# Patient Record
Sex: MALE | Race: WHITE | Employment: FULL TIME | URBAN - METROPOLITAN AREA
[De-identification: names, ages, dates, MRNs, and addresses within clinical notes are randomized per-mention and may not be internally consistent; named-entity substitution may affect disease eponyms.]

---

## 2018-01-01 ENCOUNTER — APPOINTMENT (OUTPATIENT)
Dept: GENERAL RADIOLOGY | Age: 63
DRG: 870 | End: 2018-01-01
Attending: INTERNAL MEDICINE
Payer: COMMERCIAL

## 2018-01-01 ENCOUNTER — APPOINTMENT (OUTPATIENT)
Dept: MRI IMAGING | Age: 63
DRG: 870 | End: 2018-01-01
Attending: INTERNAL MEDICINE
Payer: COMMERCIAL

## 2018-01-01 ENCOUNTER — APPOINTMENT (OUTPATIENT)
Dept: CT IMAGING | Age: 63
DRG: 870 | End: 2018-01-01
Attending: INTERNAL MEDICINE
Payer: COMMERCIAL

## 2018-01-01 ENCOUNTER — HOSPITAL ENCOUNTER (INPATIENT)
Age: 63
LOS: 16 days | DRG: 870 | End: 2018-02-28
Attending: EMERGENCY MEDICINE | Admitting: INTERNAL MEDICINE
Payer: COMMERCIAL

## 2018-01-01 ENCOUNTER — APPOINTMENT (OUTPATIENT)
Dept: INTERVENTIONAL RADIOLOGY/VASCULAR | Age: 63
DRG: 870 | End: 2018-01-01
Attending: INTERNAL MEDICINE
Payer: COMMERCIAL

## 2018-01-01 ENCOUNTER — APPOINTMENT (OUTPATIENT)
Dept: NUCLEAR MEDICINE | Age: 63
DRG: 870 | End: 2018-01-01
Attending: INTERNAL MEDICINE
Payer: COMMERCIAL

## 2018-01-01 ENCOUNTER — APPOINTMENT (OUTPATIENT)
Dept: ULTRASOUND IMAGING | Age: 63
DRG: 870 | End: 2018-01-01
Attending: INTERNAL MEDICINE
Payer: COMMERCIAL

## 2018-01-01 ENCOUNTER — APPOINTMENT (OUTPATIENT)
Dept: CT IMAGING | Age: 63
DRG: 870 | End: 2018-01-01
Attending: EMERGENCY MEDICINE
Payer: COMMERCIAL

## 2018-01-01 ENCOUNTER — APPOINTMENT (OUTPATIENT)
Dept: GENERAL RADIOLOGY | Age: 63
DRG: 870 | End: 2018-01-01
Attending: EMERGENCY MEDICINE
Payer: COMMERCIAL

## 2018-01-01 VITALS
SYSTOLIC BLOOD PRESSURE: 79 MMHG | DIASTOLIC BLOOD PRESSURE: 35 MMHG | OXYGEN SATURATION: 100 % | RESPIRATION RATE: 39 BRPM | HEIGHT: 72 IN | BODY MASS INDEX: 42.66 KG/M2 | WEIGHT: 315 LBS | TEMPERATURE: 99 F

## 2018-01-01 DIAGNOSIS — R50.9 FEVER IN ADULT: ICD-10-CM
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
## 2018-01-01 DIAGNOSIS — E87.6 HYPOKALEMIA: ICD-10-CM

## 2018-01-01 DIAGNOSIS — N17.9 AKI (ACUTE KIDNEY INJURY) (HCC): ICD-10-CM

## 2018-01-01 DIAGNOSIS — A41.9 SEPSIS, DUE TO UNSPECIFIED ORGANISM: Primary | ICD-10-CM

## 2018-01-01 DIAGNOSIS — B95.62 MRSA BACTEREMIA: ICD-10-CM

## 2018-01-01 DIAGNOSIS — J96.00 ACUTE RESPIRATORY FAILURE, UNSPECIFIED WHETHER WITH HYPOXIA OR HYPERCAPNIA (HCC): ICD-10-CM

## 2018-01-01 DIAGNOSIS — R78.81 MRSA BACTEREMIA: ICD-10-CM

## 2018-01-01 DIAGNOSIS — E86.0 DEHYDRATION, MODERATE: ICD-10-CM

## 2018-01-01 LAB
1,25(OH)2D3 SERPL-MCNC: 19.1 PG/ML (ref 19.9–79.3)
ABO + RH BLD: NORMAL
ABO + RH BLD: NORMAL
ALBUMIN SERPL-MCNC: 1.5 G/DL (ref 3.4–5)
ALBUMIN SERPL-MCNC: 1.6 G/DL (ref 3.4–5)
ALBUMIN SERPL-MCNC: 1.7 G/DL (ref 3.4–5)
ALBUMIN SERPL-MCNC: 1.7 G/DL (ref 3.4–5)
ALBUMIN SERPL-MCNC: 1.8 G/DL (ref 3.4–5)
ALBUMIN SERPL-MCNC: 1.8 G/DL (ref 3.4–5)
ALBUMIN SERPL-MCNC: 1.9 G/DL (ref 3.4–5)
ALBUMIN SERPL-MCNC: 2 G/DL (ref 3.4–5)
ALBUMIN SERPL-MCNC: 2 G/DL (ref 3.4–5)
ALBUMIN SERPL-MCNC: 2.7 G/DL (ref 3.4–5)
ALBUMIN SERPL-MCNC: 3 G/DL (ref 3.4–5)
ALBUMIN/GLOB SERPL: 0.4 {RATIO} (ref 0.8–1.7)
ALBUMIN/GLOB SERPL: 0.5 {RATIO} (ref 0.8–1.7)
ALBUMIN/GLOB SERPL: 0.7 {RATIO} (ref 0.8–1.7)
ALBUMIN/GLOB SERPL: 0.7 {RATIO} (ref 0.8–1.7)
ALP SERPL-CCNC: 113 U/L (ref 45–117)
ALP SERPL-CCNC: 122 U/L (ref 45–117)
ALP SERPL-CCNC: 181 U/L (ref 45–117)
ALP SERPL-CCNC: 215 U/L (ref 45–117)
ALP SERPL-CCNC: 224 U/L (ref 45–117)
ALP SERPL-CCNC: 226 U/L (ref 45–117)
ALP SERPL-CCNC: 262 U/L (ref 45–117)
ALP SERPL-CCNC: 317 U/L (ref 45–117)
ALP SERPL-CCNC: 394 U/L (ref 45–117)
ALP SERPL-CCNC: 414 U/L (ref 45–117)
ALP SERPL-CCNC: 46 U/L (ref 45–117)
ALP SERPL-CCNC: 54 U/L (ref 45–117)
ALP SERPL-CCNC: 57 U/L (ref 45–117)
ALP SERPL-CCNC: 62 U/L (ref 45–117)
ALP SERPL-CCNC: 68 U/L (ref 45–117)
ALP SERPL-CCNC: 72 U/L (ref 45–117)
ALP SERPL-CCNC: 97 U/L (ref 45–117)
ALT SERPL-CCNC: 20 U/L (ref 16–61)
ALT SERPL-CCNC: 21 U/L (ref 16–61)
ALT SERPL-CCNC: 21 U/L (ref 16–61)
ALT SERPL-CCNC: 22 U/L (ref 16–61)
ALT SERPL-CCNC: 23 U/L (ref 16–61)
ALT SERPL-CCNC: 25 U/L (ref 16–61)
ALT SERPL-CCNC: 26 U/L (ref 16–61)
ALT SERPL-CCNC: 27 U/L (ref 16–61)
ALT SERPL-CCNC: 28 U/L (ref 16–61)
ALT SERPL-CCNC: 29 U/L (ref 16–61)
ALT SERPL-CCNC: 33 U/L (ref 16–61)
ALT SERPL-CCNC: 34 U/L (ref 16–61)
ALT SERPL-CCNC: 35 U/L (ref 16–61)
ALT SERPL-CCNC: 36 U/L (ref 16–61)
ALT SERPL-CCNC: 37 U/L (ref 16–61)
ALT SERPL-CCNC: 40 U/L (ref 16–61)
ALT SERPL-CCNC: 45 U/L (ref 16–61)
AMORPH CRY URNS QL MICRO: ABNORMAL
AMPHET UR QL SCN: NEGATIVE
ANION GAP SERPL CALC-SCNC: 10 MMOL/L (ref 3–18)
ANION GAP SERPL CALC-SCNC: 11 MMOL/L (ref 3–18)
ANION GAP SERPL CALC-SCNC: 12 MMOL/L (ref 3–18)
ANION GAP SERPL CALC-SCNC: 15 MMOL/L (ref 3–18)
ANION GAP SERPL CALC-SCNC: 16 MMOL/L (ref 3–18)
ANION GAP SERPL CALC-SCNC: 18 MMOL/L (ref 3–18)
ANION GAP SERPL CALC-SCNC: 7 MMOL/L (ref 3–18)
ANION GAP SERPL CALC-SCNC: 8 MMOL/L (ref 3–18)
ANION GAP SERPL CALC-SCNC: 8 MMOL/L (ref 3–18)
ANION GAP SERPL CALC-SCNC: 9 MMOL/L (ref 3–18)
APPEARANCE UR: ABNORMAL
APTT PPP: 116.4 SEC (ref 23–36.4)
APTT PPP: 127.9 SEC (ref 23–36.4)
APTT PPP: 47 SEC (ref 23–36.4)
APTT PPP: 50.8 SEC (ref 23–36.4)
APTT PPP: 52.4 SEC (ref 23–36.4)
APTT PPP: 53.6 SEC (ref 23–36.4)
APTT PPP: 53.9 SEC (ref 23–36.4)
APTT PPP: 55.4 SEC (ref 23–36.4)
APTT PPP: 56.2 SEC (ref 23–36.4)
APTT PPP: 57.5 SEC (ref 23–36.4)
APTT PPP: 58.3 SEC (ref 23–36.4)
APTT PPP: 61.4 SEC (ref 23–36.4)
APTT PPP: 66.2 SEC (ref 23–36.4)
APTT PPP: 71.2 SEC (ref 23–36.4)
APTT PPP: 75.6 SEC (ref 23–36.4)
APTT PPP: 82 SEC (ref 23–36.4)
APTT PPP: 82.3 SEC (ref 23–36.4)
APTT PPP: 83.8 SEC (ref 23–36.4)
APTT PPP: 94.9 SEC (ref 23–36.4)
ARTERIAL PATENCY WRIST A: ABNORMAL
ARTERIAL PATENCY WRIST A: YES
AST SERPL-CCNC: 107 U/L (ref 15–37)
AST SERPL-CCNC: 133 U/L (ref 15–37)
AST SERPL-CCNC: 20 U/L (ref 15–37)
AST SERPL-CCNC: 23 U/L (ref 15–37)
AST SERPL-CCNC: 44 U/L (ref 15–37)
AST SERPL-CCNC: 45 U/L (ref 15–37)
AST SERPL-CCNC: 46 U/L (ref 15–37)
AST SERPL-CCNC: 47 U/L (ref 15–37)
AST SERPL-CCNC: 48 U/L (ref 15–37)
AST SERPL-CCNC: 48 U/L (ref 15–37)
AST SERPL-CCNC: 54 U/L (ref 15–37)
AST SERPL-CCNC: 55 U/L (ref 15–37)
AST SERPL-CCNC: 57 U/L (ref 15–37)
AST SERPL-CCNC: 58 U/L (ref 15–37)
AST SERPL-CCNC: 59 U/L (ref 15–37)
AST SERPL-CCNC: 73 U/L (ref 15–37)
AST SERPL-CCNC: 80 U/L (ref 15–37)
ATRIAL RATE: 112 BPM
ATRIAL RATE: 115 BPM
ATRIAL RATE: 120 BPM
ATRIAL RATE: 272 BPM
BACTERIA SPEC CULT: ABNORMAL
BACTERIA SPEC CULT: NORMAL
BACTERIA URNS QL MICRO: ABNORMAL /HPF
BARBITURATES UR QL SCN: POSITIVE
BASE DEFICIT BLD-SCNC: 1 MMOL/L
BASE DEFICIT BLD-SCNC: 2 MMOL/L
BASE DEFICIT BLD-SCNC: 3 MMOL/L
BASE DEFICIT BLD-SCNC: 4 MMOL/L
BASE DEFICIT BLD-SCNC: 4 MMOL/L
BASE DEFICIT BLD-SCNC: 5 MMOL/L
BASE DEFICIT BLD-SCNC: 6 MMOL/L
BASE DEFICIT BLD-SCNC: 7 MMOL/L
BASE DEFICIT BLD-SCNC: 7 MMOL/L
BASE EXCESS BLD CALC-SCNC: 0 MMOL/L
BASE EXCESS BLD CALC-SCNC: 1 MMOL/L
BASE EXCESS BLD CALC-SCNC: 1 MMOL/L
BASE EXCESS BLD CALC-SCNC: 2 MMOL/L
BASE EXCESS BLD CALC-SCNC: 5 MMOL/L
BASE EXCESS BLD CALC-SCNC: 6 MMOL/L
BASE EXCESS BLD CALC-SCNC: 6 MMOL/L
BASE EXCESS BLD CALC-SCNC: 8 MMOL/L
BASE EXCESS BLD CALC-SCNC: 9 MMOL/L
BASOPHILS # BLD: 0 K/UL (ref 0–0.06)
BASOPHILS # BLD: 0 K/UL (ref 0–0.1)
BASOPHILS # BLD: 0.1 K/UL (ref 0–0.06)
BASOPHILS NFR BLD: 0 % (ref 0–2)
BASOPHILS NFR BLD: 0 % (ref 0–3)
BASOPHILS NFR BLD: 1 % (ref 0–2)
BDY SITE: ABNORMAL
BENZODIAZ UR QL: POSITIVE
BILIRUB SERPL-MCNC: 0.7 MG/DL (ref 0.2–1)
BILIRUB SERPL-MCNC: 0.7 MG/DL (ref 0.2–1)
BILIRUB SERPL-MCNC: 0.8 MG/DL (ref 0.2–1)
BILIRUB SERPL-MCNC: 1 MG/DL (ref 0.2–1)
BILIRUB SERPL-MCNC: 1.2 MG/DL (ref 0.2–1)
BILIRUB SERPL-MCNC: 1.3 MG/DL (ref 0.2–1)
BILIRUB SERPL-MCNC: 1.4 MG/DL (ref 0.2–1)
BILIRUB SERPL-MCNC: 1.4 MG/DL (ref 0.2–1)
BILIRUB SERPL-MCNC: 1.5 MG/DL (ref 0.2–1)
BILIRUB SERPL-MCNC: 1.5 MG/DL (ref 0.2–1)
BILIRUB SERPL-MCNC: 1.6 MG/DL (ref 0.2–1)
BILIRUB SERPL-MCNC: 1.9 MG/DL (ref 0.2–1)
BILIRUB SERPL-MCNC: 2 MG/DL (ref 0.2–1)
BILIRUB SERPL-MCNC: 2.1 MG/DL (ref 0.2–1)
BILIRUB SERPL-MCNC: 2.2 MG/DL (ref 0.2–1)
BILIRUB SERPL-MCNC: 2.4 MG/DL (ref 0.2–1)
BILIRUB SERPL-MCNC: 2.8 MG/DL (ref 0.2–1)
BILIRUB UR QL: NEGATIVE
BLD PROD TYP BPU: NORMAL
BLD PROD TYP BPU: NORMAL
BLOOD GROUP ANTIBODIES SERPL: NORMAL
BLOOD GROUP ANTIBODIES SERPL: NORMAL
BODY TEMPERATURE: 100
BODY TEMPERATURE: 100.7
BODY TEMPERATURE: 100.8
BODY TEMPERATURE: 101.2
BODY TEMPERATURE: 102.5
BODY TEMPERATURE: 103
BODY TEMPERATURE: 103
BODY TEMPERATURE: 97.8
BODY TEMPERATURE: 98.2
BODY TEMPERATURE: 98.4
BODY TEMPERATURE: 98.5
BODY TEMPERATURE: 98.6
BODY TEMPERATURE: 98.8
BODY TEMPERATURE: 98.8
BODY TEMPERATURE: 99
BODY TEMPERATURE: 99
BODY TEMPERATURE: 99.2
BODY TEMPERATURE: 99.3
BODY TEMPERATURE: 99.5
BODY TEMPERATURE: 99.6
BODY TEMPERATURE: 99.7
BODY TEMPERATURE: 99.9
BPU ID: NORMAL
BPU ID: NORMAL
BUN SERPL-MCNC: 27 MG/DL (ref 7–18)
BUN SERPL-MCNC: 28 MG/DL (ref 7–18)
BUN SERPL-MCNC: 29 MG/DL (ref 7–18)
BUN SERPL-MCNC: 29 MG/DL (ref 7–18)
BUN SERPL-MCNC: 30 MG/DL (ref 7–18)
BUN SERPL-MCNC: 30 MG/DL (ref 7–18)
BUN SERPL-MCNC: 31 MG/DL (ref 7–18)
BUN SERPL-MCNC: 32 MG/DL (ref 7–18)
BUN SERPL-MCNC: 33 MG/DL (ref 7–18)
BUN SERPL-MCNC: 34 MG/DL (ref 7–18)
BUN SERPL-MCNC: 37 MG/DL (ref 7–18)
BUN SERPL-MCNC: 43 MG/DL (ref 7–18)
BUN SERPL-MCNC: 44 MG/DL (ref 7–18)
BUN SERPL-MCNC: 45 MG/DL (ref 7–18)
BUN SERPL-MCNC: 57 MG/DL (ref 7–18)
BUN/CREAT SERPL: 12 (ref 12–20)
BUN/CREAT SERPL: 12 (ref 12–20)
BUN/CREAT SERPL: 14 (ref 12–20)
BUN/CREAT SERPL: 14 (ref 12–20)
BUN/CREAT SERPL: 15 (ref 12–20)
BUN/CREAT SERPL: 16 (ref 12–20)
BUN/CREAT SERPL: 19 (ref 12–20)
BUN/CREAT SERPL: 19 (ref 12–20)
BUN/CREAT SERPL: 20 (ref 12–20)
BUN/CREAT SERPL: 21 (ref 12–20)
BUN/CREAT SERPL: 21 (ref 12–20)
BUN/CREAT SERPL: 22 (ref 12–20)
BUN/CREAT SERPL: 23 (ref 12–20)
BUN/CREAT SERPL: 24 (ref 12–20)
CA-I SERPL-SCNC: 1.13 MMOL/L (ref 1.12–1.32)
CA-I SERPL-SCNC: 1.16 MMOL/L (ref 1.12–1.32)
CA-I SERPL-SCNC: 1.17 MMOL/L (ref 1.12–1.32)
CA-I SERPL-SCNC: 1.19 MMOL/L (ref 1.12–1.32)
CA-I SERPL-SCNC: 1.2 MMOL/L (ref 1.12–1.32)
CA-I SERPL-SCNC: 1.2 MMOL/L (ref 1.12–1.32)
CA-I SERPL-SCNC: 1.22 MMOL/L (ref 1.12–1.32)
CA-I SERPL-SCNC: 1.22 MMOL/L (ref 1.12–1.32)
CA-I SERPL-SCNC: 1.26 MMOL/L (ref 1.12–1.32)
CA-I SERPL-SCNC: 1.27 MMOL/L (ref 1.12–1.32)
CA-I SERPL-SCNC: 1.28 MMOL/L (ref 1.12–1.32)
CA-I SERPL-SCNC: 1.31 MMOL/L (ref 1.12–1.32)
CALCIUM SERPL-MCNC: 7.4 MG/DL (ref 8.5–10.1)
CALCIUM SERPL-MCNC: 8 MG/DL (ref 8.5–10.1)
CALCIUM SERPL-MCNC: 8.2 MG/DL (ref 8.5–10.1)
CALCIUM SERPL-MCNC: 8.2 MG/DL (ref 8.5–10.1)
CALCIUM SERPL-MCNC: 8.3 MG/DL (ref 8.5–10.1)
CALCIUM SERPL-MCNC: 8.5 MG/DL (ref 8.5–10.1)
CALCIUM SERPL-MCNC: 8.5 MG/DL (ref 8.5–10.1)
CALCIUM SERPL-MCNC: 8.6 MG/DL (ref 8.5–10.1)
CALCIUM SERPL-MCNC: 8.7 MG/DL (ref 8.5–10.1)
CALCIUM SERPL-MCNC: 8.8 MG/DL (ref 8.5–10.1)
CALCIUM SERPL-MCNC: 8.9 MG/DL (ref 8.5–10.1)
CALCIUM SERPL-MCNC: 9 MG/DL (ref 8.5–10.1)
CALCIUM SERPL-MCNC: 9.2 MG/DL (ref 8.5–10.1)
CALCIUM SERPL-MCNC: 9.4 MG/DL (ref 8.5–10.1)
CALCIUM SERPL-MCNC: 9.5 MG/DL (ref 8.5–10.1)
CALCULATED P AXIS, ECG09: 31 DEGREES
CALCULATED R AXIS, ECG10: -21 DEGREES
CALCULATED R AXIS, ECG10: -34 DEGREES
CALCULATED R AXIS, ECG10: -36 DEGREES
CALCULATED R AXIS, ECG10: -39 DEGREES
CALCULATED T AXIS, ECG11: -136 DEGREES
CALCULATED T AXIS, ECG11: -168 DEGREES
CALCULATED T AXIS, ECG11: -89 DEGREES
CALCULATED T AXIS, ECG11: 54 DEGREES
CALLED TO:,BCALL1: NORMAL
CALLED TO:,BCALL1: NORMAL
CANNABINOIDS UR QL SCN: NEGATIVE
CHLORIDE SERPL-SCNC: 102 MMOL/L (ref 100–108)
CHLORIDE SERPL-SCNC: 103 MMOL/L (ref 100–108)
CHLORIDE SERPL-SCNC: 105 MMOL/L (ref 100–108)
CHLORIDE SERPL-SCNC: 106 MMOL/L (ref 100–108)
CHLORIDE SERPL-SCNC: 107 MMOL/L (ref 100–108)
CHLORIDE SERPL-SCNC: 108 MMOL/L (ref 100–108)
CHLORIDE SERPL-SCNC: 109 MMOL/L (ref 100–108)
CHLORIDE SERPL-SCNC: 110 MMOL/L (ref 100–108)
CHLORIDE SERPL-SCNC: 112 MMOL/L (ref 100–108)
CHLORIDE SERPL-SCNC: 115 MMOL/L (ref 100–108)
CHLORIDE SERPL-SCNC: 97 MMOL/L (ref 100–108)
CHLORIDE SERPL-SCNC: 99 MMOL/L (ref 100–108)
CHOLEST SERPL-MCNC: 116 MG/DL
CK SERPL-CCNC: 1354 U/L (ref 39–308)
CK SERPL-CCNC: 217 U/L (ref 39–308)
CK SERPL-CCNC: 260 U/L (ref 39–308)
CK SERPL-CCNC: 2677 U/L (ref 39–308)
CK SERPL-CCNC: 3318 U/L (ref 39–308)
CK SERPL-CCNC: 647 U/L (ref 39–308)
CO2 SERPL-SCNC: 19 MMOL/L (ref 21–32)
CO2 SERPL-SCNC: 20 MMOL/L (ref 21–32)
CO2 SERPL-SCNC: 21 MMOL/L (ref 21–32)
CO2 SERPL-SCNC: 22 MMOL/L (ref 21–32)
CO2 SERPL-SCNC: 23 MMOL/L (ref 21–32)
CO2 SERPL-SCNC: 23 MMOL/L (ref 21–32)
CO2 SERPL-SCNC: 25 MMOL/L (ref 21–32)
CO2 SERPL-SCNC: 25 MMOL/L (ref 21–32)
CO2 SERPL-SCNC: 26 MMOL/L (ref 21–32)
CO2 SERPL-SCNC: 27 MMOL/L (ref 21–32)
CO2 SERPL-SCNC: 27 MMOL/L (ref 21–32)
CO2 SERPL-SCNC: 28 MMOL/L (ref 21–32)
CO2 SERPL-SCNC: 30 MMOL/L (ref 21–32)
CO2 SERPL-SCNC: 31 MMOL/L (ref 21–32)
CO2 SERPL-SCNC: 31 MMOL/L (ref 21–32)
COCAINE UR QL SCN: NEGATIVE
COLOR UR: ABNORMAL
CREAT SERPL-MCNC: 1.38 MG/DL (ref 0.6–1.3)
CREAT SERPL-MCNC: 1.39 MG/DL (ref 0.6–1.3)
CREAT SERPL-MCNC: 1.43 MG/DL (ref 0.6–1.3)
CREAT SERPL-MCNC: 1.44 MG/DL (ref 0.6–1.3)
CREAT SERPL-MCNC: 1.45 MG/DL (ref 0.6–1.3)
CREAT SERPL-MCNC: 1.48 MG/DL (ref 0.6–1.3)
CREAT SERPL-MCNC: 1.49 MG/DL (ref 0.6–1.3)
CREAT SERPL-MCNC: 1.59 MG/DL (ref 0.6–1.3)
CREAT SERPL-MCNC: 1.95 MG/DL (ref 0.6–1.3)
CREAT SERPL-MCNC: 1.99 MG/DL (ref 0.6–1.3)
CREAT SERPL-MCNC: 2.1 MG/DL (ref 0.6–1.3)
CREAT SERPL-MCNC: 2.22 MG/DL (ref 0.6–1.3)
CREAT SERPL-MCNC: 2.24 MG/DL (ref 0.6–1.3)
CREAT SERPL-MCNC: 2.5 MG/DL (ref 0.6–1.3)
CREAT SERPL-MCNC: 2.69 MG/DL (ref 0.6–1.3)
CREAT SERPL-MCNC: 2.73 MG/DL (ref 0.6–1.3)
CREAT SERPL-MCNC: 3.12 MG/DL (ref 0.6–1.3)
CREAT SERPL-MCNC: 4.06 MG/DL (ref 0.6–1.3)
CROSSMATCH RESULT,%XM: NORMAL
CROSSMATCH RESULT,%XM: NORMAL
DATE LAST DOSE: NORMAL
DIAGNOSIS, 93000: NORMAL
DIFFERENTIAL METHOD BLD: ABNORMAL
DIGOXIN SERPL-MCNC: 1.4 NG/ML (ref 0.9–2)
EOSINOPHIL # BLD: 0 K/UL (ref 0–0.4)
EOSINOPHIL # BLD: 0 K/UL (ref 0–0.4)
EOSINOPHIL # BLD: 0.2 K/UL (ref 0–0.4)
EOSINOPHIL # BLD: 0.4 K/UL (ref 0–0.4)
EOSINOPHIL # BLD: 0.5 K/UL (ref 0–0.4)
EOSINOPHIL # BLD: 0.6 K/UL (ref 0–0.4)
EOSINOPHIL NFR BLD: 0 % (ref 0–5)
EOSINOPHIL NFR BLD: 0 % (ref 0–5)
EOSINOPHIL NFR BLD: 2 % (ref 0–5)
EOSINOPHIL NFR BLD: 6 % (ref 0–5)
EOSINOPHIL NFR BLD: 6 % (ref 0–5)
EOSINOPHIL NFR BLD: 8 % (ref 0–5)
ERYTHROCYTE [DISTWIDTH] IN BLOOD BY AUTOMATED COUNT: 15.3 % (ref 11.6–14.5)
ERYTHROCYTE [DISTWIDTH] IN BLOOD BY AUTOMATED COUNT: 15.5 % (ref 11.6–14.5)
ERYTHROCYTE [DISTWIDTH] IN BLOOD BY AUTOMATED COUNT: 15.7 % (ref 11.6–14.5)
ERYTHROCYTE [DISTWIDTH] IN BLOOD BY AUTOMATED COUNT: 15.8 % (ref 11.6–14.5)
ERYTHROCYTE [DISTWIDTH] IN BLOOD BY AUTOMATED COUNT: 16.8 % (ref 11.6–14.5)
ERYTHROCYTE [DISTWIDTH] IN BLOOD BY AUTOMATED COUNT: 16.9 % (ref 11.6–14.5)
ERYTHROCYTE [DISTWIDTH] IN BLOOD BY AUTOMATED COUNT: 17.7 % (ref 11.6–14.5)
ERYTHROCYTE [DISTWIDTH] IN BLOOD BY AUTOMATED COUNT: 18.1 % (ref 11.6–14.5)
ERYTHROCYTE [DISTWIDTH] IN BLOOD BY AUTOMATED COUNT: 18.2 % (ref 11.6–14.5)
ERYTHROCYTE [DISTWIDTH] IN BLOOD BY AUTOMATED COUNT: 18.3 % (ref 11.6–14.5)
ERYTHROCYTE [DISTWIDTH] IN BLOOD BY AUTOMATED COUNT: 18.7 % (ref 11.6–14.5)
ERYTHROCYTE [DISTWIDTH] IN BLOOD BY AUTOMATED COUNT: 19.2 % (ref 11.6–14.5)
ERYTHROCYTE [DISTWIDTH] IN BLOOD BY AUTOMATED COUNT: 19.3 % (ref 11.6–14.5)
ERYTHROCYTE [DISTWIDTH] IN BLOOD BY AUTOMATED COUNT: 19.6 % (ref 11.6–14.5)
ERYTHROCYTE [DISTWIDTH] IN BLOOD BY AUTOMATED COUNT: 19.6 % (ref 11.6–14.5)
ERYTHROCYTE [DISTWIDTH] IN BLOOD BY AUTOMATED COUNT: 19.7 % (ref 11.6–14.5)
ERYTHROCYTE [DISTWIDTH] IN BLOOD BY AUTOMATED COUNT: 20.1 % (ref 11.6–14.5)
FLUAV AG NPH QL IA: NEGATIVE
FLUBV AG NOSE QL IA: NEGATIVE
GAS FLOW.O2 O2 DELIVERY SYS: ABNORMAL L/MIN
GAS FLOW.O2 SETTING OXYMISER: 12 BPM
GAS FLOW.O2 SETTING OXYMISER: 14 BPM
GAS FLOW.O2 SETTING OXYMISER: 16 BPM
GAS FLOW.O2 SETTING OXYMISER: 16 BPM
GAS FLOW.O2 SETTING OXYMISER: 2 L/M
GAS FLOW.O2 SETTING OXYMISER: 20 BPM
GAS FLOW.O2 SETTING OXYMISER: 20 BPM
GAS FLOW.O2 SETTING OXYMISER: 24 BPM
GAS FLOW.O2 SETTING OXYMISER: 24 BPM
GAS FLOW.O2 SETTING OXYMISER: 3.5 L/M
GAS FLOW.O2 SETTING OXYMISER: 50 L/M
GAS FLOW.O2 SETTING OXYMISER: 50 L/M
GLOBULIN SER CALC-MCNC: 3.2 G/DL (ref 2–4)
GLOBULIN SER CALC-MCNC: 3.8 G/DL (ref 2–4)
GLOBULIN SER CALC-MCNC: 3.9 G/DL (ref 2–4)
GLOBULIN SER CALC-MCNC: 3.9 G/DL (ref 2–4)
GLOBULIN SER CALC-MCNC: 4 G/DL (ref 2–4)
GLOBULIN SER CALC-MCNC: 4.1 G/DL (ref 2–4)
GLOBULIN SER CALC-MCNC: 4.3 G/DL (ref 2–4)
GLOBULIN SER CALC-MCNC: 4.3 G/DL (ref 2–4)
GLOBULIN SER CALC-MCNC: 4.5 G/DL (ref 2–4)
GLOBULIN SER CALC-MCNC: 4.6 G/DL (ref 2–4)
GLOBULIN SER CALC-MCNC: 4.9 G/DL (ref 2–4)
GLOBULIN SER CALC-MCNC: 5.1 G/DL (ref 2–4)
GLUCOSE BLD STRIP.AUTO-MCNC: 103 MG/DL (ref 70–110)
GLUCOSE BLD STRIP.AUTO-MCNC: 105 MG/DL (ref 70–110)
GLUCOSE BLD STRIP.AUTO-MCNC: 105 MG/DL (ref 70–110)
GLUCOSE BLD STRIP.AUTO-MCNC: 106 MG/DL (ref 70–110)
GLUCOSE BLD STRIP.AUTO-MCNC: 109 MG/DL (ref 70–110)
GLUCOSE BLD STRIP.AUTO-MCNC: 110 MG/DL (ref 70–110)
GLUCOSE BLD STRIP.AUTO-MCNC: 110 MG/DL (ref 70–110)
GLUCOSE BLD STRIP.AUTO-MCNC: 112 MG/DL (ref 70–110)
GLUCOSE BLD STRIP.AUTO-MCNC: 114 MG/DL (ref 70–110)
GLUCOSE BLD STRIP.AUTO-MCNC: 116 MG/DL (ref 70–110)
GLUCOSE BLD STRIP.AUTO-MCNC: 117 MG/DL (ref 70–110)
GLUCOSE BLD STRIP.AUTO-MCNC: 118 MG/DL (ref 70–110)
GLUCOSE BLD STRIP.AUTO-MCNC: 120 MG/DL (ref 70–110)
GLUCOSE BLD STRIP.AUTO-MCNC: 122 MG/DL (ref 70–110)
GLUCOSE BLD STRIP.AUTO-MCNC: 124 MG/DL (ref 70–110)
GLUCOSE BLD STRIP.AUTO-MCNC: 126 MG/DL (ref 70–110)
GLUCOSE BLD STRIP.AUTO-MCNC: 130 MG/DL (ref 70–110)
GLUCOSE BLD STRIP.AUTO-MCNC: 131 MG/DL (ref 70–110)
GLUCOSE BLD STRIP.AUTO-MCNC: 132 MG/DL (ref 70–110)
GLUCOSE BLD STRIP.AUTO-MCNC: 137 MG/DL (ref 70–110)
GLUCOSE BLD STRIP.AUTO-MCNC: 138 MG/DL (ref 70–110)
GLUCOSE BLD STRIP.AUTO-MCNC: 139 MG/DL (ref 70–110)
GLUCOSE BLD STRIP.AUTO-MCNC: 140 MG/DL (ref 70–110)
GLUCOSE BLD STRIP.AUTO-MCNC: 140 MG/DL (ref 70–110)
GLUCOSE BLD STRIP.AUTO-MCNC: 142 MG/DL (ref 70–110)
GLUCOSE BLD STRIP.AUTO-MCNC: 143 MG/DL (ref 70–110)
GLUCOSE BLD STRIP.AUTO-MCNC: 144 MG/DL (ref 70–110)
GLUCOSE BLD STRIP.AUTO-MCNC: 145 MG/DL (ref 70–110)
GLUCOSE BLD STRIP.AUTO-MCNC: 150 MG/DL (ref 70–110)
GLUCOSE BLD STRIP.AUTO-MCNC: 150 MG/DL (ref 70–110)
GLUCOSE BLD STRIP.AUTO-MCNC: 154 MG/DL (ref 70–110)
GLUCOSE BLD STRIP.AUTO-MCNC: 154 MG/DL (ref 70–110)
GLUCOSE BLD STRIP.AUTO-MCNC: 155 MG/DL (ref 70–110)
GLUCOSE BLD STRIP.AUTO-MCNC: 158 MG/DL (ref 70–110)
GLUCOSE BLD STRIP.AUTO-MCNC: 158 MG/DL (ref 70–110)
GLUCOSE BLD STRIP.AUTO-MCNC: 164 MG/DL (ref 70–110)
GLUCOSE BLD STRIP.AUTO-MCNC: 165 MG/DL (ref 70–110)
GLUCOSE BLD STRIP.AUTO-MCNC: 168 MG/DL (ref 70–110)
GLUCOSE BLD STRIP.AUTO-MCNC: 172 MG/DL (ref 70–110)
GLUCOSE BLD STRIP.AUTO-MCNC: 172 MG/DL (ref 70–110)
GLUCOSE BLD STRIP.AUTO-MCNC: 176 MG/DL (ref 70–110)
GLUCOSE BLD STRIP.AUTO-MCNC: 177 MG/DL (ref 70–110)
GLUCOSE BLD STRIP.AUTO-MCNC: 179 MG/DL (ref 70–110)
GLUCOSE BLD STRIP.AUTO-MCNC: 181 MG/DL (ref 70–110)
GLUCOSE BLD STRIP.AUTO-MCNC: 181 MG/DL (ref 70–110)
GLUCOSE BLD STRIP.AUTO-MCNC: 182 MG/DL (ref 70–110)
GLUCOSE BLD STRIP.AUTO-MCNC: 183 MG/DL (ref 70–110)
GLUCOSE BLD STRIP.AUTO-MCNC: 184 MG/DL (ref 70–110)
GLUCOSE BLD STRIP.AUTO-MCNC: 186 MG/DL (ref 70–110)
GLUCOSE BLD STRIP.AUTO-MCNC: 188 MG/DL (ref 70–110)
GLUCOSE BLD STRIP.AUTO-MCNC: 198 MG/DL (ref 70–110)
GLUCOSE BLD STRIP.AUTO-MCNC: 200 MG/DL (ref 70–110)
GLUCOSE BLD STRIP.AUTO-MCNC: 203 MG/DL (ref 70–110)
GLUCOSE BLD STRIP.AUTO-MCNC: 207 MG/DL (ref 70–110)
GLUCOSE BLD STRIP.AUTO-MCNC: 208 MG/DL (ref 70–110)
GLUCOSE BLD STRIP.AUTO-MCNC: 209 MG/DL (ref 70–110)
GLUCOSE BLD STRIP.AUTO-MCNC: 215 MG/DL (ref 70–110)
GLUCOSE BLD STRIP.AUTO-MCNC: 223 MG/DL (ref 70–110)
GLUCOSE BLD STRIP.AUTO-MCNC: 228 MG/DL (ref 70–110)
GLUCOSE BLD STRIP.AUTO-MCNC: 266 MG/DL (ref 70–110)
GLUCOSE BLD STRIP.AUTO-MCNC: 267 MG/DL (ref 70–110)
GLUCOSE BLD STRIP.AUTO-MCNC: 89 MG/DL (ref 70–110)
GLUCOSE BLD STRIP.AUTO-MCNC: 92 MG/DL (ref 70–110)
GLUCOSE BLD STRIP.AUTO-MCNC: 92 MG/DL (ref 70–110)
GLUCOSE BLD STRIP.AUTO-MCNC: 94 MG/DL (ref 70–110)
GLUCOSE SERPL-MCNC: 103 MG/DL (ref 74–99)
GLUCOSE SERPL-MCNC: 107 MG/DL (ref 74–99)
GLUCOSE SERPL-MCNC: 108 MG/DL (ref 74–99)
GLUCOSE SERPL-MCNC: 118 MG/DL (ref 74–99)
GLUCOSE SERPL-MCNC: 130 MG/DL (ref 74–99)
GLUCOSE SERPL-MCNC: 142 MG/DL (ref 74–99)
GLUCOSE SERPL-MCNC: 149 MG/DL (ref 74–99)
GLUCOSE SERPL-MCNC: 161 MG/DL (ref 74–99)
GLUCOSE SERPL-MCNC: 178 MG/DL (ref 74–99)
GLUCOSE SERPL-MCNC: 179 MG/DL (ref 74–99)
GLUCOSE SERPL-MCNC: 186 MG/DL (ref 74–99)
GLUCOSE SERPL-MCNC: 198 MG/DL (ref 74–99)
GLUCOSE SERPL-MCNC: 199 MG/DL (ref 74–99)
GLUCOSE SERPL-MCNC: 200 MG/DL (ref 74–99)
GLUCOSE SERPL-MCNC: 203 MG/DL (ref 74–99)
GLUCOSE SERPL-MCNC: 226 MG/DL (ref 74–99)
GLUCOSE SERPL-MCNC: 257 MG/DL (ref 74–99)
GLUCOSE SERPL-MCNC: 272 MG/DL (ref 74–99)
GLUCOSE UR STRIP.AUTO-MCNC: >1000 MG/DL
GRAM STN SPEC: ABNORMAL
HBA1C MFR BLD: 5.8 % (ref 4.5–5.6)
HBV SURFACE AB SER QL IA: NEGATIVE
HBV SURFACE AB SERPL IA-ACNC: <3.1 MIU/ML
HBV SURFACE AG SER QL: <0.1 INDEX
HBV SURFACE AG SER QL: NEGATIVE
HCO3 BLD-SCNC: 17.3 MMOL/L (ref 22–26)
HCO3 BLD-SCNC: 18.5 MMOL/L (ref 22–26)
HCO3 BLD-SCNC: 19.3 MMOL/L (ref 22–26)
HCO3 BLD-SCNC: 19.5 MMOL/L (ref 22–26)
HCO3 BLD-SCNC: 19.5 MMOL/L (ref 22–26)
HCO3 BLD-SCNC: 20.1 MMOL/L (ref 22–26)
HCO3 BLD-SCNC: 20.5 MMOL/L (ref 22–26)
HCO3 BLD-SCNC: 21.9 MMOL/L (ref 22–26)
HCO3 BLD-SCNC: 22.3 MMOL/L (ref 22–26)
HCO3 BLD-SCNC: 22.7 MMOL/L (ref 22–26)
HCO3 BLD-SCNC: 23.1 MMOL/L (ref 22–26)
HCO3 BLD-SCNC: 23.5 MMOL/L (ref 22–26)
HCO3 BLD-SCNC: 23.8 MMOL/L (ref 22–26)
HCO3 BLD-SCNC: 24 MMOL/L (ref 22–26)
HCO3 BLD-SCNC: 24.1 MMOL/L (ref 22–26)
HCO3 BLD-SCNC: 24.1 MMOL/L (ref 22–26)
HCO3 BLD-SCNC: 24.5 MMOL/L (ref 22–26)
HCO3 BLD-SCNC: 25.1 MMOL/L (ref 22–26)
HCO3 BLD-SCNC: 25.3 MMOL/L (ref 22–26)
HCO3 BLD-SCNC: 27.7 MMOL/L (ref 22–26)
HCO3 BLD-SCNC: 28.6 MMOL/L (ref 22–26)
HCO3 BLD-SCNC: 29.4 MMOL/L (ref 22–26)
HCO3 BLD-SCNC: 29.4 MMOL/L (ref 22–26)
HCO3 BLD-SCNC: 30.1 MMOL/L (ref 22–26)
HCO3 BLD-SCNC: 30.7 MMOL/L (ref 22–26)
HCO3 BLD-SCNC: 31.7 MMOL/L (ref 22–26)
HCT VFR BLD AUTO: 21.8 % (ref 36–48)
HCT VFR BLD AUTO: 22.5 % (ref 36–48)
HCT VFR BLD AUTO: 23.8 % (ref 36–48)
HCT VFR BLD AUTO: 24.5 % (ref 36–48)
HCT VFR BLD AUTO: 24.5 % (ref 36–48)
HCT VFR BLD AUTO: 24.9 % (ref 36–48)
HCT VFR BLD AUTO: 25.6 % (ref 36–48)
HCT VFR BLD AUTO: 25.9 % (ref 36–48)
HCT VFR BLD AUTO: 25.9 % (ref 36–48)
HCT VFR BLD AUTO: 26 % (ref 36–48)
HCT VFR BLD AUTO: 26 % (ref 36–48)
HCT VFR BLD AUTO: 26.4 % (ref 36–48)
HCT VFR BLD AUTO: 26.5 % (ref 36–48)
HCT VFR BLD AUTO: 28.1 % (ref 36–48)
HCT VFR BLD AUTO: 29.4 % (ref 36–48)
HCT VFR BLD AUTO: 29.8 % (ref 36–48)
HCT VFR BLD AUTO: 32.3 % (ref 36–48)
HCT VFR BLD AUTO: 32.9 % (ref 36–48)
HDLC SERPL-MCNC: 27 MG/DL (ref 40–60)
HDLC SERPL: 4.3 {RATIO} (ref 0–5)
HDSCOM,HDSCOM: ABNORMAL
HEP BS AB COMMENT,HBSAC: ABNORMAL
HGB BLD-MCNC: 10.1 G/DL (ref 13–16)
HGB BLD-MCNC: 10.8 G/DL (ref 13–16)
HGB BLD-MCNC: 11.2 G/DL (ref 13–16)
HGB BLD-MCNC: 7.4 G/DL (ref 13–16)
HGB BLD-MCNC: 7.5 G/DL (ref 13–16)
HGB BLD-MCNC: 7.8 G/DL (ref 13–16)
HGB BLD-MCNC: 8 G/DL (ref 13–16)
HGB BLD-MCNC: 8.1 G/DL (ref 13–16)
HGB BLD-MCNC: 8.4 G/DL (ref 13–16)
HGB BLD-MCNC: 8.5 G/DL (ref 13–16)
HGB BLD-MCNC: 8.8 G/DL (ref 13–16)
HGB BLD-MCNC: 8.9 G/DL (ref 13–16)
HGB BLD-MCNC: 9.9 G/DL (ref 13–16)
HGB UR QL STRIP: ABNORMAL
INR PPP: 1.1 (ref 0.8–1.2)
INR PPP: 1.2 (ref 0.8–1.2)
INR PPP: 1.2 (ref 0.8–1.2)
INR PPP: 1.4 (ref 0.8–1.2)
INR PPP: 1.6 (ref 0.8–1.2)
INSPIRATION.DURATION SETTING TIME VENT: 0.85 SEC
INSPIRATION.DURATION SETTING TIME VENT: 0.9 SEC
INSPIRATION.DURATION SETTING TIME VENT: 1 SEC
KETONES UR QL STRIP.AUTO: ABNORMAL MG/DL
LACTATE SERPL-SCNC: 1.8 MMOL/L (ref 0.4–2)
LACTATE SERPL-SCNC: 2.1 MMOL/L (ref 0.4–2)
LACTATE SERPL-SCNC: 2.2 MMOL/L (ref 0.4–2)
LACTATE SERPL-SCNC: 2.3 MMOL/L (ref 0.4–2)
LDLC SERPL CALC-MCNC: 57.4 MG/DL (ref 0–100)
LEUKOCYTE ESTERASE UR QL STRIP.AUTO: NEGATIVE
LIPASE SERPL-CCNC: 176 U/L (ref 73–393)
LIPID PROFILE,FLP: ABNORMAL
LYMPHOCYTES # BLD: 0.5 K/UL (ref 0.9–3.6)
LYMPHOCYTES # BLD: 0.6 K/UL (ref 0.9–3.6)
LYMPHOCYTES # BLD: 1.4 K/UL (ref 0.9–3.6)
LYMPHOCYTES # BLD: 1.7 K/UL (ref 0.9–3.6)
LYMPHOCYTES # BLD: 1.8 K/UL (ref 0.9–3.6)
LYMPHOCYTES # BLD: 1.9 K/UL (ref 0.9–3.6)
LYMPHOCYTES # BLD: 1.9 K/UL (ref 0.9–3.6)
LYMPHOCYTES # BLD: 2 K/UL (ref 0.8–3.5)
LYMPHOCYTES # BLD: 2 K/UL (ref 0.9–3.6)
LYMPHOCYTES NFR BLD: 16 % (ref 21–52)
LYMPHOCYTES NFR BLD: 17 % (ref 21–52)
LYMPHOCYTES NFR BLD: 17 % (ref 21–52)
LYMPHOCYTES NFR BLD: 21 % (ref 21–52)
LYMPHOCYTES NFR BLD: 21 % (ref 21–52)
LYMPHOCYTES NFR BLD: 24 % (ref 21–52)
LYMPHOCYTES NFR BLD: 25 % (ref 20–51)
LYMPHOCYTES NFR BLD: 9 % (ref 21–52)
LYMPHOCYTES NFR BLD: 9 % (ref 21–52)
MAGNESIUM SERPL-MCNC: 0.8 MG/DL (ref 1.6–2.6)
MAGNESIUM SERPL-MCNC: 1 MG/DL (ref 1.6–2.6)
MAGNESIUM SERPL-MCNC: 1.1 MG/DL (ref 1.6–2.6)
MAGNESIUM SERPL-MCNC: 1.5 MG/DL (ref 1.6–2.6)
MAGNESIUM SERPL-MCNC: 1.5 MG/DL (ref 1.6–2.6)
MAGNESIUM SERPL-MCNC: 1.6 MG/DL (ref 1.6–2.6)
MAGNESIUM SERPL-MCNC: 1.7 MG/DL (ref 1.6–2.6)
MAGNESIUM SERPL-MCNC: 1.8 MG/DL (ref 1.6–2.6)
MAGNESIUM SERPL-MCNC: 1.9 MG/DL (ref 1.6–2.6)
MAGNESIUM SERPL-MCNC: 2 MG/DL (ref 1.6–2.6)
MCH RBC QN AUTO: 29.4 PG (ref 24–34)
MCH RBC QN AUTO: 29.6 PG (ref 24–34)
MCH RBC QN AUTO: 29.8 PG (ref 24–34)
MCH RBC QN AUTO: 30 PG (ref 24–34)
MCH RBC QN AUTO: 30 PG (ref 24–34)
MCH RBC QN AUTO: 30.1 PG (ref 24–34)
MCH RBC QN AUTO: 30.2 PG (ref 24–34)
MCH RBC QN AUTO: 30.3 PG (ref 24–34)
MCH RBC QN AUTO: 30.7 PG (ref 24–34)
MCH RBC QN AUTO: 30.8 PG (ref 24–34)
MCH RBC QN AUTO: 30.9 PG (ref 24–34)
MCH RBC QN AUTO: 31.1 PG (ref 24–34)
MCH RBC QN AUTO: 31.2 PG (ref 24–34)
MCH RBC QN AUTO: 31.5 PG (ref 24–34)
MCH RBC QN AUTO: 31.9 PG (ref 24–34)
MCHC RBC AUTO-ENTMCNC: 30.2 G/DL (ref 31–37)
MCHC RBC AUTO-ENTMCNC: 30.7 G/DL (ref 31–37)
MCHC RBC AUTO-ENTMCNC: 30.8 G/DL (ref 31–37)
MCHC RBC AUTO-ENTMCNC: 31.1 G/DL (ref 31–37)
MCHC RBC AUTO-ENTMCNC: 31.3 G/DL (ref 31–37)
MCHC RBC AUTO-ENTMCNC: 31.3 G/DL (ref 31–37)
MCHC RBC AUTO-ENTMCNC: 32.7 G/DL (ref 31–37)
MCHC RBC AUTO-ENTMCNC: 32.7 G/DL (ref 31–37)
MCHC RBC AUTO-ENTMCNC: 33.4 G/DL (ref 31–37)
MCHC RBC AUTO-ENTMCNC: 33.7 G/DL (ref 31–37)
MCHC RBC AUTO-ENTMCNC: 33.9 G/DL (ref 31–37)
MCHC RBC AUTO-ENTMCNC: 34 G/DL (ref 31–37)
MCHC RBC AUTO-ENTMCNC: 34 G/DL (ref 31–37)
MCHC RBC AUTO-ENTMCNC: 34.4 G/DL (ref 31–37)
MCHC RBC AUTO-ENTMCNC: 34.4 G/DL (ref 31–37)
MCHC RBC AUTO-ENTMCNC: 34.7 G/DL (ref 31–37)
MCHC RBC AUTO-ENTMCNC: 35.3 G/DL (ref 31–37)
MCV RBC AUTO: 100 FL (ref 74–97)
MCV RBC AUTO: 87.2 FL (ref 74–97)
MCV RBC AUTO: 87.4 FL (ref 74–97)
MCV RBC AUTO: 87.8 FL (ref 74–97)
MCV RBC AUTO: 90.5 FL (ref 74–97)
MCV RBC AUTO: 90.6 FL (ref 74–97)
MCV RBC AUTO: 90.6 FL (ref 74–97)
MCV RBC AUTO: 90.7 FL (ref 74–97)
MCV RBC AUTO: 92 FL (ref 74–97)
MCV RBC AUTO: 92.7 FL (ref 74–97)
MCV RBC AUTO: 92.8 FL (ref 74–97)
MCV RBC AUTO: 93.7 FL (ref 74–97)
MCV RBC AUTO: 94.1 FL (ref 74–97)
MCV RBC AUTO: 96.7 FL (ref 74–97)
MCV RBC AUTO: 96.9 FL (ref 74–97)
MCV RBC AUTO: 97.8 FL (ref 74–97)
MCV RBC AUTO: 98.5 FL (ref 74–97)
METHADONE UR QL: NEGATIVE
MONOCYTES # BLD: 0.5 K/UL (ref 0.05–1.2)
MONOCYTES # BLD: 0.5 K/UL (ref 0.05–1.2)
MONOCYTES # BLD: 0.6 K/UL (ref 0.05–1.2)
MONOCYTES # BLD: 0.6 K/UL (ref 0.05–1.2)
MONOCYTES # BLD: 0.6 K/UL (ref 0–1)
MONOCYTES # BLD: 0.7 K/UL (ref 0.05–1.2)
MONOCYTES # BLD: 0.7 K/UL (ref 0.05–1.2)
MONOCYTES # BLD: 0.8 K/UL (ref 0.05–1.2)
MONOCYTES # BLD: 0.8 K/UL (ref 0.05–1.2)
MONOCYTES NFR BLD: 10 % (ref 3–10)
MONOCYTES NFR BLD: 7 % (ref 3–10)
MONOCYTES NFR BLD: 8 % (ref 2–9)
MONOCYTES NFR BLD: 8 % (ref 3–10)
MONOCYTES NFR BLD: 8 % (ref 3–10)
MONOCYTES NFR BLD: 9 % (ref 3–10)
NEUTS BAND NFR BLD MANUAL: 6 %
NEUTS SEG # BLD: 4.3 K/UL (ref 1.8–8)
NEUTS SEG # BLD: 4.3 K/UL (ref 1.8–8)
NEUTS SEG # BLD: 4.4 K/UL (ref 1.8–8)
NEUTS SEG # BLD: 4.8 K/UL (ref 1.8–8)
NEUTS SEG # BLD: 4.8 K/UL (ref 1.8–8)
NEUTS SEG # BLD: 6.3 K/UL (ref 1.8–8)
NEUTS SEG # BLD: 7.4 K/UL (ref 1.8–8)
NEUTS SEG # BLD: 7.8 K/UL (ref 1.8–8)
NEUTS SEG # BLD: 8.6 K/UL (ref 1.8–8)
NEUTS SEG NFR BLD: 60 % (ref 40–73)
NEUTS SEG NFR BLD: 61 % (ref 42–75)
NEUTS SEG NFR BLD: 65 % (ref 40–73)
NEUTS SEG NFR BLD: 68 % (ref 40–73)
NEUTS SEG NFR BLD: 73 % (ref 40–73)
NEUTS SEG NFR BLD: 73 % (ref 40–73)
NEUTS SEG NFR BLD: 74 % (ref 40–73)
NEUTS SEG NFR BLD: 76 % (ref 40–73)
NEUTS SEG NFR BLD: 81 % (ref 40–73)
NITRITE UR QL STRIP.AUTO: NEGATIVE
O2/TOTAL GAS SETTING VFR VENT: 0.35 %
O2/TOTAL GAS SETTING VFR VENT: 0.4 %
O2/TOTAL GAS SETTING VFR VENT: 0.6 %
O2/TOTAL GAS SETTING VFR VENT: 21 %
O2/TOTAL GAS SETTING VFR VENT: 28 %
O2/TOTAL GAS SETTING VFR VENT: 35 %
O2/TOTAL GAS SETTING VFR VENT: 40 %
O2/TOTAL GAS SETTING VFR VENT: 50 %
O2/TOTAL GAS SETTING VFR VENT: 65 %
O2/TOTAL GAS SETTING VFR VENT: 70 %
OPIATES UR QL: NEGATIVE
P-R INTERVAL, ECG05: 152 MS
P-R INTERVAL, ECG05: 168 MS
PCO2 BLD: 22 MMHG (ref 35–45)
PCO2 BLD: 26.1 MMHG (ref 35–45)
PCO2 BLD: 26.5 MMHG (ref 35–45)
PCO2 BLD: 29.9 MMHG (ref 35–45)
PCO2 BLD: 30.5 MMHG (ref 35–45)
PCO2 BLD: 30.7 MMHG (ref 35–45)
PCO2 BLD: 31.2 MMHG (ref 35–45)
PCO2 BLD: 31.5 MMHG (ref 35–45)
PCO2 BLD: 31.7 MMHG (ref 35–45)
PCO2 BLD: 32 MMHG (ref 35–45)
PCO2 BLD: 32.6 MMHG (ref 35–45)
PCO2 BLD: 32.8 MMHG (ref 35–45)
PCO2 BLD: 33.3 MMHG (ref 35–45)
PCO2 BLD: 33.4 MMHG (ref 35–45)
PCO2 BLD: 33.6 MMHG (ref 35–45)
PCO2 BLD: 34.4 MMHG (ref 35–45)
PCO2 BLD: 35.8 MMHG (ref 35–45)
PCO2 BLD: 36.1 MMHG (ref 35–45)
PCO2 BLD: 37.5 MMHG (ref 35–45)
PCO2 BLD: 37.6 MMHG (ref 35–45)
PCO2 BLD: 38 MMHG (ref 35–45)
PCO2 BLD: 39.1 MMHG (ref 35–45)
PCO2 BLD: 39.5 MMHG (ref 35–45)
PCO2 BLD: 42.7 MMHG (ref 35–45)
PCO2 BLD: 43.1 MMHG (ref 35–45)
PCO2 BLD: 46.1 MMHG (ref 35–45)
PCP UR QL: NEGATIVE
PEEP RESPIRATORY: 5 CMH2O
PEEP RESPIRATORY: 6 CMH2O
PEEP RESPIRATORY: 8 CMH2O
PH BLD: 7.25 [PH] (ref 7.35–7.45)
PH BLD: 7.27 [PH] (ref 7.35–7.45)
PH BLD: 7.38 [PH] (ref 7.35–7.45)
PH BLD: 7.39 [PH] (ref 7.35–7.45)
PH BLD: 7.41 [PH] (ref 7.35–7.45)
PH BLD: 7.41 [PH] (ref 7.35–7.45)
PH BLD: 7.42 [PH] (ref 7.35–7.45)
PH BLD: 7.43 [PH] (ref 7.35–7.45)
PH BLD: 7.43 [PH] (ref 7.35–7.45)
PH BLD: 7.46 [PH] (ref 7.35–7.45)
PH BLD: 7.46 [PH] (ref 7.35–7.45)
PH BLD: 7.47 [PH] (ref 7.35–7.45)
PH BLD: 7.47 [PH] (ref 7.35–7.45)
PH BLD: 7.48 [PH] (ref 7.35–7.45)
PH BLD: 7.49 [PH] (ref 7.35–7.45)
PH BLD: 7.49 [PH] (ref 7.35–7.45)
PH BLD: 7.5 [PH] (ref 7.35–7.45)
PH BLD: 7.5 [PH] (ref 7.35–7.45)
PH BLD: 7.51 [PH] (ref 7.35–7.45)
PH BLD: 7.51 [PH] (ref 7.35–7.45)
PH BLD: 7.54 [PH] (ref 7.35–7.45)
PH BLD: 7.54 [PH] (ref 7.35–7.45)
PH BLD: 7.56 [PH] (ref 7.35–7.45)
PH BLD: 7.59 [PH] (ref 7.35–7.45)
PH UR STRIP: 5.5 [PH] (ref 5–8)
PHOSPHATE SERPL-MCNC: 1.4 MG/DL (ref 2.5–4.9)
PHOSPHATE SERPL-MCNC: 1.9 MG/DL (ref 2.5–4.9)
PHOSPHATE SERPL-MCNC: 2 MG/DL (ref 2.5–4.9)
PHOSPHATE SERPL-MCNC: 2.1 MG/DL (ref 2.5–4.9)
PHOSPHATE SERPL-MCNC: 2.2 MG/DL (ref 2.5–4.9)
PHOSPHATE SERPL-MCNC: 2.3 MG/DL (ref 2.5–4.9)
PHOSPHATE SERPL-MCNC: 2.3 MG/DL (ref 2.5–4.9)
PHOSPHATE SERPL-MCNC: 3.2 MG/DL (ref 2.5–4.9)
PHOSPHATE SERPL-MCNC: 3.3 MG/DL (ref 2.5–4.9)
PHOSPHATE SERPL-MCNC: 3.4 MG/DL (ref 2.5–4.9)
PHOSPHATE SERPL-MCNC: 3.5 MG/DL (ref 2.5–4.9)
PHOSPHATE SERPL-MCNC: 3.5 MG/DL (ref 2.5–4.9)
PHOSPHATE SERPL-MCNC: 3.6 MG/DL (ref 2.5–4.9)
PHOSPHATE SERPL-MCNC: 3.8 MG/DL (ref 2.5–4.9)
PHOSPHATE SERPL-MCNC: 4 MG/DL (ref 2.5–4.9)
PHOSPHATE SERPL-MCNC: 4.1 MG/DL (ref 2.5–4.9)
PIP ISTAT,IPIP: 12
PIP ISTAT,IPIP: 13
PIP ISTAT,IPIP: 17
PIP ISTAT,IPIP: 19
PIP ISTAT,IPIP: 20
PIP ISTAT,IPIP: 21
PIP ISTAT,IPIP: 23
PIP ISTAT,IPIP: 26
PIP ISTAT,IPIP: 29
PLATELET # BLD AUTO: 116 K/UL (ref 135–420)
PLATELET # BLD AUTO: 129 K/UL (ref 135–420)
PLATELET # BLD AUTO: 130 K/UL (ref 135–420)
PLATELET # BLD AUTO: 141 K/UL (ref 135–420)
PLATELET # BLD AUTO: 149 K/UL (ref 135–420)
PLATELET # BLD AUTO: 187 K/UL (ref 135–420)
PLATELET # BLD AUTO: 191 K/UL (ref 135–420)
PLATELET # BLD AUTO: 297 K/UL (ref 135–420)
PLATELET # BLD AUTO: 363 K/UL (ref 135–420)
PLATELET # BLD AUTO: 405 K/UL (ref 135–420)
PLATELET # BLD AUTO: 419 K/UL (ref 135–420)
PLATELET # BLD AUTO: 419 K/UL (ref 135–420)
PLATELET # BLD AUTO: 430 K/UL (ref 135–420)
PLATELET # BLD AUTO: 442 K/UL (ref 135–420)
PLATELET # BLD AUTO: 468 K/UL (ref 135–420)
PLATELET # BLD AUTO: 488 K/UL (ref 135–420)
PLATELET # BLD AUTO: 520 K/UL (ref 135–420)
PMV BLD AUTO: 10.7 FL (ref 9.2–11.8)
PMV BLD AUTO: 10.8 FL (ref 9.2–11.8)
PMV BLD AUTO: 11 FL (ref 9.2–11.8)
PMV BLD AUTO: 11.1 FL (ref 9.2–11.8)
PMV BLD AUTO: 11.1 FL (ref 9.2–11.8)
PMV BLD AUTO: 11.2 FL (ref 9.2–11.8)
PMV BLD AUTO: 11.2 FL (ref 9.2–11.8)
PMV BLD AUTO: 11.3 FL (ref 9.2–11.8)
PMV BLD AUTO: 11.6 FL (ref 9.2–11.8)
PMV BLD AUTO: 11.7 FL (ref 9.2–11.8)
PMV BLD AUTO: 11.8 FL (ref 9.2–11.8)
PMV BLD AUTO: 12.1 FL (ref 9.2–11.8)
PMV BLD AUTO: 12.2 FL (ref 9.2–11.8)
PMV BLD AUTO: 12.4 FL (ref 9.2–11.8)
PMV BLD AUTO: 12.9 FL (ref 9.2–11.8)
PO2 BLD: 100 MMHG (ref 80–100)
PO2 BLD: 107 MMHG (ref 80–100)
PO2 BLD: 119 MMHG (ref 80–100)
PO2 BLD: 47 MMHG (ref 80–100)
PO2 BLD: 54 MMHG (ref 80–100)
PO2 BLD: 59 MMHG (ref 80–100)
PO2 BLD: 62 MMHG (ref 80–100)
PO2 BLD: 64 MMHG (ref 80–100)
PO2 BLD: 67 MMHG (ref 80–100)
PO2 BLD: 70 MMHG (ref 80–100)
PO2 BLD: 71 MMHG (ref 80–100)
PO2 BLD: 71 MMHG (ref 80–100)
PO2 BLD: 72 MMHG (ref 80–100)
PO2 BLD: 72 MMHG (ref 80–100)
PO2 BLD: 74 MMHG (ref 80–100)
PO2 BLD: 79 MMHG (ref 80–100)
PO2 BLD: 83 MMHG (ref 80–100)
PO2 BLD: 84 MMHG (ref 80–100)
PO2 BLD: 85 MMHG (ref 80–100)
PO2 BLD: 87 MMHG (ref 80–100)
PO2 BLD: 88 MMHG (ref 80–100)
PO2 BLD: 88 MMHG (ref 80–100)
PO2 BLD: 96 MMHG (ref 80–100)
PO2 BLD: 97 MMHG (ref 80–100)
PO2 BLD: 97 MMHG (ref 80–100)
PO2 BLD: 98 MMHG (ref 80–100)
POTASSIUM SERPL-SCNC: 2.5 MMOL/L (ref 3.5–5.5)
POTASSIUM SERPL-SCNC: 2.6 MMOL/L (ref 3.5–5.5)
POTASSIUM SERPL-SCNC: 2.9 MMOL/L (ref 3.5–5.5)
POTASSIUM SERPL-SCNC: 2.9 MMOL/L (ref 3.5–5.5)
POTASSIUM SERPL-SCNC: 3 MMOL/L (ref 3.5–5.5)
POTASSIUM SERPL-SCNC: 3.1 MMOL/L (ref 3.5–5.5)
POTASSIUM SERPL-SCNC: 3.2 MMOL/L (ref 3.5–5.5)
POTASSIUM SERPL-SCNC: 3.3 MMOL/L (ref 3.5–5.5)
POTASSIUM SERPL-SCNC: 3.3 MMOL/L (ref 3.5–5.5)
POTASSIUM SERPL-SCNC: 3.4 MMOL/L (ref 3.5–5.5)
POTASSIUM SERPL-SCNC: 3.4 MMOL/L (ref 3.5–5.5)
POTASSIUM SERPL-SCNC: 3.6 MMOL/L (ref 3.5–5.5)
POTASSIUM SERPL-SCNC: 3.7 MMOL/L (ref 3.5–5.5)
POTASSIUM SERPL-SCNC: 3.7 MMOL/L (ref 3.5–5.5)
POTASSIUM SERPL-SCNC: 3.8 MMOL/L (ref 3.5–5.5)
POTASSIUM SERPL-SCNC: 3.9 MMOL/L (ref 3.5–5.5)
POTASSIUM SERPL-SCNC: 4 MMOL/L (ref 3.5–5.5)
POTASSIUM SERPL-SCNC: 4 MMOL/L (ref 3.5–5.5)
POTASSIUM SERPL-SCNC: 4.4 MMOL/L (ref 3.5–5.5)
POTASSIUM SERPL-SCNC: 4.7 MMOL/L (ref 3.5–5.5)
PRESSURE SUPPORT SETTING VENT: 13 CMH2O
PRESSURE SUPPORT SETTING VENT: 7 CMH2O
PRESSURE SUPPORT SETTING VENT: 7 CMH2O
PROT SERPL-MCNC: 4.8 G/DL (ref 6.4–8.2)
PROT SERPL-MCNC: 5.3 G/DL (ref 6.4–8.2)
PROT SERPL-MCNC: 5.3 G/DL (ref 6.4–8.2)
PROT SERPL-MCNC: 5.4 G/DL (ref 6.4–8.2)
PROT SERPL-MCNC: 5.5 G/DL (ref 6.4–8.2)
PROT SERPL-MCNC: 5.5 G/DL (ref 6.4–8.2)
PROT SERPL-MCNC: 5.6 G/DL (ref 6.4–8.2)
PROT SERPL-MCNC: 5.7 G/DL (ref 6.4–8.2)
PROT SERPL-MCNC: 5.8 G/DL (ref 6.4–8.2)
PROT SERPL-MCNC: 5.9 G/DL (ref 6.4–8.2)
PROT SERPL-MCNC: 6.2 G/DL (ref 6.4–8.2)
PROT SERPL-MCNC: 6.3 G/DL (ref 6.4–8.2)
PROT SERPL-MCNC: 6.3 G/DL (ref 6.4–8.2)
PROT SERPL-MCNC: 6.7 G/DL (ref 6.4–8.2)
PROT SERPL-MCNC: 6.8 G/DL (ref 6.4–8.2)
PROT SERPL-MCNC: 7 G/DL (ref 6.4–8.2)
PROT SERPL-MCNC: 7.1 G/DL (ref 6.4–8.2)
PROT UR STRIP-MCNC: 300 MG/DL
PROTHROMBIN TIME: 13.5 SEC (ref 11.5–15.2)
PROTHROMBIN TIME: 14.2 SEC (ref 11.5–15.2)
PROTHROMBIN TIME: 14.2 SEC (ref 11.5–15.2)
PROTHROMBIN TIME: 16.9 SEC (ref 11.5–15.2)
PROTHROMBIN TIME: 18.2 SEC (ref 11.5–15.2)
Q-T INTERVAL, ECG07: 204 MS
Q-T INTERVAL, ECG07: 288 MS
Q-T INTERVAL, ECG07: 292 MS
Q-T INTERVAL, ECG07: 376 MS
QRS DURATION, ECG06: 80 MS
QRS DURATION, ECG06: 82 MS
QRS DURATION, ECG06: 84 MS
QRS DURATION, ECG06: 84 MS
QTC CALCULATION (BEZET), ECG08: 307 MS
QTC CALCULATION (BEZET), ECG08: 389 MS
QTC CALCULATION (BEZET), ECG08: 393 MS
QTC CALCULATION (BEZET), ECG08: 531 MS
RBC # BLD AUTO: 2.41 M/UL (ref 4.7–5.5)
RBC # BLD AUTO: 2.46 M/UL (ref 4.7–5.5)
RBC # BLD AUTO: 2.48 M/UL (ref 4.7–5.5)
RBC # BLD AUTO: 2.64 M/UL (ref 4.7–5.5)
RBC # BLD AUTO: 2.64 M/UL (ref 4.7–5.5)
RBC # BLD AUTO: 2.65 M/UL (ref 4.7–5.5)
RBC # BLD AUTO: 2.7 M/UL (ref 4.7–5.5)
RBC # BLD AUTO: 2.72 M/UL (ref 4.7–5.5)
RBC # BLD AUTO: 2.85 M/UL (ref 4.7–5.5)
RBC # BLD AUTO: 2.87 M/UL (ref 4.7–5.5)
RBC # BLD AUTO: 2.9 M/UL (ref 4.7–5.5)
RBC # BLD AUTO: 2.95 M/UL (ref 4.7–5.5)
RBC # BLD AUTO: 2.97 M/UL (ref 4.7–5.5)
RBC # BLD AUTO: 3.17 M/UL (ref 4.7–5.5)
RBC # BLD AUTO: 3.29 M/UL (ref 4.7–5.5)
RBC # BLD AUTO: 3.51 M/UL (ref 4.7–5.5)
RBC # BLD AUTO: 3.51 M/UL (ref 4.7–5.5)
RBC #/AREA URNS HPF: ABNORMAL /HPF (ref 0–5)
RBC MORPH BLD: ABNORMAL
REPORTED DOSE,DOSE: NORMAL UNITS
REPORTED DOSE/TIME,TMG: 2231
SAO2 % BLD: 89 % (ref 92–97)
SAO2 % BLD: 90 % (ref 92–97)
SAO2 % BLD: 91 % (ref 92–97)
SAO2 % BLD: 92 % (ref 92–97)
SAO2 % BLD: 92 % (ref 92–97)
SAO2 % BLD: 93 % (ref 92–97)
SAO2 % BLD: 93 % (ref 92–97)
SAO2 % BLD: 94 % (ref 92–97)
SAO2 % BLD: 95 % (ref 92–97)
SAO2 % BLD: 96 % (ref 92–97)
SAO2 % BLD: 97 % (ref 92–97)
SAO2 % BLD: 97 % (ref 92–97)
SAO2 % BLD: 98 % (ref 92–97)
SAO2 % BLD: 99 % (ref 92–97)
SERVICE CMNT-IMP: ABNORMAL
SERVICE CMNT-IMP: NORMAL
SODIUM SERPL-SCNC: 134 MMOL/L (ref 136–145)
SODIUM SERPL-SCNC: 136 MMOL/L (ref 136–145)
SODIUM SERPL-SCNC: 136 MMOL/L (ref 136–145)
SODIUM SERPL-SCNC: 138 MMOL/L (ref 136–145)
SODIUM SERPL-SCNC: 138 MMOL/L (ref 136–145)
SODIUM SERPL-SCNC: 141 MMOL/L (ref 136–145)
SODIUM SERPL-SCNC: 142 MMOL/L (ref 136–145)
SODIUM SERPL-SCNC: 143 MMOL/L (ref 136–145)
SODIUM SERPL-SCNC: 144 MMOL/L (ref 136–145)
SODIUM SERPL-SCNC: 145 MMOL/L (ref 136–145)
SODIUM SERPL-SCNC: 146 MMOL/L (ref 136–145)
SODIUM SERPL-SCNC: 149 MMOL/L (ref 136–145)
SODIUM SERPL-SCNC: 149 MMOL/L (ref 136–145)
SODIUM SERPL-SCNC: 150 MMOL/L (ref 136–145)
SP GR UR REFRACTOMETRY: 1.02 (ref 1–1.03)
SPECIMEN EXP DATE BLD: NORMAL
SPECIMEN EXP DATE BLD: NORMAL
SPECIMEN TYPE: ABNORMAL
SPONTANEOUS TIMED, IST: YES
STATUS OF UNIT,%ST: NORMAL
STATUS OF UNIT,%ST: NORMAL
TOTAL RESP. RATE, ITRR: 16
TOTAL RESP. RATE, ITRR: 20
TOTAL RESP. RATE, ITRR: 24
TOTAL RESP. RATE, ITRR: 25
TOTAL RESP. RATE, ITRR: 26
TOTAL RESP. RATE, ITRR: 26
TOTAL RESP. RATE, ITRR: 28
TOTAL RESP. RATE, ITRR: 28
TOTAL RESP. RATE, ITRR: 30
TOTAL RESP. RATE, ITRR: 31
TOTAL RESP. RATE, ITRR: 32
TOTAL RESP. RATE, ITRR: 35
TOTAL RESP. RATE, ITRR: 36
TOTAL RESP. RATE, ITRR: 36
TOTAL RESP. RATE, ITRR: 37
TOTAL RESP. RATE, ITRR: 38
TOTAL RESP. RATE, ITRR: 39
TRIGL SERPL-MCNC: 158 MG/DL (ref ?–150)
TRIGL SERPL-MCNC: 278 MG/DL (ref ?–150)
TROPONIN I SERPL-MCNC: 1.33 NG/ML (ref 0–0.06)
TROPONIN I SERPL-MCNC: 1.57 NG/ML (ref 0–0.06)
TROPONIN I SERPL-MCNC: 1.96 NG/ML (ref 0–0.06)
TROPONIN I SERPL-MCNC: 2.81 NG/ML (ref 0–0.06)
TSH SERPL DL<=0.05 MIU/L-ACNC: 0.35 UIU/ML (ref 0.36–3.74)
TSH SERPL DL<=0.05 MIU/L-ACNC: 0.43 UIU/ML (ref 0.36–3.74)
UNIT DIVISION, %UDIV: 0
UNIT DIVISION, %UDIV: 0
UROBILINOGEN UR QL STRIP.AUTO: 1 EU/DL (ref 0.2–1)
VANCOMYCIN SERPL-MCNC: 15.7 UG/ML (ref 5–40)
VANCOMYCIN TROUGH SERPL-MCNC: 14.7 UG/ML (ref 10–20)
VANCOMYCIN TROUGH SERPL-MCNC: 16.4 UG/ML (ref 10–20)
VENTILATION MODE VENT: ABNORMAL
VENTRICULAR RATE, ECG03: 107 BPM
VENTRICULAR RATE, ECG03: 112 BPM
VENTRICULAR RATE, ECG03: 120 BPM
VENTRICULAR RATE, ECG03: 136 BPM
VLDLC SERPL CALC-MCNC: 31.6 MG/DL
VOLUME CONTROL IVLC: YES
VOLUME CONTROL PLUS IVLCP: YES
VT SETTING VENT: 360 ML
VT SETTING VENT: 417 ML
VT SETTING VENT: 420 ML
VT SETTING VENT: 420 ML
VT SETTING VENT: 450 ML
VT SETTING VENT: 467 ML
WBC # BLD AUTO: 10 K/UL (ref 4.6–13.2)
WBC # BLD AUTO: 10.7 K/UL (ref 4.6–13.2)
WBC # BLD AUTO: 10.7 K/UL (ref 4.6–13.2)
WBC # BLD AUTO: 11.6 K/UL (ref 4.6–13.2)
WBC # BLD AUTO: 12.9 K/UL (ref 4.6–13.2)
WBC # BLD AUTO: 4.9 K/UL (ref 4.6–13.2)
WBC # BLD AUTO: 5.7 K/UL (ref 4.6–13.2)
WBC # BLD AUTO: 5.9 K/UL (ref 4.6–13.2)
WBC # BLD AUTO: 6.6 K/UL (ref 4.6–13.2)
WBC # BLD AUTO: 6.7 K/UL (ref 4.6–13.2)
WBC # BLD AUTO: 6.9 K/UL (ref 4.6–13.2)
WBC # BLD AUTO: 7 K/UL (ref 4.6–13.2)
WBC # BLD AUTO: 7.5 K/UL (ref 4.6–13.2)
WBC # BLD AUTO: 7.6 K/UL (ref 4.6–13.2)
WBC # BLD AUTO: 7.9 K/UL (ref 4.6–13.2)
WBC # BLD AUTO: 8.8 K/UL (ref 4.6–13.2)
WBC # BLD AUTO: 9.4 K/UL (ref 4.6–13.2)
WBC MORPH BLD: ABNORMAL
WBC URNS QL MICRO: ABNORMAL /HPF (ref 0–5)

## 2018-01-01 PROCEDURE — 82962 GLUCOSE BLOOD TEST: CPT

## 2018-01-01 PROCEDURE — 71045 X-RAY EXAM CHEST 1 VIEW: CPT

## 2018-01-01 PROCEDURE — 36591 DRAW BLOOD OFF VENOUS DEVICE: CPT

## 2018-01-01 PROCEDURE — 82803 BLOOD GASES ANY COMBINATION: CPT

## 2018-01-01 PROCEDURE — 84132 ASSAY OF SERUM POTASSIUM: CPT | Performed by: INTERNAL MEDICINE

## 2018-01-01 PROCEDURE — 74011250636 HC RX REV CODE- 250/636: Performed by: INTERNAL MEDICINE

## 2018-01-01 PROCEDURE — 36415 COLL VENOUS BLD VENIPUNCTURE: CPT | Performed by: INTERNAL MEDICINE

## 2018-01-01 PROCEDURE — 5A2204Z RESTORATION OF CARDIAC RHYTHM, SINGLE: ICD-10-PCS | Performed by: INTERNAL MEDICINE

## 2018-01-01 PROCEDURE — 74011250637 HC RX REV CODE- 250/637: Performed by: INTERNAL MEDICINE

## 2018-01-01 PROCEDURE — 74011000258 HC RX REV CODE- 258: Performed by: HOSPITALIST

## 2018-01-01 PROCEDURE — 74011000250 HC RX REV CODE- 250: Performed by: INTERNAL MEDICINE

## 2018-01-01 PROCEDURE — 84478 ASSAY OF TRIGLYCERIDES: CPT | Performed by: INTERNAL MEDICINE

## 2018-01-01 PROCEDURE — 02HV33Z INSERTION OF INFUSION DEVICE INTO SUPERIOR VENA CAVA, PERCUTANEOUS APPROACH: ICD-10-PCS | Performed by: INTERNAL MEDICINE

## 2018-01-01 PROCEDURE — 85730 THROMBOPLASTIN TIME PARTIAL: CPT | Performed by: INTERNAL MEDICINE

## 2018-01-01 PROCEDURE — C9113 INJ PANTOPRAZOLE SODIUM, VIA: HCPCS | Performed by: INTERNAL MEDICINE

## 2018-01-01 PROCEDURE — 74011636637 HC RX REV CODE- 636/637: Performed by: INTERNAL MEDICINE

## 2018-01-01 PROCEDURE — 74011250637 HC RX REV CODE- 250/637: Performed by: HOSPITALIST

## 2018-01-01 PROCEDURE — 93312 ECHO TRANSESOPHAGEAL: CPT

## 2018-01-01 PROCEDURE — 72146 MRI CHEST SPINE W/O DYE: CPT

## 2018-01-01 PROCEDURE — 65610000006 HC RM INTENSIVE CARE

## 2018-01-01 PROCEDURE — 90686 IIV4 VACC NO PRSV 0.5 ML IM: CPT | Performed by: INTERNAL MEDICINE

## 2018-01-01 PROCEDURE — 74011000258 HC RX REV CODE- 258: Performed by: INTERNAL MEDICINE

## 2018-01-01 PROCEDURE — 70490 CT SOFT TISSUE NECK W/O DYE: CPT

## 2018-01-01 PROCEDURE — 06HY33Z INSERTION OF INFUSION DEVICE INTO LOWER VEIN, PERCUTANEOUS APPROACH: ICD-10-PCS | Performed by: INTERNAL MEDICINE

## 2018-01-01 PROCEDURE — 87186 SC STD MICRODIL/AGAR DIL: CPT | Performed by: INTERNAL MEDICINE

## 2018-01-01 PROCEDURE — 77030027138 HC INCENT SPIROMETER -A

## 2018-01-01 PROCEDURE — 77030033269 HC SLV COMPR SCD KNE2 CARD -B

## 2018-01-01 PROCEDURE — 85025 COMPLETE CBC W/AUTO DIFF WBC: CPT | Performed by: INTERNAL MEDICINE

## 2018-01-01 PROCEDURE — 77030018798 HC PMP KT ENTRL FED COVD -A

## 2018-01-01 PROCEDURE — 76705 ECHO EXAM OF ABDOMEN: CPT

## 2018-01-01 PROCEDURE — 94640 AIRWAY INHALATION TREATMENT: CPT

## 2018-01-01 PROCEDURE — 85027 COMPLETE CBC AUTOMATED: CPT | Performed by: INTERNAL MEDICINE

## 2018-01-01 PROCEDURE — 5A1955Z RESPIRATORY VENTILATION, GREATER THAN 96 CONSECUTIVE HOURS: ICD-10-PCS | Performed by: INTERNAL MEDICINE

## 2018-01-01 PROCEDURE — 93005 ELECTROCARDIOGRAM TRACING: CPT

## 2018-01-01 PROCEDURE — 82330 ASSAY OF CALCIUM: CPT | Performed by: HOSPITALIST

## 2018-01-01 PROCEDURE — 74011250636 HC RX REV CODE- 250/636: Performed by: EMERGENCY MEDICINE

## 2018-01-01 PROCEDURE — 82330 ASSAY OF CALCIUM: CPT | Performed by: INTERNAL MEDICINE

## 2018-01-01 PROCEDURE — 74011250636 HC RX REV CODE- 250/636: Performed by: HOSPITALIST

## 2018-01-01 PROCEDURE — 84443 ASSAY THYROID STIM HORMONE: CPT | Performed by: INTERNAL MEDICINE

## 2018-01-01 PROCEDURE — 77030011256 HC DRSG MEPILEX <16IN NO BORD MOLN -A

## 2018-01-01 PROCEDURE — 84100 ASSAY OF PHOSPHORUS: CPT | Performed by: HOSPITALIST

## 2018-01-01 PROCEDURE — 82550 ASSAY OF CK (CPK): CPT | Performed by: EMERGENCY MEDICINE

## 2018-01-01 PROCEDURE — 74011000250 HC RX REV CODE- 250: Performed by: EMERGENCY MEDICINE

## 2018-01-01 PROCEDURE — 73700 CT LOWER EXTREMITY W/O DYE: CPT

## 2018-01-01 PROCEDURE — 74018 RADEX ABDOMEN 1 VIEW: CPT

## 2018-01-01 PROCEDURE — 94002 VENT MGMT INPAT INIT DAY: CPT

## 2018-01-01 PROCEDURE — 80202 ASSAY OF VANCOMYCIN: CPT | Performed by: INTERNAL MEDICINE

## 2018-01-01 PROCEDURE — 85610 PROTHROMBIN TIME: CPT | Performed by: INTERNAL MEDICINE

## 2018-01-01 PROCEDURE — 02PYX3Z REMOVAL OF INFUSION DEVICE FROM GREAT VESSEL, EXTERNAL APPROACH: ICD-10-PCS | Performed by: INTERNAL MEDICINE

## 2018-01-01 PROCEDURE — 83690 ASSAY OF LIPASE: CPT | Performed by: INTERNAL MEDICINE

## 2018-01-01 PROCEDURE — 80053 COMPREHEN METABOLIC PANEL: CPT | Performed by: INTERNAL MEDICINE

## 2018-01-01 PROCEDURE — 84100 ASSAY OF PHOSPHORUS: CPT | Performed by: INTERNAL MEDICINE

## 2018-01-01 PROCEDURE — 94003 VENT MGMT INPAT SUBQ DAY: CPT

## 2018-01-01 PROCEDURE — 36600 WITHDRAWAL OF ARTERIAL BLOOD: CPT

## 2018-01-01 PROCEDURE — 0BH17EZ INSERTION OF ENDOTRACHEAL AIRWAY INTO TRACHEA, VIA NATURAL OR ARTIFICIAL OPENING: ICD-10-PCS | Performed by: INTERNAL MEDICINE

## 2018-01-01 PROCEDURE — 83735 ASSAY OF MAGNESIUM: CPT | Performed by: INTERNAL MEDICINE

## 2018-01-01 PROCEDURE — 74011250636 HC RX REV CODE- 250/636

## 2018-01-01 PROCEDURE — 82550 ASSAY OF CK (CPK): CPT | Performed by: INTERNAL MEDICINE

## 2018-01-01 PROCEDURE — 86706 HEP B SURFACE ANTIBODY: CPT | Performed by: INTERNAL MEDICINE

## 2018-01-01 PROCEDURE — 81001 URINALYSIS AUTO W/SCOPE: CPT | Performed by: EMERGENCY MEDICINE

## 2018-01-01 PROCEDURE — 72100 X-RAY EXAM L-S SPINE 2/3 VWS: CPT

## 2018-01-01 PROCEDURE — 77010033678 HC OXYGEN DAILY

## 2018-01-01 PROCEDURE — 84132 ASSAY OF SERUM POTASSIUM: CPT | Performed by: HOSPITALIST

## 2018-01-01 PROCEDURE — 94660 CPAP INITIATION&MGMT: CPT

## 2018-01-01 PROCEDURE — 87077 CULTURE AEROBIC IDENTIFY: CPT | Performed by: INTERNAL MEDICINE

## 2018-01-01 PROCEDURE — 83605 ASSAY OF LACTIC ACID: CPT | Performed by: INTERNAL MEDICINE

## 2018-01-01 PROCEDURE — 70450 CT HEAD/BRAIN W/O DYE: CPT

## 2018-01-01 PROCEDURE — 87340 HEPATITIS B SURFACE AG IA: CPT | Performed by: INTERNAL MEDICINE

## 2018-01-01 PROCEDURE — 80162 ASSAY OF DIGOXIN TOTAL: CPT | Performed by: INTERNAL MEDICINE

## 2018-01-01 PROCEDURE — 83605 ASSAY OF LACTIC ACID: CPT | Performed by: EMERGENCY MEDICINE

## 2018-01-01 PROCEDURE — 31500 INSERT EMERGENCY AIRWAY: CPT

## 2018-01-01 PROCEDURE — 85730 THROMBOPLASTIN TIME PARTIAL: CPT | Performed by: HOSPITALIST

## 2018-01-01 PROCEDURE — A9570 INDIUM IN-111 AUTO WBC: HCPCS

## 2018-01-01 PROCEDURE — P9016 RBC LEUKOCYTES REDUCED: HCPCS | Performed by: HOSPITALIST

## 2018-01-01 PROCEDURE — 84484 ASSAY OF TROPONIN QUANT: CPT | Performed by: INTERNAL MEDICINE

## 2018-01-01 PROCEDURE — 74011250636 HC RX REV CODE- 250/636: Performed by: RADIOLOGY

## 2018-01-01 PROCEDURE — 87040 BLOOD CULTURE FOR BACTERIA: CPT | Performed by: INTERNAL MEDICINE

## 2018-01-01 PROCEDURE — 99285 EMERGENCY DEPT VISIT HI MDM: CPT

## 2018-01-01 PROCEDURE — 76450000000

## 2018-01-01 PROCEDURE — 83036 HEMOGLOBIN GLYCOSYLATED A1C: CPT | Performed by: INTERNAL MEDICINE

## 2018-01-01 PROCEDURE — 70551 MRI BRAIN STEM W/O DYE: CPT

## 2018-01-01 PROCEDURE — 74011636320 HC RX REV CODE- 636/320: Performed by: INTERNAL MEDICINE

## 2018-01-01 PROCEDURE — 87070 CULTURE OTHR SPECIMN AEROBIC: CPT | Performed by: INTERNAL MEDICINE

## 2018-01-01 PROCEDURE — 74176 CT ABD & PELVIS W/O CONTRAST: CPT

## 2018-01-01 PROCEDURE — 76937 US GUIDE VASCULAR ACCESS: CPT

## 2018-01-01 PROCEDURE — 36592 COLLECT BLOOD FROM PICC: CPT

## 2018-01-01 PROCEDURE — 87086 URINE CULTURE/COLONY COUNT: CPT | Performed by: INTERNAL MEDICINE

## 2018-01-01 PROCEDURE — 83735 ASSAY OF MAGNESIUM: CPT | Performed by: EMERGENCY MEDICINE

## 2018-01-01 PROCEDURE — 80048 BASIC METABOLIC PNL TOTAL CA: CPT | Performed by: INTERNAL MEDICINE

## 2018-01-01 PROCEDURE — 76770 US EXAM ABDO BACK WALL COMP: CPT

## 2018-01-01 PROCEDURE — 74011636637 HC RX REV CODE- 636/637: Performed by: HOSPITALIST

## 2018-01-01 PROCEDURE — 86900 BLOOD TYPING SEROLOGIC ABO: CPT | Performed by: HOSPITALIST

## 2018-01-01 PROCEDURE — 94760 N-INVAS EAR/PLS OXIMETRY 1: CPT

## 2018-01-01 PROCEDURE — 73502 X-RAY EXAM HIP UNI 2-3 VIEWS: CPT

## 2018-01-01 PROCEDURE — 36430 TRANSFUSION BLD/BLD COMPNT: CPT

## 2018-01-01 PROCEDURE — 72148 MRI LUMBAR SPINE W/O DYE: CPT

## 2018-01-01 PROCEDURE — 5A1945Z RESPIRATORY VENTILATION, 24-96 CONSECUTIVE HOURS: ICD-10-PCS | Performed by: INTERNAL MEDICINE

## 2018-01-01 PROCEDURE — 83735 ASSAY OF MAGNESIUM: CPT | Performed by: HOSPITALIST

## 2018-01-01 PROCEDURE — 30233N1 TRANSFUSION OF NONAUTOLOGOUS RED BLOOD CELLS INTO PERIPHERAL VEIN, PERCUTANEOUS APPROACH: ICD-10-PCS | Performed by: INTERNAL MEDICINE

## 2018-01-01 PROCEDURE — P9047 ALBUMIN (HUMAN), 25%, 50ML: HCPCS | Performed by: INTERNAL MEDICINE

## 2018-01-01 PROCEDURE — B24BZZ4 ULTRASONOGRAPHY OF HEART WITH AORTA, TRANSESOPHAGEAL: ICD-10-PCS | Performed by: INTERNAL MEDICINE

## 2018-01-01 PROCEDURE — 85025 COMPLETE CBC W/AUTO DIFF WBC: CPT | Performed by: EMERGENCY MEDICINE

## 2018-01-01 PROCEDURE — 87040 BLOOD CULTURE FOR BACTERIA: CPT | Performed by: EMERGENCY MEDICINE

## 2018-01-01 PROCEDURE — 77010033711 HC HIGH FLOW OXYGEN

## 2018-01-01 PROCEDURE — 86900 BLOOD TYPING SEROLOGIC ABO: CPT | Performed by: INTERNAL MEDICINE

## 2018-01-01 PROCEDURE — C8924 2D TTE W OR W/O FOL W/CON,FU: HCPCS

## 2018-01-01 PROCEDURE — 06PYX3Z REMOVAL OF INFUSION DEVICE FROM LOWER VEIN, EXTERNAL APPROACH: ICD-10-PCS | Performed by: INTERNAL MEDICINE

## 2018-01-01 PROCEDURE — C8929 TTE W OR WO FOL WCON,DOPPLER: HCPCS

## 2018-01-01 PROCEDURE — 90935 HEMODIALYSIS ONE EVALUATION: CPT

## 2018-01-01 PROCEDURE — 77030018846 HC SOL IRR STRL H20 ICUM -A

## 2018-01-01 PROCEDURE — P9040 RBC LEUKOREDUCED IRRADIATED: HCPCS | Performed by: INTERNAL MEDICINE

## 2018-01-01 PROCEDURE — 74011000250 HC RX REV CODE- 250

## 2018-01-01 PROCEDURE — 80053 COMPREHEN METABOLIC PANEL: CPT | Performed by: EMERGENCY MEDICINE

## 2018-01-01 PROCEDURE — 86920 COMPATIBILITY TEST SPIN: CPT | Performed by: HOSPITALIST

## 2018-01-01 PROCEDURE — 3E0G76Z INTRODUCTION OF NUTRITIONAL SUBSTANCE INTO UPPER GI, VIA NATURAL OR ARTIFICIAL OPENING: ICD-10-PCS | Performed by: INTERNAL MEDICINE

## 2018-01-01 PROCEDURE — 74011000250 HC RX REV CODE- 250: Performed by: RADIOLOGY

## 2018-01-01 PROCEDURE — C1752 CATH,HEMODIALYSIS,SHORT-TERM: HCPCS

## 2018-01-01 PROCEDURE — 87077 CULTURE AEROBIC IDENTIFY: CPT | Performed by: EMERGENCY MEDICINE

## 2018-01-01 PROCEDURE — 80061 LIPID PANEL: CPT | Performed by: EMERGENCY MEDICINE

## 2018-01-01 PROCEDURE — 87106 FUNGI IDENTIFICATION YEAST: CPT | Performed by: INTERNAL MEDICINE

## 2018-01-01 PROCEDURE — 85018 HEMOGLOBIN: CPT | Performed by: INTERNAL MEDICINE

## 2018-01-01 PROCEDURE — 96365 THER/PROPH/DIAG IV INF INIT: CPT

## 2018-01-01 PROCEDURE — C1751 CATH, INF, PER/CENT/MIDLINE: HCPCS

## 2018-01-01 PROCEDURE — 77030035694 HC MSK BIPAP FLL FAC PERFMAX PHIL -B

## 2018-01-01 PROCEDURE — 80307 DRUG TEST PRSMV CHEM ANLYZR: CPT | Performed by: INTERNAL MEDICINE

## 2018-01-01 PROCEDURE — A4217 STERILE WATER/SALINE, 500 ML: HCPCS

## 2018-01-01 PROCEDURE — 71250 CT THORAX DX C-: CPT

## 2018-01-01 PROCEDURE — 82652 VIT D 1 25-DIHYDROXY: CPT | Performed by: INTERNAL MEDICINE

## 2018-01-01 PROCEDURE — 36556 INSERT NON-TUNNEL CV CATH: CPT

## 2018-01-01 PROCEDURE — 87186 SC STD MICRODIL/AGAR DIL: CPT | Performed by: EMERGENCY MEDICINE

## 2018-01-01 PROCEDURE — 90471 IMMUNIZATION ADMIN: CPT

## 2018-01-01 PROCEDURE — 85025 COMPLETE CBC W/AUTO DIFF WBC: CPT | Performed by: HOSPITALIST

## 2018-01-01 PROCEDURE — 87804 INFLUENZA ASSAY W/OPTIC: CPT | Performed by: INTERNAL MEDICINE

## 2018-01-01 PROCEDURE — 86920 COMPATIBILITY TEST SPIN: CPT | Performed by: INTERNAL MEDICINE

## 2018-01-01 PROCEDURE — 5A12012 PERFORMANCE OF CARDIAC OUTPUT, SINGLE, MANUAL: ICD-10-PCS | Performed by: HOSPITALIST

## 2018-01-01 PROCEDURE — 5A1D70Z PERFORMANCE OF URINARY FILTRATION, INTERMITTENT, LESS THAN 6 HOURS PER DAY: ICD-10-PCS | Performed by: INTERNAL MEDICINE

## 2018-01-01 PROCEDURE — 36415 COLL VENOUS BLD VENIPUNCTURE: CPT | Performed by: HOSPITALIST

## 2018-01-01 RX ORDER — INSULIN GLARGINE 100 [IU]/ML
18 INJECTION, SOLUTION SUBCUTANEOUS
Status: DISCONTINUED | OUTPATIENT
Start: 2018-01-01 | End: 2018-01-01

## 2018-01-01 RX ORDER — HYDROCODONE BITARTRATE AND ACETAMINOPHEN 5; 325 MG/1; MG/1
1 TABLET ORAL
Status: DISCONTINUED | OUTPATIENT
Start: 2018-01-01 | End: 2018-01-01

## 2018-01-01 RX ORDER — INSULIN LISPRO 100 [IU]/ML
5 INJECTION, SOLUTION INTRAVENOUS; SUBCUTANEOUS EVERY 6 HOURS
Status: DISCONTINUED | OUTPATIENT
Start: 2018-01-01 | End: 2018-01-01

## 2018-01-01 RX ORDER — PROPOFOL 10 MG/ML
0-50 VIAL (ML) INTRAVENOUS
Status: DISCONTINUED | OUTPATIENT
Start: 2018-01-01 | End: 2018-01-01

## 2018-01-01 RX ORDER — SUCCINYLCHOLINE CHLORIDE 20 MG/ML
INJECTION INTRAMUSCULAR; INTRAVENOUS
Status: COMPLETED
Start: 2018-01-01 | End: 2018-01-01

## 2018-01-01 RX ORDER — INSULIN GLARGINE 100 [IU]/ML
22 INJECTION, SOLUTION SUBCUTANEOUS
Status: DISCONTINUED | OUTPATIENT
Start: 2018-01-01 | End: 2018-01-01

## 2018-01-01 RX ORDER — POTASSIUM CHLORIDE 7.45 MG/ML
10 INJECTION INTRAVENOUS
Status: COMPLETED | OUTPATIENT
Start: 2018-01-01 | End: 2018-01-01

## 2018-01-01 RX ORDER — METOPROLOL TARTRATE 5 MG/5ML
5 INJECTION INTRAVENOUS EVERY 6 HOURS
Status: DISCONTINUED | OUTPATIENT
Start: 2018-01-01 | End: 2018-01-01

## 2018-01-01 RX ORDER — MAGNESIUM SULFATE HEPTAHYDRATE 40 MG/ML
2 INJECTION, SOLUTION INTRAVENOUS ONCE
Status: COMPLETED | OUTPATIENT
Start: 2018-01-01 | End: 2018-01-01

## 2018-01-01 RX ORDER — MORPHINE SULFATE 4 MG/ML
2 INJECTION, SOLUTION INTRAMUSCULAR; INTRAVENOUS ONCE
Status: COMPLETED | OUTPATIENT
Start: 2018-01-01 | End: 2018-01-01

## 2018-01-01 RX ORDER — POTASSIUM CHLORIDE 20MEQ/15ML
30 LIQUID (ML) ORAL ONCE
Status: COMPLETED | OUTPATIENT
Start: 2018-01-01 | End: 2018-01-01

## 2018-01-01 RX ORDER — FUROSEMIDE 10 MG/ML
INJECTION INTRAMUSCULAR; INTRAVENOUS
Status: DISPENSED
Start: 2018-01-01 | End: 2018-01-01

## 2018-01-01 RX ORDER — FENTANYL CITRATE 50 UG/ML
100 INJECTION, SOLUTION INTRAMUSCULAR; INTRAVENOUS ONCE
Status: COMPLETED | OUTPATIENT
Start: 2018-01-01 | End: 2018-01-01

## 2018-01-01 RX ORDER — DEXTROMETHORPHAN/PSEUDOEPHED 2.5-7.5/.8
1.2 DROPS ORAL
Status: CANCELLED | OUTPATIENT
Start: 2018-01-01

## 2018-01-01 RX ORDER — MAGNESIUM SULFATE 1 G/100ML
1 INJECTION INTRAVENOUS ONCE
Status: COMPLETED | OUTPATIENT
Start: 2018-01-01 | End: 2018-01-01

## 2018-01-01 RX ORDER — NOREPINEPHRINE BITARTRATE/D5W 8 MG/250ML
2-16 PLASTIC BAG, INJECTION (ML) INTRAVENOUS
Status: DISCONTINUED | OUTPATIENT
Start: 2018-01-01 | End: 2018-01-01 | Stop reason: HOSPADM

## 2018-01-01 RX ORDER — EPINEPHRINE 0.1 MG/ML
INJECTION INTRACARDIAC; INTRAVENOUS
Status: DISCONTINUED | OUTPATIENT
Start: 2018-01-01 | End: 2018-01-01 | Stop reason: HOSPADM

## 2018-01-01 RX ORDER — FENTANYL CITRATE 50 UG/ML
100 INJECTION, SOLUTION INTRAMUSCULAR; INTRAVENOUS
Status: CANCELLED | OUTPATIENT
Start: 2018-01-01 | End: 2018-01-01

## 2018-01-01 RX ORDER — SODIUM CHLORIDE 9 MG/ML
75 INJECTION, SOLUTION INTRAVENOUS CONTINUOUS
Status: DISCONTINUED | OUTPATIENT
Start: 2018-01-01 | End: 2018-01-01

## 2018-01-01 RX ORDER — FENTANYL CITRATE-0.9 % NACL/PF 900MCG/30
25-150 PATIENT CONTROLLED ANALGESIA VIAL INJECTION
Status: DISCONTINUED | OUTPATIENT
Start: 2018-01-01 | End: 2018-01-01

## 2018-01-01 RX ORDER — VANCOMYCIN/0.9 % SOD CHLORIDE 1.5G/250ML
1500 PLASTIC BAG, INJECTION (ML) INTRAVENOUS ONCE
Status: COMPLETED | OUTPATIENT
Start: 2018-01-01 | End: 2018-01-01

## 2018-01-01 RX ORDER — MIDAZOLAM HYDROCHLORIDE 1 MG/ML
2 INJECTION, SOLUTION INTRAMUSCULAR; INTRAVENOUS
Status: DISCONTINUED | OUTPATIENT
Start: 2018-01-01 | End: 2018-01-01 | Stop reason: HOSPADM

## 2018-01-01 RX ORDER — PROPOFOL 10 MG/ML
INJECTION, EMULSION INTRAVENOUS
Status: COMPLETED
Start: 2018-01-01 | End: 2018-01-01

## 2018-01-01 RX ORDER — PROPOFOL 10 MG/ML
20 INJECTION, EMULSION INTRAVENOUS
Status: COMPLETED | OUTPATIENT
Start: 2018-01-01 | End: 2018-01-01

## 2018-01-01 RX ORDER — METOPROLOL TARTRATE 5 MG/5ML
5 INJECTION INTRAVENOUS ONCE
Status: COMPLETED | OUTPATIENT
Start: 2018-01-01 | End: 2018-01-01

## 2018-01-01 RX ORDER — SODIUM CHLORIDE 9 MG/ML
250 INJECTION, SOLUTION INTRAVENOUS AS NEEDED
Status: DISCONTINUED | OUTPATIENT
Start: 2018-01-01 | End: 2018-01-01 | Stop reason: HOSPADM

## 2018-01-01 RX ORDER — ACETAMINOPHEN 650 MG/1
650 SUPPOSITORY RECTAL
Status: DISCONTINUED | OUTPATIENT
Start: 2018-01-01 | End: 2018-01-01 | Stop reason: HOSPADM

## 2018-01-01 RX ORDER — PANTOPRAZOLE SODIUM 40 MG/1
40 TABLET, DELAYED RELEASE ORAL DAILY
Status: DISCONTINUED | OUTPATIENT
Start: 2018-01-01 | End: 2018-01-01

## 2018-01-01 RX ORDER — HEPARIN SODIUM 1000 [USP'U]/ML
3000 INJECTION, SOLUTION INTRAVENOUS; SUBCUTANEOUS ONCE
Status: COMPLETED | OUTPATIENT
Start: 2018-01-01 | End: 2018-01-01

## 2018-01-01 RX ORDER — VANCOMYCIN/0.9 % SOD CHLORIDE 1.5G/250ML
1500 PLASTIC BAG, INJECTION (ML) INTRAVENOUS EVERY 24 HOURS
Status: DISCONTINUED | OUTPATIENT
Start: 2018-01-01 | End: 2018-01-01

## 2018-01-01 RX ORDER — HEPARIN SODIUM 1000 [USP'U]/ML
1500 INJECTION, SOLUTION INTRAVENOUS; SUBCUTANEOUS AS NEEDED
Status: DISCONTINUED | OUTPATIENT
Start: 2018-01-01 | End: 2018-01-01 | Stop reason: HOSPADM

## 2018-01-01 RX ORDER — METOPROLOL TARTRATE 25 MG/1
25 TABLET, FILM COATED ORAL 2 TIMES DAILY
Status: DISCONTINUED | OUTPATIENT
Start: 2018-01-01 | End: 2018-01-01

## 2018-01-01 RX ORDER — LIDOCAINE HYDROCHLORIDE 10 MG/ML
1-20 INJECTION INFILTRATION; PERINEURAL
Status: COMPLETED | OUTPATIENT
Start: 2018-01-01 | End: 2018-01-01

## 2018-01-01 RX ORDER — SODIUM CHLORIDE 9 MG/ML
1000 INJECTION, SOLUTION INTRAVENOUS CONTINUOUS
Status: CANCELLED | OUTPATIENT
Start: 2018-01-01

## 2018-01-01 RX ORDER — EPINEPHRINE 0.1 MG/ML
1 INJECTION INTRACARDIAC; INTRAVENOUS
Status: CANCELLED | OUTPATIENT
Start: 2018-01-01 | End: 2018-01-01

## 2018-01-01 RX ORDER — SODIUM CHLORIDE 9 MG/ML
100 INJECTION, SOLUTION INTRAVENOUS CONTINUOUS
Status: DISPENSED | OUTPATIENT
Start: 2018-01-01 | End: 2018-01-01

## 2018-01-01 RX ORDER — SODIUM CHLORIDE 0.9 % (FLUSH) 0.9 %
5-10 SYRINGE (ML) INJECTION AS NEEDED
Status: DISCONTINUED | OUTPATIENT
Start: 2018-01-01 | End: 2018-01-01 | Stop reason: HOSPADM

## 2018-01-01 RX ORDER — DEXTROSE 50 % IN WATER (D50W) INTRAVENOUS SYRINGE
25-50 AS NEEDED
Status: DISCONTINUED | OUTPATIENT
Start: 2018-01-01 | End: 2018-01-01 | Stop reason: HOSPADM

## 2018-01-01 RX ORDER — ATROPINE SULFATE 0.1 MG/ML
0.5 INJECTION INTRAVENOUS
Status: CANCELLED | OUTPATIENT
Start: 2018-01-01 | End: 2018-01-01

## 2018-01-01 RX ORDER — FENTANYL CITRATE 50 UG/ML
INJECTION, SOLUTION INTRAMUSCULAR; INTRAVENOUS
Status: COMPLETED
Start: 2018-01-01 | End: 2018-01-01

## 2018-01-01 RX ORDER — LEVOFLOXACIN 5 MG/ML
500 INJECTION, SOLUTION INTRAVENOUS EVERY 24 HOURS
Status: DISCONTINUED | OUTPATIENT
Start: 2018-01-01 | End: 2018-01-01

## 2018-01-01 RX ORDER — LORAZEPAM 2 MG/ML
1 INJECTION INTRAMUSCULAR
Status: COMPLETED | OUTPATIENT
Start: 2018-01-01 | End: 2018-01-01

## 2018-01-01 RX ORDER — METOPROLOL TARTRATE 25 MG/1
25 TABLET, FILM COATED ORAL EVERY 12 HOURS
Status: DISCONTINUED | OUTPATIENT
Start: 2018-01-01 | End: 2018-01-01

## 2018-01-01 RX ORDER — INSULIN LISPRO 100 [IU]/ML
3 INJECTION, SOLUTION INTRAVENOUS; SUBCUTANEOUS EVERY 6 HOURS
Status: DISCONTINUED | OUTPATIENT
Start: 2018-01-01 | End: 2018-01-01

## 2018-01-01 RX ORDER — SUCCINYLCHOLINE CHLORIDE 20 MG/ML
50 INJECTION INTRAMUSCULAR; INTRAVENOUS
Status: COMPLETED | OUTPATIENT
Start: 2018-01-01 | End: 2018-01-01

## 2018-01-01 RX ORDER — INSULIN GLARGINE 100 [IU]/ML
15 INJECTION, SOLUTION SUBCUTANEOUS
Status: DISCONTINUED | OUTPATIENT
Start: 2018-01-01 | End: 2018-01-01

## 2018-01-01 RX ORDER — HYDRALAZINE HYDROCHLORIDE 20 MG/ML
10 INJECTION INTRAMUSCULAR; INTRAVENOUS
Status: DISCONTINUED | OUTPATIENT
Start: 2018-01-01 | End: 2018-01-01 | Stop reason: HOSPADM

## 2018-01-01 RX ORDER — MIDAZOLAM HYDROCHLORIDE 1 MG/ML
2 INJECTION, SOLUTION INTRAMUSCULAR; INTRAVENOUS ONCE
Status: COMPLETED | OUTPATIENT
Start: 2018-01-01 | End: 2018-01-01

## 2018-01-01 RX ORDER — ENOXAPARIN SODIUM 150 MG/ML
150 INJECTION SUBCUTANEOUS EVERY 12 HOURS
Status: DISCONTINUED | OUTPATIENT
Start: 2018-01-01 | End: 2018-01-01 | Stop reason: HOSPADM

## 2018-01-01 RX ORDER — CHLORHEXIDINE GLUCONATE 1.2 MG/ML
10 RINSE ORAL EVERY 12 HOURS
Status: DISCONTINUED | OUTPATIENT
Start: 2018-01-01 | End: 2018-01-01 | Stop reason: SDUPTHER

## 2018-01-01 RX ORDER — FENTANYL CITRATE 50 UG/ML
50 INJECTION, SOLUTION INTRAMUSCULAR; INTRAVENOUS ONCE
Status: COMPLETED | OUTPATIENT
Start: 2018-01-01 | End: 2018-01-01

## 2018-01-01 RX ORDER — POTASSIUM CHLORIDE 20MEQ/15ML
10 LIQUID (ML) ORAL ONCE
Status: COMPLETED | OUTPATIENT
Start: 2018-01-01 | End: 2018-01-01

## 2018-01-01 RX ORDER — AMIODARONE HYDROCHLORIDE 200 MG/1
400 TABLET ORAL EVERY 12 HOURS
Status: DISCONTINUED | OUTPATIENT
Start: 2018-01-01 | End: 2018-01-01 | Stop reason: HOSPADM

## 2018-01-01 RX ORDER — MIDAZOLAM HYDROCHLORIDE 1 MG/ML
4 INJECTION, SOLUTION INTRAMUSCULAR; INTRAVENOUS ONCE
Status: COMPLETED | OUTPATIENT
Start: 2018-01-01 | End: 2018-01-01

## 2018-01-01 RX ORDER — DIGOXIN 0.25 MG/ML
250 INJECTION INTRAMUSCULAR; INTRAVENOUS
Status: COMPLETED | OUTPATIENT
Start: 2018-01-01 | End: 2018-01-01

## 2018-01-01 RX ORDER — GUAIFENESIN 100 MG/5ML
81 LIQUID (ML) ORAL DAILY
Status: DISCONTINUED | OUTPATIENT
Start: 2018-01-01 | End: 2018-01-01 | Stop reason: HOSPADM

## 2018-01-01 RX ORDER — HEPARIN SODIUM 10000 [USP'U]/100ML
7-25 INJECTION, SOLUTION INTRAVENOUS
Status: DISCONTINUED | OUTPATIENT
Start: 2018-01-01 | End: 2018-01-01

## 2018-01-01 RX ORDER — METOPROLOL TARTRATE 50 MG/1
50 TABLET ORAL EVERY 12 HOURS
Status: DISCONTINUED | OUTPATIENT
Start: 2018-01-01 | End: 2018-01-01 | Stop reason: HOSPADM

## 2018-01-01 RX ORDER — POTASSIUM CHLORIDE 20MEQ/15ML
20 LIQUID (ML) ORAL
Status: COMPLETED | OUTPATIENT
Start: 2018-01-01 | End: 2018-01-01

## 2018-01-01 RX ORDER — POTASSIUM CHLORIDE 20MEQ/15ML
10 LIQUID (ML) ORAL
Status: COMPLETED | OUTPATIENT
Start: 2018-01-01 | End: 2018-01-01

## 2018-01-01 RX ORDER — MAGNESIUM SULFATE 100 %
4 CRYSTALS MISCELLANEOUS AS NEEDED
Status: DISCONTINUED | OUTPATIENT
Start: 2018-01-01 | End: 2018-01-01 | Stop reason: HOSPADM

## 2018-01-01 RX ORDER — POTASSIUM CHLORIDE 20MEQ/15ML
10 LIQUID (ML) ORAL DAILY
Status: DISCONTINUED | OUTPATIENT
Start: 2018-01-01 | End: 2018-01-01

## 2018-01-01 RX ORDER — MIDAZOLAM HYDROCHLORIDE 1 MG/ML
INJECTION, SOLUTION INTRAMUSCULAR; INTRAVENOUS
Status: COMPLETED
Start: 2018-01-01 | End: 2018-01-01

## 2018-01-01 RX ORDER — MAGNESIUM SULFATE HEPTAHYDRATE 40 MG/ML
2 INJECTION, SOLUTION INTRAVENOUS ONCE
Status: DISCONTINUED | OUTPATIENT
Start: 2018-01-01 | End: 2018-01-01

## 2018-01-01 RX ORDER — POTASSIUM CHLORIDE 20MEQ/15ML
40 LIQUID (ML) ORAL
Status: COMPLETED | OUTPATIENT
Start: 2018-01-01 | End: 2018-01-01

## 2018-01-01 RX ORDER — AMIODARONE HCL/D5W 450 MG/250
0.5-1 PLASTIC BAG, INJECTION (ML) INTRAVENOUS
Status: DISCONTINUED | OUTPATIENT
Start: 2018-01-01 | End: 2018-01-01 | Stop reason: SDUPTHER

## 2018-01-01 RX ORDER — FUROSEMIDE 10 MG/ML
40 INJECTION INTRAMUSCULAR; INTRAVENOUS ONCE
Status: COMPLETED | OUTPATIENT
Start: 2018-01-01 | End: 2018-01-01

## 2018-01-01 RX ORDER — INSULIN GLARGINE 100 [IU]/ML
10 INJECTION, SOLUTION SUBCUTANEOUS
Status: DISCONTINUED | OUTPATIENT
Start: 2018-01-01 | End: 2018-01-01

## 2018-01-01 RX ORDER — GUAIFENESIN 100 MG/5ML
81 LIQUID (ML) ORAL
Status: COMPLETED | OUTPATIENT
Start: 2018-01-01 | End: 2018-01-01

## 2018-01-01 RX ORDER — FUROSEMIDE 10 MG/ML
80 INJECTION INTRAMUSCULAR; INTRAVENOUS ONCE
Status: COMPLETED | OUTPATIENT
Start: 2018-01-01 | End: 2018-01-01

## 2018-01-01 RX ORDER — IPRATROPIUM BROMIDE 0.5 MG/2.5ML
0.5 SOLUTION RESPIRATORY (INHALATION)
Status: DISCONTINUED | OUTPATIENT
Start: 2018-01-01 | End: 2018-01-01 | Stop reason: HOSPADM

## 2018-01-01 RX ORDER — FOLIC ACID 1 MG/1
1 TABLET ORAL DAILY
Status: DISCONTINUED | OUTPATIENT
Start: 2018-01-01 | End: 2018-01-01 | Stop reason: HOSPADM

## 2018-01-01 RX ORDER — DOCUSATE SODIUM 100 MG/1
100 CAPSULE, LIQUID FILLED ORAL
Status: DISCONTINUED | OUTPATIENT
Start: 2018-01-01 | End: 2018-01-01 | Stop reason: HOSPADM

## 2018-01-01 RX ORDER — METOPROLOL TARTRATE 5 MG/5ML
INJECTION INTRAVENOUS
Status: COMPLETED
Start: 2018-01-01 | End: 2018-01-01

## 2018-01-01 RX ORDER — HEPARIN SODIUM (PORCINE) LOCK FLUSH IV SOLN 100 UNIT/ML 100 UNIT/ML
500 SOLUTION INTRAVENOUS
Status: COMPLETED | OUTPATIENT
Start: 2018-01-01 | End: 2018-01-01

## 2018-01-01 RX ORDER — ASPIRIN 325 MG/1
100 TABLET, FILM COATED ORAL DAILY
Status: DISCONTINUED | OUTPATIENT
Start: 2018-01-01 | End: 2018-01-01 | Stop reason: HOSPADM

## 2018-01-01 RX ORDER — INSULIN LISPRO 100 [IU]/ML
3 INJECTION, SOLUTION INTRAVENOUS; SUBCUTANEOUS EVERY 6 HOURS
Status: DISCONTINUED | OUTPATIENT
Start: 2018-01-01 | End: 2018-01-01 | Stop reason: HOSPADM

## 2018-01-01 RX ORDER — INSULIN LISPRO 100 [IU]/ML
INJECTION, SOLUTION INTRAVENOUS; SUBCUTANEOUS EVERY 6 HOURS
Status: DISCONTINUED | OUTPATIENT
Start: 2018-01-01 | End: 2018-01-01 | Stop reason: HOSPADM

## 2018-01-01 RX ORDER — ASPIRIN 325 MG
325 TABLET ORAL ONCE
Status: COMPLETED | OUTPATIENT
Start: 2018-01-01 | End: 2018-01-01

## 2018-01-01 RX ORDER — AMIODARONE HYDROCHLORIDE 200 MG/1
200 TABLET ORAL DAILY
Status: DISCONTINUED | OUTPATIENT
Start: 2018-03-01 | End: 2018-01-01 | Stop reason: HOSPADM

## 2018-01-01 RX ORDER — ALBUMIN HUMAN 250 G/1000ML
25 SOLUTION INTRAVENOUS ONCE
Status: COMPLETED | OUTPATIENT
Start: 2018-01-01 | End: 2018-01-01

## 2018-01-01 RX ORDER — PROPOFOL 10 MG/ML
INJECTION, EMULSION INTRAVENOUS
Status: DISPENSED
Start: 2018-01-01 | End: 2018-01-01

## 2018-01-01 RX ORDER — FUROSEMIDE 10 MG/ML
100 INJECTION INTRAMUSCULAR; INTRAVENOUS ONCE
Status: COMPLETED | OUTPATIENT
Start: 2018-01-01 | End: 2018-01-01

## 2018-01-01 RX ORDER — HEPARIN SODIUM 5000 [USP'U]/ML
3000 INJECTION, SOLUTION INTRAVENOUS; SUBCUTANEOUS ONCE
Status: DISCONTINUED | OUTPATIENT
Start: 2018-01-01 | End: 2018-01-01

## 2018-01-01 RX ORDER — VANCOMYCIN HYDROCHLORIDE
1250 ONCE
Status: DISCONTINUED | OUTPATIENT
Start: 2018-01-01 | End: 2018-01-01

## 2018-01-01 RX ORDER — POTASSIUM CHLORIDE 7.45 MG/ML
10 INJECTION INTRAVENOUS ONCE
Status: COMPLETED | OUTPATIENT
Start: 2018-01-01 | End: 2018-01-01

## 2018-01-01 RX ORDER — CHLORHEXIDINE GLUCONATE 1.2 MG/ML
10 RINSE ORAL EVERY 12 HOURS
Status: DISCONTINUED | OUTPATIENT
Start: 2018-01-01 | End: 2018-01-01 | Stop reason: HOSPADM

## 2018-01-01 RX ORDER — FUROSEMIDE 10 MG/ML
40 INJECTION INTRAMUSCULAR; INTRAVENOUS DAILY
Status: DISCONTINUED | OUTPATIENT
Start: 2018-01-01 | End: 2018-01-01 | Stop reason: HOSPADM

## 2018-01-01 RX ORDER — MIDAZOLAM HYDROCHLORIDE 1 MG/ML
.5-5 INJECTION, SOLUTION INTRAMUSCULAR; INTRAVENOUS
Status: CANCELLED | OUTPATIENT
Start: 2018-01-01 | End: 2018-01-01

## 2018-01-01 RX ORDER — ONDANSETRON 4 MG/1
4 TABLET, ORALLY DISINTEGRATING ORAL
Status: DISCONTINUED | OUTPATIENT
Start: 2018-01-01 | End: 2018-01-01 | Stop reason: HOSPADM

## 2018-01-01 RX ORDER — NALOXONE HYDROCHLORIDE 0.4 MG/ML
0.4 INJECTION, SOLUTION INTRAMUSCULAR; INTRAVENOUS; SUBCUTANEOUS
Status: CANCELLED | OUTPATIENT
Start: 2018-01-01 | End: 2018-01-01

## 2018-01-01 RX ORDER — NALOXONE HYDROCHLORIDE 0.4 MG/ML
0.4 INJECTION, SOLUTION INTRAMUSCULAR; INTRAVENOUS; SUBCUTANEOUS AS NEEDED
Status: DISCONTINUED | OUTPATIENT
Start: 2018-01-01 | End: 2018-01-01 | Stop reason: HOSPADM

## 2018-01-01 RX ORDER — MAGNESIUM SULFATE 1 G/100ML
1 INJECTION INTRAVENOUS
Status: COMPLETED | OUTPATIENT
Start: 2018-01-01 | End: 2018-01-01

## 2018-01-01 RX ORDER — ACETAMINOPHEN 325 MG/1
650 TABLET ORAL
Status: DISCONTINUED | OUTPATIENT
Start: 2018-01-01 | End: 2018-01-01 | Stop reason: HOSPADM

## 2018-01-01 RX ORDER — FENTANYL CITRATE-0.9 % NACL/PF 10 MCG/ML
25-50 PLASTIC BAG, INJECTION (ML) INTRAVENOUS
Status: DISCONTINUED | OUTPATIENT
Start: 2018-01-01 | End: 2018-01-01 | Stop reason: DRUGHIGH

## 2018-01-01 RX ORDER — METOPROLOL TARTRATE 5 MG/5ML
2.5 INJECTION INTRAVENOUS EVERY 6 HOURS
Status: DISCONTINUED | OUTPATIENT
Start: 2018-01-01 | End: 2018-01-01

## 2018-01-01 RX ORDER — HEPARIN SODIUM 200 [USP'U]/100ML
500 INJECTION, SOLUTION INTRAVENOUS
Status: COMPLETED | OUTPATIENT
Start: 2018-01-01 | End: 2018-01-01

## 2018-01-01 RX ORDER — MORPHINE SULFATE 4 MG/ML
1 INJECTION, SOLUTION INTRAMUSCULAR; INTRAVENOUS
Status: DISCONTINUED | OUTPATIENT
Start: 2018-01-01 | End: 2018-01-01

## 2018-01-01 RX ORDER — IPRATROPIUM BROMIDE AND ALBUTEROL SULFATE 2.5; .5 MG/3ML; MG/3ML
3 SOLUTION RESPIRATORY (INHALATION)
Status: DISCONTINUED | OUTPATIENT
Start: 2018-01-01 | End: 2018-01-01

## 2018-01-01 RX ORDER — HYDRALAZINE HYDROCHLORIDE 20 MG/ML
20 INJECTION INTRAMUSCULAR; INTRAVENOUS EVERY 6 HOURS
Status: DISCONTINUED | OUTPATIENT
Start: 2018-01-01 | End: 2018-01-01

## 2018-01-01 RX ORDER — SODIUM,POTASSIUM PHOSPHATES 280-250MG
2 POWDER IN PACKET (EA) ORAL
Status: COMPLETED | OUTPATIENT
Start: 2018-01-01 | End: 2018-01-01

## 2018-01-01 RX ORDER — MAGNESIUM SULFATE HEPTAHYDRATE 40 MG/ML
2 INJECTION, SOLUTION INTRAVENOUS
Status: COMPLETED | OUTPATIENT
Start: 2018-01-01 | End: 2018-01-01

## 2018-01-01 RX ORDER — NOREPINEPHRINE BITARTRATE/D5W 8 MG/250ML
2-16 PLASTIC BAG, INJECTION (ML) INTRAVENOUS
Status: DISCONTINUED | OUTPATIENT
Start: 2018-01-01 | End: 2018-01-01

## 2018-01-01 RX ORDER — THIAMINE HYDROCHLORIDE 100 MG/ML
100 INJECTION, SOLUTION INTRAMUSCULAR; INTRAVENOUS DAILY
Status: DISCONTINUED | OUTPATIENT
Start: 2018-01-01 | End: 2018-01-01 | Stop reason: SDUPTHER

## 2018-01-01 RX ORDER — LEVOFLOXACIN 5 MG/ML
750 INJECTION, SOLUTION INTRAVENOUS EVERY 24 HOURS
Status: DISCONTINUED | OUTPATIENT
Start: 2018-01-01 | End: 2018-01-01

## 2018-01-01 RX ORDER — INSULIN GLARGINE 100 [IU]/ML
12 INJECTION, SOLUTION SUBCUTANEOUS
Status: DISCONTINUED | OUTPATIENT
Start: 2018-01-01 | End: 2018-01-01

## 2018-01-01 RX ORDER — ACETAMINOPHEN 325 MG/1
650 TABLET ORAL
Status: DISCONTINUED | OUTPATIENT
Start: 2018-01-01 | End: 2018-01-01

## 2018-01-01 RX ORDER — MIDAZOLAM HYDROCHLORIDE 1 MG/ML
3 INJECTION, SOLUTION INTRAMUSCULAR; INTRAVENOUS ONCE
Status: COMPLETED | OUTPATIENT
Start: 2018-01-01 | End: 2018-01-01

## 2018-01-01 RX ORDER — FLUMAZENIL 0.1 MG/ML
0.2 INJECTION INTRAVENOUS
Status: CANCELLED | OUTPATIENT
Start: 2018-01-01 | End: 2018-01-01

## 2018-01-01 RX ORDER — FENTANYL CITRATE 50 UG/ML
50-100 INJECTION, SOLUTION INTRAMUSCULAR; INTRAVENOUS
Status: DISCONTINUED | OUTPATIENT
Start: 2018-01-01 | End: 2018-01-01 | Stop reason: HOSPADM

## 2018-01-01 RX ORDER — SODIUM,POTASSIUM PHOSPHATES 280-250MG
2 POWDER IN PACKET (EA) ORAL ONCE
Status: COMPLETED | OUTPATIENT
Start: 2018-01-01 | End: 2018-01-01

## 2018-01-01 RX ORDER — INSULIN LISPRO 100 [IU]/ML
INJECTION, SOLUTION INTRAVENOUS; SUBCUTANEOUS
Status: DISCONTINUED | OUTPATIENT
Start: 2018-01-01 | End: 2018-01-01

## 2018-01-01 RX ORDER — POTASSIUM CHLORIDE 20MEQ/15ML
20 LIQUID (ML) ORAL DAILY
Status: DISCONTINUED | OUTPATIENT
Start: 2018-01-01 | End: 2018-01-01

## 2018-01-01 RX ORDER — HEPARIN SODIUM 5000 [USP'U]/ML
5000 INJECTION, SOLUTION INTRAVENOUS; SUBCUTANEOUS EVERY 8 HOURS
Status: DISCONTINUED | OUTPATIENT
Start: 2018-01-01 | End: 2018-01-01

## 2018-01-01 RX ADMIN — POTASSIUM CHLORIDE 10 MEQ: 20 SOLUTION ORAL at 08:21

## 2018-01-01 RX ADMIN — CEFTAROLINE FOSAMIL 600 MG: 600 POWDER, FOR SOLUTION INTRAVENOUS at 13:11

## 2018-01-01 RX ADMIN — INSULIN LISPRO 5 UNITS: 100 INJECTION, SOLUTION INTRAVENOUS; SUBCUTANEOUS at 05:48

## 2018-01-01 RX ADMIN — CEFTAROLINE FOSAMIL 600 MG: 600 POWDER, FOR SOLUTION INTRAVENOUS at 00:37

## 2018-01-01 RX ADMIN — INSULIN GLARGINE 18 UNITS: 100 INJECTION, SOLUTION SUBCUTANEOUS at 12:29

## 2018-01-01 RX ADMIN — HEPARIN SODIUM 3000 UNITS: 1000 INJECTION, SOLUTION INTRAVENOUS; SUBCUTANEOUS at 07:11

## 2018-01-01 RX ADMIN — SODIUM CHLORIDE 500 ML: 900 INJECTION, SOLUTION INTRAVENOUS at 01:37

## 2018-01-01 RX ADMIN — POTASSIUM CHLORIDE 10 MEQ: 10 INJECTION, SOLUTION INTRAVENOUS at 08:39

## 2018-01-01 RX ADMIN — IPRATROPIUM BROMIDE 0.5 MG: 0.5 SOLUTION RESPIRATORY (INHALATION) at 07:18

## 2018-01-01 RX ADMIN — MIDAZOLAM HYDROCHLORIDE 2 MG: 1 INJECTION, SOLUTION INTRAMUSCULAR; INTRAVENOUS at 00:46

## 2018-01-01 RX ADMIN — ASPIRIN 81 MG 81 MG: 81 TABLET ORAL at 08:51

## 2018-01-01 RX ADMIN — VANCOMYCIN HYDROCHLORIDE 1500 MG: 10 INJECTION, POWDER, LYOPHILIZED, FOR SOLUTION INTRAVENOUS at 06:17

## 2018-01-01 RX ADMIN — PHENYLEPHRINE HYDROCHLORIDE 100 MCG/MIN: 10 INJECTION INTRAVENOUS at 23:08

## 2018-01-01 RX ADMIN — VANCOMYCIN HYDROCHLORIDE 1500 MG: 10 INJECTION, POWDER, LYOPHILIZED, FOR SOLUTION INTRAVENOUS at 22:31

## 2018-01-01 RX ADMIN — IPRATROPIUM BROMIDE 0.5 MG: 0.5 SOLUTION RESPIRATORY (INHALATION) at 07:41

## 2018-01-01 RX ADMIN — POTASSIUM CHLORIDE 10 MEQ: 10 INJECTION, SOLUTION INTRAVENOUS at 23:18

## 2018-01-01 RX ADMIN — METOPROLOL TARTRATE 50 MG: 50 TABLET ORAL at 08:20

## 2018-01-01 RX ADMIN — IPRATROPIUM BROMIDE 0.5 MG: 0.5 SOLUTION RESPIRATORY (INHALATION) at 01:16

## 2018-01-01 RX ADMIN — METOPROLOL TARTRATE 5 MG: 5 INJECTION, SOLUTION INTRAVENOUS at 15:05

## 2018-01-01 RX ADMIN — SUCCINYLCHOLINE CHLORIDE 50 MG: 20 INJECTION, SOLUTION INTRAMUSCULAR; INTRAVENOUS at 15:48

## 2018-01-01 RX ADMIN — CHLORHEXIDINE GLUCONATE 10 ML: 1.2 RINSE ORAL at 13:48

## 2018-01-01 RX ADMIN — INSULIN LISPRO 5 UNITS: 100 INJECTION, SOLUTION INTRAVENOUS; SUBCUTANEOUS at 00:51

## 2018-01-01 RX ADMIN — METOPROLOL TARTRATE 50 MG: 50 TABLET ORAL at 21:47

## 2018-01-01 RX ADMIN — ASPIRIN 81 MG 81 MG: 81 TABLET ORAL at 09:29

## 2018-01-01 RX ADMIN — MAGNESIUM SULFATE HEPTAHYDRATE 1 G: 1 INJECTION, SOLUTION INTRAVENOUS at 20:28

## 2018-01-01 RX ADMIN — AMIODARONE HYDROCHLORIDE 1 MG/MIN: 1.8 INJECTION, SOLUTION INTRAVENOUS at 05:13

## 2018-01-01 RX ADMIN — SODIUM CHLORIDE 40 MG: 9 INJECTION INTRAMUSCULAR; INTRAVENOUS; SUBCUTANEOUS at 08:56

## 2018-01-01 RX ADMIN — INSULIN GLARGINE 12 UNITS: 100 INJECTION, SOLUTION SUBCUTANEOUS at 12:57

## 2018-01-01 RX ADMIN — ASPIRIN 81 MG 81 MG: 81 TABLET ORAL at 08:27

## 2018-01-01 RX ADMIN — AMIODARONE HYDROCHLORIDE 400 MG: 200 TABLET ORAL at 08:52

## 2018-01-01 RX ADMIN — HEPARIN SODIUM 3000 UNITS: 1000 INJECTION, SOLUTION INTRAVENOUS; SUBCUTANEOUS at 05:12

## 2018-01-01 RX ADMIN — PHENYLEPHRINE HYDROCHLORIDE 80 MCG/MIN: 10 INJECTION INTRAVENOUS at 18:41

## 2018-01-01 RX ADMIN — NOREPINEPHRINE BITARTRATE 4 MCG/MIN: 8 SOLUTION at 13:18

## 2018-01-01 RX ADMIN — POTASSIUM CHLORIDE 10 MEQ: 10 INJECTION, SOLUTION INTRAVENOUS at 11:24

## 2018-01-01 RX ADMIN — HYDRALAZINE HYDROCHLORIDE 10 MG: 20 INJECTION INTRAMUSCULAR; INTRAVENOUS at 14:13

## 2018-01-01 RX ADMIN — INSULIN GLARGINE 22 UNITS: 100 INJECTION, SOLUTION SUBCUTANEOUS at 12:34

## 2018-01-01 RX ADMIN — HEPARIN SODIUM 3000 UNITS: 1000 INJECTION, SOLUTION INTRAVENOUS; SUBCUTANEOUS at 18:24

## 2018-01-01 RX ADMIN — INSULIN LISPRO 2 UNITS: 100 INJECTION, SOLUTION INTRAVENOUS; SUBCUTANEOUS at 18:21

## 2018-01-01 RX ADMIN — ASPIRIN 81 MG 81 MG: 81 TABLET ORAL at 08:22

## 2018-01-01 RX ADMIN — ENOXAPARIN SODIUM 150 MG: 150 INJECTION SUBCUTANEOUS at 14:29

## 2018-01-01 RX ADMIN — METOPROLOL TARTRATE 5 MG: 5 INJECTION, SOLUTION INTRAVENOUS at 16:05

## 2018-01-01 RX ADMIN — SODIUM CHLORIDE 40 MG: 9 INJECTION INTRAMUSCULAR; INTRAVENOUS; SUBCUTANEOUS at 08:27

## 2018-01-01 RX ADMIN — THIAMINE HYDROCHLORIDE: 100 INJECTION, SOLUTION INTRAMUSCULAR; INTRAVENOUS at 11:13

## 2018-01-01 RX ADMIN — ACETAMINOPHEN 650 MG: 325 TABLET ORAL at 00:52

## 2018-01-01 RX ADMIN — POTASSIUM CHLORIDE 10 MEQ: 10 INJECTION, SOLUTION INTRAVENOUS at 12:06

## 2018-01-01 RX ADMIN — INSULIN LISPRO 5 UNITS: 100 INJECTION, SOLUTION INTRAVENOUS; SUBCUTANEOUS at 00:03

## 2018-01-01 RX ADMIN — FOLIC ACID 1 MG: 1 TABLET ORAL at 08:20

## 2018-01-01 RX ADMIN — PROPOFOL 20 MCG/KG/MIN: 10 INJECTION, EMULSION INTRAVENOUS at 06:07

## 2018-01-01 RX ADMIN — POTASSIUM CHLORIDE 10 MEQ: 10 INJECTION, SOLUTION INTRAVENOUS at 11:02

## 2018-01-01 RX ADMIN — AMIODARONE HYDROCHLORIDE 400 MG: 200 TABLET ORAL at 09:29

## 2018-01-01 RX ADMIN — AZTREONAM 2 G: 2 INJECTION, POWDER, LYOPHILIZED, FOR SOLUTION INTRAMUSCULAR; INTRAVENOUS at 06:00

## 2018-01-01 RX ADMIN — INSULIN LISPRO 2 UNITS: 100 INJECTION, SOLUTION INTRAVENOUS; SUBCUTANEOUS at 12:10

## 2018-01-01 RX ADMIN — LEVOFLOXACIN 750 MG: 5 INJECTION, SOLUTION INTRAVENOUS at 22:34

## 2018-01-01 RX ADMIN — HEPARIN SODIUM AND DEXTROSE 25 UNITS/KG/HR: 10000; 5 INJECTION INTRAVENOUS at 11:23

## 2018-01-01 RX ADMIN — INSULIN GLARGINE 10 UNITS: 100 INJECTION, SOLUTION SUBCUTANEOUS at 11:13

## 2018-01-01 RX ADMIN — AMIODARONE HYDROCHLORIDE 1 MG/MIN: 1.8 INJECTION, SOLUTION INTRAVENOUS at 00:22

## 2018-01-01 RX ADMIN — PROPOFOL 30 MCG/KG/MIN: 10 INJECTION, EMULSION INTRAVENOUS at 18:28

## 2018-01-01 RX ADMIN — SODIUM CHLORIDE 40 MG: 9 INJECTION INTRAMUSCULAR; INTRAVENOUS; SUBCUTANEOUS at 08:51

## 2018-01-01 RX ADMIN — PROPOFOL 20 MCG/KG/MIN: 10 INJECTION, EMULSION INTRAVENOUS at 06:30

## 2018-01-01 RX ADMIN — MAGNESIUM SULFATE HEPTAHYDRATE 2 G: 40 INJECTION, SOLUTION INTRAVENOUS at 02:22

## 2018-01-01 RX ADMIN — AMIODARONE HYDROCHLORIDE 400 MG: 200 TABLET ORAL at 22:04

## 2018-01-01 RX ADMIN — INSULIN LISPRO 5 UNITS: 100 INJECTION, SOLUTION INTRAVENOUS; SUBCUTANEOUS at 18:27

## 2018-01-01 RX ADMIN — POTASSIUM CHLORIDE 10 MEQ: 10 INJECTION, SOLUTION INTRAVENOUS at 22:47

## 2018-01-01 RX ADMIN — HEPARIN SODIUM AND DEXTROSE 12 UNITS/KG/HR: 10000; 5 INJECTION INTRAVENOUS at 18:23

## 2018-01-01 RX ADMIN — PHENYLEPHRINE HYDROCHLORIDE 80 MCG/MIN: 10 INJECTION INTRAVENOUS at 09:27

## 2018-01-01 RX ADMIN — POTASSIUM CHLORIDE 40 MEQ: 20 SOLUTION ORAL at 06:09

## 2018-01-01 RX ADMIN — INSULIN LISPRO 2 UNITS: 100 INJECTION, SOLUTION INTRAVENOUS; SUBCUTANEOUS at 02:00

## 2018-01-01 RX ADMIN — IPRATROPIUM BROMIDE AND ALBUTEROL SULFATE 3 ML: .5; 3 SOLUTION RESPIRATORY (INHALATION) at 07:40

## 2018-01-01 RX ADMIN — METOPROLOL TARTRATE 5 MG: 5 INJECTION, SOLUTION INTRAVENOUS at 00:40

## 2018-01-01 RX ADMIN — PROPOFOL 30 MCG/KG/MIN: 10 INJECTION, EMULSION INTRAVENOUS at 05:18

## 2018-01-01 RX ADMIN — PHENYLEPHRINE HYDROCHLORIDE 100 MCG/MIN: 10 INJECTION INTRAVENOUS at 04:36

## 2018-01-01 RX ADMIN — FENTANYL CITRATE 50 MCG: 50 INJECTION, SOLUTION INTRAMUSCULAR; INTRAVENOUS at 15:56

## 2018-01-01 RX ADMIN — FUROSEMIDE 80 MG: 10 INJECTION, SOLUTION INTRAMUSCULAR; INTRAVENOUS at 10:00

## 2018-01-01 RX ADMIN — ENOXAPARIN SODIUM 150 MG: 150 INJECTION SUBCUTANEOUS at 17:26

## 2018-01-01 RX ADMIN — CHLORHEXIDINE GLUCONATE 10 ML: 1.2 RINSE ORAL at 04:22

## 2018-01-01 RX ADMIN — AMIODARONE HYDROCHLORIDE 400 MG: 200 TABLET ORAL at 21:47

## 2018-01-01 RX ADMIN — ENOXAPARIN SODIUM 150 MG: 150 INJECTION SUBCUTANEOUS at 16:00

## 2018-01-01 RX ADMIN — FENTANYL CITRATE 100 MCG: 50 INJECTION, SOLUTION INTRAMUSCULAR; INTRAVENOUS at 16:40

## 2018-01-01 RX ADMIN — AMIODARONE HYDROCHLORIDE 0.5 MG/MIN: 1.8 INJECTION, SOLUTION INTRAVENOUS at 17:31

## 2018-01-01 RX ADMIN — POTASSIUM CHLORIDE 10 MEQ: 10 INJECTION, SOLUTION INTRAVENOUS at 07:25

## 2018-01-01 RX ADMIN — SODIUM CHLORIDE 1 ML: 9 INJECTION INTRAMUSCULAR; INTRAVENOUS; SUBCUTANEOUS at 10:33

## 2018-01-01 RX ADMIN — INSULIN LISPRO 3 UNITS: 100 INJECTION, SOLUTION INTRAVENOUS; SUBCUTANEOUS at 23:38

## 2018-01-01 RX ADMIN — LEVOFLOXACIN 500 MG: 5 INJECTION, SOLUTION INTRAVENOUS at 22:27

## 2018-01-01 RX ADMIN — INSULIN LISPRO 3 UNITS: 100 INJECTION, SOLUTION INTRAVENOUS; SUBCUTANEOUS at 05:16

## 2018-01-01 RX ADMIN — POTASSIUM & SODIUM PHOSPHATES POWDER PACK 280-160-250 MG 2 PACKET: 280-160-250 PACK at 00:19

## 2018-01-01 RX ADMIN — ENOXAPARIN SODIUM 150 MG: 150 INJECTION SUBCUTANEOUS at 15:57

## 2018-01-01 RX ADMIN — POTASSIUM CHLORIDE 10 MEQ: 10 INJECTION, SOLUTION INTRAVENOUS at 13:49

## 2018-01-01 RX ADMIN — INSULIN GLARGINE 15 UNITS: 100 INJECTION, SOLUTION SUBCUTANEOUS at 12:27

## 2018-01-01 RX ADMIN — IPRATROPIUM BROMIDE 0.5 MG: 0.5 SOLUTION RESPIRATORY (INHALATION) at 01:22

## 2018-01-01 RX ADMIN — AMIODARONE HYDROCHLORIDE 400 MG: 200 TABLET ORAL at 08:19

## 2018-01-01 RX ADMIN — DEXTROSE MONOHYDRATE AND SODIUM CHLORIDE: 5; .45 INJECTION, SOLUTION INTRAVENOUS at 02:56

## 2018-01-01 RX ADMIN — INSULIN LISPRO 2 UNITS: 100 INJECTION, SOLUTION INTRAVENOUS; SUBCUTANEOUS at 12:31

## 2018-01-01 RX ADMIN — ACETAMINOPHEN 650 MG: 650 SUPPOSITORY RECTAL at 20:55

## 2018-01-01 RX ADMIN — POTASSIUM CHLORIDE 10 MEQ: 10 INJECTION, SOLUTION INTRAVENOUS at 12:01

## 2018-01-01 RX ADMIN — ACETAZOLAMIDE 250 MG: 500 INJECTION, POWDER, LYOPHILIZED, FOR SOLUTION INTRAVENOUS at 16:27

## 2018-01-01 RX ADMIN — METOPROLOL TARTRATE 5 MG: 5 INJECTION, SOLUTION INTRAVENOUS at 02:31

## 2018-01-01 RX ADMIN — POTASSIUM CHLORIDE 10 MEQ: 10 INJECTION, SOLUTION INTRAVENOUS at 07:54

## 2018-01-01 RX ADMIN — CHLORHEXIDINE GLUCONATE 10 ML: 1.2 RINSE ORAL at 13:05

## 2018-01-01 RX ADMIN — MAGNESIUM SULFATE HEPTAHYDRATE 1 G: 1 INJECTION, SOLUTION INTRAVENOUS at 00:21

## 2018-01-01 RX ADMIN — CEFTAROLINE FOSAMIL 600 MG: 600 POWDER, FOR SOLUTION INTRAVENOUS at 12:57

## 2018-01-01 RX ADMIN — HYDRALAZINE HYDROCHLORIDE 10 MG: 20 INJECTION INTRAMUSCULAR; INTRAVENOUS at 09:20

## 2018-01-01 RX ADMIN — HYDRALAZINE HYDROCHLORIDE 10 MG: 20 INJECTION INTRAMUSCULAR; INTRAVENOUS at 01:55

## 2018-01-01 RX ADMIN — POTASSIUM CHLORIDE 10 MEQ: 10 INJECTION, SOLUTION INTRAVENOUS at 12:41

## 2018-01-01 RX ADMIN — THIAMINE HYDROCHLORIDE: 100 INJECTION, SOLUTION INTRAMUSCULAR; INTRAVENOUS at 10:37

## 2018-01-01 RX ADMIN — MORPHINE SULFATE 1 MG: 4 INJECTION, SOLUTION INTRAMUSCULAR; INTRAVENOUS at 14:29

## 2018-01-01 RX ADMIN — PHENYLEPHRINE HYDROCHLORIDE 90 MCG/MIN: 10 INJECTION INTRAVENOUS at 10:45

## 2018-01-01 RX ADMIN — CHLORHEXIDINE GLUCONATE 10 ML: 1.2 RINSE ORAL at 02:35

## 2018-01-01 RX ADMIN — CHLORHEXIDINE GLUCONATE 10 ML: 1.2 RINSE ORAL at 21:39

## 2018-01-01 RX ADMIN — DIATRIZOATE MEGLUMINE AND DIATRIZOATE SODIUM 30 ML: 660; 100 LIQUID ORAL; RECTAL at 15:54

## 2018-01-01 RX ADMIN — LORAZEPAM 1 MG/HR: 2 INJECTION INTRAMUSCULAR at 13:19

## 2018-01-01 RX ADMIN — INSULIN GLARGINE 15 UNITS: 100 INJECTION, SOLUTION SUBCUTANEOUS at 12:25

## 2018-01-01 RX ADMIN — METOPROLOL TARTRATE 5 MG: 5 INJECTION INTRAVENOUS at 09:59

## 2018-01-01 RX ADMIN — METOPROLOL TARTRATE 5 MG: 5 INJECTION, SOLUTION INTRAVENOUS at 22:17

## 2018-01-01 RX ADMIN — INSULIN LISPRO 2 UNITS: 100 INJECTION, SOLUTION INTRAVENOUS; SUBCUTANEOUS at 00:19

## 2018-01-01 RX ADMIN — INSULIN LISPRO 5 UNITS: 100 INJECTION, SOLUTION INTRAVENOUS; SUBCUTANEOUS at 06:52

## 2018-01-01 RX ADMIN — CALCIUM GLUCONATE 1 G: 94 INJECTION, SOLUTION INTRAVENOUS at 11:00

## 2018-01-01 RX ADMIN — CHLORHEXIDINE GLUCONATE 10 ML: 1.2 RINSE ORAL at 08:54

## 2018-01-01 RX ADMIN — INSULIN LISPRO 5 UNITS: 100 INJECTION, SOLUTION INTRAVENOUS; SUBCUTANEOUS at 00:30

## 2018-01-01 RX ADMIN — INSULIN LISPRO 4 UNITS: 100 INJECTION, SOLUTION INTRAVENOUS; SUBCUTANEOUS at 00:37

## 2018-01-01 RX ADMIN — CEFTAROLINE FOSAMIL 600 MG: 600 POWDER, FOR SOLUTION INTRAVENOUS at 00:44

## 2018-01-01 RX ADMIN — IPRATROPIUM BROMIDE 0.5 MG: 0.5 SOLUTION RESPIRATORY (INHALATION) at 07:52

## 2018-01-01 RX ADMIN — CHLORHEXIDINE GLUCONATE 10 ML: 1.2 RINSE ORAL at 02:05

## 2018-01-01 RX ADMIN — HEPARIN SODIUM AND DEXTROSE 7 UNITS/KG/HR: 10000; 5 INJECTION INTRAVENOUS at 18:11

## 2018-01-01 RX ADMIN — LEVOFLOXACIN 500 MG: 5 INJECTION, SOLUTION INTRAVENOUS at 23:02

## 2018-01-01 RX ADMIN — CHLORHEXIDINE GLUCONATE 10 ML: 1.2 RINSE ORAL at 21:30

## 2018-01-01 RX ADMIN — MAGNESIUM SULFATE HEPTAHYDRATE 1 G: 1 INJECTION, SOLUTION INTRAVENOUS at 19:38

## 2018-01-01 RX ADMIN — HEPARIN SODIUM AND DEXTROSE 25 UNITS/KG/HR: 10000; 5 INJECTION INTRAVENOUS at 02:36

## 2018-01-01 RX ADMIN — INSULIN LISPRO 3 UNITS: 100 INJECTION, SOLUTION INTRAVENOUS; SUBCUTANEOUS at 17:39

## 2018-01-01 RX ADMIN — INSULIN LISPRO 2 UNITS: 100 INJECTION, SOLUTION INTRAVENOUS; SUBCUTANEOUS at 18:27

## 2018-01-01 RX ADMIN — HEPARIN SODIUM 3000 UNITS: 1000 INJECTION, SOLUTION INTRAVENOUS; SUBCUTANEOUS at 08:04

## 2018-01-01 RX ADMIN — METOPROLOL TARTRATE 5 MG: 5 INJECTION, SOLUTION INTRAVENOUS at 11:23

## 2018-01-01 RX ADMIN — PROPOFOL 20 MCG/KG/MIN: 10 INJECTION, EMULSION INTRAVENOUS at 17:36

## 2018-01-01 RX ADMIN — POTASSIUM CHLORIDE 10 MEQ: 10 INJECTION, SOLUTION INTRAVENOUS at 00:13

## 2018-01-01 RX ADMIN — IPRATROPIUM BROMIDE 0.5 MG: 0.5 SOLUTION RESPIRATORY (INHALATION) at 09:10

## 2018-01-01 RX ADMIN — IPRATROPIUM BROMIDE 0.5 MG: 0.5 SOLUTION RESPIRATORY (INHALATION) at 20:39

## 2018-01-01 RX ADMIN — POTASSIUM CHLORIDE 10 MEQ: 20 SOLUTION ORAL at 00:02

## 2018-01-01 RX ADMIN — METOPROLOL TARTRATE 5 MG: 5 INJECTION INTRAVENOUS at 13:50

## 2018-01-01 RX ADMIN — METOPROLOL TARTRATE 50 MG: 50 TABLET ORAL at 09:40

## 2018-01-01 RX ADMIN — MAGNESIUM SULFATE HEPTAHYDRATE 2 G: 40 INJECTION, SOLUTION INTRAVENOUS at 23:03

## 2018-01-01 RX ADMIN — Medication 100 MG: at 14:29

## 2018-01-01 RX ADMIN — INSULIN LISPRO 5 UNITS: 100 INJECTION, SOLUTION INTRAVENOUS; SUBCUTANEOUS at 18:19

## 2018-01-01 RX ADMIN — IPRATROPIUM BROMIDE 0.5 MG: 0.5 SOLUTION RESPIRATORY (INHALATION) at 13:15

## 2018-01-01 RX ADMIN — DOCUSATE SODIUM 100 MG: 100 CAPSULE, LIQUID FILLED ORAL at 00:52

## 2018-01-01 RX ADMIN — AZTREONAM 2 G: 2 INJECTION, POWDER, LYOPHILIZED, FOR SOLUTION INTRAMUSCULAR; INTRAVENOUS at 08:20

## 2018-01-01 RX ADMIN — CHLORHEXIDINE GLUCONATE 10 ML: 1.2 RINSE ORAL at 14:00

## 2018-01-01 RX ADMIN — MAGNESIUM SULFATE HEPTAHYDRATE 2 G: 40 INJECTION, SOLUTION INTRAVENOUS at 11:31

## 2018-01-01 RX ADMIN — FUROSEMIDE 40 MG: 10 INJECTION, SOLUTION INTRAMUSCULAR; INTRAVENOUS at 02:22

## 2018-01-01 RX ADMIN — AMIODARONE HYDROCHLORIDE 0.5 MG/MIN: 50 INJECTION, SOLUTION INTRAVENOUS at 19:39

## 2018-01-01 RX ADMIN — LORAZEPAM 0.01 MG/KG/HR: 2 INJECTION INTRAMUSCULAR at 14:52

## 2018-01-01 RX ADMIN — SODIUM CHLORIDE 40 MG: 9 INJECTION INTRAMUSCULAR; INTRAVENOUS; SUBCUTANEOUS at 08:22

## 2018-01-01 RX ADMIN — AZTREONAM 2 G: 2 INJECTION, POWDER, LYOPHILIZED, FOR SOLUTION INTRAMUSCULAR; INTRAVENOUS at 13:53

## 2018-01-01 RX ADMIN — INSULIN LISPRO 3 UNITS: 100 INJECTION, SOLUTION INTRAVENOUS; SUBCUTANEOUS at 23:56

## 2018-01-01 RX ADMIN — INSULIN GLARGINE 15 UNITS: 100 INJECTION, SOLUTION SUBCUTANEOUS at 12:31

## 2018-01-01 RX ADMIN — ACETAMINOPHEN 650 MG: 325 SOLUTION ORAL at 01:47

## 2018-01-01 RX ADMIN — IPRATROPIUM BROMIDE 0.5 MG: 0.5 SOLUTION RESPIRATORY (INHALATION) at 07:35

## 2018-01-01 RX ADMIN — HEPARIN SODIUM 5000 UNITS: 5000 INJECTION, SOLUTION INTRAVENOUS; SUBCUTANEOUS at 02:31

## 2018-01-01 RX ADMIN — AMIODARONE HYDROCHLORIDE 400 MG: 200 TABLET ORAL at 08:51

## 2018-01-01 RX ADMIN — POTASSIUM CHLORIDE 10 MEQ: 10 INJECTION, SOLUTION INTRAVENOUS at 21:46

## 2018-01-01 RX ADMIN — FOLIC ACID 1 MG: 1 TABLET ORAL at 08:22

## 2018-01-01 RX ADMIN — METOPROLOL TARTRATE 2.5 MG: 5 INJECTION, SOLUTION INTRAVENOUS at 12:34

## 2018-01-01 RX ADMIN — MIDAZOLAM HYDROCHLORIDE 4 MG: 1 INJECTION, SOLUTION INTRAMUSCULAR; INTRAVENOUS at 15:45

## 2018-01-01 RX ADMIN — THIAMINE HYDROCHLORIDE: 100 INJECTION, SOLUTION INTRAMUSCULAR; INTRAVENOUS at 12:02

## 2018-01-01 RX ADMIN — METOPROLOL TARTRATE 50 MG: 50 TABLET ORAL at 22:04

## 2018-01-01 RX ADMIN — ENOXAPARIN SODIUM 150 MG: 150 INJECTION SUBCUTANEOUS at 16:56

## 2018-01-01 RX ADMIN — IPRATROPIUM BROMIDE 0.5 MG: 0.5 SOLUTION RESPIRATORY (INHALATION) at 01:06

## 2018-01-01 RX ADMIN — METOPROLOL TARTRATE 5 MG: 5 INJECTION, SOLUTION INTRAVENOUS at 04:48

## 2018-01-01 RX ADMIN — METOPROLOL TARTRATE 5 MG: 5 INJECTION, SOLUTION INTRAVENOUS at 06:08

## 2018-01-01 RX ADMIN — INFLUENZA VIRUS VACCINE 0.5 ML: 15; 15; 15; 15 SUSPENSION INTRAMUSCULAR at 23:35

## 2018-01-01 RX ADMIN — METHYLPREDNISOLONE SODIUM SUCCINATE 40 MG: 40 INJECTION, POWDER, FOR SOLUTION INTRAMUSCULAR; INTRAVENOUS at 16:25

## 2018-01-01 RX ADMIN — ASPIRIN 81 MG 81 MG: 81 TABLET ORAL at 08:14

## 2018-01-01 RX ADMIN — AMIODARONE HYDROCHLORIDE 400 MG: 200 TABLET ORAL at 20:04

## 2018-01-01 RX ADMIN — ASPIRIN 81 MG 81 MG: 81 TABLET ORAL at 09:40

## 2018-01-01 RX ADMIN — AMIODARONE HYDROCHLORIDE 0.5 MG/MIN: 1.8 INJECTION, SOLUTION INTRAVENOUS at 00:56

## 2018-01-01 RX ADMIN — POTASSIUM CHLORIDE 10 MEQ: 10 INJECTION, SOLUTION INTRAVENOUS at 11:18

## 2018-01-01 RX ADMIN — INSULIN LISPRO 4 UNITS: 100 INJECTION, SOLUTION INTRAVENOUS; SUBCUTANEOUS at 12:40

## 2018-01-01 RX ADMIN — METOPROLOL TARTRATE 2.5 MG: 5 INJECTION, SOLUTION INTRAVENOUS at 06:12

## 2018-01-01 RX ADMIN — POTASSIUM CHLORIDE 10 MEQ: 10 INJECTION, SOLUTION INTRAVENOUS at 12:35

## 2018-01-01 RX ADMIN — HEPARIN SODIUM AND DEXTROSE 25 UNITS/KG/HR: 10000; 5 INJECTION INTRAVENOUS at 20:13

## 2018-01-01 RX ADMIN — INSULIN LISPRO 5 UNITS: 100 INJECTION, SOLUTION INTRAVENOUS; SUBCUTANEOUS at 18:14

## 2018-01-01 RX ADMIN — POTASSIUM CHLORIDE 10 MEQ: 10 INJECTION, SOLUTION INTRAVENOUS at 06:34

## 2018-01-01 RX ADMIN — CHLORHEXIDINE GLUCONATE 10 ML: 1.2 RINSE ORAL at 01:45

## 2018-01-01 RX ADMIN — ASPIRIN 81 MG 81 MG: 81 TABLET ORAL at 08:36

## 2018-01-01 RX ADMIN — INSULIN LISPRO 2 UNITS: 100 INJECTION, SOLUTION INTRAVENOUS; SUBCUTANEOUS at 18:40

## 2018-01-01 RX ADMIN — SUCCINYLCHOLINE CHLORIDE 50 MG: 20 INJECTION, SOLUTION INTRAMUSCULAR; INTRAVENOUS at 15:09

## 2018-01-01 RX ADMIN — POTASSIUM CHLORIDE 10 MEQ: 10 INJECTION, SOLUTION INTRAVENOUS at 09:19

## 2018-01-01 RX ADMIN — INSULIN LISPRO 4 UNITS: 100 INJECTION, SOLUTION INTRAVENOUS; SUBCUTANEOUS at 23:56

## 2018-01-01 RX ADMIN — AMIODARONE HYDROCHLORIDE 0.5 MG/MIN: 50 INJECTION, SOLUTION INTRAVENOUS at 19:38

## 2018-01-01 RX ADMIN — INSULIN LISPRO 2 UNITS: 100 INJECTION, SOLUTION INTRAVENOUS; SUBCUTANEOUS at 05:17

## 2018-01-01 RX ADMIN — FUROSEMIDE 40 MG: 10 INJECTION, SOLUTION INTRAMUSCULAR; INTRAVENOUS at 09:28

## 2018-01-01 RX ADMIN — HYDRALAZINE HYDROCHLORIDE 20 MG: 20 INJECTION INTRAMUSCULAR; INTRAVENOUS at 10:45

## 2018-01-01 RX ADMIN — ACETAMINOPHEN 650 MG: 325 TABLET ORAL at 19:37

## 2018-01-01 RX ADMIN — POTASSIUM CHLORIDE 10 MEQ: 10 INJECTION, SOLUTION INTRAVENOUS at 09:50

## 2018-01-01 RX ADMIN — ENOXAPARIN SODIUM 150 MG: 150 INJECTION SUBCUTANEOUS at 05:31

## 2018-01-01 RX ADMIN — MAGNESIUM SULFATE HEPTAHYDRATE 1 G: 1 INJECTION, SOLUTION INTRAVENOUS at 06:27

## 2018-01-01 RX ADMIN — IPRATROPIUM BROMIDE 0.5 MG: 0.5 SOLUTION RESPIRATORY (INHALATION) at 20:09

## 2018-01-01 RX ADMIN — METOPROLOL TARTRATE 5 MG: 5 INJECTION, SOLUTION INTRAVENOUS at 10:34

## 2018-01-01 RX ADMIN — CEFTAROLINE FOSAMIL 600 MG: 600 POWDER, FOR SOLUTION INTRAVENOUS at 01:32

## 2018-01-01 RX ADMIN — FOLIC ACID 1 MG: 1 TABLET ORAL at 08:41

## 2018-01-01 RX ADMIN — FUROSEMIDE 80 MG: 10 INJECTION, SOLUTION INTRAMUSCULAR; INTRAVENOUS at 14:06

## 2018-01-01 RX ADMIN — Medication 100 MG: at 08:51

## 2018-01-01 RX ADMIN — PHENYLEPHRINE HYDROCHLORIDE 100 MCG/MIN: 10 INJECTION INTRAVENOUS at 22:16

## 2018-01-01 RX ADMIN — INSULIN LISPRO 3 UNITS: 100 INJECTION, SOLUTION INTRAVENOUS; SUBCUTANEOUS at 06:09

## 2018-01-01 RX ADMIN — LORAZEPAM 1 MG/HR: 2 INJECTION INTRAMUSCULAR at 07:38

## 2018-01-01 RX ADMIN — INSULIN LISPRO 2 UNITS: 100 INJECTION, SOLUTION INTRAVENOUS; SUBCUTANEOUS at 12:28

## 2018-01-01 RX ADMIN — INSULIN LISPRO 5 UNITS: 100 INJECTION, SOLUTION INTRAVENOUS; SUBCUTANEOUS at 00:25

## 2018-01-01 RX ADMIN — POTASSIUM CHLORIDE 10 MEQ: 10 INJECTION, SOLUTION INTRAVENOUS at 08:50

## 2018-01-01 RX ADMIN — HEPARIN SODIUM AND DEXTROSE 15 UNITS/KG/HR: 10000; 5 INJECTION INTRAVENOUS at 04:25

## 2018-01-01 RX ADMIN — INSULIN LISPRO 2 UNITS: 100 INJECTION, SOLUTION INTRAVENOUS; SUBCUTANEOUS at 06:56

## 2018-01-01 RX ADMIN — HEPARIN SODIUM AND DEXTROSE 25 UNITS/KG/HR: 10000; 5 INJECTION INTRAVENOUS at 07:36

## 2018-01-01 RX ADMIN — INSULIN GLARGINE 18 UNITS: 100 INJECTION, SOLUTION SUBCUTANEOUS at 12:09

## 2018-01-01 RX ADMIN — AMIODARONE HYDROCHLORIDE 0.5 MG/MIN: 1.8 INJECTION, SOLUTION INTRAVENOUS at 12:05

## 2018-01-01 RX ADMIN — INSULIN GLARGINE 15 UNITS: 100 INJECTION, SOLUTION SUBCUTANEOUS at 12:05

## 2018-01-01 RX ADMIN — INSULIN LISPRO 2 UNITS: 100 INJECTION, SOLUTION INTRAVENOUS; SUBCUTANEOUS at 12:24

## 2018-01-01 RX ADMIN — METOPROLOL TARTRATE 5 MG: 5 INJECTION, SOLUTION INTRAVENOUS at 18:44

## 2018-01-01 RX ADMIN — CHLORHEXIDINE GLUCONATE 10 ML: 1.2 RINSE ORAL at 14:12

## 2018-01-01 RX ADMIN — IPRATROPIUM BROMIDE 0.5 MG: 0.5 SOLUTION RESPIRATORY (INHALATION) at 14:02

## 2018-01-01 RX ADMIN — INSULIN LISPRO 3 UNITS: 100 INJECTION, SOLUTION INTRAVENOUS; SUBCUTANEOUS at 12:39

## 2018-01-01 RX ADMIN — POTASSIUM CHLORIDE 10 MEQ: 10 INJECTION, SOLUTION INTRAVENOUS at 02:35

## 2018-01-01 RX ADMIN — IPRATROPIUM BROMIDE 0.5 MG: 0.5 SOLUTION RESPIRATORY (INHALATION) at 14:16

## 2018-01-01 RX ADMIN — PHENYLEPHRINE HYDROCHLORIDE 100 MCG/MIN: 10 INJECTION INTRAVENOUS at 16:57

## 2018-01-01 RX ADMIN — ENOXAPARIN SODIUM 150 MG: 150 INJECTION SUBCUTANEOUS at 04:22

## 2018-01-01 RX ADMIN — SODIUM CHLORIDE 40 MG: 9 INJECTION INTRAMUSCULAR; INTRAVENOUS; SUBCUTANEOUS at 09:28

## 2018-01-01 RX ADMIN — IPRATROPIUM BROMIDE AND ALBUTEROL SULFATE 3 ML: .5; 3 SOLUTION RESPIRATORY (INHALATION) at 20:36

## 2018-01-01 RX ADMIN — CHLORHEXIDINE GLUCONATE 10 ML: 1.2 RINSE ORAL at 08:32

## 2018-01-01 RX ADMIN — HEPARIN SODIUM AND DEXTROSE 10 UNITS/KG/HR: 10000; 5 INJECTION INTRAVENOUS at 14:24

## 2018-01-01 RX ADMIN — THIAMINE HYDROCHLORIDE: 100 INJECTION, SOLUTION INTRAMUSCULAR; INTRAVENOUS at 12:14

## 2018-01-01 RX ADMIN — HEPARIN SODIUM 3000 UNITS: 1000 INJECTION, SOLUTION INTRAVENOUS; SUBCUTANEOUS at 12:05

## 2018-01-01 RX ADMIN — LORAZEPAM 0.01 MG/KG/HR: 2 INJECTION INTRAMUSCULAR at 16:37

## 2018-01-01 RX ADMIN — MAGNESIUM SULFATE HEPTAHYDRATE 2 G: 40 INJECTION, SOLUTION INTRAVENOUS at 23:13

## 2018-01-01 RX ADMIN — PHENYLEPHRINE HYDROCHLORIDE 70 MCG/MIN: 10 INJECTION INTRAVENOUS at 17:34

## 2018-01-01 RX ADMIN — AMIODARONE HYDROCHLORIDE 0.5 MG/MIN: 50 INJECTION, SOLUTION INTRAVENOUS at 03:59

## 2018-01-01 RX ADMIN — ENOXAPARIN SODIUM 150 MG: 150 INJECTION SUBCUTANEOUS at 05:48

## 2018-01-01 RX ADMIN — FUROSEMIDE 40 MG: 10 INJECTION, SOLUTION INTRAMUSCULAR; INTRAVENOUS at 08:41

## 2018-01-01 RX ADMIN — POTASSIUM CHLORIDE 10 MEQ: 10 INJECTION, SOLUTION INTRAVENOUS at 10:48

## 2018-01-01 RX ADMIN — METOPROLOL TARTRATE 5 MG: 5 INJECTION, SOLUTION INTRAVENOUS at 10:04

## 2018-01-01 RX ADMIN — HEPARIN SODIUM AND DEXTROSE 25 UNITS/KG/HR: 10000; 5 INJECTION INTRAVENOUS at 19:30

## 2018-01-01 RX ADMIN — THIAMINE HYDROCHLORIDE: 100 INJECTION, SOLUTION INTRAMUSCULAR; INTRAVENOUS at 11:24

## 2018-01-01 RX ADMIN — INSULIN LISPRO 2 UNITS: 100 INJECTION, SOLUTION INTRAVENOUS; SUBCUTANEOUS at 23:38

## 2018-01-01 RX ADMIN — POTASSIUM CHLORIDE 10 MEQ: 10 INJECTION, SOLUTION INTRAVENOUS at 20:30

## 2018-01-01 RX ADMIN — POTASSIUM CHLORIDE 40 MEQ: 20 SOLUTION ORAL at 20:37

## 2018-01-01 RX ADMIN — LORAZEPAM 1 MG: 2 INJECTION INTRAMUSCULAR; INTRAVENOUS at 15:56

## 2018-01-01 RX ADMIN — PROPOFOL 20 MG: 10 INJECTION, EMULSION INTRAVENOUS at 13:02

## 2018-01-01 RX ADMIN — CHLORHEXIDINE GLUCONATE 10 ML: 1.2 RINSE ORAL at 14:02

## 2018-01-01 RX ADMIN — POTASSIUM CHLORIDE 10 MEQ: 10 INJECTION, SOLUTION INTRAVENOUS at 10:11

## 2018-01-01 RX ADMIN — AMIODARONE HYDROCHLORIDE 150 MG: 1.5 INJECTION, SOLUTION INTRAVENOUS at 20:32

## 2018-01-01 RX ADMIN — POTASSIUM CHLORIDE 10 MEQ: 10 INJECTION, SOLUTION INTRAVENOUS at 01:37

## 2018-01-01 RX ADMIN — PROPOFOL 35 MCG/KG/MIN: 10 INJECTION, EMULSION INTRAVENOUS at 02:30

## 2018-01-01 RX ADMIN — INSULIN LISPRO 2 UNITS: 100 INJECTION, SOLUTION INTRAVENOUS; SUBCUTANEOUS at 18:42

## 2018-01-01 RX ADMIN — SODIUM CHLORIDE 40 MG: 9 INJECTION INTRAMUSCULAR; INTRAVENOUS; SUBCUTANEOUS at 09:00

## 2018-01-01 RX ADMIN — AMIODARONE HYDROCHLORIDE 0.5 MG/MIN: 1.8 INJECTION, SOLUTION INTRAVENOUS at 01:25

## 2018-01-01 RX ADMIN — IPRATROPIUM BROMIDE 0.5 MG: 0.5 SOLUTION RESPIRATORY (INHALATION) at 13:50

## 2018-01-01 RX ADMIN — Medication 3 MG/HR: at 17:06

## 2018-01-01 RX ADMIN — POTASSIUM CHLORIDE 10 MEQ: 20 SOLUTION ORAL at 09:52

## 2018-01-01 RX ADMIN — ACETAMINOPHEN 650 MG: 325 TABLET ORAL at 20:13

## 2018-01-01 RX ADMIN — Medication 4 MG/HR: at 11:16

## 2018-01-01 RX ADMIN — MIDAZOLAM HYDROCHLORIDE 3 MG: 1 INJECTION, SOLUTION INTRAMUSCULAR; INTRAVENOUS at 15:09

## 2018-01-01 RX ADMIN — AZTREONAM 2 G: 2 INJECTION, POWDER, LYOPHILIZED, FOR SOLUTION INTRAMUSCULAR; INTRAVENOUS at 21:38

## 2018-01-01 RX ADMIN — FOLIC ACID 1 MG: 1 TABLET ORAL at 08:40

## 2018-01-01 RX ADMIN — Medication 2 MG/HR: at 18:08

## 2018-01-01 RX ADMIN — IPRATROPIUM BROMIDE 0.5 MG: 0.5 SOLUTION RESPIRATORY (INHALATION) at 01:03

## 2018-01-01 RX ADMIN — CEFTAROLINE FOSAMIL 600 MG: 600 POWDER, FOR SOLUTION INTRAVENOUS at 12:42

## 2018-01-01 RX ADMIN — POTASSIUM CHLORIDE 10 MEQ: 10 INJECTION, SOLUTION INTRAVENOUS at 14:15

## 2018-01-01 RX ADMIN — HEPARIN SODIUM AND DEXTROSE 19 UNITS/KG/HR: 10000; 5 INJECTION INTRAVENOUS at 14:24

## 2018-01-01 RX ADMIN — CEFTAROLINE FOSAMIL 600 MG: 600 POWDER, FOR SOLUTION INTRAVENOUS at 12:33

## 2018-01-01 RX ADMIN — AMIODARONE HYDROCHLORIDE 400 MG: 200 TABLET ORAL at 20:37

## 2018-01-01 RX ADMIN — PROPOFOL 25 MCG/KG/MIN: 10 INJECTION, EMULSION INTRAVENOUS at 13:15

## 2018-01-01 RX ADMIN — METOPROLOL TARTRATE 5 MG: 5 INJECTION, SOLUTION INTRAVENOUS at 04:35

## 2018-01-01 RX ADMIN — CHLORHEXIDINE GLUCONATE 10 ML: 1.2 RINSE ORAL at 00:30

## 2018-01-01 RX ADMIN — POTASSIUM CHLORIDE 10 MEQ: 10 INJECTION, SOLUTION INTRAVENOUS at 16:27

## 2018-01-01 RX ADMIN — AMIODARONE HYDROCHLORIDE 1 MG/MIN: 1.8 INJECTION, SOLUTION INTRAVENOUS at 02:22

## 2018-01-01 RX ADMIN — PROPOFOL 20 MCG/KG/MIN: 10 INJECTION, EMULSION INTRAVENOUS at 19:29

## 2018-01-01 RX ADMIN — PROPOFOL 20 MCG/KG/MIN: 10 INJECTION, EMULSION INTRAVENOUS at 11:27

## 2018-01-01 RX ADMIN — CEFTAROLINE FOSAMIL 600 MG: 600 POWDER, FOR SOLUTION INTRAVENOUS at 12:00

## 2018-01-01 RX ADMIN — THIAMINE HYDROCHLORIDE: 100 INJECTION, SOLUTION INTRAMUSCULAR; INTRAVENOUS at 10:58

## 2018-01-01 RX ADMIN — INSULIN LISPRO 2 UNITS: 100 INJECTION, SOLUTION INTRAVENOUS; SUBCUTANEOUS at 23:34

## 2018-01-01 RX ADMIN — INSULIN LISPRO 5 UNITS: 100 INJECTION, SOLUTION INTRAVENOUS; SUBCUTANEOUS at 18:00

## 2018-01-01 RX ADMIN — CHLORHEXIDINE GLUCONATE 10 ML: 1.2 RINSE ORAL at 01:52

## 2018-01-01 RX ADMIN — HEPARIN SODIUM AND DEXTROSE 12 UNITS/KG/HR: 10000; 5 INJECTION INTRAVENOUS at 06:34

## 2018-01-01 RX ADMIN — HEPARIN SODIUM 3000 UNITS: 1000 INJECTION, SOLUTION INTRAVENOUS; SUBCUTANEOUS at 22:09

## 2018-01-01 RX ADMIN — INSULIN LISPRO 2 UNITS: 100 INJECTION, SOLUTION INTRAVENOUS; SUBCUTANEOUS at 23:58

## 2018-01-01 RX ADMIN — IPRATROPIUM BROMIDE 0.5 MG: 0.5 SOLUTION RESPIRATORY (INHALATION) at 14:59

## 2018-01-01 RX ADMIN — INSULIN LISPRO 5 UNITS: 100 INJECTION, SOLUTION INTRAVENOUS; SUBCUTANEOUS at 17:44

## 2018-01-01 RX ADMIN — INSULIN LISPRO 5 UNITS: 100 INJECTION, SOLUTION INTRAVENOUS; SUBCUTANEOUS at 05:20

## 2018-01-01 RX ADMIN — ASPIRIN 81 MG 81 MG: 81 TABLET ORAL at 08:52

## 2018-01-01 RX ADMIN — INSULIN GLARGINE 22 UNITS: 100 INJECTION, SOLUTION SUBCUTANEOUS at 14:29

## 2018-01-01 RX ADMIN — Medication 100 MG: at 08:27

## 2018-01-01 RX ADMIN — CHLORHEXIDINE GLUCONATE 10 ML: 1.2 RINSE ORAL at 21:00

## 2018-01-01 RX ADMIN — SODIUM CHLORIDE 40 MG: 9 INJECTION INTRAMUSCULAR; INTRAVENOUS; SUBCUTANEOUS at 08:14

## 2018-01-01 RX ADMIN — INSULIN LISPRO 2 UNITS: 100 INJECTION, SOLUTION INTRAVENOUS; SUBCUTANEOUS at 05:51

## 2018-01-01 RX ADMIN — INSULIN LISPRO 3 UNITS: 100 INJECTION, SOLUTION INTRAVENOUS; SUBCUTANEOUS at 00:19

## 2018-01-01 RX ADMIN — FENTANYL CITRATE 50 MCG: 50 INJECTION, SOLUTION INTRAMUSCULAR; INTRAVENOUS at 17:04

## 2018-01-01 RX ADMIN — INSULIN LISPRO 3 UNITS: 100 INJECTION, SOLUTION INTRAVENOUS; SUBCUTANEOUS at 12:29

## 2018-01-01 RX ADMIN — IPRATROPIUM BROMIDE 0.5 MG: 0.5 SOLUTION RESPIRATORY (INHALATION) at 14:31

## 2018-01-01 RX ADMIN — ACETAMINOPHEN 650 MG: 325 SOLUTION ORAL at 15:00

## 2018-01-01 RX ADMIN — ENOXAPARIN SODIUM 150 MG: 150 INJECTION SUBCUTANEOUS at 04:23

## 2018-01-01 RX ADMIN — PHENYLEPHRINE HYDROCHLORIDE 70 MCG/MIN: 10 INJECTION INTRAVENOUS at 01:25

## 2018-01-01 RX ADMIN — IPRATROPIUM BROMIDE 0.5 MG: 0.5 SOLUTION RESPIRATORY (INHALATION) at 07:53

## 2018-01-01 RX ADMIN — POTASSIUM CHLORIDE 10 MEQ: 10 INJECTION, SOLUTION INTRAVENOUS at 15:02

## 2018-01-01 RX ADMIN — Medication 100 MG: at 08:20

## 2018-01-01 RX ADMIN — FOLIC ACID 1 MG: 1 TABLET ORAL at 08:27

## 2018-01-01 RX ADMIN — FUROSEMIDE 100 MG: 10 INJECTION, SOLUTION INTRAMUSCULAR; INTRAVENOUS at 11:37

## 2018-01-01 RX ADMIN — INSULIN LISPRO 5 UNITS: 100 INJECTION, SOLUTION INTRAVENOUS; SUBCUTANEOUS at 00:37

## 2018-01-01 RX ADMIN — METOPROLOL TARTRATE 50 MG: 50 TABLET ORAL at 09:29

## 2018-01-01 RX ADMIN — POTASSIUM CHLORIDE 10 MEQ: 10 INJECTION, SOLUTION INTRAVENOUS at 06:28

## 2018-01-01 RX ADMIN — CHLORHEXIDINE GLUCONATE 10 ML: 1.2 RINSE ORAL at 15:58

## 2018-01-01 RX ADMIN — IPRATROPIUM BROMIDE 0.5 MG: 0.5 SOLUTION RESPIRATORY (INHALATION) at 01:19

## 2018-01-01 RX ADMIN — Medication 50 MCG/HR: at 06:41

## 2018-01-01 RX ADMIN — IPRATROPIUM BROMIDE 0.5 MG: 0.5 SOLUTION RESPIRATORY (INHALATION) at 07:27

## 2018-01-01 RX ADMIN — IPRATROPIUM BROMIDE 0.5 MG: 0.5 SOLUTION RESPIRATORY (INHALATION) at 20:44

## 2018-01-01 RX ADMIN — THIAMINE HYDROCHLORIDE: 100 INJECTION, SOLUTION INTRAMUSCULAR; INTRAVENOUS at 11:00

## 2018-01-01 RX ADMIN — Medication 150 MCG/HR: at 06:09

## 2018-01-01 RX ADMIN — INSULIN LISPRO 5 UNITS: 100 INJECTION, SOLUTION INTRAVENOUS; SUBCUTANEOUS at 00:26

## 2018-01-01 RX ADMIN — AMIODARONE HYDROCHLORIDE 400 MG: 200 TABLET ORAL at 08:41

## 2018-01-01 RX ADMIN — MAGNESIUM SULFATE HEPTAHYDRATE 2 G: 40 INJECTION, SOLUTION INTRAVENOUS at 06:02

## 2018-01-01 RX ADMIN — POTASSIUM CHLORIDE 10 MEQ: 10 INJECTION, SOLUTION INTRAVENOUS at 13:57

## 2018-01-01 RX ADMIN — Medication 150 MCG/HR: at 23:55

## 2018-01-01 RX ADMIN — CHLORHEXIDINE GLUCONATE 10 ML: 1.2 RINSE ORAL at 08:13

## 2018-01-01 RX ADMIN — PROPOFOL 40 MCG/KG/MIN: 10 INJECTION, EMULSION INTRAVENOUS at 15:14

## 2018-01-01 RX ADMIN — MAGNESIUM SULFATE HEPTAHYDRATE 2 G: 40 INJECTION, SOLUTION INTRAVENOUS at 06:17

## 2018-01-01 RX ADMIN — POTASSIUM CHLORIDE 10 MEQ: 20 SOLUTION ORAL at 06:52

## 2018-01-01 RX ADMIN — CHLORHEXIDINE GLUCONATE 10 ML: 1.2 RINSE ORAL at 08:52

## 2018-01-01 RX ADMIN — CEFTAROLINE FOSAMIL 600 MG: 600 POWDER, FOR SOLUTION INTRAVENOUS at 23:47

## 2018-01-01 RX ADMIN — PROPOFOL 20 MCG/KG/MIN: 10 INJECTION, EMULSION INTRAVENOUS at 16:01

## 2018-01-01 RX ADMIN — INSULIN LISPRO 5 UNITS: 100 INJECTION, SOLUTION INTRAVENOUS; SUBCUTANEOUS at 18:09

## 2018-01-01 RX ADMIN — MAGNESIUM SULFATE HEPTAHYDRATE 1 G: 1 INJECTION, SOLUTION INTRAVENOUS at 07:01

## 2018-01-01 RX ADMIN — DIGOXIN 250 MCG: 250 INJECTION, SOLUTION INTRAMUSCULAR; INTRAVENOUS; PARENTERAL at 10:28

## 2018-01-01 RX ADMIN — ACETAMINOPHEN 650 MG: 325 SOLUTION ORAL at 08:46

## 2018-01-01 RX ADMIN — ACETAMINOPHEN 650 MG: 325 SOLUTION ORAL at 08:36

## 2018-01-01 RX ADMIN — METOPROLOL TARTRATE 50 MG: 50 TABLET ORAL at 20:04

## 2018-01-01 RX ADMIN — CEFTAROLINE FOSAMIL 600 MG: 600 POWDER, FOR SOLUTION INTRAVENOUS at 12:35

## 2018-01-01 RX ADMIN — PROPOFOL 40 MCG/KG/MIN: 10 INJECTION, EMULSION INTRAVENOUS at 09:21

## 2018-01-01 RX ADMIN — IPRATROPIUM BROMIDE AND ALBUTEROL SULFATE 3 ML: .5; 3 SOLUTION RESPIRATORY (INHALATION) at 14:26

## 2018-01-01 RX ADMIN — METOPROLOL TARTRATE 5 MG: 5 INJECTION, SOLUTION INTRAVENOUS at 17:24

## 2018-01-01 RX ADMIN — Medication 150 MCG/HR: at 12:14

## 2018-01-01 RX ADMIN — AMIODARONE HYDROCHLORIDE 0.5 MG/MIN: 1.8 INJECTION, SOLUTION INTRAVENOUS at 12:30

## 2018-01-01 RX ADMIN — Medication 50 MCG/HR: at 04:16

## 2018-01-01 RX ADMIN — ASPIRIN 81 MG 81 MG: 81 TABLET ORAL at 08:41

## 2018-01-01 RX ADMIN — CHLORHEXIDINE GLUCONATE 10 ML: 1.2 RINSE ORAL at 04:01

## 2018-01-01 RX ADMIN — IPRATROPIUM BROMIDE 0.5 MG: 0.5 SOLUTION RESPIRATORY (INHALATION) at 01:36

## 2018-01-01 RX ADMIN — AMIODARONE HYDROCHLORIDE 0.5 MG/MIN: 50 INJECTION, SOLUTION INTRAVENOUS at 19:30

## 2018-01-01 RX ADMIN — POTASSIUM CHLORIDE 10 MEQ: 10 INJECTION, SOLUTION INTRAVENOUS at 12:48

## 2018-01-01 RX ADMIN — PROPOFOL 20 MCG/KG/MIN: 10 INJECTION, EMULSION INTRAVENOUS at 23:27

## 2018-01-01 RX ADMIN — AMIODARONE HYDROCHLORIDE 400 MG: 200 TABLET ORAL at 08:27

## 2018-01-01 RX ADMIN — METOPROLOL TARTRATE 50 MG: 50 TABLET ORAL at 20:14

## 2018-01-01 RX ADMIN — POTASSIUM & SODIUM PHOSPHATES POWDER PACK 280-160-250 MG 2 PACKET: 280-160-250 PACK at 01:12

## 2018-01-01 RX ADMIN — HYDRALAZINE HYDROCHLORIDE 10 MG: 20 INJECTION INTRAMUSCULAR; INTRAVENOUS at 17:31

## 2018-01-01 RX ADMIN — CHLORHEXIDINE GLUCONATE 10 ML: 1.2 RINSE ORAL at 14:29

## 2018-01-01 RX ADMIN — IPRATROPIUM BROMIDE 0.5 MG: 0.5 SOLUTION RESPIRATORY (INHALATION) at 01:23

## 2018-01-01 RX ADMIN — POTASSIUM CHLORIDE 10 MEQ: 10 INJECTION, SOLUTION INTRAVENOUS at 08:57

## 2018-01-01 RX ADMIN — SODIUM CHLORIDE 1000 MG: 900 INJECTION, SOLUTION INTRAVENOUS at 01:15

## 2018-01-01 RX ADMIN — ASPIRIN 325 MG ORAL TABLET 325 MG: 325 PILL ORAL at 00:07

## 2018-01-01 RX ADMIN — DEXTROSE MONOHYDRATE AND SODIUM CHLORIDE: 5; .45 INJECTION, SOLUTION INTRAVENOUS at 11:02

## 2018-01-01 RX ADMIN — METOPROLOL TARTRATE 50 MG: 50 TABLET ORAL at 20:28

## 2018-01-01 RX ADMIN — AMIODARONE HYDROCHLORIDE 400 MG: 200 TABLET ORAL at 20:38

## 2018-01-01 RX ADMIN — AMIODARONE HYDROCHLORIDE 400 MG: 200 TABLET ORAL at 15:59

## 2018-01-01 RX ADMIN — PROPOFOL 30 MCG/KG/MIN: 10 INJECTION, EMULSION INTRAVENOUS at 21:16

## 2018-01-01 RX ADMIN — FENTANYL CITRATE 100 MCG: 50 INJECTION, SOLUTION INTRAMUSCULAR; INTRAVENOUS at 15:57

## 2018-01-01 RX ADMIN — IPRATROPIUM BROMIDE 0.5 MG: 0.5 SOLUTION RESPIRATORY (INHALATION) at 14:58

## 2018-01-01 RX ADMIN — HEPARIN SODIUM AND DEXTROSE 8 UNITS/KG/HR: 10000; 5 INJECTION INTRAVENOUS at 16:16

## 2018-01-01 RX ADMIN — PHENYLEPHRINE HYDROCHLORIDE 100 MCG/MIN: 10 INJECTION, SOLUTION INTRAMUSCULAR; INTRAVENOUS; SUBCUTANEOUS at 04:24

## 2018-01-01 RX ADMIN — FOLIC ACID 1 MG: 1 TABLET ORAL at 08:55

## 2018-01-01 RX ADMIN — INSULIN GLARGINE 22 UNITS: 100 INJECTION, SOLUTION SUBCUTANEOUS at 12:08

## 2018-01-01 RX ADMIN — HEPARIN SODIUM 1000 UNITS: 200 INJECTION, SOLUTION INTRAVENOUS at 15:36

## 2018-01-01 RX ADMIN — CHLORHEXIDINE GLUCONATE 10 ML: 1.2 RINSE ORAL at 02:24

## 2018-01-01 RX ADMIN — IPRATROPIUM BROMIDE 0.5 MG: 0.5 SOLUTION RESPIRATORY (INHALATION) at 20:54

## 2018-01-01 RX ADMIN — ONDANSETRON 4 MG: 4 TABLET, ORALLY DISINTEGRATING ORAL at 00:07

## 2018-01-01 RX ADMIN — IPRATROPIUM BROMIDE 0.5 MG: 0.5 SOLUTION RESPIRATORY (INHALATION) at 13:23

## 2018-01-01 RX ADMIN — HEPARIN SODIUM AND DEXTROSE 25 UNITS/KG/HR: 10000; 5 INJECTION INTRAVENOUS at 22:27

## 2018-01-01 RX ADMIN — PROPOFOL 30 MCG/KG/MIN: 10 INJECTION, EMULSION INTRAVENOUS at 01:13

## 2018-01-01 RX ADMIN — POTASSIUM CHLORIDE 10 MEQ: 10 INJECTION, SOLUTION INTRAVENOUS at 00:10

## 2018-01-01 RX ADMIN — IPRATROPIUM BROMIDE 0.5 MG: 0.5 SOLUTION RESPIRATORY (INHALATION) at 20:21

## 2018-01-01 RX ADMIN — EPINEPHRINE 1 MG: 0.1 INJECTION, SOLUTION ENDOTRACHEAL; INTRACARDIAC; INTRAVENOUS at 02:27

## 2018-01-01 RX ADMIN — NOREPINEPHRINE BITARTRATE 2 MCG/MIN: 8 SOLUTION at 02:00

## 2018-01-01 RX ADMIN — HEPARIN SODIUM AND DEXTROSE 25 UNITS/KG/HR: 10000; 5 INJECTION INTRAVENOUS at 13:44

## 2018-01-01 RX ADMIN — INSULIN LISPRO 3 UNITS: 100 INJECTION, SOLUTION INTRAVENOUS; SUBCUTANEOUS at 12:40

## 2018-01-01 RX ADMIN — LORAZEPAM 1 MG: 2 INJECTION INTRAMUSCULAR; INTRAVENOUS at 12:05

## 2018-01-01 RX ADMIN — POTASSIUM & SODIUM PHOSPHATES POWDER PACK 280-160-250 MG 2 PACKET: 280-160-250 PACK at 06:33

## 2018-01-01 RX ADMIN — PHYTONADIONE 5 MG: 10 INJECTION, EMULSION INTRAMUSCULAR; INTRAVENOUS; SUBCUTANEOUS at 10:06

## 2018-01-01 RX ADMIN — CEFTAROLINE FOSAMIL 600 MG: 600 POWDER, FOR SOLUTION INTRAVENOUS at 00:20

## 2018-01-01 RX ADMIN — INSULIN LISPRO 5 UNITS: 100 INJECTION, SOLUTION INTRAVENOUS; SUBCUTANEOUS at 05:36

## 2018-01-01 RX ADMIN — CEFTAROLINE FOSAMIL 600 MG: 600 POWDER, FOR SOLUTION INTRAVENOUS at 00:26

## 2018-01-01 RX ADMIN — EPINEPHRINE 1 MG: 0.1 INJECTION, SOLUTION ENDOTRACHEAL; INTRACARDIAC; INTRAVENOUS at 02:30

## 2018-01-01 RX ADMIN — SODIUM CHLORIDE 40 MG: 9 INJECTION INTRAMUSCULAR; INTRAVENOUS; SUBCUTANEOUS at 08:41

## 2018-01-01 RX ADMIN — Medication 3 MG/HR: at 23:41

## 2018-01-01 RX ADMIN — DIGOXIN 250 MCG: 250 INJECTION, SOLUTION INTRAMUSCULAR; INTRAVENOUS; PARENTERAL at 08:54

## 2018-01-01 RX ADMIN — POTASSIUM CHLORIDE 10 MEQ: 10 INJECTION, SOLUTION INTRAVENOUS at 20:13

## 2018-01-01 RX ADMIN — POTASSIUM CHLORIDE 10 MEQ: 20 SOLUTION ORAL at 21:04

## 2018-01-01 RX ADMIN — MAGNESIUM SULFATE HEPTAHYDRATE 1 G: 1 INJECTION, SOLUTION INTRAVENOUS at 21:59

## 2018-01-01 RX ADMIN — IPRATROPIUM BROMIDE 0.5 MG: 0.5 SOLUTION RESPIRATORY (INHALATION) at 13:08

## 2018-01-01 RX ADMIN — AZTREONAM 2 G: 2 INJECTION, POWDER, LYOPHILIZED, FOR SOLUTION INTRAMUSCULAR; INTRAVENOUS at 16:17

## 2018-01-01 RX ADMIN — POTASSIUM CHLORIDE 10 MEQ: 10 INJECTION, SOLUTION INTRAVENOUS at 08:17

## 2018-01-01 RX ADMIN — INSULIN LISPRO 2 UNITS: 100 INJECTION, SOLUTION INTRAVENOUS; SUBCUTANEOUS at 05:42

## 2018-01-01 RX ADMIN — INSULIN LISPRO 5 UNITS: 100 INJECTION, SOLUTION INTRAVENOUS; SUBCUTANEOUS at 05:51

## 2018-01-01 RX ADMIN — MAGNESIUM SULFATE HEPTAHYDRATE 2 G: 40 INJECTION, SOLUTION INTRAVENOUS at 10:32

## 2018-01-01 RX ADMIN — IPRATROPIUM BROMIDE 0.5 MG: 0.5 SOLUTION RESPIRATORY (INHALATION) at 20:10

## 2018-01-01 RX ADMIN — AZTREONAM 2 G: 2 INJECTION, POWDER, LYOPHILIZED, FOR SOLUTION INTRAMUSCULAR; INTRAVENOUS at 05:42

## 2018-01-01 RX ADMIN — CEFTAROLINE FOSAMIL 600 MG: 600 POWDER, FOR SOLUTION INTRAVENOUS at 12:53

## 2018-01-01 RX ADMIN — FENTANYL CITRATE 100 MCG: 50 INJECTION, SOLUTION INTRAMUSCULAR; INTRAVENOUS at 15:07

## 2018-01-01 RX ADMIN — AMIODARONE HYDROCHLORIDE 400 MG: 200 TABLET ORAL at 09:40

## 2018-01-01 RX ADMIN — POTASSIUM CHLORIDE 10 MEQ: 10 INJECTION, SOLUTION INTRAVENOUS at 12:14

## 2018-01-01 RX ADMIN — SODIUM CHLORIDE 40 MG: 9 INJECTION INTRAMUSCULAR; INTRAVENOUS; SUBCUTANEOUS at 09:39

## 2018-01-01 RX ADMIN — ENOXAPARIN SODIUM 150 MG: 150 INJECTION SUBCUTANEOUS at 04:52

## 2018-01-01 RX ADMIN — MAGNESIUM SULFATE HEPTAHYDRATE 1 G: 1 INJECTION, SOLUTION INTRAVENOUS at 15:05

## 2018-01-01 RX ADMIN — LIDOCAINE HYDROCHLORIDE 2 ML: 10 INJECTION, SOLUTION INFILTRATION; PERINEURAL at 15:36

## 2018-01-01 RX ADMIN — PHENYLEPHRINE HYDROCHLORIDE 90 MCG/MIN: 10 INJECTION INTRAVENOUS at 11:15

## 2018-01-01 RX ADMIN — IPRATROPIUM BROMIDE 0.5 MG: 0.5 SOLUTION RESPIRATORY (INHALATION) at 20:15

## 2018-01-01 RX ADMIN — POTASSIUM CHLORIDE 10 MEQ: 20 SOLUTION ORAL at 15:59

## 2018-01-01 RX ADMIN — THIAMINE HYDROCHLORIDE: 100 INJECTION, SOLUTION INTRAMUSCULAR; INTRAVENOUS at 13:08

## 2018-01-01 RX ADMIN — INSULIN LISPRO 3 UNITS: 100 INJECTION, SOLUTION INTRAVENOUS; SUBCUTANEOUS at 23:19

## 2018-01-01 RX ADMIN — POTASSIUM CHLORIDE 10 MEQ: 10 INJECTION, SOLUTION INTRAVENOUS at 22:46

## 2018-01-01 RX ADMIN — ACETAMINOPHEN 650 MG: 325 SOLUTION ORAL at 16:25

## 2018-01-01 RX ADMIN — IPRATROPIUM BROMIDE 0.5 MG: 0.5 SOLUTION RESPIRATORY (INHALATION) at 04:41

## 2018-01-01 RX ADMIN — DEXTROSE MONOHYDRATE AND SODIUM CHLORIDE: 5; .45 INJECTION, SOLUTION INTRAVENOUS at 17:15

## 2018-01-01 RX ADMIN — POTASSIUM CHLORIDE 10 MEQ: 10 INJECTION, SOLUTION INTRAVENOUS at 13:30

## 2018-01-01 RX ADMIN — FUROSEMIDE 40 MG: 10 INJECTION, SOLUTION INTRAMUSCULAR; INTRAVENOUS at 08:20

## 2018-01-01 RX ADMIN — SODIUM CHLORIDE 100 ML/HR: 900 INJECTION, SOLUTION INTRAVENOUS at 21:46

## 2018-01-01 RX ADMIN — MAGNESIUM SULFATE HEPTAHYDRATE 2 G: 40 INJECTION, SOLUTION INTRAVENOUS at 21:04

## 2018-01-01 RX ADMIN — ENOXAPARIN SODIUM 150 MG: 150 INJECTION SUBCUTANEOUS at 03:59

## 2018-01-01 RX ADMIN — LORAZEPAM 1 MG: 2 INJECTION INTRAMUSCULAR; INTRAVENOUS at 11:57

## 2018-01-01 RX ADMIN — INSULIN LISPRO 4 UNITS: 100 INJECTION, SOLUTION INTRAVENOUS; SUBCUTANEOUS at 11:14

## 2018-01-01 RX ADMIN — FOLIC ACID 1 MG: 1 TABLET ORAL at 08:51

## 2018-01-01 RX ADMIN — MORPHINE SULFATE 2 MG: 4 INJECTION, SOLUTION INTRAMUSCULAR; INTRAVENOUS at 08:20

## 2018-01-01 RX ADMIN — CHLORHEXIDINE GLUCONATE 5 ML: 1.2 RINSE ORAL at 15:44

## 2018-01-01 RX ADMIN — ALBUMIN (HUMAN) 25 G: 0.25 INJECTION, SOLUTION INTRAVENOUS at 14:24

## 2018-01-01 RX ADMIN — AMIODARONE HYDROCHLORIDE 0.5 MG/MIN: 50 INJECTION, SOLUTION INTRAVENOUS at 12:33

## 2018-01-01 RX ADMIN — LEVOFLOXACIN 500 MG: 5 INJECTION, SOLUTION INTRAVENOUS at 23:44

## 2018-01-01 RX ADMIN — FOLIC ACID 1 MG: 1 TABLET ORAL at 09:29

## 2018-01-01 RX ADMIN — METOPROLOL TARTRATE 5 MG: 5 INJECTION, SOLUTION INTRAVENOUS at 22:27

## 2018-01-01 RX ADMIN — HEPARIN SODIUM 5000 UNITS: 5000 INJECTION, SOLUTION INTRAVENOUS; SUBCUTANEOUS at 09:19

## 2018-01-01 RX ADMIN — HEPARIN SODIUM (PORCINE) LOCK FLUSH IV SOLN 100 UNIT/ML 500 UNITS: 100 SOLUTION at 15:40

## 2018-01-01 RX ADMIN — CHLORHEXIDINE GLUCONATE 10 ML: 1.2 RINSE ORAL at 21:25

## 2018-01-01 RX ADMIN — AMIODARONE HYDROCHLORIDE 400 MG: 200 TABLET ORAL at 20:13

## 2018-01-01 RX ADMIN — AZTREONAM 2 G: 2 INJECTION, POWDER, LYOPHILIZED, FOR SOLUTION INTRAMUSCULAR; INTRAVENOUS at 22:27

## 2018-01-01 RX ADMIN — AMIODARONE HYDROCHLORIDE 400 MG: 200 TABLET ORAL at 21:53

## 2018-01-01 RX ADMIN — INSULIN LISPRO 5 UNITS: 100 INJECTION, SOLUTION INTRAVENOUS; SUBCUTANEOUS at 14:30

## 2018-01-01 RX ADMIN — SODIUM CHLORIDE 40 MG: 9 INJECTION INTRAMUSCULAR; INTRAVENOUS; SUBCUTANEOUS at 08:54

## 2018-01-01 RX ADMIN — HYDRALAZINE HYDROCHLORIDE 10 MG: 20 INJECTION INTRAMUSCULAR; INTRAVENOUS at 08:55

## 2018-01-01 RX ADMIN — VANCOMYCIN HYDROCHLORIDE 1500 MG: 10 INJECTION, POWDER, LYOPHILIZED, FOR SOLUTION INTRAVENOUS at 23:57

## 2018-01-01 RX ADMIN — AMIODARONE HYDROCHLORIDE 0.5 MG/MIN: 1.8 INJECTION, SOLUTION INTRAVENOUS at 12:54

## 2018-01-01 RX ADMIN — INSULIN LISPRO 5 UNITS: 100 INJECTION, SOLUTION INTRAVENOUS; SUBCUTANEOUS at 12:08

## 2018-01-01 RX ADMIN — METOPROLOL TARTRATE 50 MG: 50 TABLET ORAL at 08:51

## 2018-01-01 RX ADMIN — ENOXAPARIN SODIUM 150 MG: 150 INJECTION SUBCUTANEOUS at 16:12

## 2018-01-01 RX ADMIN — ACETAMINOPHEN 650 MG: 325 SOLUTION ORAL at 21:38

## 2018-01-01 RX ADMIN — POTASSIUM CHLORIDE 20 MEQ: 20 SOLUTION ORAL at 02:24

## 2018-01-01 RX ADMIN — ENOXAPARIN SODIUM 150 MG: 150 INJECTION SUBCUTANEOUS at 16:48

## 2018-01-01 RX ADMIN — FUROSEMIDE 40 MG: 10 INJECTION, SOLUTION INTRAMUSCULAR; INTRAVENOUS at 08:51

## 2018-01-01 RX ADMIN — POTASSIUM CHLORIDE 10 MEQ: 10 INJECTION, SOLUTION INTRAVENOUS at 03:32

## 2018-01-01 RX ADMIN — POTASSIUM CHLORIDE 10 MEQ: 10 INJECTION, SOLUTION INTRAVENOUS at 11:36

## 2018-01-01 RX ADMIN — ASPIRIN 81 MG 81 MG: 81 TABLET ORAL at 08:56

## 2018-01-01 RX ADMIN — FOLIC ACID 1 MG: 1 TABLET ORAL at 09:40

## 2018-01-01 RX ADMIN — CHLORHEXIDINE GLUCONATE 10 ML: 1.2 RINSE ORAL at 21:59

## 2018-01-01 RX ADMIN — MIDAZOLAM HYDROCHLORIDE 2 MG: 1 INJECTION, SOLUTION INTRAMUSCULAR; INTRAVENOUS at 16:12

## 2018-01-01 RX ADMIN — METOPROLOL TARTRATE 50 MG: 50 TABLET ORAL at 20:37

## 2018-01-01 RX ADMIN — SODIUM CHLORIDE 1000 MG: 900 INJECTION, SOLUTION INTRAVENOUS at 06:09

## 2018-01-01 RX ADMIN — POTASSIUM CHLORIDE 10 MEQ: 10 INJECTION, SOLUTION INTRAVENOUS at 10:04

## 2018-01-01 RX ADMIN — AMIODARONE HYDROCHLORIDE 1 MG/MIN: 1.8 INJECTION, SOLUTION INTRAVENOUS at 21:17

## 2018-01-01 RX ADMIN — ACETAMINOPHEN 650 MG: 325 TABLET ORAL at 04:07

## 2018-01-01 RX ADMIN — FENTANYL CITRATE 100 MCG: 50 INJECTION, SOLUTION INTRAMUSCULAR; INTRAVENOUS at 14:00

## 2018-01-01 RX ADMIN — NOREPINEPHRINE BITARTRATE 6 MCG/MIN: 8 SOLUTION at 02:09

## 2018-01-01 RX ADMIN — FOLIC ACID 1 MG: 1 TABLET ORAL at 08:56

## 2018-01-01 RX ADMIN — POTASSIUM CHLORIDE 10 MEQ: 10 INJECTION, SOLUTION INTRAVENOUS at 09:44

## 2018-01-01 RX ADMIN — Medication 50 MCG/HR: at 09:39

## 2018-01-01 RX ADMIN — CEFTAROLINE FOSAMIL 600 MG: 600 POWDER, FOR SOLUTION INTRAVENOUS at 01:00

## 2018-01-01 RX ADMIN — POTASSIUM CHLORIDE 10 MEQ: 10 INJECTION, SOLUTION INTRAVENOUS at 14:53

## 2018-01-01 RX ADMIN — METOPROLOL TARTRATE 5 MG: 5 INJECTION, SOLUTION INTRAVENOUS at 09:59

## 2018-01-01 RX ADMIN — IPRATROPIUM BROMIDE 0.5 MG: 0.5 SOLUTION RESPIRATORY (INHALATION) at 20:07

## 2018-01-01 RX ADMIN — CHLORHEXIDINE GLUCONATE 10 ML: 1.2 RINSE ORAL at 15:06

## 2018-01-01 RX ADMIN — CHLORHEXIDINE GLUCONATE 10 ML: 1.2 RINSE ORAL at 01:00

## 2018-01-01 RX ADMIN — POTASSIUM CHLORIDE 10 MEQ: 10 INJECTION, SOLUTION INTRAVENOUS at 17:44

## 2018-01-01 RX ADMIN — POTASSIUM CHLORIDE 10 MEQ: 10 INJECTION, SOLUTION INTRAVENOUS at 18:31

## 2018-01-01 RX ADMIN — ACETAMINOPHEN 650 MG: 325 SOLUTION ORAL at 04:41

## 2018-01-01 RX ADMIN — POTASSIUM CHLORIDE 10 MEQ: 10 INJECTION, SOLUTION INTRAVENOUS at 18:51

## 2018-01-01 RX ADMIN — IPRATROPIUM BROMIDE 0.5 MG: 0.5 SOLUTION RESPIRATORY (INHALATION) at 01:38

## 2018-01-01 RX ADMIN — PROPOFOL 15 MCG/KG/MIN: 10 INJECTION, EMULSION INTRAVENOUS at 16:55

## 2018-01-01 RX ADMIN — DEXTROSE MONOHYDRATE: 5 INJECTION, SOLUTION INTRAVENOUS at 04:33

## 2018-01-01 RX ADMIN — EPINEPHRINE 1 MG: 0.1 INJECTION, SOLUTION ENDOTRACHEAL; INTRACARDIAC; INTRAVENOUS at 02:25

## 2018-01-01 RX ADMIN — FOLIC ACID 1 MG: 1 TABLET ORAL at 08:52

## 2018-01-01 RX ADMIN — INSULIN LISPRO 3 UNITS: 100 INJECTION, SOLUTION INTRAVENOUS; SUBCUTANEOUS at 18:22

## 2018-01-01 RX ADMIN — VANCOMYCIN HYDROCHLORIDE 1500 MG: 10 INJECTION, POWDER, LYOPHILIZED, FOR SOLUTION INTRAVENOUS at 01:10

## 2018-01-01 RX ADMIN — FUROSEMIDE 40 MG: 10 INJECTION, SOLUTION INTRAMUSCULAR; INTRAVENOUS at 08:27

## 2018-01-01 RX ADMIN — THIAMINE HYDROCHLORIDE: 100 INJECTION, SOLUTION INTRAMUSCULAR; INTRAVENOUS at 12:06

## 2018-01-01 RX ADMIN — POTASSIUM CHLORIDE 10 MEQ: 10 INJECTION, SOLUTION INTRAVENOUS at 09:27

## 2018-01-01 RX ADMIN — Medication 150 MCG/HR: at 18:39

## 2018-01-01 RX ADMIN — POTASSIUM CHLORIDE 10 MEQ: 10 INJECTION, SOLUTION INTRAVENOUS at 00:18

## 2018-01-01 RX ADMIN — CHLORHEXIDINE GLUCONATE 10 ML: 1.2 RINSE ORAL at 02:12

## 2018-01-01 RX ADMIN — POTASSIUM CHLORIDE 10 MEQ: 10 INJECTION, SOLUTION INTRAVENOUS at 01:26

## 2018-01-01 RX ADMIN — POTASSIUM CHLORIDE 10 MEQ: 10 INJECTION, SOLUTION INTRAVENOUS at 20:37

## 2018-01-01 RX ADMIN — HEPARIN SODIUM 3000 UNITS: 1000 INJECTION, SOLUTION INTRAVENOUS; SUBCUTANEOUS at 22:51

## 2018-01-01 RX ADMIN — THIAMINE HYDROCHLORIDE: 100 INJECTION, SOLUTION INTRAMUSCULAR; INTRAVENOUS at 18:37

## 2018-01-01 RX ADMIN — MAGNESIUM SULFATE HEPTAHYDRATE 1 G: 1 INJECTION, SOLUTION INTRAVENOUS at 08:17

## 2018-01-01 RX ADMIN — Medication 100 MG: at 09:29

## 2018-01-01 RX ADMIN — ASPIRIN 81 MG 81 MG: 81 TABLET ORAL at 09:27

## 2018-01-01 RX ADMIN — HEPARIN SODIUM 3000 UNITS: 1000 INJECTION, SOLUTION INTRAVENOUS; SUBCUTANEOUS at 14:32

## 2018-01-01 RX ADMIN — CEFTAROLINE FOSAMIL 600 MG: 600 POWDER, FOR SOLUTION INTRAVENOUS at 23:24

## 2018-01-01 RX ADMIN — PROPOFOL 30 MCG/KG/MIN: 10 INJECTION, EMULSION INTRAVENOUS at 22:22

## 2018-01-01 RX ADMIN — ASPIRIN 81 MG 81 MG: 81 TABLET ORAL at 09:00

## 2018-01-01 RX ADMIN — INSULIN LISPRO 2 UNITS: 100 INJECTION, SOLUTION INTRAVENOUS; SUBCUTANEOUS at 12:23

## 2018-01-01 RX ADMIN — ENOXAPARIN SODIUM 150 MG: 150 INJECTION SUBCUTANEOUS at 15:56

## 2018-01-01 RX ADMIN — DEXTROSE MONOHYDRATE AND SODIUM CHLORIDE: 5; .45 INJECTION, SOLUTION INTRAVENOUS at 20:22

## 2018-01-01 RX ADMIN — SUCCINYLCHOLINE CHLORIDE 50 MG: 20 INJECTION INTRAMUSCULAR; INTRAVENOUS at 15:48

## 2018-01-01 RX ADMIN — ASPIRIN 81 MG 81 MG: 81 TABLET ORAL at 08:19

## 2018-01-01 RX ADMIN — ASPIRIN 81 MG 81 MG: 81 TABLET ORAL at 14:53

## 2018-01-01 RX ADMIN — POTASSIUM CHLORIDE 10 MEQ: 10 INJECTION, SOLUTION INTRAVENOUS at 16:07

## 2018-01-01 RX ADMIN — INSULIN LISPRO 4 UNITS: 100 INJECTION, SOLUTION INTRAVENOUS; SUBCUTANEOUS at 17:47

## 2018-01-01 RX ADMIN — PROPOFOL 20 MCG/KG/MIN: 10 INJECTION, EMULSION INTRAVENOUS at 11:06

## 2018-01-01 RX ADMIN — METOPROLOL TARTRATE 25 MG: 25 TABLET ORAL at 08:51

## 2018-01-01 RX ADMIN — POTASSIUM CHLORIDE 10 MEQ: 10 INJECTION, SOLUTION INTRAVENOUS at 23:25

## 2018-01-01 RX ADMIN — METOPROLOL TARTRATE 2.5 MG: 5 INJECTION, SOLUTION INTRAVENOUS at 01:10

## 2018-01-01 RX ADMIN — INSULIN GLARGINE 15 UNITS: 100 INJECTION, SOLUTION SUBCUTANEOUS at 12:35

## 2018-01-01 RX ADMIN — MIDAZOLAM HYDROCHLORIDE 2 MG: 1 INJECTION, SOLUTION INTRAMUSCULAR; INTRAVENOUS at 20:38

## 2018-01-01 RX ADMIN — PHENYLEPHRINE HYDROCHLORIDE 20 MCG/MIN: 10 INJECTION INTRAVENOUS at 18:49

## 2018-01-01 RX ADMIN — Medication 4 MG/HR: at 08:33

## 2018-01-01 RX ADMIN — METOPROLOL TARTRATE 5 MG: 5 INJECTION, SOLUTION INTRAVENOUS at 18:45

## 2018-01-01 RX ADMIN — VANCOMYCIN HYDROCHLORIDE 1500 MG: 10 INJECTION, POWDER, LYOPHILIZED, FOR SOLUTION INTRAVENOUS at 09:31

## 2018-01-01 RX ADMIN — INSULIN LISPRO 5 UNITS: 100 INJECTION, SOLUTION INTRAVENOUS; SUBCUTANEOUS at 12:10

## 2018-01-01 RX ADMIN — METOPROLOL TARTRATE 50 MG: 50 TABLET ORAL at 08:27

## 2018-01-01 RX ADMIN — METOPROLOL TARTRATE 25 MG: 25 TABLET ORAL at 21:24

## 2018-01-01 RX ADMIN — IPRATROPIUM BROMIDE 0.5 MG: 0.5 SOLUTION RESPIRATORY (INHALATION) at 14:22

## 2018-01-01 RX ADMIN — IPRATROPIUM BROMIDE 0.5 MG: 0.5 SOLUTION RESPIRATORY (INHALATION) at 08:25

## 2018-01-01 RX ADMIN — Medication 100 MCG/HR: at 20:00

## 2018-01-01 RX ADMIN — POTASSIUM CHLORIDE 10 MEQ: 10 INJECTION, SOLUTION INTRAVENOUS at 00:36

## 2018-01-01 RX ADMIN — INSULIN LISPRO 2 UNITS: 100 INJECTION, SOLUTION INTRAVENOUS; SUBCUTANEOUS at 18:15

## 2018-01-01 RX ADMIN — HEPARIN SODIUM AND DEXTROSE 25 UNITS/KG/HR: 10000; 5 INJECTION INTRAVENOUS at 19:38

## 2018-01-01 RX ADMIN — POTASSIUM CHLORIDE 10 MEQ: 10 INJECTION, SOLUTION INTRAVENOUS at 06:15

## 2018-01-01 RX ADMIN — METOPROLOL TARTRATE 50 MG: 50 TABLET ORAL at 08:41

## 2018-01-01 RX ADMIN — LORAZEPAM 0.01 MG/KG/HR: 2 INJECTION INTRAMUSCULAR at 12:02

## 2018-01-01 RX ADMIN — SODIUM CHLORIDE 1000 ML: 900 INJECTION, SOLUTION INTRAVENOUS at 17:21

## 2018-01-01 RX ADMIN — POTASSIUM CHLORIDE 10 MEQ: 10 INJECTION, SOLUTION INTRAVENOUS at 11:00

## 2018-01-01 RX ADMIN — SODIUM CHLORIDE 40 MG: 9 INJECTION INTRAMUSCULAR; INTRAVENOUS; SUBCUTANEOUS at 08:40

## 2018-01-01 RX ADMIN — SODIUM CHLORIDE 2328 ML: 900 INJECTION, SOLUTION INTRAVENOUS at 20:54

## 2018-01-01 RX ADMIN — AZTREONAM 2 G: 2 INJECTION, POWDER, LYOPHILIZED, FOR SOLUTION INTRAMUSCULAR; INTRAVENOUS at 01:15

## 2018-01-01 RX ADMIN — FOLIC ACID 1 MG: 1 TABLET ORAL at 08:36

## 2018-01-01 RX ADMIN — INSULIN GLARGINE 12 UNITS: 100 INJECTION, SOLUTION SUBCUTANEOUS at 12:55

## 2018-01-01 RX ADMIN — INSULIN LISPRO 5 UNITS: 100 INJECTION, SOLUTION INTRAVENOUS; SUBCUTANEOUS at 06:43

## 2018-01-01 RX ADMIN — SODIUM CHLORIDE 1000 ML: 900 INJECTION, SOLUTION INTRAVENOUS at 08:54

## 2018-01-01 RX ADMIN — POTASSIUM CHLORIDE 30 MEQ: 20 SOLUTION ORAL at 19:40

## 2018-01-01 RX ADMIN — SODIUM CHLORIDE 1 ML: 9 INJECTION INTRAMUSCULAR; INTRAVENOUS; SUBCUTANEOUS at 14:14

## 2018-01-01 RX ADMIN — DIGOXIN 250 MCG: 250 INJECTION, SOLUTION INTRAMUSCULAR; INTRAVENOUS; PARENTERAL at 18:26

## 2018-01-01 RX ADMIN — HEPARIN SODIUM AND DEXTROSE 25 UNITS/KG/HR: 10000; 5 INJECTION INTRAVENOUS at 04:49

## 2018-01-01 RX ADMIN — IPRATROPIUM BROMIDE AND ALBUTEROL SULFATE 3 ML: .5; 3 SOLUTION RESPIRATORY (INHALATION) at 01:06

## 2018-01-01 RX ADMIN — FUROSEMIDE 40 MG: 10 INJECTION, SOLUTION INTRAMUSCULAR; INTRAVENOUS at 17:58

## 2018-01-01 RX ADMIN — POTASSIUM CHLORIDE 10 MEQ: 10 INJECTION, SOLUTION INTRAVENOUS at 21:58

## 2018-01-01 RX ADMIN — MORPHINE SULFATE 1 MG: 4 INJECTION, SOLUTION INTRAMUSCULAR; INTRAVENOUS at 08:47

## 2018-01-01 RX ADMIN — INSULIN GLARGINE 15 UNITS: 100 INJECTION, SOLUTION SUBCUTANEOUS at 12:23

## 2018-01-01 RX ADMIN — INSULIN LISPRO 5 UNITS: 100 INJECTION, SOLUTION INTRAVENOUS; SUBCUTANEOUS at 06:56

## 2018-01-01 RX ADMIN — INSULIN LISPRO 4 UNITS: 100 INJECTION, SOLUTION INTRAVENOUS; SUBCUTANEOUS at 06:13

## 2018-01-01 RX ADMIN — METOPROLOL TARTRATE 5 MG: 5 INJECTION, SOLUTION INTRAVENOUS at 13:50

## 2018-01-01 RX ADMIN — FENTANYL CITRATE 100 MCG: 50 INJECTION, SOLUTION INTRAMUSCULAR; INTRAVENOUS at 22:20

## 2018-01-01 RX ADMIN — HEPARIN SODIUM AND DEXTROSE 21 UNITS/KG/HR: 10000; 5 INJECTION INTRAVENOUS at 00:21

## 2018-01-01 RX ADMIN — POTASSIUM CHLORIDE 10 MEQ: 10 INJECTION, SOLUTION INTRAVENOUS at 00:23

## 2018-01-01 RX ADMIN — POTASSIUM CHLORIDE 10 MEQ: 10 INJECTION, SOLUTION INTRAVENOUS at 18:46

## 2018-01-01 RX ADMIN — METOPROLOL TARTRATE 50 MG: 50 TABLET ORAL at 21:53

## 2018-01-01 RX ADMIN — POTASSIUM CHLORIDE 10 MEQ: 20 SOLUTION ORAL at 06:02

## 2018-01-01 RX ADMIN — VANCOMYCIN HYDROCHLORIDE 1500 MG: 10 INJECTION, POWDER, LYOPHILIZED, FOR SOLUTION INTRAVENOUS at 06:34

## 2018-01-01 RX ADMIN — ACETAMINOPHEN 650 MG: 325 SOLUTION ORAL at 11:15

## 2018-01-01 RX ADMIN — INSULIN LISPRO 5 UNITS: 100 INJECTION, SOLUTION INTRAVENOUS; SUBCUTANEOUS at 12:34

## 2018-01-01 RX ADMIN — SUCCINYLCHOLINE CHLORIDE 50 MG: 20 INJECTION INTRAMUSCULAR; INTRAVENOUS at 15:09

## 2018-01-01 RX ADMIN — CEFTAROLINE FOSAMIL 600 MG: 600 POWDER, FOR SOLUTION INTRAVENOUS at 01:01

## 2018-01-01 RX ADMIN — IPRATROPIUM BROMIDE 0.5 MG: 0.5 SOLUTION RESPIRATORY (INHALATION) at 01:13

## 2018-01-01 RX ADMIN — IPRATROPIUM BROMIDE 0.5 MG: 0.5 SOLUTION RESPIRATORY (INHALATION) at 19:30

## 2018-01-01 RX ADMIN — Medication 100 MG: at 08:41

## 2018-01-01 RX ADMIN — POTASSIUM CHLORIDE 10 MEQ: 10 INJECTION, SOLUTION INTRAVENOUS at 22:11

## 2018-01-01 RX ADMIN — ACETAMINOPHEN 650 MG: 325 SOLUTION ORAL at 16:23

## 2018-01-01 RX ADMIN — CHLORHEXIDINE GLUCONATE 10 ML: 1.2 RINSE ORAL at 15:14

## 2018-01-01 RX ADMIN — DEXTROSE MONOHYDRATE AND SODIUM CHLORIDE: 5; .45 INJECTION, SOLUTION INTRAVENOUS at 06:39

## 2018-01-01 RX ADMIN — INSULIN LISPRO 4 UNITS: 100 INJECTION, SOLUTION INTRAVENOUS; SUBCUTANEOUS at 06:10

## 2018-01-01 RX ADMIN — HYDRALAZINE HYDROCHLORIDE 10 MG: 20 INJECTION INTRAMUSCULAR; INTRAVENOUS at 04:16

## 2018-01-01 RX ADMIN — CHLORHEXIDINE GLUCONATE 10 ML: 1.2 RINSE ORAL at 09:27

## 2018-01-01 RX ADMIN — DEXTROSE MONOHYDRATE: 5 INJECTION, SOLUTION INTRAVENOUS at 13:57

## 2018-01-01 RX ADMIN — INSULIN LISPRO 2 UNITS: 100 INJECTION, SOLUTION INTRAVENOUS; SUBCUTANEOUS at 05:12

## 2018-01-01 RX ADMIN — INSULIN LISPRO 6 UNITS: 100 INJECTION, SOLUTION INTRAVENOUS; SUBCUTANEOUS at 08:20

## 2018-01-01 RX ADMIN — MAGNESIUM SULFATE HEPTAHYDRATE 2 G: 40 INJECTION, SOLUTION INTRAVENOUS at 21:45

## 2018-01-01 RX ADMIN — INSULIN LISPRO 2 UNITS: 100 INJECTION, SOLUTION INTRAVENOUS; SUBCUTANEOUS at 18:08

## 2018-01-01 RX ADMIN — INSULIN GLARGINE 10 UNITS: 100 INJECTION, SOLUTION SUBCUTANEOUS at 12:40

## 2018-01-01 RX ADMIN — CHLORHEXIDINE GLUCONATE 10 ML: 1.2 RINSE ORAL at 08:39

## 2018-01-01 RX ADMIN — POTASSIUM CHLORIDE 10 MEQ: 10 INJECTION, SOLUTION INTRAVENOUS at 01:13

## 2018-01-01 RX ADMIN — POTASSIUM CHLORIDE 10 MEQ: 10 INJECTION, SOLUTION INTRAVENOUS at 12:31

## 2018-01-01 RX ADMIN — PANTOPRAZOLE SODIUM 40 MG: 40 TABLET, DELAYED RELEASE ORAL at 08:20

## 2018-01-01 RX ADMIN — SODIUM CHLORIDE 5 MG/HR: 900 INJECTION, SOLUTION INTRAVENOUS at 11:02

## 2018-01-01 RX ADMIN — AMIODARONE HYDROCHLORIDE 0.5 MG/MIN: 1.8 INJECTION, SOLUTION INTRAVENOUS at 05:17

## 2018-01-01 RX ADMIN — POTASSIUM CHLORIDE 10 MEQ: 10 INJECTION, SOLUTION INTRAVENOUS at 11:21

## 2018-01-01 RX ADMIN — SODIUM CHLORIDE 40 MG: 9 INJECTION INTRAMUSCULAR; INTRAVENOUS; SUBCUTANEOUS at 10:19

## 2018-01-01 RX ADMIN — SODIUM CHLORIDE 15 ML: 900 INJECTION, SOLUTION INTRAVENOUS at 19:40

## 2018-01-01 RX ADMIN — SODIUM CHLORIDE 40 MG: 9 INJECTION INTRAMUSCULAR; INTRAVENOUS; SUBCUTANEOUS at 08:19

## 2018-01-01 RX ADMIN — MIDAZOLAM HYDROCHLORIDE 2 MG: 1 INJECTION, SOLUTION INTRAMUSCULAR; INTRAVENOUS at 16:00

## 2018-01-01 RX ADMIN — ENOXAPARIN SODIUM 150 MG: 150 INJECTION SUBCUTANEOUS at 03:39

## 2018-01-01 RX ADMIN — CHLORHEXIDINE GLUCONATE 10 ML: 1.2 RINSE ORAL at 01:13

## 2018-01-01 RX ADMIN — Medication 100 MCG/HR: at 16:55

## 2018-01-01 RX ADMIN — FOLIC ACID 1 MG: 1 TABLET ORAL at 08:14
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
## 2018-02-11 PROBLEM — R81 GLUCOSURIA: Status: ACTIVE | Noted: 2018-01-01

## 2018-02-11 PROBLEM — D64.9 ANEMIA: Status: ACTIVE | Noted: 2018-01-01

## 2018-02-11 PROBLEM — M54.9 BACK PAIN: Status: ACTIVE | Noted: 2018-01-01

## 2018-02-11 PROBLEM — E87.6 HYPOKALEMIA: Status: ACTIVE | Noted: 2018-01-01

## 2018-02-11 PROBLEM — N17.9 ACUTE KIDNEY INJURY (HCC): Status: ACTIVE | Noted: 2018-01-01

## 2018-02-11 PROBLEM — R29.898 WEAKNESS OF RIGHT LOWER EXTREMITY: Status: ACTIVE | Noted: 2018-01-01

## 2018-02-11 PROBLEM — A41.9 SEPSIS (HCC): Status: ACTIVE | Noted: 2018-01-01

## 2018-02-11 NOTE — ED TRIAGE NOTES
57year old male presents to ED via EMS with reported fever, weakness, and multiple falls today at home. Patient states he did not take a flu shot this year. Blood sugar 283 per EMS. Patient alert, oriented, complaining of pain in lower back related to falls. Patient meets initial SIRS criteria for sepsis workup.

## 2018-02-11 NOTE — ED NOTES
Triage for this patient was done by SUSANA Mendez RN, accidentally documented under another RN's name.

## 2018-02-11 NOTE — Clinical Note
Status[de-identified] Inpatient [101] Type of Bed: Telemetry [19] Inpatient Hospitalization Certified Necessary for the Following Reasons: 3. Patient receiving treatment that can only be provided in an inpatient setting (further clarification in H&P documentation) Admitting Diagnosis: Sepsis ([facility] [address] [[id number]] Admitting Physician: Jones Rubi [[id number]] Attending Physician: Jones Rubi [[id number]] Estimated Length of Stay: 2 Midnights Discharge Plan[de-identified] Home with Office Follow-up

## 2018-02-12 NOTE — H&P
History & Physical    Patient: Oseas Sloan MRN: [id number]  CSN: [id number]    YOB: 1955  Age: 57 y.o. Sex: male      DOA: 2/11/2018  Primary Care Provider:  Lucas Real MD      Assessment/Plan     Patient Active Problem List   Diagnosis Code    Osteoarthritis of right knee M17.11    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[facility] [address] T84.018A, Z96.659    Failed total knee arthroplasty ([facility] [address] T84.018A, Z96.659    Dysphagia R13.10    Sepsis ([facility] [address] A41.9    Hypokalemia E87.6    Glucosuria R81    Acute kidney injury ([facility] [address] N17.9    Fall W19. XXXA    Back pain M54.9    Weakness of right lower extremity R29.898    Anemia D64.9       1. Sepsis, of unknown origin   2. Multiple fall and gait imbalance   3. Acute Low Back pain   4. Hypokalemia   5. RLE weakness, concerns for possible CVA   6. Acute Kidney Injury, unknown baseline  7. Anemia, unknown etiology. Likely chronic dz   8. OA  9. Glucosuria   FULL CODE     -admit for further care to tele   -sepsis protocol initiated. Vancomycin and Aztreonam for now, He is allergic to PCN  -follow up cultures, IVF for now  -pain control. Will get XRs of his lower back and hip. Bedrest for now. PT/OT tomorrow  -check CK levels to rule out rhabdo   -CT head is neg, will need MRI brain in am to rule out stroke   -neurochecks meanwhile   -replete K, check Mg. Check tsh, a1c and lipid panel   -avoid nephrotoxic drugs. Follow Cr and urine output   -supportive care     Update 6am  Was called by the nurse earlier tht patient was having difficulty breathing. At that time, he was placed on BiPAp, fluids were stopped and lasix was given. His ABG looks like Resp alkalosis. He is not tolerating bipap as well. Will cont to monitor him closely for now. He is a low threshold for ICU upgrade if needed. Estimated length of stay : 3-4 days     CC: falls        HPI:     Oseas Sloan is a 57 y.o. male who came to the ed with complaints of fall.  Patient states he has been feeling generalized weakness at home for the last day or so with low grade subjective fevers but yesterday he noted RLE weakness around 5pm. Since then he has fallen about 4 times in his back without any head trauma or LOC. He has been using his tripod cane to assist him with walking. Due to persistence of his weakness, falls, fevers and low back pain he decided to come to the ED. In the ED, CT of the head was neg and his labs showed significant hypoK with elevated Cr (unknown baseline). He was also febrile and tachycardic. He was started on broad spectrum Abx. Past Medical History:   Diagnosis Date    Arrhythmia     irregular heart beat    Arthritis     knees- osteo    Chronic pain     knees    Diabetes (HCC) 1990's    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[facility] [address] 10/21/2014    GERD (gastroesophageal reflux disease)     well controlled    Gout     High cholesterol     Hypertension 1990's    Irregular heart beat     palpitations    Morbid obesity ([facility] [address]     Osteoarthritis of right knee 1/18/2014    Other ill-defined conditions(799.89)     asbestosis    Other ill-defined conditions(799.89)     irregular heart beat    Other ill-defined conditions(799.89)     h/o migraines    Other ill-defined conditions(799.89)     gout    Other ill-defined conditions(799.89)     cholesterol    PUD (peptic ulcer disease) 1970's    Tinnitus of left ear        Past Surgical History:   Procedure Laterality Date    HX CARPAL TUNNEL RELEASE      HX KNEE ARTHROSCOPY  2010    left    HX KNEE ARTHROSCOPY  1980s    right    HX KNEE REPLACEMENT      HX ORTHOPAEDIC  1970s    right hand tendenitis    HX ORTHOPAEDIC  2012    left hand ring finger released    HX ORTHOPAEDIC  8-2013    left carpal    TOTAL KNEE ARTHROPLASTY  2009/2010    left       No family history on file.     Social History     Social History    Marital status:      Spouse name: N/A    Number of children: N/A    Years of education: N/A Social History Main Topics    Smoking status: Former Smoker     Quit date: 10/1/1975    Smokeless tobacco: Never Used      Comment: 1970s    Alcohol use 1.5 oz/week     3 Shots of liquor per week    Drug use: No    Sexual activity: Not on file     Other Topics Concern    Not on file     Social History Narrative       Prior to Admission medications    Medication Sig Start Date End Date Taking? Authorizing Provider   ondansetron hcl (ZOFRAN, AS HYDROCHLORIDE,) 4 mg tablet Take 1 Tab by mouth every eight (8) hours as needed for Nausea. 8/3/15   Luis Buchanan MD   selenium sulfide 2.25 % sham by Apply Externally route. Joey Gaxiola MD   oxycodone-acetaminophen (PERCOCET)  mg per tablet Take 1 tablet by mouth four (4) times daily. Pt instructed to take am of surgery if needed. Indications: PAIN    Historical Provider   magnesium 250 mg tab Take  by mouth daily. Historical Provider   vit B Cmplx 3-FA-Vit C-Biotin (NEPHRO JESSICA RX) 1- mg-mg-mcg tablet Take 1 tablet by mouth daily. Historical Provider   therapeutic multivitamin (THERAGRAN) tablet Take 1 Tab by mouth daily. Historical Provider   hydrochlorothiazide (HYDRODIURIL) 25 mg tablet Take 25 mg by mouth daily. Indications: EDEMA    Joey Gaxiola MD   fluticasone (FLONASE) 50 mcg/actuation nasal spray 2 sprays by Both Nostrils route daily. Indications: ALLERGIC RHINITIS    Joey Gaxiola MD   omega-3 fatty acids-vitamin e (FISH OIL) 1,000 mg cap Take 1 Cap by mouth four (4) times daily. Joey Gaxiola MD   allopurinol (ZYLOPRIM) 100 mg tablet Take 200 mg by mouth two (2) times a day. Indications: GOUT    Joey Gaxiola MD   metoprolol (LOPRESSOR) 100 mg tablet Take 300 mg by mouth three (3) times daily. Indications: HYPERTENSION    Joey Gaxiola MD   fenofibrate (TRICOR) 160 mg tablet Take 160 mg by mouth daily. Indications: HYPERCHOLESTEROLEMIA    Joey Gaxiola MD   rosuvastatin (CRESTOR) 10 mg tablet Take 10 mg by mouth nightly. Indications: HYPERCHOLESTEROLEMIA    Phys MD Minor   pioglitazone (ACTOS) 45 mg tablet Take 45 mg by mouth daily. Indications: TYPE 2 DIABETES MELLITUS    Joey Gaxiola MD   enalapril (VASOTEC) 20 mg tablet Take 20 mg by mouth two (2) times a day. Pt instructed to take am of surgery. Indications: HYPERTENSION    Joey Gaxiola MD   BUTALB/ACETAMINOPHEN/CAFFEINE (FIORICET PO) Take  by mouth nightly. 50/325/40 mg  Indications: Migraine    Joey Gaxiola MD   clobetasol (OLUX) 0.05 % topical foam Apply  to affected area as needed. use thin film on affected area    Joey Gaxiola MD   metFORMIN (GLUCOPHAGE) 1,000 mg tablet Take 1,000 mg by mouth two (2) times daily (with meals). Indications: TYPE 2 DIABETES MELLITUS    Joey Gaxiola MD   loratadine (CLARITIN) 10 mg tablet Take 10 mg by mouth two (2) times daily as needed. Indications: ALLERGIC RHINITIS    Joey Gaxiola MD   ranitidine (ZANTAC) 150 mg tablet Take 150 mg by mouth two (2) times a day. Pt instructed to take am of surgery. Indications: GASTROESOPHAGEAL REFLUX    Joey Gaxiola MD   colchicine 0.6 mg tablet Take 0.6 mg by mouth daily as needed. Indications: GOUT    Phys Minor, MD       Allergies   Allergen Reactions    Bees [Hymenoptera Allergenic Extract] Anaphylaxis    Cocoa Butter-Zinc Oxide Rash    Dilaudid [Hydromorphone (Bulk)] Rash     capsules    Pcn [Penicillins] Rash       Review of Systems  Gen: No weight loss/gain. Heent: No headache, rhinorrhea, epistaxis, ear pain, hearing loss, sinus pain, neck pain/stiffness, sore throat. Heart: No chest pain, palpitations, SIGALA, pnd, or orthopnea. Resp: No cough, hemoptysis, wheezing and shortness of breath. GI: No nausea, vomiting, diarrhea, constipation, melena or hematochezia. : No urinary obstruction, dysuria or hematuria. Derm: No rash, new skin lesion or pruritis. Musc/skeletal: no bone or joint complains. Vasc: No edema, cyanosis or claudication.    Endo: No heat/cold intolerance, no polyuria,polydipsia or polyphagia. Neuro: No  numbness, tingling. No seizures. Heme: No easy bruising or bleeding. Physical Exam:     Physical Exam:  Visit Vitals    /80    Pulse (!) 121    Temp (!) 102.1 °F (38.9 °C)    Resp 20    Ht 6' (1.829 m)    Wt 138.3 kg (305 lb)    SpO2 100%    BMI 41.37 kg/m2    O2 Flow Rate (L/min): 2 l/min O2 Device: Room air    Temp (24hrs), Av.1 °F (38.9 °C), Min:102.1 °F (38.9 °C), Max:102.1 °F (38.9 °C)             General:  Awake, cooperative, mild distress due to back pain    Head:  Normocephalic, without obvious abnormality, atraumatic. Eyes:  Conjunctivae/corneas clear, sclera anicteric, PERRL, EOMs intact. Nose: Nares normal. No drainage or sinus tenderness. Throat: Lips, mucosa, and tongue DRY   Neck: Supple, symmetrical, trachea midline, no adenopathy. Lungs:   Clear to auscultation bilaterally. Heart:  Tachycardiac, Regular rate and rhythm, S1, S2 normal, no murmur, click, rub or gallop. Abdomen: Soft, non-tender. Bowel sounds normal. No masses,  No organomegaly. Extremities: Extremities normal, atraumatic, no cyanosis or edema. Capillary refill normal. No midline back tenderness    Pulses: 2+ and symmetric all extremities. Skin: Skin color pink/pale/mottled/flushed, turgor normal. No rashes or lesions   Neurologic: CNII-XII intact. RLE weakness 3+/5, rest of the ext 4+/5.         Labs Reviewed:      Recent Results (from the past 24 hour(s))   CBC WITH AUTOMATED DIFF    Collection Time: 18  6:45 PM   Result Value Ref Range    WBC 5.9 4.6 - 13.2 K/uL    RBC 3.51 (L) 4.70 - 5.50 M/uL    HGB 11.2 (L) 13.0 - 16.0 g/dL    HCT 32.9 (L) 36.0 - 48.0 %    MCV 93.7 74.0 - 97.0 FL    MCH 31.9 24.0 - 34.0 PG    MCHC 34.0 31.0 - 37.0 g/dL    RDW 15.5 (H) 11.6 - 14.5 %    PLATELET 130 642 - 262 K/uL    MPV 12.1 (H) 9.2 - 11.8 FL    NEUTROPHILS 81 (H) 40 - 73 %    LYMPHOCYTES 9 (L) 21 - 52 %    MONOCYTES 10 3 - 10 % EOSINOPHILS 0 0 - 5 %    BASOPHILS 0 0 - 2 %    ABS. NEUTROPHILS 4.8 1.8 - 8.0 K/UL    ABS. LYMPHOCYTES 0.6 (L) 0.9 - 3.6 K/UL    ABS. MONOCYTES 0.6 0.05 - 1.2 K/UL    ABS. EOSINOPHILS 0.0 0.0 - 0.4 K/UL    ABS. BASOPHILS 0.0 0.0 - 0.06 K/UL    DF AUTOMATED     LACTIC ACID    Collection Time: 02/11/18  6:46 PM   Result Value Ref Range    Lactic acid 2.1 (HH) 0.4 - 2.0 MMOL/L   METABOLIC PANEL, COMPREHENSIVE    Collection Time: 02/11/18  8:45 PM   Result Value Ref Range    Sodium 134 (L) 136 - 145 mmol/L    Potassium 2.6 (LL) 3.5 - 5.5 mmol/L    Chloride 97 (L) 100 - 108 mmol/L    CO2 19 (L) 21 - 32 mmol/L    Anion gap 18 3.0 - 18 mmol/L    Glucose 272 (H) 74 - 99 mg/dL    BUN 29 (H) 7.0 - 18 MG/DL    Creatinine 2.50 (H) 0.6 - 1.3 MG/DL    BUN/Creatinine ratio 12 12 - 20      GFR est AA 32 (L) >60 ml/min/1.73m2    GFR est non-AA 26 (L) >60 ml/min/1.73m2    Calcium 9.4 8.5 - 10.1 MG/DL    Bilirubin, total 1.2 (H) 0.2 - 1.0 MG/DL    ALT (SGPT) 35 16 - 61 U/L    AST (SGOT) 107 (H) 15 - 37 U/L    Alk.  phosphatase 57 45 - 117 U/L    Protein, total 7.1 6.4 - 8.2 g/dL    Albumin 3.0 (L) 3.4 - 5.0 g/dL    Globulin 4.1 (H) 2.0 - 4.0 g/dL    A-G Ratio 0.7 (L) 0.8 - 1.7     URINALYSIS W/ RFLX MICROSCOPIC    Collection Time: 02/11/18 10:45 PM   Result Value Ref Range    Color DARK YELLOW      Appearance CLOUDY      Specific gravity 1.019 1.005 - 1.030      pH (UA) 5.5 5.0 - 8.0      Protein 300 (A) NEG mg/dL    Glucose >1000 (A) NEG mg/dL    Ketone TRACE (A) NEG mg/dL    Bilirubin NEGATIVE  NEG      Blood LARGE (A) NEG      Urobilinogen 1.0 0.2 - 1.0 EU/dL    Nitrites NEGATIVE  NEG      Leukocyte Esterase NEGATIVE  NEG     URINE MICROSCOPIC ONLY    Collection Time: 02/11/18 10:45 PM   Result Value Ref Range    WBC 0 to 2 0 - 5 /hpf    RBC 0 to 2 0 - 5 /hpf    Bacteria 2+ (A) NEG /hpf    Amorphous Crystals 2+ (A) NEG       Procedures/imaging: see electronic medical records for all procedures/Xrays and details which were not copied into this note but were reviewed prior to creation of Plan    60 minutes of care time spent in the direct evaluation and treatment of this high risk patient.      CC: Jessica Dumont MD

## 2018-02-12 NOTE — CONSULTS
Manjit Cantu M.D  [address] [facility] [address]  Phone [phone] Fax [phone]  Pulmonary Critical Care & Sleep Medicine       Name: Sis Ford MRN: [id number]   : 1955 Hospital: [facility] [place]   Date: 2018        Critical Care Initial Patient Consult    Requesting MD:                                                  Reason for CC Consult:    IMPRESSION:   Patient Active Problem List   Diagnosis Code    Osteoarthritis of right knee M17.11    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[address] T84.018A, Z96.659    Failed total knee arthroplasty ([address] T84.018A, Z96.659    Dysphagia R13.10    Sepsis ([address] A41.9    Hypokalemia E87.6    Glucosuria R81    Acute kidney injury ([address] N17.9    Fall W19. XXXA    Back pain M54.9    Weakness of right lower extremity R29.898    Anemia D64.9 ·   Sepsis ? Source ? Related to lll air space vs abdominal source  · Acute renal failure  · Rhabdo   · Afib  · Back pain fall  · Severe hypokalemia hypomagnesemia  · Elevated blood sugars   · Lactic acidosis<was on metformin at home>    PLAN:  Blood culture, Urine culture, Sputum culture  IV fluid bolus  Check CK and Troponin  Digoxin for afib  Echo  Cardiology consult  Add levaquin to vanco and azactam  Chest and Abd CT  Follow MRI head  Check Coags  Monitor PLT  Give little morphine for back pain and fever and tachycardia   CVS:BP is ok on afib with RVR <H/o in past not on any anticoagulation> give dig as worry on dropping BP with lopressor if continue to have issues will give cardizem, Cardiology is consulted, Echo and cardiac enz,   RS:Tachypnea most likely due to pain and underline sepsis, Alkalotic will hold bipap for now, Recheck ABG in few hours, Monitor on Chest CT, Add atrovent for cough avoice laba due to tachycardia  ID:Sepsis ? Etiology, ? Urosepsis, Cough and LLL air space ?  Left sided pneumonia with cough, Sputum culture, Monitor blood culture, Get urine culture  Vanco 2/11  Azactam 2/11  Add levaquin 2/12 <Allergic to pcn>  Monitor Lactate  Give iv fluid another bolus received per sepsis protocol  Start iv fluid continuous after that  ENDO:Hold metformin, SSI Get TSH due to afib  GI:NPO for now, Follow ABD ct,   RENAL:Wilson for I/O, Start iv fluids, Monitor lactate and CK as h/o fall with possible rahbdo, Give little bicarb on iv fluids for rhabdo< avoid giving too much as already alkalotic> Replace k and MG and replace per protocol  CNS: Mentation ok now, MRI head is ordered, Follow CT first if concern for anything in spine add spinal MRI  HEMATOLOGY:PLT id dropping monitor, Check coargs  MUSCULOSKELETAL:CK is elevated monitor  PAIN AND SEDATION:Give little morpin, Monitor for ETOH withdrawal add thiamine and folic acid  · Skin/Wound: Monitor back  · Electrolytes: Replace electrolytes per ICU electrolyte replacement protocol. · IVF: D5W 1/2 ns and 1 amp bicarb 100cc/hr  · Nutrition: NPO for now till better understanding of sepsis and abd ct   · Prophylaxis: DVT Prophylaxis with Heparin,. GI Prophylaxis. · Restraints: none  · PT/OT eval and treat. OOB when appropriate. · Lines/Tubes: none. No central line. Wilson 2/12/18  ADVANCE DIRECTIVE:Full code  FAMILY DISCUSSION:spoke with family  Quality Care: PPI, DVT prophylaxis, HOB elevated, Infection control all reviewed and addressed. Events and notes from last 24 hours reviewed. Care plan discussed with nursing CC TIME:65 min excluding procedure    · Labs and images personally seen and available reports reviewed. · All current medicines are reviewed and doses and prescription adjusted.     Addendum: 12:18 pm  Blood cult positive for GPC  Chest ABD ct done pending report  Troponin is >2    PlanL: Follow ct report if nothing acute consider heparin and aspirin  Add betablocker   Rpt culture in am  Already on vanco   Monitor in ICU     Addendum 4 pm  Patient continue to decline with elevated Respiratory rate  Troponin elevated  CT chest and abd followed ? Septic embolie    Plan: Intubated around 4 pm  Will plan femoral line as I think patient is septic and putting a line will colonize and will plan to change groin line to neck line once better handle of sepsis  Spoke with patient and wife extensively before patietn was intubated  Spend 25 min CCM time excluding procedures   Subjective/History:   Savannah Foss is a 57 y.o. male who came to the ed with complaints of fall. Patient states he has been feeling generalized weakness at home for the last day or so with low grade subjective fevers but yesterday he noted RLE weakness around 5pm. Since then he has fallen about 4 times in his back without any head trauma or LOC. He has been using his tripod cane to assist him with walking. Due to persistence of his weakness, falls, fevers and low back pain he decided to come to the ED. In the ED, CT of the head was neg and his labs showed significant hypoK with elevated Cr (unknown baseline). He was also febrile and tachycardic. He was started on broad spectrum Abx.      Overnight RRT called found to be tachycardic, tachypnic and ABG showed alkalosis and was transferred to ICU  Now patient is awake, denies any complains except some back pain   Tachycardic with HR in 120s   No nausea vomiting  Recently about 1 month a go treated for pna at office  No diarrhoea  Ex smoker  Drinks about 2 glasses of mix drinks every day  Recently lost his son to multiorgan failure and sepsis     Past Medical History:   Diagnosis Date    Arrhythmia     irregular heart beat    Arthritis     knees- osteo    Chronic pain     knees    Diabetes ([address] 1990's    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[address] 10/21/2014    GERD (gastroesophageal reflux disease)     well controlled    Gout     High cholesterol     Hypertension 1990's    Irregular heart beat     palpitations    Morbid obesity ([address]     Osteoarthritis of right knee 1/18/2014    Other ill-defined conditions(799.89)     asbestosis    Other ill-defined conditions(799.89)     irregular heart beat    Other ill-defined conditions(799.89)     h/o migraines    Other ill-defined conditions(799.89)     gout    Other ill-defined conditions(799.89)     cholesterol    PUD (peptic ulcer disease) 1970's    Tinnitus of left ear       Past Surgical History:   Procedure Laterality Date    HX CARPAL TUNNEL RELEASE      HX KNEE ARTHROSCOPY  2010    left    HX KNEE ARTHROSCOPY  1980s    right    HX KNEE REPLACEMENT      HX ORTHOPAEDIC  1970s    right hand tendenitis    HX ORTHOPAEDIC  2012    left hand ring finger released    HX ORTHOPAEDIC  8-2013    left carpal    TOTAL KNEE ARTHROPLASTY  2009/2010    left      Prior to Admission medications    Medication Sig Start Date End Date Taking? Authorizing Provider   ondansetron hcl (ZOFRAN, AS HYDROCHLORIDE,) 4 mg tablet Take 1 Tab by mouth every eight (8) hours as needed for Nausea. 8/3/15   Richard Moon MD   selenium sulfide 2.25 % sham by Apply Externally route. Joey Gaxiola MD   oxycodone-acetaminophen (PERCOCET)  mg per tablet Take 1 tablet by mouth four (4) times daily. Pt instructed to take am of surgery if needed. Indications: PAIN    Historical Provider   magnesium 250 mg tab Take  by mouth daily. Historical Provider   vit B Cmplx 3-FA-Vit C-Biotin (NEPHRO JESSICA RX) 1- mg-mg-mcg tablet Take 1 tablet by mouth daily. Historical Provider   therapeutic multivitamin (THERAGRAN) tablet Take 1 Tab by mouth daily. Historical Provider   hydrochlorothiazide (HYDRODIURIL) 25 mg tablet Take 25 mg by mouth daily. Indications: EDEMA    Joey Gaxiola MD   fluticasone (FLONASE) 50 mcg/actuation nasal spray 2 sprays by Both Nostrils route daily. Indications: ALLERGIC RHINITIS    Joey Gaxiola MD   omega-3 fatty acids-vitamin e (FISH OIL) 1,000 mg cap Take 1 Cap by mouth four (4) times daily.     Joey Gaxiola MD allopurinol (ZYLOPRIM) 100 mg tablet Take 200 mg by mouth two (2) times a day. Indications: GOUT    Phys Other, MD   metoprolol (LOPRESSOR) 100 mg tablet Take 300 mg by mouth three (3) times daily. Indications: HYPERTENSION    Phys Other, MD   fenofibrate (TRICOR) 160 mg tablet Take 160 mg by mouth daily. Indications: HYPERCHOLESTEROLEMIA    Joey Other, MD   rosuvastatin (CRESTOR) 10 mg tablet Take 10 mg by mouth nightly. Indications: HYPERCHOLESTEROLEMIA    Phys Other, MD   pioglitazone (ACTOS) 45 mg tablet Take 45 mg by mouth daily. Indications: TYPE 2 DIABETES MELLITUS    Phys Other, MD   enalapril (VASOTEC) 20 mg tablet Take 20 mg by mouth two (2) times a day. Pt instructed to take am of surgery. Indications: HYPERTENSION    Joey Other, MD   BUTALB/ACETAMINOPHEN/CAFFEINE (FIORICET PO) Take  by mouth nightly. 50/325/40 mg  Indications: Migraine    Phys Other, MD   clobetasol (OLUX) 0.05 % topical foam Apply  to affected area as needed. use thin film on affected area    Phys Other, MD   metFORMIN (GLUCOPHAGE) 1,000 mg tablet Take 1,000 mg by mouth two (2) times daily (with meals). Indications: TYPE 2 DIABETES MELLITUS    Phys Other, MD   loratadine (CLARITIN) 10 mg tablet Take 10 mg by mouth two (2) times daily as needed. Indications: ALLERGIC RHINITIS    Phys Other, MD   ranitidine (ZANTAC) 150 mg tablet Take 150 mg by mouth two (2) times a day. Pt instructed to take am of surgery. Indications: GASTROESOPHAGEAL REFLUX    Phys Other, MD   colchicine 0.6 mg tablet Take 0.6 mg by mouth daily as needed.  Indications: GOUT    Phys MD Minor     Current Facility-Administered Medications   Medication Dose Route Frequency    heparin (porcine) injection 5,000 Units  5,000 Units SubCUTAneous Q8H    vancomycin (VANCOCIN) 1500 mg in  ml infusion  1,500 mg IntraVENous Q24H    potassium chloride 10 mEq in 100 ml IVPB  10 mEq IntraVENous Q1H    pantoprazole (PROTONIX) tablet 40 mg  40 mg Oral DAILY    perflutren lipid microspheres (DEFINITY) in NS bolus IV  1 mL IntraVENous RAD ONCE    sodium chloride 0.9 % bolus infusion 1,000 mL  1,000 mL IntraVENous ONCE    levoFLOXacin (LEVAQUIN) 500 mg in D5W IVPB  500 mg IntraVENous Q24H    digoxin (LANOXIN) injection 250 mcg  250 mcg IntraVENous NOW    aztreonam (AZACTAM) 2 g in sterile water (preservative free) 10 mL IV syringe  2 g IntraVENous Q8H    insulin lispro (HUMALOG) injection   SubCUTAneous AC&HS     Allergies   Allergen Reactions    Bees [Hymenoptera Allergenic Extract] Anaphylaxis    Cocoa Butter-Zinc Oxide Rash    Dilaudid [Hydromorphone (Bulk)] Rash     capsules    Pcn [Penicillins] Rash      Social History   Substance Use Topics    Smoking status: Former Smoker     Quit date: 10/1/1975    Smokeless tobacco: Never Used      Comment:     Alcohol use 1.5 oz/week     3 Shots of liquor per week      No family history on file.      Objective:   Vital Signs:    Visit Vitals    /72    Pulse (!) 110    Temp 97.9 °F (36.6 °C)    Resp (!) 39    Ht 6' (1.829 m)    Wt 138.3 kg (305 lb)    SpO2 100%    BMI 41.37 kg/m2       O2 Device: Nasal cannula   O2 Flow Rate (L/min): 2 l/min   Temp (24hrs), Av.5 °F (38.1 °C), Min:97.9 °F (36.6 °C), Max:103 °F (39.4 °C)       Intake/Output:   Last shift:         Last 3 shifts: 02/10 1901 -  0700  In: 240 [P.O.:240]  Out: 0     Intake/Output Summary (Last 24 hours) at 18 0849  Last data filed at 18 0048   Gross per 24 hour   Intake              240 ml   Output                0 ml   Net              240 ml       Review of Systems:  HEENT: No epistaxis, no nasal drainage, no difficulty in swallowing, no redness in eyes  Respiratory: as above  Cardiovascular: no chest pain, no palpitations, no chronic leg edema, no syncope  Gastrointestinal: no abd pain, no vomiting, no diarrhea, no bleeding symptoms  Genitourinary: No urinary symptoms or hematuria  Integument/breast: No ulcers or rashes  Musculoskeletal:BACK PAIN  Neurological: No focal weakness, no seizures, no headaches  Behvioral/Psych: No anxiety, no depression  Constitutional: FEVER , no chills, no weight loss, no night sweats     Objective:    General: comfortable, In minimal distress   HEENT: pupils reactive, sclera anicteric, EOM intact  Neck: No adenopathy or thyroid swelling, no lymphadenopathy or JVD, supple  CVS: S1S2 no murmurs  RS: Mod AE bilaterally, minimal rales on right side  Abd: soft, non tender, distended sluggish bowel sound  Neuro: non focal, awake, alert  Extrm: no leg edema, Right knee scar  Lymph node: No obvious palpable lymph node appreciated.   Skin: no rash  CBC w/Diff Recent Labs      02/12/18 0442 02/11/18 1845   WBC  6.6  5.9   RBC  3.51*  3.51*   HGB  10.8*  11.2*   HCT  32.3*  32.9*   PLT  149  191   GRANS   --   81*   LYMPH   --   9*   EOS   --   0        Chemistry Recent Labs      02/12/18 0442 02/11/18 2045   GLU  257*  272*   NA  136  134*   K  2.5*  2.6*   CL  99*  97*   CO2  21  19*   BUN  32*  29*   CREA  2.73*  2.50*   CA  9.0  9.4   MG  1.0*  0.8*   AGAP  16  18   BUCR  12  12   AP  46  57   TP  6.8  7.1   ALB  2.7*  3.0*   GLOB  4.1*  4.1*   AGRAT  0.7*  0.7*        Lactic Acid Lactic acid   Date Value Ref Range Status   02/12/2018 2.3 () 0.4 - 2.0 MMOL/L Final     Comment:     CALLED TO AND CORRECTLY REPEATED BY:  BREANNA CONDE RN 3N TO HK AT [id number] ON [id number]       Recent Labs      02/12/18 0442 02/11/18 1846   LAC  2.3*  2.1*        Micro  Recent Labs      02/11/18 1846   CULT  NO GROWTH AFTER 11 HOURS     Recent Labs      02/11/18 1846   CULT  NO GROWTH AFTER 11 HOURS        ABG Recent Labs      02/12/18   0257  02/12/18   0127   PHI  7.535*  7.489*   PCO2I  22.0*  26.1*   PO2I  88  97   HCO3I  18.5*  19.5*   FIO2I  21   --         Liver Enzymes Protein, total   Date Value Ref Range Status   02/12/2018 6.8 6.4 - 8.2 g/dL Final     Albumin   Date Value Ref Range Status 02/12/2018 2.7 (L) 3.4 - 5.0 g/dL Final     Globulin   Date Value Ref Range Status   02/12/2018 4.1 (H) 2.0 - 4.0 g/dL Final     A-G Ratio   Date Value Ref Range Status   02/12/2018 0.7 (L) 0.8 - 1.7   Final     AST (SGOT)   Date Value Ref Range Status   02/12/2018 133 (H) 15 - 37 U/L Final     Alk. phosphatase   Date Value Ref Range Status   02/12/2018 46 45 - 117 U/L Final     Recent Labs      02/12/18   0442  02/11/18   2045   TP  6.8  7.1   ALB  2.7*  3.0*   GLOB  4.1*  4.1*   AGRAT  0.7*  0.7*   SGOT  133*  107*   AP  46  57        Cardiac Enzymes Lab Results   Component Value Date/Time    CPK 2677 (H) 02/11/2018 08:45 PM        BNP No results found for: BNP, BNPP, XBNPT     Coagulation No results for input(s): PTP, INR, APTT in the last 72 hours. No lab exists for component: INREXT      Thyroid  No results found for: T4, T3U, TSH, TSHEXT       Lipid Panel Lab Results   Component Value Date/Time    Cholesterol, total 116 02/11/2018 08:45 PM    HDL Cholesterol 27 (L) 02/11/2018 08:45 PM    LDL, calculated 57.4 02/11/2018 08:45 PM    VLDL, calculated 31.6 02/11/2018 08:45 PM    Triglyceride 158 (H) 02/11/2018 08:45 PM    CHOL/HDL Ratio 4.3 02/11/2018 08:45 PM          Urinalysis Lab Results   Component Value Date/Time    Color DARK YELLOW 02/11/2018 10:45 PM    Appearance CLOUDY 02/11/2018 10:45 PM    Specific gravity 1.019 02/11/2018 10:45 PM    pH (UA) 5.5 02/11/2018 10:45 PM    Protein 300 (A) 02/11/2018 10:45 PM    Glucose >1000 (A) 02/11/2018 10:45 PM    Ketone TRACE (A) 02/11/2018 10:45 PM    Bilirubin NEGATIVE  02/11/2018 10:45 PM    Urobilinogen 1.0 02/11/2018 10:45 PM    Nitrites NEGATIVE  02/11/2018 10:45 PM    Leukocyte Esterase NEGATIVE  02/11/2018 10:45 PM    Epithelial cells 0 01/08/2014 02:46 PM    Bacteria 2+ (A) 02/11/2018 10:45 PM    WBC 0 to 2 02/11/2018 10:45 PM    RBC 0 to 2 02/11/2018 10:45 PM        XR (Most Recent).  CXR reviewed by me and compared with previous CXR   Results from Hospital Encounter encounter on 02/11/18   XR CHEST PORT   Narrative Portable chest xray     Reason for exam:acute dyspnea, sepsis    Images: 1    Comparison:  Examination of one day earlier and 9/26/2014. Findings: The cardiomediastinal contours are stable on this somewhat rotated  exam.    The right lung is grossly clear. There is increased density at the left  costophrenic angle which obscures this lateral heart margin. No pneumothorax. No regulo CHF. Cortical margins appear intact. Impression Impression:    Increased density at the left lung base laterally. This may be secondary to  small effusion. Infiltrate or atelectasis could also be present. The lungs  elsewhere are clear. The appearance is similar to the study of one day earlier. CT (Most Recent)   Results from Hospital Encounter encounter on 02/11/18   CT HEAD WO CONT   Narrative EXAM: CT head    INDICATION: Left leg weakness    COMPARISON: None. TECHNIQUE: Axial CT imaging of the head was performed without intravenous  contrast.    DOSE REDUCTION:  One or more dose reduction techniques were used on this CT:  automated exposure control, adjustment of the mAs and/or kVp according to  patient's size, and iterative reconstruction techniques. The specific techniques  utilized on this CT exam have been documented in the patient's electronic  medical record.    _______________    FINDINGS:    BRAIN AND POSTERIOR FOSSA: There is moderate parenchymal volume loss. There is  no intracranial hemorrhage, mass effect, or midline shift. Mild small vessel  ischemic disease present. EXTRA-AXIAL SPACES AND MENINGES: There are no abnormal extra-axial fluid  collections. CALVARIUM: Intact. SINUSES: There is mucoperiosteal thickening of both maxillary sinuses with  air-fluid level in the right maxillary sinus.     OTHER: None.    _______________         Impression IMPRESSION:    No acute intracranial abnormalities. EKG No results found for this or any previous visit. ECHO No results found for this or any previous visit. Imaging:  I have personally reviewed the patients radiographs and have reviewed the reports:     Best practice :  Fluids started at 30 ml/kg with aim to keep CVP 8 or above  Lactic acid ordered- initial and repeat Q6hrs if elevated till normalized. Cultures drawn. Antibiotic administered within 1hr-ICU  And 3hrs ED  Glycemic control  [facility]. Ventilated patients- aim to keep peak plateau pressure 84-61HB H2O. Sress ulcer prophylaxis  DVT prophylaxis    Vent bundle, Wilson bundle and central line bundle followed. Temi Wu M.D.   Pulmonary Critical Care & Sleep Medicine

## 2018-02-12 NOTE — PROGRESS NOTES
Readmission Risk Assessment: Low Risk and [facility]P/[facility] patients    RRAT Score: 1 - 12    Initial Assessment:Emergency Contact:chart reviewed pt came to ED with c/o fever,weakness,admitted for sepsis, transferred from tele after RRT,cm will cont to review and follow thru for d/c planning. Pertinent Medical Hx: see chart  PCP/Specialists: Community Services: Joanna    DME:     Low Risk Care Transition Plan:  1. Evaluate for [facility] or [facility], community care coordination of resources  2. Involve patient/caregiver in assessment, planning, education and implement of intervention. 3. CM daily patient care huddles/interdisciplinary rounds. 4. PCP/Specialist appointment within 7 - 10 days made prior to discharge. 5. Facilitate transportation and logistics for follow-up appointments. 6. Handoff to [address] Nurse Navigator or PCP practice.

## 2018-02-12 NOTE — CONSULTS
[facility] Consult Note      Patient: Renae Felix MRN: [id number]  SSN: [id number]    YOB: 1955  Age: 57 y.o. Sex: male    Date of Consultation: 2/12/2017  Referring Physician: Dr. Nancy Adams  Reason for Consultation: Afib with RVR. HPI:  Mr. Tea Young is a 57years old gentleman with history of irregular heart beat, DM, HTN and drinks ETOH daily admitted with sepsis and also possible falls. Patient have history of irregular and have seen a cardiologist long time ago. Patient did not have recent echo or stress test. No history of stroke/ CHF and bleeding or blood transfusion. Patient is denied chest pain, have mild SOB, no palpitation, syncope or ankle swelling.     Past Medical History:   Diagnosis Date    Arrhythmia     irregular heart beat    Arthritis     knees- osteo    Chronic pain     knees    Diabetes (HCC) 1990's    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[facility] [address] 10/21/2014    GERD (gastroesophageal reflux disease)     well controlled    Gout     High cholesterol     Hypertension 1990's    Irregular heart beat     palpitations    Morbid obesity ([address]     Osteoarthritis of right knee 1/18/2014    Other ill-defined conditions(799.89)     asbestosis    Other ill-defined conditions(799.89)     irregular heart beat    Other ill-defined conditions(799.89)     h/o migraines    Other ill-defined conditions(799.89)     gout    Other ill-defined conditions(799.89)     cholesterol    PUD (peptic ulcer disease) 1970's    Tinnitus of left ear      Past Surgical History:   Procedure Laterality Date    HX CARPAL TUNNEL RELEASE      HX KNEE ARTHROSCOPY  2010    left    HX KNEE ARTHROSCOPY  1980s    right    HX KNEE REPLACEMENT      HX ORTHOPAEDIC  1970s    right hand tendenitis    HX ORTHOPAEDIC  2012    left hand ring finger released    HX ORTHOPAEDIC  8-2013    left carpal    TOTAL KNEE ARTHROPLASTY  2009/2010    left     Current Facility-Administered Medications   Medication Dose Route Frequency    acetaminophen (TYLENOL) tablet 650 mg  650 mg Oral Q4H PRN    HYDROcodone-acetaminophen (NORCO) 5-325 mg per tablet 1 Tab  1 Tab Oral Q4H PRN    morphine injection 1 mg  1 mg IntraVENous Q4H PRN    naloxone (NARCAN) injection 0.4 mg  0.4 mg IntraVENous PRN    docusate sodium (COLACE) capsule 100 mg  100 mg Oral BID PRN    heparin (porcine) injection 5,000 Units  5,000 Units SubCUTAneous Q8H    acetaminophen (TYLENOL) tablet 650 mg  650 mg Oral Q4H PRN    vancomycin (VANCOCIN) 1500 mg in  ml infusion  1,500 mg IntraVENous Q24H    potassium chloride 10 mEq in 100 ml IVPB  10 mEq IntraVENous Q1H    pantoprazole (PROTONIX) tablet 40 mg  40 mg Oral DAILY    perflutren lipid microspheres (DEFINITY) in NS bolus IV  1 mL IntraVENous RAD ONCE    sodium chloride 0.9 % bolus infusion 1,000 mL  1,000 mL IntraVENous ONCE    levoFLOXacin (LEVAQUIN) 500 mg in D5W IVPB  500 mg IntraVENous Q24H    ELECTROLYTE REPLACEMENT PROTOCOL-POTASSIUM  1 Each Other PRN    ELECTROLYTE REPLACEMENT PROTOCOL-MAGNESIUM  1 Each Other PRN    folic acid (FOLVITE) tablet 1 mg  1 mg Oral DAILY    sodium bicarbonate (8.4%) 50 mEq in dextrose 5 % - 0.45% NaCl 1,000 mL infusion   IntraVENous CONTINUOUS    ELECTROLYTE REPLACEMENT PROTOCOL-POTASSIUM  1 Each Other PRN    ELECTROLYTE REPLACEMENT PROTOCOL-MAGNESIUM  1 Each Other PRN    ELECTROLYTE REPLACEMENT PROTOCOL-PHOSPHORUS  1 Each Other PRN    ELECTROLYTE REPLACEMENT PROTOCOL-CALCIUM  1 Each Other PRN    thiamine 100 mg in 50 mL NS   IntraVENous Q24H    sodium chloride (NS) flush 5-10 mL  5-10 mL IntraVENous PRN    aztreonam (AZACTAM) 2 g in sterile water (preservative free) 10 mL IV syringe  2 g IntraVENous Q8H    ondansetron (ZOFRAN ODT) tablet 4 mg  4 mg Oral Q4H PRN    insulin lispro (HUMALOG) injection   SubCUTAneous AC&HS    glucose chewable tablet 16 g  4 Tab Oral PRN    glucagon (GLUCAGEN) injection 1 mg  1 mg IntraMUSCular PRN    dextrose (D50W) injection syrg 12.5-25 g  25-50 mL IntraVENous PRN       Allergies and Intolerances: Allergies   Allergen Reactions    Bees [Hymenoptera Allergenic Extract] Anaphylaxis    Cocoa Butter-Zinc Oxide Rash    Dilaudid [Hydromorphone (Bulk)] Rash     capsules    Pcn [Penicillins] Rash       Family History:   No family history on file. Social History:   He  reports that he quit smoking about 42 years ago. He has never used smokeless tobacco.  He  reports that he drinks about 1.5 oz of alcohol per week       Review of Systems:   10 point review of system completed. Positive pertinent discussed in HPI and rest of system are negative. Physical:   Patient Vitals for the past 6 hrs:   Temp Pulse Resp BP SpO2   02/12/18 0854 - (!) 125 - (!) 122/100 -   02/12/18 0759 97.9 °F (36.6 °C) - - - -   02/12/18 0718 98.9 °F (37.2 °C) (!) 110 (!) 39 117/72 100 %         Exam:   General Appearance: Comfortable, not using accessory muscles of respiration. HEENT: TERRANCE. HEAD: Atraumatic  NECK: No JVD, no thyroidomeglay. LUNGS: Clear bilaterally. HEART: S1 irregular+S2 audible, no murmur, no pericardial rub. ABD: Non-tender, BS Audible    EXT: No edema, and no cysnosis. VASCULAR EXAM: Pulses are intact. PSYCHIATRIC EXAM: Mood is appropriate. MUSCULOSKELETAL: Grossly no joint deformity. NEUROLOGICAL: Motor and sensory sytem intact and Cranial nerves II-XII intact.     Review of Data:   LABS:   Lab Results   Component Value Date/Time    WBC 6.6 02/12/2018 04:42 AM    HGB 10.8 (L) 02/12/2018 04:42 AM    HCT 32.3 (L) 02/12/2018 04:42 AM    PLATELET 949 84/46/2757 04:42 AM     Lab Results   Component Value Date/Time    Sodium 136 02/12/2018 04:42 AM    Potassium 2.5 (LL) 02/12/2018 04:42 AM    Chloride 99 (L) 02/12/2018 04:42 AM    CO2 21 02/12/2018 04:42 AM    Glucose 257 (H) 02/12/2018 04:42 AM    BUN 32 (H) 02/12/2018 04:42 AM    Creatinine 2.73 (H) 02/12/2018 04:42 AM     Lab Results   Component Value Date/Time    Cholesterol, total 116 02/11/2018 08:45 PM    HDL Cholesterol 27 (L) 02/11/2018 08:45 PM    LDL, calculated 57.4 02/11/2018 08:45 PM    Triglyceride 158 (H) 02/11/2018 08:45 PM     No results found for: GPT  Lab Results   Component Value Date/Time    Hemoglobin A1c 5.8 (H) 02/11/2018 06:45 PM         Cardiology Procedures:   Results for orders placed or performed during the hospital encounter of 02/11/18   EKG, 12 LEAD, INITIAL   Result Value Ref Range    Ventricular Rate 120 BPM    Atrial Rate 120 BPM    P-R Interval 152 ms    QRS Duration 80 ms    Q-T Interval 376 ms    QTC Calculation (Bezet) 531 ms    Calculated R Axis -36 degrees    Calculated T Axis -89 degrees    Diagnosis       Poor data quality, interpretation may be adversely affected  Sinus tachycardia with premature atrial complexes  Left axis deviation  Poor R Wave Progression  ST & T wave abnormality, consider lateral ischemia  Abnormal ECG  Confirmed by Kelli Sanchez MD, Abbey Bennett ([id number]) on 2/11/2018 11:48:55 PM             Impression / Plan:    Patient Active Problem List   Diagnosis Code    Osteoarthritis of right knee M17.11    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HCC) T84.018A, Z96.659    Failed total knee arthroplasty (HCC) T84.018A, Z96.659    Dysphagia R13.10    Sepsis ([facility]) A41.9    Hypokalemia E87.6    Glucosuria R81    Acute kidney injury ([facility] [address] N17.9    Fall W19. XXXA    Back pain M54.9    Weakness of right lower extremity R29.898    Anemia D64.9       A/P  Afib with RVR  Echo urgent  Continue with loading dose of   Pt's CHADS-VASC score is 2, but with risk recurrent fall. Will anticoagulate in the hospital once CT scan of belly is negative for bleed    Will give addition dose of digoxin 0.25 mg,   Start Cardizem infusion. I will follow the patient  Discussed with pt, Dr. Charley Torres, RN.           Signed By: Jayesh Emanuel MD     February 12, 2018

## 2018-02-12 NOTE — ED NOTES
RN gave verbal report to Giles Wong RN from telemetry at the bedside in ED. RN reviewed patient presentation, treatment so far, and details r/t sepsis protocol including fluid resuscitation status, lactic acid time and results (and due time of repeat), and antibiotics due/given. Giles Wong then wheeled patient otu of ED with assistance from [id number]

## 2018-02-12 NOTE — PROGRESS NOTES
was called when it looked like patient would be intubated. Wife was crying and anxious. Their son Kathya Lemus  in ICU 7 last month (). Wife/Mom - \"It's just too much. We are still dealing with Khalif's death. \"  I took her to the family room while waiting for the procedures to be completed. I encouraged her to call family. \"All we have left is my 's family. Bernadette Colbert was our only child. My  has a sister and a few other family members around. \"  She was calling them as I left. She asked for prayers for the patient and herself. They attend [facility]. Chapo Pang is aware he is here. [address], M.Div.   Board Certified   [phone] - Office

## 2018-02-12 NOTE — PROGRESS NOTES
Occupational Therapy Evaluation/Treatment Attempt    Chart reviewed. Attempted Occupational Therapy Evaluation/Treatment, however, patient unable to be seen due to:  []  Nausea/vomiting  []  Eating  []  Pain  []  Patient too lethargic  [x]  Transferred to ICU d/t RRT d/t RR  []  Dialysis treatment in progress   []  Telemetry Results  []  Other:     Will f/u later as patient's schedule allows.    Thank you for this referral.  Olivia Acosta, OTR/L

## 2018-02-12 NOTE — PROGRESS NOTES
2942 RRT called by primary RN ALEXANDRA Blanchard RN, RT, Rachelle Santana and Dr Uli Akhtar at bedside. Verbal orders received to transfer the pt to ICU.    0750 Pt transferred down to ICU, pt belongings that included pt's clothing, reading glasses, Iphone and Iphone  brought downstairs and placed on his dresser. Trinity Munoz, RN, Marc Dasilva, ICU RN, Oj Jacobo ICU RN, Felecia Hall PCT were all notified where the pt belongs were, Rizwana Hicks RN and Johana Cooley RN witnessed as well.

## 2018-02-12 NOTE — PROGRESS NOTES
TRANSFER - IN REPORT:    Verbal report received from Lilibeth Alfredo R.N. on Savannah Foss  being received from Emergency Dept. for routine progression of care      Report consisted of patients Situation, Background, Assessment and   Recommendations(SBAR). Information from the following report(s) SBAR, Kardex, ED Summary, Intake/Output, MAR, Recent Results and Cardiac Rhythm Sinus Tach was reviewed with the receiving nurse. Opportunity for questions and clarification was provided. Assessment completed upon patients arrival to unit and care assumed. 0045: Assumed patient care, he was alert and oriented to person, place, time and situation. Patient was on 4L via nasal cannula. His temperature blood pressure, respirations and MEWs score were elevated, so this nurse called the Hospitalist and asked for a transfer order to ICU. The hospitalist did not want to transfer the patient at that time. After an initial assessment, this nurse asked for and received an order to place he patient on bipap. This nurse then called the Respiratory Therapy Supervisor to access the patient's respiratory status and make a recommendation to the hospitalist to transfer the patient to ICU. The patient complained of some nausea, but denied any vomiting dizziness or anxiety. White board was updated and explained. Bed was locked and in lowest position. Call bell, water and personal belongings were within reach. Patient had no questions, comments or concerns after bedside shift report. 0600: Stephanie ELLIS From the lab called and informed this nurse that the patient had a critically low potassium level and a critically high lactic acid level. This nurse called the hopitalist who ordered six runs of IVPB potassium. 0700: Patient's status did not improve while on bipap, so this nurse called the Nursing Supervisor and expressed his concerns. A  R.R. T. was called and the patient was transferred via stretcher to the ICU.      02/14/18 7292: Late entry: Entered patient's chart to fix an error on the date of service for the progress note from 02/11/18 to 02/12/18.

## 2018-02-12 NOTE — PROGRESS NOTES
Patient intubated x 1 attempt with glidescope by Dr Shimon Holly with a size 7.5 ETT that has been secured at 26 lip line in center. BS equal and End tidal Co2  Had color change. Patient placed on ventilator with settings noted.  Ambu bag and mask at bedside

## 2018-02-12 NOTE — PROCEDURES
Glo Meyer M.D  [address] [facility] [address] [facility] [address]  Phone [phone]   Fax [phone]    Pulmonary Critical Care & Sleep Medicine         Name: Renae Felix MRN: [id number]   : 1955 Hospital: [facility]   Date: 2018        Intubation Note    Procedure: elective/emergent intubation    Indication: respiratory insufficiency    Anesthesia- Rapid sequence:  Fentanyl 50mcg(1.5mcg/kg), versed 4 mg   Paralysis: Succinylcholine 50 mg(1mg/kg)    After assessing the airway, the patient underwent preoxygenation with 100% FiO2 for 5 min. Fentanyl was then given and after 3 min, etomidate and succnylcholine was given sequentially in rapid IV push. The Sellick maneuver was performed throughout the entire sequence. After adequate sedation and paralisys emergent intubation was performed. A 3.5/4. 0MAC, 3.0/4.0 Madison blade  laryngoscope was used to visualize the epiglottis and vocal chords. After positive identification of the vocal cords, .7.5 ET tube was placed into the trachea with direct visualization. The tube was seen passing through the vocal chords without / with some difficulty. CO2 colorimetry was employed immediately to verify tube in airway with /without appropriate color change indicating detection/lack of CO2. Water vapor was seen within the ET tube, and auscultation of the abdomen revealed no bubbling souds. Auscultation  and inspection of the chest after intubation showed symmetric chest excursion and symmetric air entry bilaterally. Chest X-ray has been ordered and is pending. The patient has been placed on a mechanical ventilator. There were no complications.     Mendel Hoff, MD

## 2018-02-12 NOTE — PROGRESS NOTES
Hospitalist Progress Note    Patient: Radha Brady MRN: [id number]  CSN: [id number]    YOB: 1955  Age: 57 y.o. Sex: male    DOA: 2/11/2018 LOS:  LOS: 0 days                Assessment/Plan     Patient Active Problem List   Diagnosis Code    Osteoarthritis of right knee M17.11    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[facility] [address] T84.018A, Z96.659    Failed total knee arthroplasty ([facility] [address] T84.018A, Z96.659    Dysphagia R13.10    Sepsis ([facility] [address] A41.9    Hypokalemia E87.6    Glucosuria R81    Acute kidney injury ([facility] [address] N17.9    Fall W19. XXXA    Back pain M54.9    Weakness of right lower extremity R29.898    Anemia D64.9            RRT called on this patient for respiratory distress, high mews score of 8, fever of 103, tachycardia, resp rate of 44. Patient is awake and answers to questions. He was on BiPAP. CRITICAL CARE PLAN    Resp -   Acute respiratory distress - will stop BiPAP, his ABG reviewed, respiratory alkalosis. Stop BiPAP. Place on oxygen by NC. CXR reviewed, history of pneumonia about one month ago. Called and discussed with Dr. Jacqueline Gooden, recommend morphine. ID -   Sepsis unclear source of infection. Recent pneumonia. Follow up blood  cx. ANTIBIOTICS vancomycin and zosyn. Trend temps, LA. Wbc in normal range    CVS - Monitor HD. Heme/onc - Follow H&H, plts. Renal -   AMANDA - follow urine sodium  Trend BUN, Cr, follow I/O. Check and replace Mg, K, phos. Endocrine -  Follow FSG    Neuro/ Pain/ Sedation -   RLE weakenss/generalized weakness. Head CT negative  Follow MRI    GI - diabetic diet. Prophylaxis - DVT: heparin, GI: protonix. 40 minutes of critical care time spent in the direct evaluation and treatment of this high risk patient.  The reason for providing this level of medical care for this critically ill patient was due a critical illness that impaired one or more vital organ systems such that there was a high probability of imminent or life threatening deterioration in the patients condition. This care involved high complexity decision making to assess, manipulate, and support vital system functions, to treat this degreee vital organ system failure and to prevent further life threatening deterioration of the patients condition. Disposition : TBD    Review of systems  General: No fevers or chills. Cardiovascular: No chest pain or pressure. No palpitations. Pulmonary: see above. Gastrointestinal: No nausea, vomiting. Physical Exam:  General: Awake, cooperative, no acute distress    HEENT: NC, Atraumatic. PERRLA, anicteric sclerae. Lungs: Coarse rhonchi bilaterally. Heart:  Regular  rhythm,  No murmur, No Rubs, No Gallops  Abdomen: Soft, Non distended, Non tender.  +Bowel sounds,   Extremities: No c/c/e  Psych:   Not anxious or agitated. Neurologic:  No acute neurological deficit. Vital signs/Intake and Output:  Visit Vitals    /72    Pulse (!) 110    Temp 98.9 °F (37.2 °C)    Resp (!) 39    Ht 6' (1.829 m)    Wt 138.3 kg (305 lb)    SpO2 100%    BMI 41.37 kg/m2     Current Shift:     Last three shifts:  02/10 1901 - 02/12 0700  In: 240 [P.O.:240]  Out: 0             Labs: Results:       Chemistry Recent Labs      02/12/18 0442  02/11/18 2045   GLU  257*  272*   NA  136  134*   K  2.5*  2.6*   CL  99*  97*   CO2  21  19*   BUN  32*  29*   CREA  2.73*  2.50*   CA  9.0  9.4   AGAP  16  18   BUCR  12  12   AP  46  57   TP  6.8  7.1   ALB  2.7*  3.0*   GLOB  4.1*  4.1*   AGRAT  0.7*  0.7*      CBC w/Diff Recent Labs      02/12/18 0442  02/11/18   1845   WBC  6.6  5.9   RBC  3.51*  3.51*   HGB  10.8*  11.2*   HCT  32.3*  32.9*   PLT  149  191   GRANS   --   81*   LYMPH   --   9*   EOS   --   0      Cardiac Enzymes Recent Labs      02/11/18 2045   CPK  2677*      Coagulation No results for input(s): PTP, INR, APTT in the last 72 hours.     No lab exists for component: INREXT    Lipid Panel Lab Results   Component Value Date/Time    Cholesterol, total 116 02/11/2018 08:45 PM    HDL Cholesterol 27 (L) 02/11/2018 08:45 PM    LDL, calculated 57.4 02/11/2018 08:45 PM    VLDL, calculated 31.6 02/11/2018 08:45 PM    Triglyceride 158 (H) 02/11/2018 08:45 PM    CHOL/HDL Ratio 4.3 02/11/2018 08:45 PM      BNP No results for input(s): BNPP in the last 72 hours.    Liver Enzymes Recent Labs      02/12/18   0442   TP  6.8   ALB  2.7*   AP  46   SGOT  133*      Thyroid Studies No results found for: T4, T3U, TSH, TSHEXT     Procedures/imaging: see electronic medical records for all procedures/Xrays and details which were not copied into this note but were reviewed prior to creation of Plan

## 2018-02-12 NOTE — PROGRESS NOTES
0710  Arrived on floor and assessed pt, still very uncomfortable with breathing, RR high 30's, O2 sat 97% on BIPAP with RA. Pt diaphoretic, but able to answer questions appropriately. 0755  Pt transferred to ICU with no incident, report given to receiving RT.

## 2018-02-12 NOTE — PROGRESS NOTES
Patients blood pressure dropped on propofol  CXR reviewed ET tube looks ok about 1 -2 cm above lorena  NG tube in good place  Stop propofol and start vesed drip  Hold metoprolol and cardizem  IF bp continue to drop start phenylephrine drip <due to afib>  If HR continue to remain elevated consider digoxin      CHARLY Rocha.  Alyson Martinez [address]  Phone [phone]   Fax [phone]  Pulmonary Critical Care & Sleep Medicine

## 2018-02-12 NOTE — PROGRESS NOTES
PT orders received and chart reviewed. Per IDR Rounds, HOLD OT/PT for this date as pt is Afib and unsure why (still being worked up).  Pt scheduled to have multiple radiological tests. (per Dr Shimon Holly)    Estanislao Severin PT, DPT

## 2018-02-12 NOTE — PROGRESS NOTES
800: Dr. Brandie Silver called, asked to remove BIPAP d/t ABG resp alk. 2 mg morphine ordered, asked pt is allergic pt stated no. Placed on 4L NC    Pt HR elevated, multiple digoxin boluses given per order. Cardizem started and titrated to 15 per order. 's with Cardizem at max rate, 5 metoprolol given. See MAR for medication times. Orders received from Dr. Mcgarry Seen Dr. Nathalie Jay.     2916: CT read per radiology, Spoke with Dr. Nathalie Jay, ok to start pt on heparin per Cardiology. Dr. Fidel Aase called, ASA, metoprolol, and heparin gtt ordered. 1500: Increased WOB and SOB noted, 's, Morphine and metoprolol given with little relief. Dr. Nathalie Jay called, stated to set up for intubation and Central line placement. Wife came to ICU, updated on Pt status and POC. 1530: Wife and Dr. Nathalie Jay by the bedside, all questions answered. 1545: Pt intubated    1600: Central line placed left groin    1700: Pt hypotensive with propofol, versed gtt ordered to be started post fluid bolus. Dr. Nathalie Jay stated start Heparin gtt at 1800.    1800: Pt tachycardic, fluid bolus going, digoxin ordered. Spoke with Dr. Mcgarry Seen about hypotension and elevated HR. Will monitor after dig and possibly start Amio. 1825: BP still low, Jeffry gtt ordered. 1900: 's, Dr. Mcgarry Seen paged. Orders for Amio gtt placed. Dr. Brandie Silver by bedside, aware of lactic acid. Report given to Indigo Whitmore RN, All questions answered.

## 2018-02-12 NOTE — DIABETES MGMT
GLYCEMIC CONTROL & NUTRITION:    - Discussed in rounds, known h/o DM, on dual oral therapy at home, current A1C 5.8%  - POCT + Humalog orders in place, recommend continue  - BG continues >200 mg/dl despite corrective coverage  - recommend initiate conservative basal insulin dose of Lantus 10 units every day    Recent Glucose Results:   Lab Results   Component Value Date/Time     (H) 02/12/2018 04:42 AM     (H) 02/11/2018 08:45 PM    GLUCPOC 228 (H) 02/12/2018 11:06 AM    GLUCPOC 267 (H) 02/12/2018 07:25 AM    GLUCPOC 266 (H) 02/12/2018 01:29 AM     Lab Results   Component Value Date/Time    Hemoglobin A1c 5.8 (H) 02/11/2018 06:45 PM               Stefano Anderson, MPH, RD, CDE

## 2018-02-12 NOTE — PROGRESS NOTES
conducted a Follow up consultation and Spiritual Assessment for Mohit Longo, who is a 57 y.o.,male. Patient attends [employer].  At his request I called [employer] [facility] to put him on the prayer list.     The  provided the following Interventions:  Continued the relationship of care and support. Listened empathically. Offered prayer and assurance of continued prayer on patients behalf. Chart reviewed. The following outcomes were achieved:  Patient expressed gratitude for Spiritual Care visit. Patient was reviewed in ICU Interdisciplinary Rounds. Assessment:  There are no further spiritual or Jehovah's witness issues which require Spiritual Care Services intervention at this time. Plan:  Chaplains will continue to follow and will provide pastoral care as needed or requested.  recommends bedside caregivers page the  on duty if patient shows signs of acute spiritual or emotional distress. [address] [place], [place].    Board Certified   [phone] - Office

## 2018-02-12 NOTE — PROCEDURES
Kanchan Castro M.D. Pulmonary Critical Care & Sleep Medicine   [address] [place] [address], [address] [place] [address],[facility] 1  [phone]      [facility] CNRV Procedure Note      Indication: Inadequate venous access    Risks, benefits, alternatives explained and consent obtained. All risks including pneumothorax bleeding and respiratory failure explained. All questions answered. Central line Bundle:    Full sterile barrier precautions used. 7-Step Sterility Protocol followed. (cap, mask sterile gown, sterile gloves, large sterile sheet, hand hygiene, 2% chlorhexidine for cutaneous antisepsis)  5 mL 1% Lidocaine placed at insertion site. US guided Femoral line Left cannulated x 1 attempt(s) utilizing the modified Seldinger technique. mild  Good blood return. Catheter secured & Biopatch applied. Sterile Tegaderm placed. Patient tolerated procedure well. No complication noted.       Angela Boss MD   Pulmonary Critical Care & Sleep Medicine   4:57 PM Patient Specific Counseling: The patient is at [facility].  May continue to work without restrictions Detail Level: Simple

## 2018-02-12 NOTE — PROGRESS NOTES
Interdisciplinary Rounds (IDR) / REHAB:    Per IDR Rounds, HOLD OT/PT for this date as pt is Afib and unsure why (still being worked up).  Pt scheduled to have multiple radiological tests. (per Dr Matias Chao)    Pam Villafana [facility] Route, OTR/L

## 2018-02-12 NOTE — PROGRESS NOTES
0130: pt diaphoretic and S. O.B  RR 45 and increased work of breathing did blood gas and placed Pt. On bi-pap 16/5 21%    0500: will try to come off of bI-pap   0520: pt diaphoretic and SOB. RR 45 and increased work of breathing placed back on bi-pap will continue to monitor.

## 2018-02-12 NOTE — PROGRESS NOTES
58 y/o male patient admitted to ED meeting SIRs criteria started on ABX for Sepsis. Pt also receiving Levaquin 750mg and Aztreonam 2gms IV. Pt Ht = 72\", Wt = 138.3 kg. BUN  19  SCr    2.5  CrCl   43  Temp   102.1  WBC 5.9  Lactic Acid 2.1    Vancomycin ordered at 1 gram load followed by 1.5 grams q24hrs. Trough to be checked prior to 4th dose.

## 2018-02-12 NOTE — ED PROVIDER NOTES
EMERGENCY DEPARTMENT HISTORY AND PHYSICAL EXAM    Date: 2/11/2018  Patient Name: Zohreh Massey    History of Presenting Illness     Chief Complaint   Patient presents with    Fever    Fall         History Provided By: Patient and EMS    Chief Complaint: Fevers, weakness, recurrent falls  Duration: one day  Timing:  Gradual  Location: no specific location  Quality: fatigue  Severity: unable to define  Modifying Factors: none  Associated Symptoms: denies any other associated signs or symptoms    Additional History (Context):   8:08 PM  Zohreh Massey is a 57 y.o. male with PMHX gout, GERD, high cholesterol who presents via EMS to the emergency department C/O fevers, weakness and repeated falls when getting up to walk , onset roughly 12 hours ago. No symptoms targeting lung by cough or SOB, no N/V/D, no previous stroke, GBS or neurologic weakness but he does use a cane due to his chronic knee problems. Pt denies urinary complaints, back pain, headache, or any other contributing sxs or complaints.      PCP: Shazia Cary MD    Current Facility-Administered Medications   Medication Dose Route Frequency Provider Last Rate Last Dose    potassium chloride 10 mEq in 100 ml IVPB  10 mEq IntraVENous Q1H Lorrie Freeman  mL/hr at 02/18/18 1231 10 mEq at 02/18/18 1231    perflutren lipid microspheres (DEFINITY) in NS bolus IV  1 mL IntraVENous RAD ONCE Solitario Kwan MD        ipratropium (ATROVENT) 0.02 % nebulizer solution 0.5 mg  0.5 mg Nebulization Q6H RT Lorrie Freeman MD        metoprolol (LOPRESSOR) injection 5 mg  5 mg IntraVENous Q6H Lorrie Steele MD   5 mg at 02/18/18 1123    insulin lispro (HUMALOG) injection 5 Units  5 Units SubCUTAneous Q6H Lorrie Freeman MD        ELECTROLYTE REPLACEMENT PROTOCOL -- Potassium  1 Each Other PRN Lorrie Freeman MD        ELECTROLYTE REPLACEMENT PROTOCOL -- Magnesium  1 Each Other PRN Lorrie Freeman MD        ELECTROLYTE REPLACEMENT PROTOCOL -- Phosphorus  1 Each Other PRN Victor Manuel Jose MD        ELECTROLYTE REPLACEMENT PROTOCOL -- Calcium  1 Each Other PRN Lorrie Oconnor MD        propofol (DIPRIVAN) infusion  0-50 mcg/kg/min IntraVENous TITRATE Lorrie Oconnor MD 35.8 mL/hr at 02/18/18 0921 40 mcg/kg/min at 02/18/18 0921    fentaNYL (PF)  mcg/30 ml   mcg/hr IntraVENous TITRATE Lorrie Oconnor MD        chlorhexidine (PERIDEX) 0.12 % mouthwash 10 mL  10 mL Oral Q12H Lorrie Oconnor MD   10 mL at 02/18/18 0113    amiodarone (CORDARONE) 450 mg in dextrose 5% 250 ml infusion  0.5-1 mg/min IntraVENous TITRATE Solitario Kwan MD 16.7 mL/hr at 02/18/18 1233 0.5 mg/min at 02/18/18 1233    vancomycin (VANCOCIN) 1500 mg in  ml infusion  1,500 mg IntraVENous Q24H Lorrie Jay  mL/hr at 02/18/18 0634 1,500 mg at 02/18/18 0634    acetaminophen (TYLENOL) suppository 650 mg  650 mg Rectal Q6H PRN Justin Catalan MD   650 mg at 02/16/18 2055    hydrALAZINE (APRESOLINE) 20 mg/mL injection 10 mg  10 mg IntraVENous Q6H PRN Justin Catalan MD   10 mg at 02/17/18 0920    0.9% sodium chloride infusion 250 mL  250 mL IntraVENous PRN Lorrie Oconnor MD        insulin glargine (LANTUS) injection 15 Units  15 Units SubCUTAneous DAILY WITH LUNCH Lorrie Oconnor MD   15 Units at 02/18/18 1227    heparin (porcine) 1,000 unit/mL injection 1,500 Units  1,500 Units IntraVENous PRN Lorrie Oconnor MD        acetaminophen (TYLENOL) solution 650 mg  650 mg Oral Q4H PRN Aiden Wilkerson MD   650 mg at 02/18/18 0836    aspirin chewable tablet 81 mg  81 mg Oral DAILY Lorrie Oconnor MD   81 mg at 02/18/18 0836    pantoprazole (PROTONIX) 40 mg in sodium chloride 0.9 % 10 mL injection  40 mg IntraVENous DAILY Lorrie Oconnor MD   40 mg at 02/18/18 0900    naloxone (NARCAN) injection 0.4 mg  0.4 mg IntraVENous PRN Aiden Wilkerson MD        docusate sodium (COLACE) capsule 100 mg  100 mg Oral BID PRN Aiden Wilkerson MD        acetaminophen (TYLENOL) tablet 650 mg  650 mg Oral Q4H PRN Aiden Wilkerson MD   650 mg at 13/65/03 5825    folic acid (FOLVITE) tablet 1 mg  1 mg Oral DAILY Lorrie Luis MD   1 mg at 02/18/18 5769    thiamine 100 mg in 50 mL NS   IntraVENous Q24H Lorrie Luis  mL/hr at 02/18/18 1124      Vancomycin- Rx to Dose & Monitor  1 Each Other Rx Dosing/Monitoring Lorrie Luis MD        heparin 25,000 units in D5W 250 ml infusion  7-25 Units/kg/hr IntraVENous TITRATE Esmer Mayo MD 34.6 mL/hr at 02/18/18 1123 25 Units/kg/hr at 02/18/18 1123    insulin lispro (HUMALOG) injection   SubCUTAneous Q6H Magdalene Monterroso MD   2 Units at 02/18/18 1228    sodium chloride (NS) flush 5-10 mL  5-10 mL IntraVENous PRN Desire Sandra MD        ondansetron (ZOFRAN ODT) tablet 4 mg  4 mg Oral Q4H PRN Magdalene Monterroso MD   4 mg at 02/12/18 0007    glucose chewable tablet 16 g  4 Tab Oral PRN Magdalene Monterroso MD        glucagon (GLUCAGEN) injection 1 mg  1 mg IntraMUSCular PRN Magdalene Monterroso MD        dextrose (D50W) injection syrg 12.5-25 g  25-50 mL IntraVENous PRN Magdalene Monterroso MD           Past History     Past Medical History:  Past Medical History:   Diagnosis Date    Arrhythmia     irregular heart beat    Arthritis     knees- osteo    Chronic pain     knees    Diabetes ([facility] [address] 1990's    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[facility] [address] 10/21/2014    GERD (gastroesophageal reflux disease)     well controlled    Gout     High cholesterol     Hypertension 1990's    Irregular heart beat     palpitations    Morbid obesity ([address]     Osteoarthritis of right knee 1/18/2014    Other ill-defined conditions(799.89)     asbestosis    Other ill-defined conditions(799.89)     irregular heart beat    Other ill-defined conditions(799.89)     h/o migraines    Other ill-defined conditions(799.89)     gout    Other ill-defined conditions(799.89)     cholesterol    PUD (peptic ulcer disease) 1970's    Tinnitus of left ear        Past Surgical History:  Past Surgical History:   Procedure Laterality Date    HX CARPAL TUNNEL RELEASE  HX KNEE ARTHROSCOPY  2010    left    HX KNEE ARTHROSCOPY  1980s    right    HX KNEE REPLACEMENT      HX ORTHOPAEDIC  1970s    right hand tendenitis    HX ORTHOPAEDIC  2012    left hand ring finger released    HX ORTHOPAEDIC  8-2013    left carpal    TOTAL KNEE ARTHROPLASTY  2009/2010    left       Family History:  No family history on file. Social History:  Social History   Substance Use Topics    Smoking status: Former Smoker     Quit date: 10/1/1975    Smokeless tobacco: Never Used      Comment: 1970s    Alcohol use 1.5 oz/week     3 Shots of liquor per week       Allergies: Allergies   Allergen Reactions    Bees [Hymenoptera Allergenic Extract] Anaphylaxis    Cocoa Butter-Zinc Oxide Rash    Dilaudid [Hydromorphone (Bulk)] Rash     capsules    Pcn [Penicillins] Rash         Review of Systems   Review of Systems   Constitutional: Positive for activity change and fever. Negative for chills, diaphoresis and unexpected weight change. HENT: Negative for congestion, drooling, ear pain, rhinorrhea, sore throat, tinnitus and trouble swallowing. Eyes: Negative for photophobia, pain, redness and visual disturbance. Respiratory: Negative for cough, choking, chest tightness, shortness of breath, wheezing and stridor. Cardiovascular: Negative for chest pain, palpitations and leg swelling. Gastrointestinal: Negative for abdominal distention, abdominal pain, anal bleeding, blood in stool, constipation, diarrhea, nausea and vomiting. Endocrine: Negative for cold intolerance and heat intolerance. Genitourinary: Negative for difficulty urinating, dysuria, flank pain, frequency, hematuria and urgency. Musculoskeletal: Positive for gait problem. Negative for arthralgias, back pain and neck pain. Skin: Negative for color change, pallor, rash and wound. Very warm to touch   Neurological: Negative for dizziness, seizures, syncope, speech difficulty, light-headedness and headaches. Hematological: Does not bruise/bleed easily. Psychiatric/Behavioral: Negative for agitation, behavioral problems, hallucinations, self-injury and suicidal ideas. The patient is not hyperactive. All other systems reviewed and are negative. Physical Exam     Vitals:    02/18/18 0845 02/18/18 0900 02/18/18 1119 02/18/18 1200   BP: 162/67 166/59     Pulse: 100 (!) 103 89    Resp: (!) 34 (!) 37 (!) 37    Temp:    98.8 °F (37.1 °C)   SpO2: 93% 93% 98%    Weight:       Height:         Physical Exam   Constitutional: He is oriented to person, place, and time. No distress. Obese, fatigued appearing   HENT:   Head: Normocephalic and atraumatic. Right Ear: External ear normal.   Left Ear: External ear normal.   Mouth/Throat: Oropharynx is clear and moist. No oropharyngeal exudate. Eyes: Conjunctivae and EOM are normal. Pupils are equal, round, and reactive to light. Right eye exhibits no discharge. Left eye exhibits no discharge. No scleral icterus. No pallor   Neck: Normal range of motion. Neck supple. No JVD present. No tracheal deviation present. No thyromegaly present. Cardiovascular: Regular rhythm, S1 normal, S2 normal, normal heart sounds and intact distal pulses. Tachycardia present. Pulmonary/Chest: Effort normal. No stridor. Tachypnea noted. No respiratory distress. He has decreased breath sounds in the right lower field and the left lower field. He has rales in the right lower field and the left lower field. Abdominal: Soft. Bowel sounds are normal. He exhibits no distension. There is no hepatosplenomegaly. There is no tenderness. There is no rebound, no guarding and no CVA tenderness. Genitourinary:   Genitourinary Comments: Normal perineum   Musculoskeletal: Normal range of motion. He exhibits no edema or tenderness. No soft tissue injuries   Lymphadenopathy:     He has no cervical adenopathy. Right: No inguinal adenopathy present. Left: No inguinal adenopathy present. Neurological: He is alert and oriented to person, place, and time. He displays no atrophy. No cranial nerve deficit or sensory deficit. He displays no seizure activity. Coordination normal. GCS eye subscore is 4. GCS verbal subscore is 5. GCS motor subscore is 6. Reflex Scores:       Patellar reflexes are 1+ on the right side and 1+ on the left side. Skin: Skin is warm. No rash noted. He is diaphoretic. No cyanosis or erythema. Nails show no clubbing. Psychiatric: He has a normal mood and affect. His behavior is normal. Judgment and thought content normal. Cognition and memory are not impaired. He is noncommunicative. Nursing note and vitals reviewed. Diagnostic Study Results     Labs -    Radiologic Studies -      Port CXR - no clear infiltrate or pulmonary edema    Medical Decision Making   I am the first provider for this patient. I reviewed the vital signs, available nursing notes, past medical history, past surgical history, family history and social history. Vital Signs-Reviewed the patient's vital signs. Pulse Oximetry Analysis - 99% on RA     Records Reviewed: Nursing Notes    Provider Notes (Medical Decision Making):   He has findings of sepsis without a source, sepsis fluids given for Ideal Body Weight, broad spectrum abx started, electrolyte replacement begun as well as dilution of elevated CPK. No findings of PNA, UTI, or meningitis, infection just as likely to viral as well as bacterial, correct dehydration and follow for possible myocarditis    Procedures    ED Course:     Initial assessment performed. The patients presenting problems have been discussed, and they are in agreement with the care plan formulated and outlined with them. I have encouraged them to ask questions as they arise throughout their visit. Diagnosis and Disposition         CLINICAL IMPRESSION:  1. Sepsis  2. Dehydration  3. Fever adult   4. Hypokalemia  5.  Elevated CPK (early Rhabdomyolysis)      PLAN:  1.  Admit to medicine service, treat for presumptive sepsis without a source, doubt meningitis    On admission  Sepsis is present, UTI is not present, thrombosis is not present, DVT is not present, pneumonia is not present    Large volume of patients and extra physician training no additional scribe available  Conrado Handy MD

## 2018-02-13 PROBLEM — J96.00 ACUTE RESPIRATORY FAILURE (HCC): Status: ACTIVE | Noted: 2018-01-01

## 2018-02-13 PROBLEM — R57.9 SHOCK (HCC): Status: ACTIVE | Noted: 2018-01-01

## 2018-02-13 NOTE — PROGRESS NOTES
Occupational Therapy Evaluation/Treatment Attempt    Chart reviewed. Attempted Occupational Therapy Evaluation/Treatment, however, patient unable to be seen due to:  []  Nausea/vomiting  []  Eating  []  Pain  []  Patient too lethargic  []  Off Unit for testing/procedure  []  Dialysis treatment in progress   []  Telemetry Results  [x]  Other: Pt ventilated but arousable and following commands. Per IDR/Dr. Rudolph Wilcox, hold OT/PT this date. Will attempt on next date and if still not appropriate, will sign off and need new orders when medically stable to begin mobility and ADLs. Will f/u later as patient's schedule allows.    Thank you for this referral.  Olivia Acosta, OTR/L

## 2018-02-13 NOTE — PROGRESS NOTES
INITIAL NUTRITION ASSESSMENT     RECOMMENDATIONS/PLAN:     Recc Nepro @ 40ml/hr to provide 1600kcal 72g PRO, 644ml H20, 148g CHO, 85g Fat  Recc Prosource 5x/day to provide 300kcal 75g PRO  Total: 1900 147g PRO, 644ml H20, 148g CHO, 85g Fat  Recc starting @ 10ml/hr an increasing by 5ml/hr Q4 until goal rate met  Will defer Free H20 to MD  Monitor labs/lytes, tube feed tolerance, skin integrity, wt, fluid status, BM  REASON FOR ASSESSMENT:       [x] ICU admission  NUTRITION ASSESSMENT:   Client History: 57 yrs old Male admitted with sepsis, hypokalemia, AMANDA, fall PTA, weakness of right lower extremeity, anemia. TERRY / [facility] was done a pt converted from a-fib to NSR per cardiology. Pt is intubated in ICU.      PMHx: arrhythmia, arthritis, chronic knee pain, DM, failure of total knee arthroplasty, GERD, gout, high cholesterol, HTN, irregular heart beat, morbid obesity, osteoarthritis of right knee, asbestosis, irregular heart beat, h/o migraines, PUD, tinnitus of left ear   Cultural/Scientology Food Preferences: None Identified    FOOD/NUTRITION HISTORY  Diet History: unable to obtain pt intubated in ICU  Food Allergies:      [x] Yes (cocoa butter-zinc oxide)   Pertinent PTA Medications: selenium sulfide, nephro nico rx, theragran, fish oil,  Metformin,     NUTRITION INTAKE   Diet Order:  NPO     Average PO Intake:       Patient Vitals for the past 100 hrs:   % Diet Eaten   02/12/18 0048 100 %      Pertinent Medications:  [x] Reviewed; folic acid, heparin, protonix, propofol, thiamine   Electrolyte Replacement Protocol: [x]K  [x]Mg  [x]PO4    Insulin:  [x] SSI  [] Pre-meal   []  Basal   [] Drip  [] None  Pt expected to meet estimated nutrient needs through next review:          []  Yes     [x] No; NPO does not meet estimated needs ANTHROPOMETRICS  Height: 6' (182.9 cm)       Weight: 138.9 kg (306 lb 3.5 oz)    BMI: 41.5 kg/m^2  -  morbidly obese (Greater than or = to 40% BMI)        Weight change: no recent wt hx in chart; pt is intubated in ICU                                 Comparison to Reference Standards:  IBW: 178 lbs      %IBW:172%      AdjBW: 95.5 kg    NUTRITION-FOCUSED PHYSICAL ASSESSMENT  Skin: No pressure injury noted. GI: No BM noted    BIOCHEMICAL DATA & MEDICAL TESTS  Pertinent Labs:  [x] Reviewed; K-3.2     NUTRITION PRESCRIPTION  Calories: 2013-0732 kcal/day based on 11-14kcal/kg  Protein: 162 g/day based on 2.0 g/kg of IBW  CHO: 191-243 g/day based on 50% of total energy  Fluid: 1038-0022 ml/day based on 1 kcal/ml      NUTRITION DIAGNOSES:   1. Inadequate oral intake related to intubation as evidence by NPO diet order and need for nutrition support. NUTRITION INTERVENTIONS:   INTERVENTIONS:        GOALS:  1. EN 40ml/hr to provide 1600kcal 72g PRO, 644ml H20, 148g CHO, 85g Fat  W/prosource 5x/day 1. Start tube feeds by next review 3 days     LEARNING NEEDS (Diet, Supplementation, Food/Nutrient-Drug Interaction):   [x] None Identified  [] Inpatient education provided/documented    [] Identified and patient:  [] Declined     [] Was not appropriate/indicated  NUTRITION MONITORING /EVALUATION:   Adjust EN/PN as appropriate  Monitor wt  Monitor renal labs, electrolytes, fluid status    [] Participated in Interdisciplinary Rounds  [x] [facility] Reviewed/Documented  DISCHARGE NUTRITION RECOMMENDATIONS ADDRESSED:      [x] To be determined closer to discharge    NUTRITION RISK:     [x]  At risk                     []  Not currently at risk     Will follow-up per policy.   Ankit Woodard, [facility]

## 2018-02-13 NOTE — PROGRESS NOTES
Cardiology Progress Note        Patient: Marco Antonio Ravi        Sex: male          DOA: 2/11/2018  YOB: 1955      Age:  57 y.o.        LOS:  LOS: 1 day   Assessment/Plan     Principal Problem:    Sepsis ([facility] [address] (2/11/2018)    Active Problems:    Osteoarthritis of right knee (1/18/2014)      Hypokalemia (2/11/2018)      Glucosuria (2/11/2018)      Acute kidney injury ([address] (2/11/2018)      Fall (2/11/2018)      Back pain (2/11/2018)      Weakness of right lower extremity (2/11/2018)      Anemia (2/11/2018)        Plan:  Afib/AFL on amiodarone and bony synephrine  Plan TERRY for LA clot/vegetation and may also consider DCCV   continua with current supportive treatment. Discussed with Dr. Lily Conner. Possible sepsis/DT/Afib/AFL/renal failure  Discussed with Birdie Jeffers, pt's wife. R/B/A discussed with patient and she consented for TERRY possible DCCV. No Digoxin today until level is checked                Subjective:    cc:  intuabted      REVIEW OF SYSTEMS: unable to obtained  Objective:      Visit Vitals    BP 95/56    Pulse (!) 133    Temp (!) 102.5 °F (39.2 °C)    Resp 25    Ht 6' (1.829 m)    Wt 138.9 kg (306 lb 3.5 oz)    SpO2 97%    BMI 41.53 kg/m2     Body mass index is 41.53 kg/(m^2). Physical Exam:  General Appearance: Comfortable, intubated  HEENT: TERRANCE. HEAD: Atraumatic  NECK: No JVD, no thyroidomeglay. LUNGS: Clear bilaterally. HEART: S1 variable+S2 audible, no murmur, no pericardial rub. ABD: Non-tender, BS Audible    EXT: No edema, and no cysnosis. VASCULAR EXAM: Pulses are intact. PSYCHIATRIC EXAM: Mood is appropriate. MUSCULOSKELETAL: Grossly no joint deformity. NEUROLOGICAL: No motor and sensory deficit, Cranial nerves II-XII intact.   Medication:  Current Facility-Administered Medications   Medication Dose Route Frequency    acetaminophen (TYLENOL) solution 650 mg  650 mg Oral Q4H PRN    potassium chloride 10 mEq in 100 ml IVPB  10 mEq IntraVENous Q1H    aspirin chewable tablet 81 mg  81 mg Oral DAILY    pantoprazole (PROTONIX) 40 mg in sodium chloride 0.9 % 10 mL injection  40 mg IntraVENous DAILY    HYDROcodone-acetaminophen (NORCO) 5-325 mg per tablet 1 Tab  1 Tab Oral Q4H PRN    morphine injection 1 mg  1 mg IntraVENous Q4H PRN    naloxone (NARCAN) injection 0.4 mg  0.4 mg IntraVENous PRN    docusate sodium (COLACE) capsule 100 mg  100 mg Oral BID PRN    acetaminophen (TYLENOL) tablet 650 mg  650 mg Oral Q4H PRN    vancomycin (VANCOCIN) 1500 mg in  ml infusion  1,500 mg IntraVENous Q24H    levoFLOXacin (LEVAQUIN) 500 mg in D5W IVPB  500 mg IntraVENous Q24H    ELECTROLYTE REPLACEMENT PROTOCOL-MAGNESIUM  1 Each Other PRN    folic acid (FOLVITE) tablet 1 mg  1 mg Oral DAILY    sodium bicarbonate (8.4%) 50 mEq in dextrose 5 % - 0.45% NaCl 1,000 mL infusion   IntraVENous CONTINUOUS    ELECTROLYTE REPLACEMENT PROTOCOL-MAGNESIUM  1 Each Other PRN    ELECTROLYTE REPLACEMENT PROTOCOL-PHOSPHORUS  1 Each Other PRN    ELECTROLYTE REPLACEMENT PROTOCOL-CALCIUM  1 Each Other PRN    thiamine 100 mg in 50 mL NS   IntraVENous Q24H    insulin glargine (LANTUS) injection 10 Units  10 Units SubCUTAneous DAILY WITH LUNCH    Vancomycin- Rx to Dose & Monitor  1 Each Other Rx Dosing/Monitoring    heparin 25,000 units in D5W 250 ml infusion  7-25 Units/kg/hr IntraVENous TITRATE    chlorhexidine (PERIDEX) 0.12 % mouthwash 10 mL  10 mL Oral Q12H    fentaNYL (PF) 900 mcg/30 ml infusion soln  0-200 mcg/hr IntraVENous TITRATE    midazolam in normal saline (VERSED) 100 mg in 0.9% sodium chloride (MBP/ADV) 100 mL infusion  2-6 mg/hr IntraVENous CONTINUOUS    PHENYLephrine (ERWIN-SYNEPHRINE) 30 mg in 0.9% sodium chloride 250 mL infusion   mcg/min IntraVENous TITRATE    amiodarone (NEXTERONE) 360 mg in dextrose 200 mL (1.8 mg/mL) infusion  0.5-1 mg/min IntraVENous TITRATE    insulin lispro (HUMALOG) injection   SubCUTAneous Q6H    NOREPINephrine (LEVOPHED) 8 mg in 5% dextrose 250mL infusion  2-16 mcg/min IntraVENous TITRATE    ELECTROLYTE RPLACEMENT PROTOCOL- POTASSIUM  1 Each Other PRN    sodium chloride (NS) flush 5-10 mL  5-10 mL IntraVENous PRN    aztreonam (AZACTAM) 2 g in sterile water (preservative free) 10 mL IV syringe  2 g IntraVENous Q8H    ondansetron (ZOFRAN ODT) tablet 4 mg  4 mg Oral Q4H PRN    glucose chewable tablet 16 g  4 Tab Oral PRN    glucagon (GLUCAGEN) injection 1 mg  1 mg IntraMUSCular PRN    dextrose (D50W) injection syrg 12.5-25 g  25-50 mL IntraVENous PRN               Lab/Data Reviewed: All lab results for the last 24 hours reviewed.      Recent Labs      02/13/18 0310 02/12/18 1726  02/12/18 0442   WBC  4.9  5.7  6.6   HGB  10.1*  9.9*  10.8*   HCT  29.8*  29.4*  32.3*   PLT  130*  116*  149     Recent Labs      02/13/18   0640  02/13/18   0310 02/12/18 2000 02/12/18   0442  02/11/18   2045   NA   --   138   --   136  134*   K  3.2*  3.2*  3.2*  2.5*  2.6*   CL   --   103   --   99*  97*   CO2   --   20*   --   21  19*   GLU   --   178*   --   257*  272*   BUN   --   44*   --   32*  29*   CREA   --   2.69*   --   2.73*  2.50*   CA   --   8.0*   --   9.0  9.4       Signed By: Nancy Rodriguez MD     February 13, 2018

## 2018-02-13 NOTE — PROGRESS NOTES
Cardiology Progress Note        Patient: Reva Boston        Sex: male          DOA: 2/11/2018  YOB: 1955      Age:  57 y.o.        LOS:  LOS: 1 day   Assessment/Plan     TERRY with DCCV  No LA clot  No valvular vegetation  Successful convertion of Afib/AFL to NSR  Continue with current treatment.   Discussed with wife and Dr. Aileen Woodson By: Cyrus Mcburney, MD     February 13, 2018

## 2018-02-13 NOTE — PROCEDURES
[facility] [place]  PROCEDURE NOTE    Jan Aguilar  MR#: [id number]  : 1955  ACCOUNT #: [de-identified]   DATE OF SERVICE: 2018    PROCEDURE PERFORMED:  Transesophageal echocardiographic guidance, direct current cardioversion. (TERRY with DCCV)    INDICATION FOR PROCEDURE:  Atrial fibrillation/atrial flutter with RVR and hypotension. COUNSELING:  Risks, benefits and alternatives were discussed in detail with the patient's wife because the patient was intubated and sedated. She consented for the procedure and patient was already on Versed and fentanyl infusion intubated. TERRY was performed utilizing the same sedation. Left atrial appendage clot was ruled out. There was no vegetation and then the patient was given a propofol as per Dr. Daily Velazquez, pulmonologist and then I shocked the patient with a single 200 joule synchronized shock. Patient converted into normal sinus rhythm without any complication. CONCLUSION: Successful cardioversion of atrial fibrillation and atrial flutter with a single 200 joule synchronized shock without any complication. ESTIMATED BLOOD LOSS:  None. SPECIMEN REMOVED:  None.       MD Angel Sanabria / [email or web address]  D: 2018 14:10     T: 2018 15:04  JOB #: [id number]

## 2018-02-13 NOTE — DIABETES MGMT
GLYCEMIC CONTROL & NUTRITION:    - Discussed in rounds, known h/o DM, on dual oral therapy at home, current A1C 5.8%  - basal insulin, POCT + Humalog orders in place, recommend increase basal insulin to Lantus 15 units every day  - BG has improved with basal insulin on board, noted TF to start today, anticipate insulin needs to increase with nutrition on board, will continue to monitor BG and make recommendations based on overnight trends     Recent Glucose Results:   Lab Results   Component Value Date/Time     (H) 02/13/2018 03:10 AM    GLUCPOC 184 (H) 02/13/2018 11:23 AM    GLUCPOC 182 (H) 02/13/2018 05:00 AM    GLUCPOC 140 (H) 02/12/2018 11:36 PM     Lab Results   Component Value Date/Time    Hemoglobin A1c 5.8 (H) 02/11/2018 06:45 PM               Gi Murdock, MPH, RD, CDE

## 2018-02-13 NOTE — PROGRESS NOTES
Hospitalist Progress Note    Patient: Papito Sparks MRN: [id number]  CSN: [id number]    YOB: 1955  Age: 57 y.o. 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 Jane Sales    Back pain M54.9    Weakness of right lower extremity R29.898    Anemia D64.9    Shock ([address] R57.9    Acute respiratory failure ([address] J96.00        58 yo male admitted for pneumonia, respiratory distress. RRT called on this patient for respiratory distress in morning of 02/12/2018, high mews score of 8, fever of 103, tachycardia, resp rate of 44. He was transferred to ICU. Patient continued to decline in condition with elevated resp rate. He was intubated and central line placed. CRITICAL CARE PLAN     Resp -   Acute respiratory failure - intubated and sedated. Vent management per pulmonary      ID -   Sepsis unclear source of infection. Recent pneumonia. Blood cultures 02/11/2018 positive for staph aureus. Follow up repeat blood cx 02/13/2018. Follow resp cultures 02/13/2018  Urine culture 02/12/2018 no growth  CT chest Multifocal peripheral bilateral subpleural nodular densities the largest 15  mm right apex, which may represent peripheral areas of pneumonia or other  inflammatory etiology, although differential diagnosis includes less likely  metastatic disease or infarcts from pulmonary emboli. S/p TERRY today with no evidence of vegetation    ANTIBIOTICS vancomycin, levaquin and zosyn.        CVS - Monitor HD. Septic shock - On bony, wean as tolerated. A-fib - on amiodarone drip. S/p cardioversion.  Converted to sinus rhythm  On heparin drip     Heme/onc - Follow H&H, plts.      Renal -   AMANDA - follow urine sodium  Trend BUN, Cr, follow I/O. Check and replace Mg, K, phos.     Endocrine -  Follow FSG  DM - on lantus and SSI     Neuro/ Pain/ Sedation -   RLE weakenss/generalized weakness. Head CT negative  Follow MRI brain     GI - diabetic diet.     Prophylaxis - DVT: heparin drip, GI: protonix.     45 minutes of critical care time spent in the direct evaluation and treatment of this high risk patient. The reason for providing this level of medical care for this critically ill patient was due a critical illness that impaired one or more vital organ systems such that there was a high probability of imminent or life threatening deterioration in the patients condition. This care involved high complexity decision making to assess, manipulate, and support vital system functions, to treat this degreee vital organ system failure and to prevent further life threatening deterioration of the patients condition.           Disposition : TBD    Physical Exam:  General: As above  HEENT: NC, Atraumatic. PERRLA, anicteric sclerae. Lungs: CTA Bilaterally. No Wheezing/Rhonchi/Rales. Heart:  Regular  rhythm,  No murmur, No Rubs, No Gallops  Abdomen: Soft, Non distended, Non tender.  +Bowel sounds,   Extremities: No c/c/e            Vital signs/Intake and Output:  Visit Vitals    BP 98/61    Pulse (!) 112    Temp (!) 100.5 °F (38.1 °C)    Resp 18    Ht 6' (1.829 m)    Wt 138.9 kg (306 lb 3.5 oz)    SpO2 (!) 40%    BMI 41.53 kg/m2     Current Shift:     Last three shifts:  02/11 1901 - 02/13 0700  In: 4407.8 [P.O.:240;  I.V.:4167.8]  Out: 1100 [Urine:1100]            Labs: Results:       Chemistry Recent Labs      02/13/18   0640  02/13/18   0310  02/12/18 2000 02/12/18   0442  02/11/18   2045   GLU   --   178*   --   257*  272*   NA   --   138   --   136  134*   K  3.2*  3.2*  3.2*  2.5*  2.6*   CL   --   103   --   99*  97*   CO2   --   20*   --   21 19*   BUN   --   44*   --   32*  29*   CREA   --   2.69*   --   2.73*  2.50*   CA   --   8.0*   --   9.0  9.4   AGAP   --   15   --   16  18   BUCR   --   16   --   12  12   AP   --   54   --   46  57   TP   --   5.8*   --   6.8  7.1   ALB   --   2.0*   --   2.7*  3.0*   GLOB   --   3.8   --   4.1*  4.1*   AGRAT   --   0.5*   --   0.7*  0.7*      CBC w/Diff Recent Labs      02/13/18   0310 02/12/18   1726  02/12/18   0442  02/11/18   1845   WBC  4.9  5.7  6.6  5.9   RBC  3.29*  3.17*  3.51*  3.51*   HGB  10.1*  9.9*  10.8*  11.2*   HCT  29.8*  29.4*  32.3*  32.9*   PLT  130*  116*  149  191   GRANS   --   76*   --   81*   LYMPH   --   9*   --   9*   EOS   --   0   --   0      Cardiac Enzymes Recent Labs      02/13/18   0310  02/12/18   1005   CPK  1354*  3318*      Coagulation Recent Labs      02/13/18   0640  02/12/18   2325   02/12/18   1005   PTP   --    --    --   14.2   INR   --    --    --   1.2   APTT  75.6*  71.2*   < >  47.0*    < > = values in this interval not displayed. Lipid Panel Lab Results   Component Value Date/Time    Cholesterol, total 116 02/11/2018 08:45 PM    HDL Cholesterol 27 (L) 02/11/2018 08:45 PM    LDL, calculated 57.4 02/11/2018 08:45 PM    VLDL, calculated 31.6 02/11/2018 08:45 PM    Triglyceride 158 (H) 02/11/2018 08:45 PM    CHOL/HDL Ratio 4.3 02/11/2018 08:45 PM      BNP No results for input(s): BNPP in the last 72 hours.    Liver Enzymes Recent Labs      02/13/18 0310   TP  5.8*   ALB  2.0*   AP  54   SGOT  80*      Thyroid Studies Lab Results   Component Value Date/Time    TSH 0.43 02/12/2018 10:05 AM        Procedures/imaging: see electronic medical records for all procedures/Xrays and details which were not copied into this note but were reviewed prior to creation of Plan

## 2018-02-13 NOTE — CDMP QUERY
Please clarify if this patient is being treated/managed for:    =>Acute Respiratory Failure requiring intubation  =>Other Explanation of clinical findings  =>Unable to Determine (no explanation of clinical findings)    The medical record reflects the following:    Risk: Admitted with sepsis unknown source    Clinical Indicators: 02-12[facility]; RRT called on this patient for respiratory distress, high mews score of 8, fever of 103, tachycardia, resp rate of 44. Patient is awake and answers to questions. He was on BiPAP. acute resp distress, transfered to ICU  2/12 1500 Nursing: Increased WOB and SOB noted, 's, Morphine and metoprolol given with little relief. Treatment: Intubated    Please clarify and document your clinical opinion in the progress notes and discharge summary including the definitive and/or presumptive diagnosis, (suspected or probable), related to the above clinical findings. Please include clinical findings supporting your diagnosis. If you DECLINE this query or would like to communicate with [facility], please utilize the \"[employer] message box\" at the TOP of the Progress Note on the right.       Thank you,  Bell Naranjo RN, CCDS

## 2018-02-13 NOTE — CONSULTS
ID Consult Note    Percy Patel is a 57 y.o. male who is being seen on consult for antibiotic management for MSSA septic shock  . The requesting  physician is Yoanna Juarez M.D. Current  antibiotics:   Vancomycin 2/12  azactam 2/11  Levofloxacin  2/12       Past antibiotics:       Impression:   MRSA septic shock of Unclear source   Possible endocarditis with septic emboli with R sided weakness on admission   CT chest showed  Multifocal peripheral bilateral subpleural nodular densities the largest 15  mm right apex, which may represent peripheral areas of pneumonia or other inflammatory etiology,   AMANDA with Rhabdomyolysis : creatiine stable, CPK improving   Severe sepsis on admission  Fever , tachycardia persists  Mild thrombocytopenia   PCN allergy : rash listed in system  BL knee arthroplasty : not clinically suspected for infection       Plan:   Follow up surveillance blood cultures and sputum cultures sent this morning  Agree with TERRY to rule out endocarditis   If TERRY negative for vegetations , consider MRI of spine when patient improves to look for source of staph aures sepsis   Continue vancomycin for staph aureus sepsis for now  And follow up sensitivity and adjust accordingly   Keep trough 15 to 20   Pharmacy to dose vancomycin   Will consider using cephalsporins is staph aures turns out to be MSSA since his PCN allergy is listed as rash  If no GNR positive in cultures, plan to discontinue azactam and levofloxacin soon   Monitor HR closely while on levofloxacin and amiodarone due to drug interaction    Follow up MRI of head    Continue supportive care  We are watching pt closely for toxicities and side effects to abx and drug-drug interactions. We will adjust antibiotics upon the results of the pending tests and the clinical improvement of the patient.      Case discussed with: []Patient []Family [X]Nursing [ ]Case Management  [ ] Chris Lea ]MD/Care team       History of Present Illness       Katie Pruitt Danae Tomas is a 57 y.o. male who is being admitted to [facility] on 2/11/18 for fever and fall associated with generalized weakness . As per HPI, patient noticed RLE weakness around 5 pm and has fallen 4 times on his back without LOC for head trauma. Patient states he has been feeling generalized weakness at home for the last day or so with low grade subjective fevers but yesterday he noted RLE weakness around 5pm. Since then he has fallen about 4 times in his back without any head trauma or LOC. On exam in the ED patient was febrile   Temp 102.5F  , tachycardic . Patient has been admitted for sepsis     I reviewed lab tests- Labs on presentation showed WBC 5.9  , Hgb , Cr 2.5 , lactic acid 2.2  , K 2.6       , CPK 2677 . Urinalysis  with positive/negative  nitrite, WBC , bacteria present.  UCx  Is NGTD  Blood cx with staph aureus in 1 set on 2/11  .  Surveillance blood cx from  2/13  is pending . Influenza ab is negative . I reviewed tests in CPT medicine section - EKG - QTc    Echo shows no ovious vegetation, EF 55 to 60 % . Ct head was negative for acute finding . I independently reviewed images- official report showed:  CT chest/abd/pelvis showed Multifocal peripheral bilateral subpleural nodular densities the largest 15 mm right apex, which may represent peripheral areas of pneumonia or other  inflammatory etiology,   Patient has been started on vancomycin, azactam and levaquin on admisison on 2/12 . On  2/11 overnight RRT called and found  to be tachycardic, tachypnic and ABG showed alkalosis and was transferred to ICU. He decompensated during the day on 2/12 and intubated around 4 pm on 2/12 . he became hypotensive and started on phenylephrine on 2/12 and he is continued today . surveillance blood culture and sputum culture sent this morning . He has been started on iv amioderone for poorly controlled A fib  today . He continues to spike fever 102 to 103 F in last 24 hours.  He is scheduled to have TERRY done with possible DCCV at noon today   Currently he is intubated and sedated     The history is provided by the reviewing medical records and discussion with referring Jose Gonzalez and staff involving in patient care  Since patient is sedated and intubated   We were asked to comment on differential diagnosis and antibiotic management. I obtained the history of this case by independently reviewing the previous available medical records, labs, microbiology data, imaging studies, as well as from the conversation I had with the medical and nursing staff involved in this patient's care. Cc: This note has been sent to the requesting physican, with findings and recommendations via [employer]. Chief complaint:   Chief Complaint   Patient presents with    Fever    Fall             Review of Systems:   Interval notes, available laboratory, microbiology and radiographic data reviewed. Discussed with nursing  where appropriate. Critically ill  I d/w nursing staff  Intubated. On Mech Vent  Nonverbal  On  1pressor  Other direct ROS were unobtainable due to pt's condition      Skin: negative for rash   HENT: negative for ear discharge   Eyes: negative for eye discharge   Respiratory: negative for hemoptysis   Gastointestinal: negative for diarrhea,  vomiting   Genitourinary: negative for hematuria   Hem/Lymph: negative for easy bleeding   Allergy/Imm: negative for hives   Neurological: negative for seizures                Physical Assessment:     VITAL SIGNS  Blood pressure 95/56, pulse (!) 133, temperature (!) 102.5 °F (39.2 °C), resp. rate 25, height 6' (1.829 m), weight 138.9 kg (306 lb 3.5 oz), SpO2 97 %.   Temp (24hrs), Av °F (38.3 °C), Min:98.6 °F (37 °C), Max:103 °F (39.4 °C)        Intubated - On mech ventilation  Wilson in place - urine  With dark brown color   R femoral line in place     CONSTITUTIONAL:    Ill looking, nonverbal  PSYCH:   Judgement/insight - unobtainable due to pt's condition   Affect - unobtainable due to pt's condition  EYES:    Conjunctivae normal   PERRL  ENMT:    Left exterior ear normal, right external ear normal, nose normal   Inspection lips - normal   Orally intubated   NECK:    Exam neck - no masses, normal symmetry, trachea midline   Exam thyroid - no thyromegaly, no masses  PULMONARY/CHEST WALL:   Resp Effort - No use of accessory muscles, no intercostal retractions   Breath sounds -  BL coarse BS   CARDIOVASCULAR:    Heart sounds tachycardic, irregularly irregular   Intact pedal pulses by doppler   ABDOMINAL:    Appearance normal, soft, bowel sounds  Present    No tenderness   Liver/spleen - could not palpate organomegaly  GENITOURINARY: External genitalia - no ulcers, no drainage  , mcdonald cath in place   LYMPHATIC SYSTEM:   No lymphadenopathy in neck   No lymphadenopathy in groin  MUSCULOSKELETAL:   Gait  unobtainable due to pt's condition   Digits, nails - no clubbing, no cyanosis, no infection   RUE, LUE, RLE, LLE ROM -  unobtainable due to pt's condition .  BL knee incision intact and dry    Muscle strength  unobtainable due to pt's condition   Head and neck ROM -  unobtainable due to pt's condition  SKIN    Inspection - as above - otherwise no rash, no ulcers, no crepitus, intact   Palpation - as above - otherwise no induration, no nodules, dry, warm  NEUROLOGICAL:  unobtainable due to pt's condition      MICROBIOLOGY DATA  All Micro Results     Procedure Component Value Units Date/Time    CULTURE, RESPIRATORY/SPUTUM/BRONCH Safia Bertram [[id number]] Collected:  02/13/18 0858    Order Status:  Completed Specimen:  Sputum from Sputum Updated:  02/13/18 0935    CULTURE, BLOOD [[id number]]  (Abnormal) Collected:  02/11/18 1846    Order Status:  Completed Specimen:  Blood from Blood Updated:  02/13/18 0790     Special Requests: NO SPECIAL REQUESTS        GRAM STAIN         GRAM POSITIVE COCCI IN CLUSTERS AEROBIC BOTTLE ANAEROBIC BOTTLE              SMEAR CALLED TO AND CORRECTLY REPEATED BY: Benny King RN ICU TO [facility] AT 1110 ON [id number]     Culture result:         AEROBIC AND ANAEROBIC BOTTLES STAPHYLOCOCCUS AUREUS (A)    CULTURE, BLOOD [[id number]] Collected:  02/13/18 0310    Order Status:  Completed Specimen:  Whole Blood from Blood Updated:  02/13/18 0551     Special Requests: NO SPECIAL REQUESTS        Culture result: NO GROWTH AFTER 1 HOUR       CULTURE, BLOOD [[id number]] Collected:  02/13/18 0315    Order Status:  Completed Specimen:  Whole Blood from Blood Updated:  02/13/18 0551     Special Requests: NO SPECIAL REQUESTS        Culture result: NO GROWTH AFTER 1 HOUR       CULTURE, URINE [[id number]] Collected:  02/12/18 0900    Order Status:  Completed Specimen:  Urine from Wilson Specimen Updated:  02/12/18 1452    INFLUENZA A & B AG (RAPID TEST) [[id number]] Collected:  02/12/18 0211    Order Status:  Completed Specimen:  Nasopharyngeal from Nasal washing Updated:  02/12/18 0237     Influenza A Antigen NEGATIVE          A negative result does not exclude influenza virus infection, seasonal or H1N1 due to suboptimal sensitivity. If influenza is circulating in your community, a diagnosis of influenza should be considered based on a patients clinical presentation and empiric antiviral treatment should be considered, if indicated.         Influenza B Antigen NEGATIVE            LAB DATA    Recent Results (from the past 24 hour(s))   TROPONIN I    Collection Time: 02/12/18 10:05 AM   Result Value Ref Range    Troponin-I, Qt. 2.81 (HH) 0.00 - 0.06 NG/ML   CK    Collection Time: 02/12/18 10:05 AM   Result Value Ref Range    CK 3318 (H) 39 - 308 U/L   PROTHROMBIN TIME + INR    Collection Time: 02/12/18 10:05 AM   Result Value Ref Range    Prothrombin time 14.2 11.5 - 15.2 sec    INR 1.2 0.8 - 1.2     PTT    Collection Time: 02/12/18 10:05 AM   Result Value Ref Range    aPTT 47.0 (H) 23.0 - 36.4 SEC   TSH 3RD GENERATION    Collection Time: 02/12/18 10:05 AM   Result Value Ref Range    TSH 0.43 0.36 - 3. 74 uIU/mL   GLUCOSE, POC    Collection Time: 02/12/18 11:06 AM   Result Value Ref Range    Glucose (POC) 228 (H) 70 - 110 mg/dL   POC G3    Collection Time: 02/12/18 11:55 AM   Result Value Ref Range    Device: NASAL CANNULA      Flow rate (POC) 2 L/M    FIO2 (POC) 28 %    pH (POC) 7.406 7.35 - 7.45      pCO2 (POC) 32.6 (L) 35.0 - 45.0 MMHG    pO2 (POC) 84 80 - 100 MMHG    HCO3 (POC) 20.5 (L) 22 - 26 MMOL/L    sO2 (POC) 97 92 - 97 %    Base deficit (POC) 4 mmol/L    Allens test (POC) YES      Total resp. rate 39      Site RIGHT RADIAL      Specimen type (POC) ARTERIAL      Performed by Jass Gillette, POC    Collection Time: 02/12/18  5:21 PM   Result Value Ref Range    Glucose (POC) 223 (H) 70 - 110 mg/dL   LACTIC ACID    Collection Time: 02/12/18  5:26 PM   Result Value Ref Range    Lactic acid 2.2 (HH) 0.4 - 2.0 MMOL/L   CBC WITH AUTOMATED DIFF    Collection Time: 02/12/18  5:26 PM   Result Value Ref Range    WBC 5.7 4.6 - 13.2 K/uL    RBC 3.17 (L) 4.70 - 5.50 M/uL    HGB 9.9 (L) 13.0 - 16.0 g/dL    HCT 29.4 (L) 36.0 - 48.0 %    MCV 92.7 74.0 - 97.0 FL    MCH 31.2 24.0 - 34.0 PG    MCHC 33.7 31.0 - 37.0 g/dL    RDW 15.8 (H) 11.6 - 14.5 %    PLATELET 582 (L) 638 - 420 K/uL    MPV 10.8 9.2 - 11.8 FL    NEUTROPHILS 76 (H) 40 - 73 %    BAND NEUTROPHILS 6 %    LYMPHOCYTES 9 (L) 21 - 52 %    MONOCYTES 9 3 - 10 %    EOSINOPHILS 0 0 - 5 %    BASOPHILS 0 0 - 2 %    ABS. NEUTROPHILS 4.3 1.8 - 8.0 K/UL    ABS. LYMPHOCYTES 0.5 (L) 0.9 - 3.6 K/UL    ABS. MONOCYTES 0.5 0.05 - 1.2 K/UL    ABS. EOSINOPHILS 0.0 0.0 - 0.4 K/UL    ABS.  BASOPHILS 0.0 0.0 - 0.06 K/UL    RBC COMMENTS ANISOCYTOSIS  1+        DF MANUAL     PTT    Collection Time: 02/12/18  5:26 PM   Result Value Ref Range    aPTT 50.8 (H) 23.0 - 36.4 SEC   TROPONIN I    Collection Time: 02/12/18  5:26 PM   Result Value Ref Range    Troponin-I, Qt. 1.57 (HH) 0.00 - 0.06 NG/ML   POC G3    Collection Time: 02/12/18  5:34 PM   Result Value Ref Range    Device: VENT      FIO2 (POC) 40 %    pH (POC) 7.390 7.35 - 7.45      pCO2 (POC) 33.3 (L) 35.0 - 45.0 MMHG    pO2 (POC) 85 80 - 100 MMHG    HCO3 (POC) 20.1 (L) 22 - 26 MMOL/L    sO2 (POC) 96 92 - 97 %    Base deficit (POC) 5 mmol/L    Mode ASSIST CONTROL      Tidal volume 450 ml    Set Rate 16 bpm    PEEP/CPAP (POC) 5 cmH2O    Allens test (POC) YES      Inspiratory Time 0.9 sec    Total resp.  rate 24      Site RIGHT RADIAL      Specimen type (POC) ARTERIAL      Performed by Sintia Oliver     Volume control plus YES     TROPONIN I    Collection Time: 02/12/18  8:00 PM   Result Value Ref Range    Troponin-I, Qt. 1.33 (HH) 0.00 - 0.06 NG/ML   POTASSIUM    Collection Time: 02/12/18  8:00 PM   Result Value Ref Range    Potassium 3.2 (L) 3.5 - 5.5 mmol/L   MAGNESIUM    Collection Time: 02/12/18  8:00 PM   Result Value Ref Range    Magnesium 1.1 (L) 1.6 - 2.6 mg/dL   PTT    Collection Time: 02/12/18 11:25 PM   Result Value Ref Range    aPTT 71.2 (H) 23.0 - 36.4 SEC   GLUCOSE, POC    Collection Time: 02/12/18 11:36 PM   Result Value Ref Range    Glucose (POC) 140 (H) 70 - 110 mg/dL   LACTIC ACID    Collection Time: 02/13/18  1:30 AM   Result Value Ref Range    Lactic acid 1.8 0.4 - 2.0 MMOL/L   CK    Collection Time: 02/13/18  3:10 AM   Result Value Ref Range    CK 1354 (H) 39 - 308 U/L   CBC W/O DIFF    Collection Time: 02/13/18  3:10 AM   Result Value Ref Range    WBC 4.9 4.6 - 13.2 K/uL    RBC 3.29 (L) 4.70 - 5.50 M/uL    HGB 10.1 (L) 13.0 - 16.0 g/dL    HCT 29.8 (L) 36.0 - 48.0 %    MCV 90.6 74.0 - 97.0 FL    MCH 30.7 24.0 - 34.0 PG    MCHC 33.9 31.0 - 37.0 g/dL    RDW 15.7 (H) 11.6 - 14.5 %    PLATELET 239 (L) 056 - 420 K/uL    MPV 11.7 9.2 - 43.6 FL   METABOLIC PANEL, COMPREHENSIVE    Collection Time: 02/13/18  3:10 AM   Result Value Ref Range    Sodium 138 136 - 145 mmol/L    Potassium 3.2 (L) 3.5 - 5.5 mmol/L    Chloride 103 100 - 108 mmol/L    CO2 20 (L) 21 - 32 mmol/L    Anion gap 15 3.0 - 18 mmol/L    Glucose 178 (H) 74 - 99 mg/dL    BUN 44 (H) 7.0 - 18 MG/DL    Creatinine 2.69 (H) 0.6 - 1.3 MG/DL    BUN/Creatinine ratio 16 12 - 20      GFR est AA 29 (L) >60 ml/min/1.73m2    GFR est non-AA 24 (L) >60 ml/min/1.73m2    Calcium 8.0 (L) 8.5 - 10.1 MG/DL    Bilirubin, total 1.0 0.2 - 1.0 MG/DL    ALT (SGPT) 33 16 - 61 U/L    AST (SGOT) 80 (H) 15 - 37 U/L    Alk. phosphatase 54 45 - 117 U/L    Protein, total 5.8 (L) 6.4 - 8.2 g/dL    Albumin 2.0 (L) 3.4 - 5.0 g/dL    Globulin 3.8 2.0 - 4.0 g/dL    A-G Ratio 0.5 (L) 0.8 - 1.7     MAGNESIUM    Collection Time: 02/13/18  3:10 AM   Result Value Ref Range    Magnesium 1.9 1.6 - 2.6 mg/dL   TROPONIN I    Collection Time: 02/13/18  3:10 AM   Result Value Ref Range    Troponin-I, Qt. 1.96 (HH) 0.00 - 0.06 NG/ML   CULTURE, BLOOD    Collection Time: 02/13/18  3:10 AM   Result Value Ref Range    Special Requests: NO SPECIAL REQUESTS      Culture result: NO GROWTH AFTER 1 HOUR     CULTURE, BLOOD    Collection Time: 02/13/18  3:15 AM   Result Value Ref Range    Special Requests: NO SPECIAL REQUESTS      Culture result: NO GROWTH AFTER 1 HOUR     POC G3    Collection Time: 02/13/18  4:55 AM   Result Value Ref Range    Device: VENT      FIO2 (POC) 0.40 %    pH (POC) 7.433 7.35 - 7.45      pCO2 (POC) 26.5 (L) 35.0 - 45.0 MMHG    pO2 (POC) 119 (H) 80 - 100 MMHG    HCO3 (POC) 17.3 (L) 22 - 26 MMOL/L    sO2 (POC) 98 (H) 92 - 97 %    Base deficit (POC) 6 mmol/L    Mode ASSIST CONTROL      Tidal volume 450 ml    Set Rate 16 bpm    PEEP/CPAP (POC) 5 cmH2O    Allens test (POC) YES      Inspiratory Time 0.9 sec    Total resp. rate 31      Site RIGHT RADIAL      Patient temp.  103.0      Specimen type (POC) ARTERIAL      Performed by Francisco Bales     Volume control plus YES     GLUCOSE, POC    Collection Time: 02/13/18  5:00 AM   Result Value Ref Range    Glucose (POC) 182 (H) 70 - 110 mg/dL   PTT    Collection Time: 02/13/18  6:40 AM   Result Value Ref Range    aPTT 75.6 (H) 23.0 - 36.4 SEC   POTASSIUM Collection Time: 02/13/18  6:40 AM   Result Value Ref Range    Potassium 3.2 (L) 3.5 - 5.5 mmol/L           IMAGING     I reviewed images CXR from  2/13 - official report showed Overall no significant interval change, with bilateral parenchymal infiltrates/edema +/- left more than right pleural effusions.     I reviewed images CT chest/abd [place] 2/12 - official report showed    1. Mild areas of atelectasis and/or scarring lower lobes and lingula with very  small pleural effusions.     2. Multifocal peripheral bilateral subpleural nodular densities the largest 15  mm right apex, which may represent peripheral areas of pneumonia or other  inflammatory etiology, although differential diagnosis includes less likely  metastatic disease or infarcts from pulmonary emboli. Follow-up chest CT  recommended in one month following treatment.     3. Diffuse esophageal wall thickening most likely related to esophagitis.     4. Cholelithiasis without CT evidence of acute cholecystitis.     5. Dilated stomach and duodenum with air-fluid levels without evidence of  mechanical obstruction, most likely related to ileus.     6. Stable additional ancillary findings as above. TTE 2/12     Left ventricle: Systolic function was normal. Ejection fraction was estimated   in the range of 55 % to 60 %. There was  mild hypokinesis of the basal inferior wall(s). There was moderate concentric   hypertrophy. Right ventricle: The size was normal. Systolic function was normal.    Inferior vena cava, hepatic veins: The inferior vena cava was dilated. The   respirophasic change in diameter was more  than 50%.       Current medications reviewed in EPIC      Current Facility-Administered Medications   Medication Dose Route Frequency    acetaminophen (TYLENOL) solution 650 mg  650 mg Oral Q4H PRN    potassium chloride 10 mEq in 100 ml IVPB  10 mEq IntraVENous Q1H    aspirin chewable tablet 81 mg  81 mg Oral DAILY    HYDROcodone-acetaminophen (NORCO) 5-325 mg per tablet 1 Tab  1 Tab Oral Q4H PRN    morphine injection 1 mg  1 mg IntraVENous Q4H PRN    naloxone (NARCAN) injection 0.4 mg  0.4 mg IntraVENous PRN    docusate sodium (COLACE) capsule 100 mg  100 mg Oral BID PRN    acetaminophen (TYLENOL) tablet 650 mg  650 mg Oral Q4H PRN    vancomycin (VANCOCIN) 1500 mg in  ml infusion  1,500 mg IntraVENous Q24H    pantoprazole (PROTONIX) tablet 40 mg  40 mg Oral DAILY    levoFLOXacin (LEVAQUIN) 500 mg in D5W IVPB  500 mg IntraVENous Q24H    ELECTROLYTE REPLACEMENT PROTOCOL-MAGNESIUM  1 Each Other PRN    folic acid (FOLVITE) tablet 1 mg  1 mg Oral DAILY    sodium bicarbonate (8.4%) 50 mEq in dextrose 5 % - 0.45% NaCl 1,000 mL infusion   IntraVENous CONTINUOUS    ELECTROLYTE REPLACEMENT PROTOCOL-MAGNESIUM  1 Each Other PRN    ELECTROLYTE REPLACEMENT PROTOCOL-PHOSPHORUS  1 Each Other PRN    ELECTROLYTE REPLACEMENT PROTOCOL-CALCIUM  1 Each Other PRN    thiamine 100 mg in 50 mL NS   IntraVENous Q24H    insulin glargine (LANTUS) injection 10 Units  10 Units SubCUTAneous DAILY WITH LUNCH    Vancomycin- Rx to Dose & Monitor  1 Each Other Rx Dosing/Monitoring    heparin 25,000 units in D5W 250 ml infusion  7-25 Units/kg/hr IntraVENous TITRATE    chlorhexidine (PERIDEX) 0.12 % mouthwash 10 mL  10 mL Oral Q12H    fentaNYL (PF) 900 mcg/30 ml infusion soln  0-200 mcg/hr IntraVENous TITRATE    midazolam in normal saline (VERSED) 100 mg in 0.9% sodium chloride (MBP/ADV) 100 mL infusion  2-6 mg/hr IntraVENous CONTINUOUS    PHENYLephrine (ERWIN-SYNEPHRINE) 30 mg in 0.9% sodium chloride 250 mL infusion   mcg/min IntraVENous TITRATE    amiodarone (NEXTERONE) 360 mg in dextrose 200 mL (1.8 mg/mL) infusion  0.5-1 mg/min IntraVENous TITRATE    insulin lispro (HUMALOG) injection   SubCUTAneous Q6H    NOREPINephrine (LEVOPHED) 8 mg in 5% dextrose 250mL infusion  2-16 mcg/min IntraVENous TITRATE    ELECTROLYTE RPLACEMENT PROTOCOL- POTASSIUM  1 Each Other PRN    sodium chloride (NS) flush 5-10 mL  5-10 mL IntraVENous PRN    aztreonam (AZACTAM) 2 g in sterile water (preservative free) 10 mL IV syringe  2 g IntraVENous Q8H    ondansetron (ZOFRAN ODT) tablet 4 mg  4 mg Oral Q4H PRN    glucose chewable tablet 16 g  4 Tab Oral PRN    glucagon (GLUCAGEN) injection 1 mg  1 mg IntraMUSCular PRN    dextrose (D50W) injection syrg 12.5-25 g  25-50 mL IntraVENous PRN         Allergies   Allergen Reactions    Bees [Hymenoptera Allergenic Extract] Anaphylaxis    Cocoa Butter-Zinc Oxide Rash    Dilaudid [Hydromorphone (Bulk)] Rash     capsules    Pcn [Penicillins] Rash       Past Medical History:   Diagnosis Date    Arrhythmia     irregular heart beat    Arthritis     knees- osteo    Chronic pain     knees    Diabetes (HCC) 1990's    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[facility] [address] 10/21/2014    GERD (gastroesophageal reflux disease)     well controlled    Gout     High cholesterol     Hypertension 1990's    Irregular heart beat     palpitations    Morbid obesity (HCC)     Osteoarthritis of right knee 1/18/2014    Other ill-defined conditions(799.89)     asbestosis    Other ill-defined conditions(799.89)     irregular heart beat    Other ill-defined conditions(799.89)     h/o migraines    Other ill-defined conditions(799.89)     gout    Other ill-defined conditions(799.89)     cholesterol    PUD (peptic ulcer disease) 1970's    Tinnitus of left ear      Past Surgical History:   Procedure Laterality Date    HX CARPAL TUNNEL RELEASE      HX KNEE ARTHROSCOPY  2010    left    HX KNEE ARTHROSCOPY  1980s    right    HX KNEE REPLACEMENT      HX ORTHOPAEDIC  1970s    right hand tendenitis    HX ORTHOPAEDIC  2012    left hand ring finger released    HX ORTHOPAEDIC  8-2013    left carpal    TOTAL KNEE ARTHROPLASTY  2009/2010    left     Social History     Social History    Marital status:      Spouse name: N/A    Number of children: N/A    Years of education: N/A     Occupational History    Not on file. Social History Main Topics    Smoking status: Former Smoker     Quit date: 10/1/1975    Smokeless tobacco: Never Used      Comment: 1970s    Alcohol use 1.5 oz/week     3 Shots of liquor per week, drinks 2 mixed drink daily as per [facility] note     Drug use: No    Sexual activity: Not on file     Other Topics Concern    Not on file     Social History Narrative     The family history was reviewed and other than as discussed above in the H or PI does not substantially contribute to the issues surrounding the present illness. No family history on file. not available due to patient's intubated status     Patient Active Problem List   Diagnosis Code    Osteoarthritis of right knee M17.11    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[facility] [address] T84.018A, Z96.659    Failed total knee arthroplasty ([facility] [address] T84.018A, Z96.659    Dysphagia R13.10    Sepsis ([facility] [address] A41.9    Hypokalemia E87.6    Glucosuria R81    Acute kidney injury ([facility] [address] N17.9    Fall W19. Raenell Karma    Back pain M54.9    Weakness of right lower extremity R29.898    Anemia D64.9         Medical Decision Making:     I reviewed and summarized data from old medical records and obtained history from other sources than patient. I reviewed laboratory tests, Radiology data, and diagnostic tests in the CPT medicine section. I discussed with the physicians and the nursed involved in this patient's care about this patient's current medical problems. I have  Discussed plan of care with providers and  nursing staff. The total visit duration was of 85 minutes of which more than 50% was spent in coordination of care and counseling (time spent with patient/family face to face, physical exam, reviewing microbiology data, laboratory results, radiographic data, speaking with physicians and nursing staff involved in this pt's care).

## 2018-02-13 NOTE — PROGRESS NOTES
1930- Report and care received, assessment completed per flow sheet. Intubated, opens eyes spontaneously, calm, however, when asked if having pain- nods head yes when asked if having pain, follows most commands. Soft bilateral wrist restraints in place to protect integrity of lines/tubes, flow sheet in progress. IVF infusing via IV pumps without difficulty. Fever noted, tylenol administered per orders. 2029-  paged for clarification regarding amiodarone orders. Updated regarding current BP and dose of bony synephrine. Notified of fever. Orders received to administer amiodarone bolus and to call MD back with reports of any HR and NIBP changes. 2102-  updated, orders received to start continuous amiodarone drip, to use levophed if max ordered dose of bony synephrine utilized, and to call  and ask if he concurs with this plan. States a heart rate in the 120's is acceptable.  updated and states he is okay with orders. 2313- Reassessment completed and without change. 0000-  called unit for update, notified of -low 130's, no orders received. 0400- Reassessment completed and without change.

## 2018-02-13 NOTE — PROGRESS NOTES
[address] 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 XXXA    Back pain M54.9    Weakness of right lower extremity R29.898    Anemia D64.9 ·   Sepsis ? Source ? Related to lll air space vs endocarditis as CT showed concern for septic embolie . Zoran Cheyenne Staph in blood  · Septic shock on phenylephrine  · Elevated troponin ? Demand ischemia vs NSTMI   · Respiratory failure intubated on   · Acute renal failure mild improvement   · Rhabdo improving  · Afib poorly controlled   · Back pain fall  · Severe hypokalemia hypomagnesemia getting replaced  · Elevated blood sugars better controlled   · Lactic acidosis<was on metformin at home> improving  · H/O ETOH    PLAN:  -- Get EKG as not sure still on afib or just sinus tach  -- Clinical picture looks like endocarditis and worry on RT side as possible septic embolie  -- Consult ID   -- Follow RPT culture  -- Lavage and send sputum culture  Follow MRI head  Monitor PLT  Monitor for ETOH withdrawal   CVS:Currently on Amiodarone drip, Heparin drip, Phenylephrine follow on cardiology recommendations. If RPT blood culture is still positive will need TERRY to evalaute for rt sided endocarditis. Taper phenylephrine as tolerated keep MAP around 65.    Follow on cardiology might need some work up once recover  Once BP permits will add betablocker  Add aspirin  RS: On vent protocol, ABG acceptable, Decrease rate to 12, Aspiration precaution, LLL air space ? Due to septic embolie and infract vs pnemonia, Send sputum culture. ID:Sepsis ? Etiology, Staph in blood . . ID consult is called   Vanco 2/11  Azactam 2/11  Add levaquin 2/12 <Allergic to pcn>  Lactate is improved   Received >4 liter fluid yesterday  ENDO:Hold metformin, SSI TSH is ok  GI:NG tube will consider feeding from today. Monitor for illeus on fentanyl  RENAL:Wilson for I/O, Replace k and MG and replace per protocol. On 1/2 ns with bicarb at 100cc/hr continue for today. Mild improvement in CR and CK noted. CNS: Mentation ok now, MRI head is ordered,   HEMATOLOGY:PLT id dropping monitor, HB stable no bleed at Femoral site  MUSCULOSKELETAL:CK is elevated monitor  · PAIN AND SEDATION: On versed and fentanyl per protocol maintain RAAS -1, on thiamine and folic acid for ETOH  · Skin/Wound: Monitor back  · Electrolytes: Replace electrolytes per ICU electrolyte replacement protocol. · IVF: D5W 1/2 ns and 1 amp bicarb 100cc/hr  · Nutrition: Start feeding today   · Prophylaxis: DVT Prophylaxis with Heparin,. GI Prophylaxis. · Restraints: soft to avoid on pulling tube and lines  · PT/OT eval and treat. OOB when appropriate. · Lines/Tubes: none. Femoral line 2/12. Wilson 2/12/18  ADVANCE DIRECTIVE:Full code  FAMILY DISCUSSION:spoke with family  Quality Care: PPI, DVT prophylaxis, HOB elevated, Infection control all reviewed and addressed. Events and notes from last 24 hours reviewed. Care plan discussed with nursing CC TIME:60 min excluding procedure    · Labs and images personally seen and available reports reviewed. · All current medicines are reviewed and doses and prescription adjusted.     Addendum   Spoke with cardiology  Plan for TERRY   Left leg is little cold will check with doppler pulses  Also will get DIG level Subjective/History:   Indu Jay is a 57 y.o. male who came to the ed with complaints of fall. Patient states he has been feeling generalized weakness at home for the last day or so with low grade subjective fevers but yesterday he noted RLE weakness around 5pm. Since then he has fallen about 4 times in his back without any head trauma or LOC. He has been using his tripod cane to assist him with walking. Due to persistence of his weakness, falls, fevers and low back pain he decided to come to the ED. In the ED, CT of the head was neg and his labs showed significant hypoK with elevated Cr (unknown baseline). He was also febrile and tachycardic. He was started on broad spectrum Abx. Overnight RRT called found to be tachycardic, tachypnic and ABG showed alkalosis and was transferred to ICU  Now patient is awake, denies any complains except some back pain   Tachycardic with HR in 120s   No nausea vomiting  Recently about 1 month a go treated for pna at office  No diarrhoea  Ex smoker  Drinks about 2 glasses of mix drinks every day  Recently lost his son to multiorgan failure and sepsis     2/13/18    Yesterday became more tachypnic and difficult to control hr  Intubated 2/12, femoral line 2/12 <neck line not placed due to concern for colonization with ongoing sepsis>  Became hypotensive managed with phenylephrine  Afib was poorly controlled started on amiodarone drip.   Cardiology is closely following  Blood culture Staph  K 3.2 replaced  Mg 1.1 getting replaced   UO and Cr is improving  Still spiking fever 102   Flu is negative     Current Facility-Administered Medications   Medication Dose Route Frequency    potassium chloride 10 mEq in 100 ml IVPB  10 mEq IntraVENous Q1H    vancomycin (VANCOCIN) 1500 mg in  ml infusion  1,500 mg IntraVENous Q24H    pantoprazole (PROTONIX) tablet 40 mg  40 mg Oral DAILY    levoFLOXacin (LEVAQUIN) 500 mg in D5W IVPB  500 mg IntraVENous X69O    folic acid (FOLVITE) tablet 1 mg  1 mg Oral DAILY    sodium bicarbonate (8.4%) 50 mEq in dextrose 5 % - 0.45% NaCl 1,000 mL infusion   IntraVENous CONTINUOUS    thiamine 100 mg in 50 mL NS   IntraVENous Q24H    insulin glargine (LANTUS) injection 10 Units  10 Units SubCUTAneous DAILY WITH LUNCH    Vancomycin- Rx to Dose & Monitor  1 Each Other Rx Dosing/Monitoring    heparin 25,000 units in D5W 250 ml infusion  7-25 Units/kg/hr IntraVENous TITRATE    chlorhexidine (PERIDEX) 0.12 % mouthwash 10 mL  10 mL Oral Q12H    fentaNYL (PF) 900 mcg/30 ml infusion soln  0-200 mcg/hr IntraVENous TITRATE    midazolam in normal saline (VERSED) 100 mg in 0.9% sodium chloride (MBP/ADV) 100 mL infusion  2-6 mg/hr IntraVENous CONTINUOUS    PHENYLephrine (ERWIN-SYNEPHRINE) 30 mg in 0.9% sodium chloride 250 mL infusion   mcg/min IntraVENous TITRATE    amiodarone (NEXTERONE) 360 mg in dextrose 200 mL (1.8 mg/mL) infusion  0.5-1 mg/min IntraVENous TITRATE    insulin lispro (HUMALOG) injection   SubCUTAneous Q6H    NOREPINephrine (LEVOPHED) 8 mg in 5% dextrose 250mL infusion  2-16 mcg/min IntraVENous TITRATE    aztreonam (AZACTAM) 2 g in sterile water (preservative free) 10 mL IV syringe  2 g IntraVENous Q8H         Objective:   Vital Signs:    Visit Vitals    BP 95/56    Pulse (!) 133    Temp (!) 102.5 °F (39.2 °C)    Resp 25    Ht 6' (1.829 m)    Wt 138.9 kg (306 lb 3.5 oz)    SpO2 97%    BMI 41.53 kg/m2       O2 Device: Endotracheal tube, Ventilator   O2 Flow Rate (L/min): 2 l/min   Temp (24hrs), Av °F (38.3 °C), Min:98.6 °F (37 °C), Max:103 °F (39.4 °C)       Intake/Output:   Last shift:         Last 3 shifts:  1901 -  0700  In: 4407.8 [P.O.:240;  I.V.:4167.8]  Out: 1100 [Urine:1100]    Intake/Output Summary (Last 24 hours) at 18 0846  Last data filed at 18 7508   Gross per 24 hour   Intake          4167.75 ml   Output             1100 ml   Net          3067.75 ml       Review of Systems:  Intubated sedated     Objective:    General: comfortable, In minimal distress   HEENT: pupils reactive, sclera anicteric, EOM intact  Neck: No adenopathy or thyroid swelling, no lymphadenopathy or JVD, supple  CVS: S1S2 no murmurs  RS: Mod AE bilaterally, minimal rales on right side  Abd: soft, non tender, distended sluggish bowel sound  Neuro: non focal, awake, alert  Extrm: no leg edema, Right knee scar  Lymph node: No obvious palpable lymph node appreciated.   Skin: no rash  CBC w/Diff Recent Labs      02/13/18   0310  02/12/18   1726  02/12/18   0442  02/11/18   1845   WBC  4.9  5.7  6.6  5.9   RBC  3.29*  3.17*  3.51*  3.51*   HGB  10.1*  9.9*  10.8*  11.2*   HCT  29.8*  29.4*  32.3*  32.9*   PLT  130*  116*  149  191   GRANS   --   76*   --   81*   LYMPH   --   9*   --   9*   EOS   --   0   --   0        Chemistry Recent Labs      02/13/18   0640  02/13/18   0310  02/12/18   2000  02/12/18   0442  02/11/18   2045   GLU   --   178*   --   257*  272*   NA   --   138   --   136  134*   K  3.2*  3.2*  3.2*  2.5*  2.6*   CL   --   103   --   99*  97*   CO2   --   20*   --   21  19*   BUN   --   44*   --   32*  29*   CREA   --   2.69*   --   2.73*  2.50*   CA   --   8.0*   --   9.0  9.4   MG   --   1.9  1.1*  1.0*  0.8*   AGAP   --   15   --   16  18   BUCR   --   16   --   12  12   AP   --   54   --   46  57   TP   --   5.8*   --   6.8  7.1   ALB   --   2.0*   --   2.7*  3.0*   GLOB   --   3.8   --   4.1*  4.1*   AGRAT   --   0.5*   --   0.7*  0.7*        Lactic Acid Lactic acid   Date Value Ref Range Status   02/13/2018 1.8 0.4 - 2.0 MMOL/L Final     Recent Labs      02/13/18   0130  02/12/18   1726  02/12/18   0442   LAC  1.8  2.2*  2.3*        Micro  Recent Labs      02/13/18   0315  02/13/18   0310  02/11/18   1846   CULT  NO GROWTH AFTER 1 HOUR  NO GROWTH AFTER 1 HOUR  AEROBIC AND ANAEROBIC BOTTLES STAPHYLOCOCCUS AUREUS*     Recent Labs      02/13/18   0315  02/13/18   0310  02/11/18   1846   CULT  NO GROWTH AFTER 1 HOUR  NO GROWTH AFTER 1 HOUR  AEROBIC AND ANAEROBIC BOTTLES STAPHYLOCOCCUS AUREUS*        ABG Recent Labs      02/13/18   0455  02/12/18   1734  02/12/18   1155   PHI  7.433  7.390  7.406   PCO2I  26.5*  33.3*  32.6*   PO2I  119*  85  84   HCO3I  17.3*  20.1*  20.5*   FIO2I  0.40  40  28        Liver Enzymes Protein, total   Date Value Ref Range Status   02/13/2018 5.8 (L) 6.4 - 8.2 g/dL Final     Albumin   Date Value Ref Range Status   02/13/2018 2.0 (L) 3.4 - 5.0 g/dL Final     Globulin   Date Value Ref Range Status   02/13/2018 3.8 2.0 - 4.0 g/dL Final     A-G Ratio   Date Value Ref Range Status   02/13/2018 0.5 (L) 0.8 - 1.7   Final     AST (SGOT)   Date Value Ref Range Status   02/13/2018 80 (H) 15 - 37 U/L Final     Alk.  phosphatase   Date Value Ref Range Status   02/13/2018 54 45 - 117 U/L Final     Recent Labs      02/13/18   0310  02/12/18   0442  02/11/18   2045   TP  5.8*  6.8  7.1   ALB  2.0*  2.7*  3.0*   GLOB  3.8  4.1*  4.1*   AGRAT  0.5*  0.7*  0.7*   SGOT  80*  133*  107*   AP  54  46  57        Cardiac Enzymes Lab Results   Component Value Date/Time    CPK 1354 (H) 02/13/2018 03:10 AM    CPK 3318 (H) 02/12/2018 10:05 AM    TROIQ 1.96 () 02/13/2018 03:10 AM    TROIQ 1.33 () 02/12/2018 08:00 PM    TROIQ 1.57 () 02/12/2018 05:26 PM    TROIQ 2.81 ([facility]) 02/12/2018 10:05 AM        BNP No results found for: BNP, BNPP, XBNPT     Coagulation Recent Labs      02/13/18   0640  02/12/18   2325  02/12/18   1726  02/12/18   1005   PTP   --    --    --   14.2   INR   --    --    --   1.2   APTT  75.6*  71.2*  50.8*  47.0*         Thyroid  Lab Results   Component Value Date/Time    TSH 0.43 02/12/2018 10:05 AM          Lipid Panel Lab Results   Component Value Date/Time    Cholesterol, total 116 02/11/2018 08:45 PM    HDL Cholesterol 27 (L) 02/11/2018 08:45 PM    LDL, calculated 57.4 02/11/2018 08:45 PM    VLDL, calculated 31.6 02/11/2018 08:45 PM    Triglyceride 158 (H) 02/11/2018 08:45 PM CHOL/HDL Ratio 4.3 02/11/2018 08:45 PM          Urinalysis Lab Results   Component Value Date/Time    Color DARK YELLOW 02/11/2018 10:45 PM    Appearance CLOUDY 02/11/2018 10:45 PM    Specific gravity 1.019 02/11/2018 10:45 PM    pH (UA) 5.5 02/11/2018 10:45 PM    Protein 300 (A) 02/11/2018 10:45 PM    Glucose >1000 (A) 02/11/2018 10:45 PM    Ketone TRACE (A) 02/11/2018 10:45 PM    Bilirubin NEGATIVE  02/11/2018 10:45 PM    Urobilinogen 1.0 02/11/2018 10:45 PM    Nitrites NEGATIVE  02/11/2018 10:45 PM    Leukocyte Esterase NEGATIVE  02/11/2018 10:45 PM    Epithelial cells 0 01/08/2014 02:46 PM    Bacteria 2+ (A) 02/11/2018 10:45 PM    WBC 0 to 2 02/11/2018 10:45 PM    RBC 0 to 2 02/11/2018 10:45 PM        XR (Most Recent). CXR reviewed by me and compared with previous CXR   Results from Hospital Encounter encounter on 02/11/18   XR CHEST PORT   Narrative EXAM: AP portable chest    INDICATION: NG tube and ET tube placement    COMPARISON: February 12, 2018 at 1:10 AM    _______________    FINDINGS:    There is an endotracheal tube overlying the trachea 3.1 cm above the lorena. There is a orogastric sump pump tube with tip below the diaphragm and the tip is  off the edge of the film. Heart size is enlarged partly due to AP magnification. There are no effusions or  pneumothorax. There are increased interstitial lung markings. _______________         Impression IMPRESSION:    Increased lung markings. Tubes as described           CT (Most Recent)   Results from Hospital Encounter encounter on 02/11/18   CT CHEST ABD PELV WO CONT   Narrative COMPARISON: Prior abdomen and pelvic CT 5/27/2015    INDICATION: Sepsis, elevated liver enzymes, basilar airspace disease,  hypertension. TECHNIQUE:   Multislice helical CT was performed through the chest, abdomen and pelvis  without contrast. Coronal and sagittal reformations were generated.      Dose reduction: One or more dose reduction techniques were used on this CT:  automated exposure control, adjustment of the mAs and/or kVp according to  patient's size, and iterative reconstruction techniques. The specific techniques  utilized on this CT exam have been documented in the patient's electronic  medical record.      ==========    FINDINGS:    ------------------    CHEST:    LUNGS: There are multiple peripheral subpleural airspace nodular densities  bilaterally, the largest 15 mm right apex, with several smaller nodular  densities surrounding right apex, as well as involvement posterior superior  segment right lower lobe, and lingula. There are streaky areas of parenchymal  density bilateral lower lobes and lingula. PLEURA: Very small bilateral pleural effusions. AIRWAY: Normal.    MEDIASTINUM: Diffuse cardiac enlargement without pericardial effusion. No  abnormally enlarged lymph nodes with thyroid unremarkable. There is suggestion  of diffuse esophageal wall thickening. OTHER: Several chronic left-sided rib fractures with multilevel degenerative  thoracic spondylosis with no acute osseous finding.    ------------------    ABDOMEN/PELVIS:    LIVER/BILIARY: No suspicious liver lesion. No biliary dilation. Small layering  hyperdensity dependent gallbladder without gallbladder dilatation or surrounding  inflammation. SPLEEN: Normal.    PANCREAS: Normal.    ADRENALS: Normal.    KIDNEYS: Several stable small bilateral renal cysts, nonobstructing tiny  calculus right lower pole kidney. No hydronephrosis with stable nonspecific mild  perirenal stranding. LYMPH NODES: No new abnormally enlarged lymph nodes with stable small yenifer  hepatis lymph nodes. GI TRACT: There is dilated stomach and mildly dilated duodenum with air-fluid  levels without evidence of obstructing lesion proximal small bowel with no  jejunal dilatation. No other small or large bowel dilatation or wall thickening. Normal-appearing appendix. No ascites or free air.     VASCULATURE: Moderate aortoiliac atherosclerotic changes. PELVIC ORGANS: Prostate not significantly enlarged, with Wilson catheter in place  with nondistended bladder. OTHER: Small sclerotic focus right pubic symphysis stable since prior study. Lower lumbar facet arthropathy asymmetric narrowing most pronounced on the right  L4/5, with no new acute osseous finding.    ==========         Impression IMPRESSION:        1. Mild areas of atelectasis and/or scarring lower lobes and lingula with very  small pleural effusions. 2. Multifocal peripheral bilateral subpleural nodular densities the largest 15  mm right apex, which may represent peripheral areas of pneumonia or other  inflammatory etiology, although differential diagnosis includes less likely  metastatic disease or infarcts from pulmonary emboli. Follow-up chest CT  recommended in one month following treatment. 3. Diffuse esophageal wall thickening most likely related to esophagitis. 4. Cholelithiasis without CT evidence of acute cholecystitis. 5. Dilated stomach and duodenum with air-fluid levels without evidence of  mechanical obstruction, most likely related to ileus. 6. Stable additional ancillary findings as above. EKG No results found for this or any previous visit. ECHO  2/12/18 [facility] SUMMARY:  Left ventricle: Systolic function was normal. Ejection fraction was estimated   in the range of 55 % to 60 %. There was  mild hypokinesis of the basal inferior wall(s). There was moderate concentric   hypertrophy. Right ventricle: The size was normal. Systolic function was normal.    Inferior vena cava, hepatic veins: The inferior vena cava was dilated. The   respirophasic change in diameter was more  than 50%.            Imaging:  I have personally reviewed the patients radiographs and have reviewed the reports:     Best practice :  Fluids started at 30 ml/kg with aim to keep CVP 8 or above  Lactic acid ordered- initial and repeat Q6hrs if elevated till normalized. Cultures drawn. Antibiotic administered within 1hr-ICU  And 3hrs ED  Glycemic control  Mech. Ventilated patients- aim to keep peak plateau pressure 26-03TF H2O. Sress ulcer prophylaxis  DVT prophylaxis    Vent bundle, Wilson bundle and central line bundle followed. Cornelio Hoyos M.D.   Pulmonary Critical Care & Sleep Medicine

## 2018-02-13 NOTE — PROGRESS NOTES
0715 - Bedside and Verbal shift change report given to Adalgisa Liang RN (oncoming nurse) by Negro Christiansen RN (offgoing nurse). Report included the following information SBAR, Kardex, Intake/Output, MAR, Recent Results and Cardiac Rhythm AFib. Drips verified with offgoing RN, Heparin at 8units/kg/hr, Amiodarone 0.5mg/min, Bicarb drip 100ml/hr, Fentanyl 150mcg/hr, Versed 4mg/hr, Jeffry-synephrine 70mcg/min.   0800 - Pt assessed and vitals obtained. Pt in no acute distress. Opens eyes and follows commands. HR rate still uncontrolled at this time, but BP more stable. See EMR for full details  56 - Wife called for consent to complete TERRY and possible DCCV. Consent obtained. 1045 -  Intersdisciplinary rounds completed: Continue with intubation and mcdonald, continue antibiotics awaiting ID input and culture growth, awaiting cardiology recommendations and interventions. Nutrition to begin feeding post TERRY.  1200 - Pt assessed and vitals unchanged, pt is unchanged at this time. No acute distress, HR still uncontrolled. No visible signs of pain. See EMR for full details. 1242 - TERRY began by Dr Nilsa Lockwood and Cardiac Sonographer  1304 - 200 joules shock delivered, EKG confirms ST.   1600 - Pt assessed and vitals obtained. No changes at this time. Wife at the bedside and tearful. RN offered chapalin services, wife denies at this time. See EMR for full details. 1900 - Bedside and Verbal shift change report given to Negro Christiansen RN (oncoming nurse) by Adalgisa Liang RN (offgoing nurse). Report included the following information SBAR, Kardex, Procedure Summary, Intake/Output, MAR, Accordion and Cardiac Rhythm ST. All drips verified with oncomming RN.

## 2018-02-14 NOTE — PROGRESS NOTES
Chart reviewed and PT order acknowledged. In IDR, MD recommended hold from skilled PT services at this time. Will follow up tomorrow to assess for appropriateness for PT evaluation.

## 2018-02-14 NOTE — PROGRESS NOTES
NUTRITION UPDATE    - Adjusting PRO estimated needs based on kidney function-PRO estimated needs based on 1.2kg/kcal of IBW=97g  Recc only 1x/day of [address] [place] [address]  PAGER:  [phone]

## 2018-02-14 NOTE — PROGRESS NOTES
Occupational Therapy Evaluation Attempt/Discharge    Chart reviewed. Attempted Occupational Therapy Evaluation/Treatment, however, patient with significant change in status from time of orders placed. Pt transferred to ICU with sepsis with progressively getting worse, requiring intubation, now w/ MRSA in blood. Attempting MRI and consulting Palliative Care. Per Dr. Sussy Us, sign off until pt more stable with improved prognosis. Will sign off. Thank you for allowing me to assist in the care of this patient.   Olivia Acosta, OTR/L

## 2018-02-14 NOTE — CONSULTS
[id number] [facility] with [facility]    Primo Woodson  MR#: [id number]  : 1955  ACCOUNT #: [de-identified]   DATE OF SERVICE: 2018    REFERRING PHYSICIAN:  Dr. Tc Naik. REASON FOR CONSULTATION:  Acute renal failure. HISTORY OF PRESENT ILLNESS:    The patient is a 42-year-old  male with a history of hypertension, diabetes, hyperlipidemia, gout and arthritis who presented with a fever and multiple falls. On initial evaluation, he had borderline low blood pressure, and normal white cell count, but elevated creatinine at 2.5. Blood culture was positive and started on broad spectrum antibiotics, on IV fluid and IV pressors. A culture finally came back pos for MRSA. His preadmission baseline creatinine back in  was 1.2. On admission, it was 2.5. It continues to increase gradually, today it jumped up to 4.06. Of note, the patient also had slightly elevated CPK level on admission, which is trending down now. CAT scan did not show any significant finding as a possible source. Echocardiography was negative for valvular vegetation. Patient is currently intubated in ICU on pressors. He is also sedated. No further history could be obtained at this point. ASSESSMENT AND PLAN:  1. Acute kidney injury, likely acute tubular necrosis from septic shock. Creatinine trending up from 2.5 on admission, to 4.06 today. Patient also becoming oliguric. 2.  Methicillin-resistant Staphylococcus aureus septic shock, unclear source, oral antibiotics, IV pressor, IV fluid. 3.  Hypophosphatemia. 4.  Hypokalemia, corrected. 5.  Acidosis, mixed respiratory and metabolic. 6.  Mild rhabdomyolysis, improving with a CK down to 647 from high of 3300.  7. Anemia. 8.  Thrombocytopenia  9. Diabetes mellitus. 10.  Morbid obesity. 11.  Respiratory failure, intubated. RECOMMENDATIONS:  1. Continue current resuscitation with IV pressors. 2.  Continue IV sodium bicarbonate fluids for now. Will give Lasix 100 mg x1 as he is getting congested. 3.  If no significant improvement by tomorrow, we might need to cut down his IV fluid and rely more on his IV pressors to maintain hemodynamics. 4.  No urgent indication for hemodialysis at this point, but if kidney function and oliguria continues to get worse, he will need it over the coming couple days. 5.  Monitor I's and O's strictly. 6.  Avoid potentially nephrotoxic agents including NSAIDs, contrast today. 7.  Replace electrolytes as needed. ADDENDUM   Acidosis and pulm congestion getting worse, discussed with Critical care team     Will initiate HD today for 2 hrs after temp cath placement   Repeat HD tomorrow for 3 hrs           PAST MEDICAL AND  SURGICAL HISTORIES:  1.  Osteoarthritis. 2.  Morbid obesity. 3.  Hypertension. 4.  Hyperlipidemia. 5.  Gout. 6.  GERD. 7.  Diabetes mellitus. 8.  History of knee arthroplasty. 9.  History of carpal tunnel release. CURRENT IN-HOSPITAL MEDICATIONS:  1. Aspirin. 2.  Folic acid. 3.  Insulin Lantus. 4.  Insulin lispro. 5.  Levaquin. 6.  Magnesium sulfate. 7.  Protonix. 8.  Potassium chloride. 9.  Thiamine. 10.  Vancomycin. 11.  Amiodarone. 12.  Norepinephrine. 13.  Jeffry-Synephrine. ALLERGIES:  DILAUDID AND PENICILLINS. FAMILY HISTORY:  Could not be obtained at this point. Patient is sedated and intubated. SOCIAL HISTORY:  He is a former smoker. He drinks alcohol regularly, but no illicit drug use. REVIEW OF SYSTEMS:  Could not be obtained at this point as patient is intubated and sedated. PHYSICAL EXAMINATION:    VITAL SIGNS:  Blood pressure is 113/47, pulse is 117. Oxygen saturation is 96%, temperature 99.9. GENERAL APPEARANCE:  Patient is intubated and sedated. HEENT:  Head is atraumatic. Eyes:  Pupils reactive to light. ENT:  Moist oral mucosa. The patient is intubated. LUNGS:  Decreased air entry all over with no crackles, no wheezing.   HEART:  S1, S2 tachycardic. ABDOMEN:  Morbidly obese. Bowel sounds are active. GENITOURINARY:  Has a Wilson catheter. SKIN:  No rash. EXTREMITIES:  No significant edema. NEUROLOGIC:  Patient is sedated. LABORATORY DATA:  I reviewed his labs. I thank you for the consult and will follow patient along with you.       Alicia Vargas MD       [facility] / RN  D: 02/14/2018 10:07     T: 02/14/2018 10:49  JOB #: [id number]

## 2018-02-14 NOTE — PROGRESS NOTES
Hospitalist Progress Note    Patient: Kamila Jiménez MRN: [id number]  CSN: [id number]    YOB: 1955  Age: 57 y.o. 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 Kristine Tejal    Back pain M54.9    Weakness of right lower extremity R29.898    Anemia D64.9    Shock ([address] R57.9    Acute respiratory failure ([address] J96.00        58 yo male admitted for pneumonia, respiratory distress. RRT called on this patient for respiratory distress in morning of 02/12/2018, high mews score of 8, fever of 103, tachycardia, resp rate of 44. He was transferred to ICU. Patient continued to decline in condition with elevated resp rate. He was intubated and central line placed. CRITICAL CARE PLAN     Resp -   Acute respiratory failure - intubated and sedated. Vent management per pulmonary      ID -   Sepsis unclear source of infection. Recent pneumonia. Blood cultures 02/11/2018 positive for MRSA. blood cx x 2 02/13/2018 with growth of staph aureus. Follow up blood cultures x 2 02/14/2018. resp cultures 02/13/2018 staph aureus  Urine culture 02/12/2018 no growth  CT chest Multifocal peripheral bilateral subpleural nodular densities the largest 15  mm right apex, which may represent peripheral areas of pneumonia or other  inflammatory etiology, although differential diagnosis includes less likely  metastatic disease or infarcts from pulmonary emboli. S/p TERRY with no evidence of vegetations. MRI per ID,     ANTIBIOTICS vancomycin, levaquin. .        CVS - Monitor HD. Septic shock - On bony, levophed. wean as tolerated. A-fib - on amiodarone drip. S/p cardioversion. Converted to sinus rhythm. Elevated troponin - demand ischemia vs NSTEMI. Will need ischemia work up when stable per cardiology. On heparin drip     Heme/onc - Follow H&H, plts.      Renal -   AMANDA - creatine worsening, HD per nephrology. Metabolic acidosis - started on bicarb drip  Trend BUN, Cr, follow I/O. Check and replace Mg, K, phos.     Endocrine -  Follow FSG  DM - on lantus and SSI     Neuro/ Pain/ Sedation -   RLE weakenss/generalized weakness. Head CT negative       GI - diabetic diet.     Prophylaxis - DVT: heparin drip, GI: protonix.     55 minutes of critical care time spent in the direct evaluation and treatment of this high risk patient. The reason for providing this level of medical care for this critically ill patient was due a critical illness that impaired one or more vital organ systems such that there was a high probability of imminent or life threatening deterioration in the patients condition. This care involved high complexity decision making to assess, manipulate, and support vital system functions, to treat this degreee vital organ system failure and to prevent further life threatening deterioration of the patients condition.           Disposition : TBD    Physical Exam:  General: As above  HEENT: NC, Atraumatic. PERRLA, anicteric sclerae. Lungs: CTA Bilaterally. No Wheezing/Rhonchi/Rales.   Heart:  Regular  rhythm,  No murmur, No Rubs, No Gallops  Abdomen: Soft, Non distended, Non tender.  +Bowel sounds,   Extremities: No c/c/e            Vital signs/Intake and Output:  Visit Vitals    BP (!) 88/46    Pulse (!) 101    Temp 99.9 °F (37.7 °C)    Resp 23    Ht 6' (1.829 m)    Wt 144.8 kg (319 lb 3.6 oz)    SpO2 95%    BMI 43.29 kg/m2     Current Shift:     Last three shifts:  02/12 1901 - 02/14 0700  In: 5615.8 [I.V.:5380.8]  Out: 1400 [Urine:1400]            Labs: Results:       Chemistry Recent Labs      02/14/18   0630  02/13/18   1715  02/13/18   0640 02/13/18 0310 02/12/18 0442   GLU  149*   --    --   178*   --   257*   NA  136   --    --   138   --   136   K  3.9  4.0  3.2*  3.2*   < >  2.5*   CL  103   --    --   103   --   99*   CO2  23   --    --   20*   --   21   BUN  57*   --    --   44*   --   32*   CREA  4.06*   --    --   2.69*   --   2.73*   CA  7.4*   --    --   8.0*   --   9.0   AGAP  10   --    --   15   --   16   BUCR  14   --    --   16   --   12   AP  62   --    --   54   --   46   TP  5.3*   --    --   5.8*   --   6.8   ALB  1.5*   --    --   2.0*   --   2.7*   GLOB  3.8   --    --   3.8   --   4.1*   AGRAT  0.4*   --    --   0.5*   --   0.7*    < > = values in this interval not displayed. CBC w/Diff Recent Labs      02/13/18 0310 02/12/18   1726  02/12/18   0442  02/11/18   1845   WBC  4.9  5.7  6.6  5.9   RBC  3.29*  3.17*  3.51*  3.51*   HGB  10.1*  9.9*  10.8*  11.2*   HCT  29.8*  29.4*  32.3*  32.9*   PLT  130*  116*  149  191   GRANS   --   76*   --   81*   LYMPH   --   9*   --   9*   EOS   --   0   --   0      Cardiac Enzymes Recent Labs      02/14/18   0630  02/13/18   0310   CPK  647*  1354*      Coagulation Recent Labs      02/14/18   0630  02/13/18   0640   02/12/18   1005   PTP  16.9*   --    --   14.2   INR  1.4*   --    --   1.2   APTT  53.6*  75.6*   < >  47.0*    < > = values in this interval not displayed. Lipid Panel Lab Results   Component Value Date/Time    Cholesterol, total 116 02/11/2018 08:45 PM    HDL Cholesterol 27 (L) 02/11/2018 08:45 PM    LDL, calculated 57.4 02/11/2018 08:45 PM    VLDL, calculated 31.6 02/11/2018 08:45 PM    Triglyceride 158 (H) 02/11/2018 08:45 PM    CHOL/HDL Ratio 4.3 02/11/2018 08:45 PM      BNP No results for input(s): BNPP in the last 72 hours.    Liver Enzymes Recent Labs      02/14/18   0630   TP  5.3*   ALB  1.5*   AP  62   SGOT  48*      Thyroid Studies Lab Results   Component Value Date/Time    TSH 0.43 02/12/2018 10:05 AM        Procedures/imaging: see electronic medical records for all procedures/Xrays and details which were not copied into this note but were reviewed prior to creation of Plan

## 2018-02-14 NOTE — PROGRESS NOTES
0715 - Bedside and Verbal shift change report given to Romeo Madrid RN (oncoming nurse) by Marshal Harris RN (offgoing nurse). Report included the following information SBAR, Kardex, Procedure Summary, Intake/Output, Accordion, Recent Results, Med Rec Status and Cardiac Rhythm ST/NSR. Drips verified with offcoming RN.  Amio at 0.5, Heparin bolus given 3,000 units and drip increased to 10units/kg/hr, Jeffry at 100mcg/min, Fentanyl at 50mcg/hr, Versed at 3mg/hr, and Sodium bicarb at 100

## 2018-02-14 NOTE — PROGRESS NOTES
Chart reviewed attended IDR's palliative care consult placed for decision making,cm will cont to review and remain available, pt remains on vent at this time.

## 2018-02-14 NOTE — PROGRESS NOTES
Received multiple calls from nursing first [place] than charge nurse Abram grigsby. About MRI and drips    Explained at length that patient can go to MRI with phenylephrine and amiodarone drip and when back other drips can be resumed. MRI ordered per ID request and if nursing feels they can not do it will need to discuss with ID about that. Again patient condition is detoriorating and I do not expect recovery by tomorrow to get those studies done If Id feels these studies are important to be done that can be done with above recommendations    Will defer to nursing and ID       CHARLY Pollack 177.  Suzanna Woodall [address] [address]  Phone [phone]   Fax [phone]  Pulmonary Critical Care & Sleep Medicine

## 2018-02-14 NOTE — PROGRESS NOTES
Cardiology Progress Note        Patient: Percy Patel        Sex: male          DOA: 2/11/2018  YOB: 1955      Age:  57 y.o.        LOS:  LOS: 2 days   Assessment/Plan     Principal Problem:    Sepsis ([address] (2/11/2018)    Active Problems:    Osteoarthritis of right knee (1/18/2014)      Hypokalemia (2/11/2018)      Glucosuria (2/11/2018)      Acute kidney injury ([address] (2/11/2018)      Fall (2/11/2018)      Back pain (2/11/2018)      Weakness of right lower extremity (2/11/2018)      Anemia (2/11/2018)      Shock ([address] (2/13/2018)      Acute respiratory failure ([address] (2/13/2018)        Plan:  Marinating NSR now  On HD for worsening acidosis/renal failure  Continue with amiodarone and heparin   Will need ischemia work up when stable with stress test or LHC/RHC  Discussed with Dr. Marco A Marc. Subjective:    cc:  intubated      REVIEW OF SYSTEMS: unable to obtained because of intubation  Objective:      Visit Vitals    /47    Pulse (!) 117    Temp 99.9 °F (37.7 °C)    Resp 20    Ht 6' (1.829 m)    Wt 144.8 kg (319 lb 3.6 oz)    SpO2 96%    BMI 43.29 kg/m2     Body mass index is 43.29 kg/(m^2). Physical Exam:  General Appearance: Comfortable, intubated  HEENT: TERRANCE. HEAD: Atraumatic  NECK: No JVD, no thyroidomeglay. LUNGS: Clear bilaterally. HEART: S1+S2 audible, n    ABD: Non-tender, BS Audible    EXT: No edema, and no cysnosis. VASCULAR EXAM: Pulses are intact.           Medication:  Current Facility-Administered Medications   Medication Dose Route Frequency    furosemide (LASIX) injection 100 mg  100 mg IntraVENous ONCE    acetaminophen (TYLENOL) solution 650 mg  650 mg Oral Q4H PRN    aspirin chewable tablet 81 mg  81 mg Oral DAILY    pantoprazole (PROTONIX) 40 mg in sodium chloride 0.9 % 10 mL injection  40 mg IntraVENous DAILY    insulin glargine (LANTUS) injection 15 Units  15 Units SubCUTAneous DAILY WITH LUNCH    HYDROcodone-acetaminophen (NORCO) 5-325 mg per tablet 1 Tab  1 Tab Oral Q4H PRN    morphine injection 1 mg  1 mg IntraVENous Q4H PRN    naloxone (NARCAN) injection 0.4 mg  0.4 mg IntraVENous PRN    docusate sodium (COLACE) capsule 100 mg  100 mg Oral BID PRN    acetaminophen (TYLENOL) tablet 650 mg  650 mg Oral Q4H PRN    vancomycin (VANCOCIN) 1500 mg in  ml infusion  1,500 mg IntraVENous Q24H    levoFLOXacin (LEVAQUIN) 500 mg in D5W IVPB  500 mg IntraVENous Q24H    ELECTROLYTE REPLACEMENT PROTOCOL-MAGNESIUM  1 Each Other PRN    folic acid (FOLVITE) tablet 1 mg  1 mg Oral DAILY    sodium bicarbonate (8.4%) 50 mEq in dextrose 5 % - 0.45% NaCl 1,000 mL infusion   IntraVENous CONTINUOUS    ELECTROLYTE REPLACEMENT PROTOCOL-MAGNESIUM  1 Each Other PRN    ELECTROLYTE REPLACEMENT PROTOCOL-PHOSPHORUS  1 Each Other PRN    ELECTROLYTE REPLACEMENT PROTOCOL-CALCIUM  1 Each Other PRN    thiamine 100 mg in 50 mL NS   IntraVENous Q24H    Vancomycin- Rx to Dose & Monitor  1 Each Other Rx Dosing/Monitoring    heparin 25,000 units in D5W 250 ml infusion  7-25 Units/kg/hr IntraVENous TITRATE    chlorhexidine (PERIDEX) 0.12 % mouthwash 10 mL  10 mL Oral Q12H    fentaNYL (PF) 900 mcg/30 ml infusion soln  0-200 mcg/hr IntraVENous TITRATE    midazolam in normal saline (VERSED) 100 mg in 0.9% sodium chloride (MBP/ADV) 100 mL infusion  2-6 mg/hr IntraVENous CONTINUOUS    PHENYLephrine (ERWIN-SYNEPHRINE) 30 mg in 0.9% sodium chloride 250 mL infusion   mcg/min IntraVENous TITRATE    amiodarone (NEXTERONE) 360 mg in dextrose 200 mL (1.8 mg/mL) infusion  0.5-1 mg/min IntraVENous TITRATE    insulin lispro (HUMALOG) injection   SubCUTAneous Q6H    NOREPINephrine (LEVOPHED) 8 mg in 5% dextrose 250mL infusion  2-16 mcg/min IntraVENous TITRATE    ELECTROLYTE RPLACEMENT PROTOCOL- POTASSIUM  1 Each Other PRN    sodium chloride (NS) flush 5-10 mL  5-10 mL IntraVENous PRN    ondansetron (ZOFRAN ODT) tablet 4 mg  4 mg Oral Q4H PRN    glucose chewable tablet 16 g  4 Tab Oral PRN    glucagon (GLUCAGEN) injection 1 mg  1 mg IntraMUSCular PRN    dextrose (D50W) injection syrg 12.5-25 g  25-50 mL IntraVENous PRN               Lab/Data Reviewed: All lab results for the last 24 hours reviewed. Recent Labs      02/13/18   0310  02/12/18   1726  02/12/18   0442   WBC  4.9  5.7  6.6   HGB  10.1*  9.9*  10.8*   HCT  29.8*  29.4*  32.3*   PLT  130*  116*  149     Recent Labs      02/14/18   0630  02/13/18   1715  02/13/18   0640  02/13/18   0310   02/12/18   0442   NA  136   --    --   138   --   136   K  3.9  4.0  3.2*  3.2*   < >  2.5*   CL  103   --    --   103   --   99*   CO2  23   --    --   20*   --   21   GLU  149*   --    --   178*   --   257*   BUN  57*   --    --   44*   --   32*   CREA  4.06*   --    --   2.69*   --   2.73*   CA  7.4*   --    --   8.0*   --   9.0    < > = values in this interval not displayed.        Signed By: Taisha Frank MD     February 14, 2018

## 2018-02-14 NOTE — PROCEDURES
Melita Raymundo M.D  [address] [facility] [address] [address]  Phone [phone]   Fax [phone]  [facility] no [phone]     Pulmonary Critical Care & Sleep Medicine           Indication: Need for temporary dialysis    Risks, benefits, alternatives explained and consent obtained. Heparin was stopped for procedure   Patient positioned in Trendelenburg. Dialysis line Bundle:  Full sterile barrier precautions used. 7-Step Sterility Protocol followed. (cap, mask sterile gown, sterile gloves, large sterile sheet, hand hygiene, 2% chlorhexidine for cutaneous antisepsis)  5 mL 1% Lidocaine placed at insertion site. Using ultrasound guidance,   Right internal jugular cannulated x 1 attempt(s) utilizing the modified Seldinger technique. no  Good blood return. Catheter secured & Biopatch applied. Sterile Tegaderm placed. CXR pending.       Parviz Lainez MD

## 2018-02-14 NOTE — DIABETES MGMT
GLYCEMIC CONTROL & NUTRITION:    - Discussed in rounds, known h/o DM, on dual oral therapy at home, current A1C 5.8%  - basal insulin, POCT + Humalog orders in place, recommend continue current regimen  - noted TF @ 30 ml/hr today, goal is 40 ml/hr, anticipate insulin needs to increase with nutrition on board, will continue to monitor BG and make recommendations based on overnight trends     Recent Glucose Results:   Lab Results   Component Value Date/Time     (H) 02/14/2018 06:30 AM    GLUCPOC 188 (H) 02/14/2018 12:01 PM    GLUCPOC 138 (H) 02/14/2018 05:53 AM    GLUCPOC 124 (H) 02/13/2018 11:17 PM     Lab Results   Component Value Date/Time    Hemoglobin A1c 5.8 (H) 02/11/2018 06:45 PM               Louis Ayoub, MPH, RD, CDE

## 2018-02-14 NOTE — CDMP QUERY
Please clarify if this patient is being treated/managed for:    => Demand ischemia in setting of elevated troponins  =>Other Explanation of clinical findings  =>Unable to Determine (no explanation of clinical findings)    The medical record reflects the following:    Risk:  Clinical Indicators:2/12/2018 10:05  Troponin-I, Qt.: 2.81 ()    2/12/2018 17:26  Troponin-I, Qt.: 1.57 ()    2/12/2018 20:00  Troponin-I, Qt.: 1.33 ()    2/13/2018 03:10  Troponin-I, Qt.: 1.96 ([facility])    Treatment: Card consult    Please clarify and document your clinical opinion in the progress notes and discharge summary including the definitive and/or presumptive diagnosis, (suspected or probable), related to the above clinical findings. Please include clinical findings supporting your diagnosis. If you DECLINE this query or would like to communicate with [facility], please utilize the \"[employer] message box\" at the TOP of the Progress Note on the right.       Thank you,    Beverley Dubose RN, CDMP [phone]

## 2018-02-14 NOTE — PROGRESS NOTES
1930- Report and care received, assessment completed per flowsheet. Intubated and sedated, briefly responds to verbal stimuli, does not make eye contact or follow commands. Soft bilateral wrist restraints in place to protect integrity of lines/tubes, flow sheet in progress. IVF infusing via IV pumps without difficulty. 2300- Reassessment completed and without change. 0400- Reassessment completed and without change.

## 2018-02-14 NOTE — PROGRESS NOTES
Due to medical complexity and worsening in condition, will d/c PT orders at this time. Please reorder in future if medically appropriate.

## 2018-02-14 NOTE — PROGRESS NOTES
ABG still acidotic  PH is 7.27  PCO2 is 42    Plan:  Increase RR to 24  Add bicarb drip   Nursing is informed and still pending on MRI    Addendum  Spoke with wife at length  BP is dropping   Now will be started on levophed  We advised nursing to give 25gm albumin   Also start Bicarb ASAP  Overall continue to decline    Additional CCM time 25 min

## 2018-02-14 NOTE — PROGRESS NOTES
Patient continue to decline BP was dropping with acidosis I think he might get worse     Will plan Dialysis catheter    Spoke with renal to consider dialysis today   Will hold heparin and place catheter under US guidance      CHARLY Sloan 177.  [facility] [place] [address]  Phone [phone]   Fax [phone]  Pulmonary Critical Care & Sleep Medicine

## 2018-02-14 NOTE — PROGRESS NOTES
ID follow up note   Chart reviewed remotely  Patient continues to be febrile 103F on 2/13 and 99.9F today but continues to be tachycardic   Still on amioderone drip and 1 pressor  2/13/18 blood culture 2 sets positive for staph aureus again   2/11/18  blood culture positive for MRSA with vancomycin DIMITRIS 1   2/13 sputum culture in process  Creatinine increased to 4 .0 to 2.6  And Wbc within in normal limit  TERRY did not show thrombus or vegetation  Today CXR shows pulmonary edema and left lung base opacitiy and effusion is stable.        Plan   Will continue iv vancomycin with expected trough 15  To 20  With worsening creatinine , check vancomycin level and monitor closely   Discontinue azactam  Plan to discontinue Levaquin by tomorrow if no other GNR positive in cultures   Repeat 2 set of blood cultures today  Follow up sputum culture from 2/13  Will need to  Consider MRI of throracic and lumbar spine to look for source of MRSA sepsis  Discussed with ICU service over the phone     2025 Jb Haddad MD  Infectious diseases specialist   Pager [phone]

## 2018-02-14 NOTE — PROGRESS NOTES
Spoke c Dr. Terald Romberg who ordered MRI of Lumbar and Thoracic Spine. At this time patient is requiring 7 gtts and our MRI IV pump is only able to run 2 IVF at a time. Dr. Terald Romberg expressed he would like the test and to stop all sedation and take only Amiodarone and one presser. Patient is requiring 2 pressers at this time, Bicarb, Heparin, and sedation medication. Given the study will take at least 2 hours (possibly more if patient becomes restless), at this time it would unsafe and inappropriate to have this testing done as we cannot insure safety of lines, tubes and risk for hypotension. I did speak with Dr. Carli Wells who at this time stated the MRI was a recommendation and could be done once the patient became more stable. At this time Dr. Terald Romberg does not want to cancel the test, but nursing will be unable to take the patient to MRI in his current condition. This will also be discussed with Dr. Noble Santiago. MRI tech also aware.

## 2018-02-14 NOTE — PROGRESS NOTES
Ro Chung M.D  27 [facility] 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 XXXA    Back pain M54.9    Weakness of right lower extremity R29.898    Anemia D64.9    Shock (HCC) R57.9    Acute respiratory failure (HCC) J96.00 ·   Sepsis ? Source ? Related to lll air space vs endocarditis as CT showed concern for septic embolie . Conrad Salazar MRSA in blood TERRY negative for endocarditis  · Septic shock on phenylephrine  · Elevated troponin ? Demand ischemia vs NSTMI   · Respiratory failure intubated on   · Acute renal failure with worsening noted today  · Rhabdo improving  · Afib poorly controlled S/P cardioversion and on amio and heparin drip  · Back pain fall  · Severe hypokalemia hypomagnesemia improving  · Elevated blood sugars better controlled   · Lactic acidosis improved  · H/O ETOH    PLAN:  - RPT culture to see clearance  - Continue Amio taper phenylephrine as tolerated  - Increase RR to 20 and RPT abg in 1 hr due to acidosis  - DC azactam as no GN  - DC levaquin tomorrow if continue to show MRSA  - MRSA isolation  - MRI head and Spine   - Nephro consult  Monitor PLT  Monitor for ETOH withdrawal   CVS:Currently on Amiodarone drip, Heparin drip, Phenylephrine follow on cardiology recommendations.  Taper phenylephrine as tolerated keep MAP around 65. Follow on cardiology might need some work up once recover  Once BP permits will add betablocker  On aspirin  S/P cardioversion with 200 j on 2/13  RS: On vent protocol, ABG showed some acidosis combined increase RR to 20 rpt abg, , Aspiration precaution, LLL air space ? Due to septic embolie and infract vs pnemonia, Send sputum culture. ID:Sepsis ? Etiology, Staph in blood . . ID recommended as MRSA to stop azactam will get MRI as advised   Vanco 2/11  Azactam 2/11 stop on 2/14  On  levaquin 2/12 <Allergic to pcn> follow rpt culture sensitivity and adjust  Lactate is improved   Received > 2 liter fluid yesterday  RPT culture to see clearance   ENDO:Hold metformin, SSI TSH is ok bs controlled  GI:NG tube will consider started feeding . Monitor for illeus on fentanyl  RENAL:Wilson for I/O, Replace k and MG and replace per protocol. On 1/2 ns with bicarb at 100cc/hr continue for today. As CR getting worse will get Renal involved  Will also update family about patient condition   CNS: Sedated intubated wakes up with minimal stimulus MRI head pending  HEMATOLOGY:PLT id dropping monitor, HB stable no bleed at Femoral site PLT is stable today iNR is 1.4 and ok   MUSCULOSKELETAL:CK is elevated but improved   · PAIN AND SEDATION: On versed and fentanyl per protocol maintain RAAS -1, on thiamine and folic acid for ETOH  · Skin/Wound: Monitor back  · Electrolytes: Replace electrolytes per ICU electrolyte replacement protocol. · IVF: D5W 1/2 ns and 1 amp bicarb 100cc/hr follow renal   · Nutrition: Start feeding today   · Prophylaxis: DVT Prophylaxis with Heparin,. GI Prophylaxis. · Restraints: soft to avoid on pulling tube and lines  · PT/OT eval and treat. OOB when appropriate. · Lines/Tubes: none. Femoral line 2/12. Wilson 2/12/18  ADVANCE DIRECTIVE:Full code  FAMILY DISCUSSION:spoke with family  Quality Care: PPI, DVT prophylaxis, HOB elevated, Infection control all reviewed and addressed.   Events and notes from last 24 hours reviewed. Care plan discussed with nursing CC TIME:65  min excluding procedure    · Labs and images personally seen and available reports reviewed. · All current medicines are reviewed and doses and prescription adjusted. ABG still acidotic  PH is 7.27  PCO2 is 42    Plan:  Increase RR to 24  Add bicarb drip   Nursing is informed and still pending on MRI    Addendum  Spoke with wife at length  BP is dropping   Now will be started on levophed  We advised nursing to give 25gm albumin   Also start Bicarb ASAP  Overall continue to decline    Additional CCM time 15 min     Subjective/History:   Aj Mosher is a 57 y.o. male who came to the ed with complaints of fall. Patient states he has been feeling generalized weakness at home for the last day or so with low grade subjective fevers but yesterday he noted RLE weakness around 5pm. Since then he has fallen about 4 times in his back without any head trauma or LOC. He has been using his tripod cane to assist him with walking. Due to persistence of his weakness, falls, fevers and low back pain he decided to come to the ED. In the ED, CT of the head was neg and his labs showed significant hypoK with elevated Cr (unknown baseline). He was also febrile and tachycardic. He was started on broad spectrum Abx.      Overnight RRT called found to be tachycardic, tachypnic and ABG showed alkalosis and was transferred to ICU  Now patient is awake, denies any complains except some back pain   Tachycardic with HR in 120s   No nausea vomiting  Recently about 1 month a go treated for pna at office  No diarrhoea  Ex smoker  Drinks about 2 glasses of mix drinks every day  Recently lost his son to multiorgan failure and sepsis     2/13/18    Yesterday became more tachypnic and difficult to control hr  Intubated 2/12, femoral line 2/12 <neck line not placed due to concern for colonization with ongoing sepsis>  Became hypotensive managed with phenylephrine  Afib was poorly controlled started on amiodarone drip. Cardiology is closely following  Blood culture Staph  K 3.2 replaced  Mg 1.1 getting replaced   UO and Cr is improving  Still spiking fever 102   Flu is negative     2/14  No significant overnight event  S/P TERRY negative for vegetation or blood clot  S/P cardioversion  ID evaluated suggested MSSA sepsis but initial culture is growing MRSA.   Am labs showed worsening cr to 4.06 and PH 7.26  ALB 1.5  Currently on phenylephring 100, versed, amiodarone, fentanyl and bicarb drip    Current Facility-Administered Medications   Medication Dose Route Frequency    aspirin chewable tablet 81 mg  81 mg Oral DAILY    pantoprazole (PROTONIX) 40 mg in sodium chloride 0.9 % 10 mL injection  40 mg IntraVENous DAILY    insulin glargine (LANTUS) injection 15 Units  15 Units SubCUTAneous DAILY WITH LUNCH    vancomycin (VANCOCIN) 1500 mg in  ml infusion  1,500 mg IntraVENous Q24H    levoFLOXacin (LEVAQUIN) 500 mg in D5W IVPB  500 mg IntraVENous O40T    folic acid (FOLVITE) tablet 1 mg  1 mg Oral DAILY    sodium bicarbonate (8.4%) 50 mEq in dextrose 5 % - 0.45% NaCl 1,000 mL infusion   IntraVENous CONTINUOUS    thiamine 100 mg in 50 mL NS   IntraVENous Q24H    Vancomycin- Rx to Dose & Monitor  1 Each Other Rx Dosing/Monitoring    heparin 25,000 units in D5W 250 ml infusion  7-25 Units/kg/hr IntraVENous TITRATE    chlorhexidine (PERIDEX) 0.12 % mouthwash 10 mL  10 mL Oral Q12H    fentaNYL (PF) 900 mcg/30 ml infusion soln  0-200 mcg/hr IntraVENous TITRATE    midazolam in normal saline (VERSED) 100 mg in 0.9% sodium chloride (MBP/ADV) 100 mL infusion  2-6 mg/hr IntraVENous CONTINUOUS    PHENYLephrine (ERWIN-SYNEPHRINE) 30 mg in 0.9% sodium chloride 250 mL infusion   mcg/min IntraVENous TITRATE    amiodarone (NEXTERONE) 360 mg in dextrose 200 mL (1.8 mg/mL) infusion  0.5-1 mg/min IntraVENous TITRATE    insulin lispro (HUMALOG) injection SubCUTAneous Q6H    NOREPINephrine (LEVOPHED) 8 mg in 5% dextrose 250mL infusion  2-16 mcg/min IntraVENous TITRATE         Objective:   Vital Signs:    Visit Vitals    /47    Pulse (!) 117    Temp 99.9 °F (37.7 °C)    Resp 20    Ht 6' (1.829 m)    Wt 144.8 kg (319 lb 3.6 oz)    SpO2 96%    BMI 43.29 kg/m2       O2 Device: Endotracheal tube, Ventilator   O2 Flow Rate (L/min): 2 l/min   Temp (24hrs), Av.9 °F (38.3 °C), Min:99 °F (37.2 °C), Max:103 °F (39.4 °C)       Intake/Output:   Last shift:         Last 3 shifts:  1901 -  0700  In: 5615.8 [I.V.:5380.8]  Out: 1400 [Urine:1400]    Intake/Output Summary (Last 24 hours) at 18 0918  Last data filed at 18 4062   Gross per 24 hour   Intake          2919.99 ml   Output              650 ml   Net          2269.99 ml       Review of Systems:  Intubated sedated     Objective:    General: comfortable, In minimal distress   HEENT: pupils reactive, sclera anicteric, EOM intact  Neck: No adenopathy or thyroid swelling, no lymphadenopathy or JVD, supple  CVS: S1S2 no murmurs  RS: Mod AE bilaterally, minimal rales on right side  Abd: soft, non tender, distended sluggish bowel sound  Neuro: non focal, awake, alert  Extrm: no leg edema, Right knee scar  Lymph node: No obvious palpable lymph node appreciated.   Skin: no rash  CBC w/Diff Recent Labs      18   0310  18   1726  18   0442  18   1845   WBC  4.9  5.7  6.6  5.9   RBC  3.29*  3.17*  3.51*  3.51*   HGB  10.1*  9.9*  10.8*  11.2*   HCT  29.8*  29.4*  32.3*  32.9*   PLT  130*  116*  149  191   GRANS   --   76*   --   81*   LYMPH   --   9*   --   9*   EOS   --   0   --   0        Chemistry Recent Labs      18   0630  18   1740  18   1715  18   0640  18   0310   18   0442   GLU  149*   --    --    --   178*   --   257*   NA  136   --    --    --   138   --   136   K  3.9   --   4.0  3.2*  3.2*   < >  2.5*   CL  103   --    --    -- 103   --   99*   CO2  23   --    --    --   20*   --   21   BUN  57*   --    --    --   44*   --   32*   CREA  4.06*   --    --    --   2.69*   --   2.73*   CA  7.4*   --    --    --   8.0*   --   9.0   MG  2.0   --   1.8   --   1.9   < >  1.0*   PHOS  2.3*  2.0*   --    --    --    --    --    AGAP  10   --    --    --   15   --   16   BUCR  14   --    --    --   16   --   12   AP  62   --    --    --   54   --   46   TP  5.3*   --    --    --   5.8*   --   6.8   ALB  1.5*   --    --    --   2.0*   --   2.7*   GLOB  3.8   --    --    --   3.8   --   4.1*   AGRAT  0.4*   --    --    --   0.5*   --   0.7*    < > = values in this interval not displayed. Lactic Acid Lactic acid   Date Value Ref Range Status   02/13/2018 1.8 0.4 - 2.0 MMOL/L Final     Recent Labs      02/13/18   0130  02/12/18   1726  02/12/18   0442   LAC  1.8  2.2*  2.3*        Micro  Recent Labs      02/13/18   0858  02/13/18   0315  02/13/18   0310   CULT  PENDING  AEROBIC BOTTLE STAPHYLOCOCCUS AUREUS*  AEROBIC BOTTLE STAPHYLOCOCCUS AUREUS*     Recent Labs      02/13/18   0858  02/13/18   0315  02/13/18   0310  02/12/18   0900  02/11/18   1846   CULT  PENDING  AEROBIC BOTTLE STAPHYLOCOCCUS AUREUS*  AEROBIC BOTTLE STAPHYLOCOCCUS AUREUS*  NO GROWTH AFTER 20 HOURS  AEROBIC AND ANAEROBIC BOTTLES **METHICILLIN RESISTANT STAPHYLOCOCCUS AUREUS  CALLED TO AND CORRECTLY REPEATED BY:  Isaiah Doss RN ICU TO [employer] AT 0825 ON 2/14/18.           ABG Recent Labs      02/14/18   0515  02/13/18   0455  02/12/18   1734   PHI  7.268*  7.433  7.390   PCO2I  42.7  26.5*  33.3*   PO2I  87  119*  85   HCO3I  19.3*  17.3*  20.1*   FIO2I  0.4  0.40  40        Liver Enzymes Protein, total   Date Value Ref Range Status   02/14/2018 5.3 (L) 6.4 - 8.2 g/dL Final     Albumin   Date Value Ref Range Status   02/14/2018 1.5 (L) 3.4 - 5.0 g/dL Final     Globulin   Date Value Ref Range Status   02/14/2018 3.8 2.0 - 4.0 g/dL Final     A-G Ratio   Date Value Ref Range Status 02/14/2018 0.4 (L) 0.8 - 1.7   Final     AST (SGOT)   Date Value Ref Range Status   02/14/2018 48 (H) 15 - 37 U/L Final     Alk. phosphatase   Date Value Ref Range Status   02/14/2018 62 45 - 117 U/L Final     Recent Labs      02/14/18   0630  02/13/18   0310  02/12/18   0442   TP  5.3*  5.8*  6.8   ALB  1.5*  2.0*  2.7*   GLOB  3.8  3.8  4.1*   AGRAT  0.4*  0.5*  0.7*   SGOT  48*  80*  133*   AP  62  54  46        Cardiac Enzymes Lab Results   Component Value Date/Time     (H) 02/14/2018 06:30 AM        BNP No results found for: BNP, BNPP, XBNPT     Coagulation Recent Labs      02/14/18   0630  02/13/18   0640  02/12/18   2325   02/12/18   1005   PTP  16.9*   --    --    --   14.2   INR  1.4*   --    --    --   1.2   APTT  53.6*  75.6*  71.2*   < >  47.0*    < > = values in this interval not displayed. Thyroid  Lab Results   Component Value Date/Time    TSH 0.43 02/12/2018 10:05 AM          Lipid Panel Lab Results   Component Value Date/Time    Cholesterol, total 116 02/11/2018 08:45 PM    HDL Cholesterol 27 (L) 02/11/2018 08:45 PM    LDL, calculated 57.4 02/11/2018 08:45 PM    VLDL, calculated 31.6 02/11/2018 08:45 PM    Triglyceride 158 (H) 02/11/2018 08:45 PM    CHOL/HDL Ratio 4.3 02/11/2018 08:45 PM          Urinalysis Lab Results   Component Value Date/Time    Color DARK YELLOW 02/11/2018 10:45 PM    Appearance CLOUDY 02/11/2018 10:45 PM    Specific gravity 1.019 02/11/2018 10:45 PM    pH (UA) 5.5 02/11/2018 10:45 PM    Protein 300 (A) 02/11/2018 10:45 PM    Glucose >1000 (A) 02/11/2018 10:45 PM    Ketone TRACE (A) 02/11/2018 10:45 PM    Bilirubin NEGATIVE  02/11/2018 10:45 PM    Urobilinogen 1.0 02/11/2018 10:45 PM    Nitrites NEGATIVE  02/11/2018 10:45 PM    Leukocyte Esterase NEGATIVE  02/11/2018 10:45 PM    Epithelial cells 0 01/08/2014 02:46 PM    Bacteria 2+ (A) 02/11/2018 10:45 PM    WBC 0 to 2 02/11/2018 10:45 PM    RBC 0 to 2 02/11/2018 10:45 PM        XR (Most Recent).  CXR reviewed by me and compared with previous CXR   Results from Hospital Encounter encounter on 02/11/18   XR CHEST PORT   Narrative Chest AP portable    INDICATION: Respiratory failure, endotracheal tube. COMPARISON: 02/13/2018. FINDINGS: Portable technique and obese body habitus obscuring details. Patient  is slightly rotated to the left. Endotracheal tube tip is approximately 3.9 cm from the lorena. NG tube extends  into the stomach out of field-of-view. Monitoring leads overlie the chest.    Low lung volume. Stable cardiomediastinal silhouette with prominence of the  central pulmonary vessels. Bilateral hazy opacities without appreciable change. Slightly more confluent streaky and hazy opacity in the left lung base is stable  to slightly improved. Right costophrenic angle is sharp. Small left effusion  difficult to exclude but not significantly changed. Impression IMPRESSION:      1. Endotracheal tube and NG tube appear in adequate position as above. 2. Vascular congestion and hazy opacities suggestive of edema. Parenchymal  opacity in the left lung base is stable to slightly improved and possible small  effusion and atelectasis/infiltrate. CT (Most Recent)   Results from Hospital Encounter encounter on 02/11/18   CT CHEST ABD PELV WO CONT   Narrative COMPARISON: Prior abdomen and pelvic CT 5/27/2015    INDICATION: Sepsis, elevated liver enzymes, basilar airspace disease,  hypertension. TECHNIQUE:   Multislice helical CT was performed through the chest, abdomen and pelvis  without contrast. Coronal and sagittal reformations were generated. Dose reduction: One or more dose reduction techniques were used on this CT:  automated exposure control, adjustment of the mAs and/or kVp according to  patient's size, and iterative reconstruction techniques.  The specific techniques  utilized on this CT exam have been documented in the patient's electronic  medical record.      ==========    FINDINGS:    ------------------    CHEST:    LUNGS: There are multiple peripheral subpleural airspace nodular densities  bilaterally, the largest 15 mm right apex, with several smaller nodular  densities surrounding right apex, as well as involvement posterior superior  segment right lower lobe, and lingula. There are streaky areas of parenchymal  density bilateral lower lobes and lingula. PLEURA: Very small bilateral pleural effusions. AIRWAY: Normal.    MEDIASTINUM: Diffuse cardiac enlargement without pericardial effusion. No  abnormally enlarged lymph nodes with thyroid unremarkable. There is suggestion  of diffuse esophageal wall thickening. OTHER: Several chronic left-sided rib fractures with multilevel degenerative  thoracic spondylosis with no acute osseous finding.    ------------------    ABDOMEN/PELVIS:    LIVER/BILIARY: No suspicious liver lesion. No biliary dilation. Small layering  hyperdensity dependent gallbladder without gallbladder dilatation or surrounding  inflammation. SPLEEN: Normal.    PANCREAS: Normal.    ADRENALS: Normal.    KIDNEYS: Several stable small bilateral renal cysts, nonobstructing tiny  calculus right lower pole kidney. No hydronephrosis with stable nonspecific mild  perirenal stranding. LYMPH NODES: No new abnormally enlarged lymph nodes with stable small yenifer  hepatis lymph nodes. GI TRACT: There is dilated stomach and mildly dilated duodenum with air-fluid  levels without evidence of obstructing lesion proximal small bowel with no  jejunal dilatation. No other small or large bowel dilatation or wall thickening. Normal-appearing appendix. No ascites or free air. VASCULATURE: Moderate aortoiliac atherosclerotic changes. PELVIC ORGANS: Prostate not significantly enlarged, with Wilson catheter in place  with nondistended bladder. OTHER: Small sclerotic focus right pubic symphysis stable since prior study.   Lower lumbar facet arthropathy asymmetric narrowing most pronounced on the right  L4/5, with no new acute osseous finding.    ==========         Impression IMPRESSION:        1. Mild areas of atelectasis and/or scarring lower lobes and lingula with very  small pleural effusions. 2. Multifocal peripheral bilateral subpleural nodular densities the largest 15  mm right apex, which may represent peripheral areas of pneumonia or other  inflammatory etiology, although differential diagnosis includes less likely  metastatic disease or infarcts from pulmonary emboli. Follow-up chest CT  recommended in one month following treatment. 3. Diffuse esophageal wall thickening most likely related to esophagitis. 4. Cholelithiasis without CT evidence of acute cholecystitis. 5. Dilated stomach and duodenum with air-fluid levels without evidence of  mechanical obstruction, most likely related to ileus. 6. Stable additional ancillary findings as above. EKG No results found for this or any previous visit. ECHO  2/12/18 [facility] SUMMARY:  Left ventricle: Systolic function was normal. Ejection fraction was estimated   in the range of 55 % to 60 %. There was  mild hypokinesis of the basal inferior wall(s). There was moderate concentric   hypertrophy. Right ventricle: The size was normal. Systolic function was normal.    Inferior vena cava, hepatic veins: The inferior vena cava was dilated. The   respirophasic change in diameter was more  than 50%. Imaging:  I have personally reviewed the patients radiographs and have reviewed the reports:              Sundar Sullivan M.D.   Pulmonary Critical Care & Sleep Medicine

## 2018-02-14 NOTE — ACP (ADVANCE CARE PLANNING)
Advance Directive in EMR. Primary MPOA is wife Dylon Miramontes [phone]. Secondary MPOA is brother, DEVORAH Galindo Manjula, [phone].     Goals of care discussion planned 02/15/18 at 11:am.

## 2018-02-14 NOTE — PROGRESS NOTES
Palliative medicine consult noted and appreciated. Mr. Micky Gerardo is currently intubated and sedated. His wife, Lissette Kilpatrick was at bedside. She is very familiar with the PM team from a recent admission for a different family member. She immediately hugged me and thanked me for coming. She was tearful stating, \"this is my worst nightmare\". \"I am not giving up though. I am leaving it in the hands of the [address] \"     She related that Mr. Micky Gerardo had been sick for over a week with a recurrent fever but had not wanted to go to the doctor. He finally became so weak that he was having difficulty walking so she had called [facility]. \"He is too stubborn\". She stated that she had a headache and felt like \"the room is closing in on me\". She agreed to meet with PM team tomorrow at 11 am to discuss goals of care. She stated she was going to go home and lie down for a while to see if she felt better. She expressed appreciation for support. Full note to follow goals of care meeting.      Ayde Weber RN

## 2018-02-15 PROBLEM — B95.62 MRSA BACTEREMIA: Status: ACTIVE | Noted: 2018-01-01

## 2018-02-15 PROBLEM — R78.81 MRSA BACTEREMIA: Status: ACTIVE | Noted: 2018-01-01

## 2018-02-15 PROBLEM — R77.8 ELEVATED TROPONIN: Status: ACTIVE | Noted: 2018-01-01

## 2018-02-15 PROBLEM — I24.8 DEMAND ISCHEMIA OF MYOCARDIUM (HCC): Status: ACTIVE | Noted: 2018-01-01

## 2018-02-15 NOTE — PROCEDURES
Maurizio Florez M.D. Pulmonary Critical Care & Sleep Medicine   [address] [place] [address], [address] [place] [address],[facility] [phone]  [address] Procedure Note      Indication: Need for vasopressors    Risks, benefits, alternatives explained and consent obtained. All risks including pneumothorax bleeding and respiratory failure explained. All questions answered. Central line Bundle:    Full sterile barrier precautions used. 7-Step Sterility Protocol followed. (cap, mask sterile gown, sterile gloves, large sterile sheet, hand hygiene, 2% chlorhexidine for cutaneous antisepsis)  5 mL 1% Lidocaine placed at insertion site. Subclavian line Left cannulated x 1 attempt(s) utilizing the modified Seldinger technique. no  Good blood return. Catheter secured & Biopatch applied. Sterile Tegaderm placed. Patient tolerated procedure well. No complication noted.     Reviewed cxr ok no pneumothorax line placement looks good         Parviz Lainez MD   Pulmonary Critical Care & Sleep Medicine   12:18 PM

## 2018-02-15 NOTE — PROGRESS NOTES
Bedside and Verbal shift change report received from Jose C Cabrera RN (offgoing nurse). Report included the following information SBAR, Kardex, Intake/Output, MAR and Recent Results. 2015 Shift assessment completed, see EMR    2138 Tylenol 650 mg given for fever. 0000 Reassessment completed, see EMR. Patient continues to have fever ice packs added to armpits and groin area, and fan turned on.     0030 PTT 82.0 within therapeutic range. No change to  heparin drip    0147 Tylenol 650 mg given for fever. 1135 Notified Dr. Rajani Macias that patient's blood culture in aerobic bottle is positive for gram + cocci in clusters. No new orders given. 0430 Reassessment completed, see EMR    0530 Patient was given cold CHG bath, with completed, linen change.      0727 PTT 66.2 will heparin drip increased from 12 to 13 units/ kg/hr

## 2018-02-15 NOTE — PROGRESS NOTES
Cardiology Progress Note        Patient: Amrita Mcnamara        Sex: male          DOA: 2/11/2018  YOB: 1955      Age:  57 y.o.        LOS:  LOS: 3 days   Assessment/Plan     Principal Problem:    Sepsis ([address] (2/11/2018)    Active Problems:    Osteoarthritis of right knee (1/18/2014)      Hypokalemia (2/11/2018)      Glucosuria (2/11/2018)      Acute kidney injury ([address] (2/11/2018)      Fall (2/11/2018)      Back pain (2/11/2018)      Weakness of right lower extremity (2/11/2018)      Anemia (2/11/2018)      Shock ([address] (2/13/2018)      Acute respiratory failure ([address] (2/13/2018)        Plan:  2/15/2018  Maintaining NSR  Continue with amiodarone and Heparin  OK to hold amiodarone for MRI scan  Discussed with Dr. Keyana Temple and RN          2/14/2018  Marinating NSR now  On HD for worsening acidosis/renal failure  Continue with amiodarone and heparin   Will need ischemia work up when stable with stress test or LHC/RHC  Discussed with Dr. Keyana Temple.                   Subjective:    cc: Intubated/sedated    REVIEW OF SYSTEMS:unable to obtained  Objective:      Visit Vitals    /55    Pulse 75    Temp 99.4 °F (37.4 °C)    Resp 24    Ht 6' (1.829 m)    Wt 147.9 kg (326 lb 1 oz)    SpO2 99%    BMI 44.22 kg/m2     Body mass index is 44.22 kg/(m^2). Physical Exam:  General Appearance: Comfortable, not using accessory muscles of respiration. HEENT: TERRANCE. HEAD: Atraumatic  NECK: No JVD, no thyroidomeglay  LUNGS: Clear bilaterally. HEART: S1+S2 audible,   ABD: Non-tender, BS Audible    EXT: No edema, and no cysnosis. VASCULAR EXAM: Pulses are intact. MUSCULOSKELETAL: Grossly no joint deformity.     Medication:  Current Facility-Administered Medications   Medication Dose Route Frequency    vancomycin (VANCOCIN) 1500 mg in  ml infusion  1,500 mg IntraVENous ONCE    PHENYLephrine (ERWIN-SYNEPHRINE) 90 mg in 0.9% sodium chloride 250 mL infusion (premix or compounded)  mcg/min IntraVENous TITRATE    0.9% sodium chloride infusion 250 mL  250 mL IntraVENous PRN    insulin glargine (LANTUS) injection 18 Units  18 Units SubCUTAneous DAILY WITH LUNCH    phytonadione (vitamin K1) (AQUA-MEPHYTON) 5 mg in 0.9% sodium chloride 50 mL IVPB  5 mg IntraVENous ONCE    heparin (porcine) 1,000 unit/mL injection 1,500 Units  1,500 Units IntraVENous PRN    acetaminophen (TYLENOL) solution 650 mg  650 mg Oral Q4H PRN    aspirin chewable tablet 81 mg  81 mg Oral DAILY    pantoprazole (PROTONIX) 40 mg in sodium chloride 0.9 % 10 mL injection  40 mg IntraVENous DAILY    HYDROcodone-acetaminophen (NORCO) 5-325 mg per tablet 1 Tab  1 Tab Oral Q4H PRN    morphine injection 1 mg  1 mg IntraVENous Q4H PRN    naloxone (NARCAN) injection 0.4 mg  0.4 mg IntraVENous PRN    docusate sodium (COLACE) capsule 100 mg  100 mg Oral BID PRN    acetaminophen (TYLENOL) tablet 650 mg  650 mg Oral Q4H PRN    ELECTROLYTE REPLACEMENT PROTOCOL-MAGNESIUM  1 Each Other PRN    folic acid (FOLVITE) tablet 1 mg  1 mg Oral DAILY    ELECTROLYTE REPLACEMENT PROTOCOL-MAGNESIUM  1 Each Other PRN    ELECTROLYTE REPLACEMENT PROTOCOL-PHOSPHORUS  1 Each Other PRN    ELECTROLYTE REPLACEMENT PROTOCOL-CALCIUM  1 Each Other PRN    thiamine 100 mg in 50 mL NS   IntraVENous Q24H    Vancomycin- Rx to Dose & Monitor  1 Each Other Rx Dosing/Monitoring    heparin 25,000 units in D5W 250 ml infusion  7-25 Units/kg/hr IntraVENous TITRATE    chlorhexidine (PERIDEX) 0.12 % mouthwash 10 mL  10 mL Oral Q12H    fentaNYL (PF) 900 mcg/30 ml infusion soln  0-200 mcg/hr IntraVENous TITRATE    midazolam in normal saline (VERSED) 100 mg in 0.9% sodium chloride (MBP/ADV) 100 mL infusion  2-6 mg/hr IntraVENous CONTINUOUS    amiodarone (NEXTERONE) 360 mg in dextrose 200 mL (1.8 mg/mL) infusion  0.5-1 mg/min IntraVENous TITRATE    insulin lispro (HUMALOG) injection   SubCUTAneous Q6H    NOREPINephrine (LEVOPHED) 8 mg in 5% dextrose 250mL infusion  2-16 mcg/min IntraVENous TITRATE    ELECTROLYTE RPLACEMENT PROTOCOL- POTASSIUM  1 Each Other PRN    sodium chloride (NS) flush 5-10 mL  5-10 mL IntraVENous PRN    ondansetron (ZOFRAN ODT) tablet 4 mg  4 mg Oral Q4H PRN    glucose chewable tablet 16 g  4 Tab Oral PRN    glucagon (GLUCAGEN) injection 1 mg  1 mg IntraMUSCular PRN    dextrose (D50W) injection syrg 12.5-25 g  25-50 mL IntraVENous PRN               Lab/Data Reviewed: All lab results for the last 24 hours reviewed. Recent Labs      02/15/18   0800  02/15/18   0625  02/13/18   0310   WBC  6.9  7.0  4.9   HGB  7.5*  7.8*  10.1*   HCT  21.8*  22.5*  29.8*   PLT  129*  141  130*     Recent Labs      02/15/18   0625  02/14/18   0630  02/13/18   1715   02/13/18   0310   NA  138  136   --    --   138   K  3.0*  3.9  4.0   < >  3.2*   CL  102  103   --    --   103   CO2  26  23   --    --   20*   GLU  199*  149*   --    --   178*   BUN  43*  57*   --    --   44*   CREA  3.12*  4.06*   --    --   2.69*   CA  8.2*  7.4*   --    --   8.0*    < > = values in this interval not displayed. Signed By: Zakia Farias MD     February 15, 2018      Please note that patient's troponin elevation could be demand ischemia in the setting of sepsis and Afib.   THX

## 2018-02-15 NOTE — PROGRESS NOTES
conducted a Follow up consultation and Spiritual Assessment for Luca Bryant, who is a 57 y.o.,male. Spent time supporting patient's wife as they prepared to take him to MRI. She is struggling with the loss of their son last month and how overwhelmed she is with \"all this. \"     The  provided the following Interventions:  Continued the relationship of care and support. Listened empathically. Offered assurance of continued prayer on patients behalf. Chart reviewed. The following outcomes were achieved: Wife expressed gratitude for Spiritual Care visit. Patient was reviewed in ICU Interdisciplinary Rounds. Assessment:  There are no further spiritual or Latter day issues which require Spiritual Care Services intervention at this time. Plan:  Chaplains will continue to follow and will provide pastoral care as needed or requested.  recommends bedside caregivers page the  on duty if patient shows signs of acute spiritual or emotional distress. [address], [place].    Board Certified   [phone] - Office

## 2018-02-15 NOTE — PROGRESS NOTES
0730- Bedside and Verbal shift change report given to Larry Whaley RN (oncoming nurse) by Lucien Johnson RN (offgoing nurse). Report included the following information SBAR, Kardex, Intake/Output, MAR and Recent Results, SR on monitor. Initial shift assessment complete, see EMR. RASS -1. TF to be titrated to goal.     0755- Dr León Luis called unit, telephone orders received to do [place] [address] Martina Benjamin, gave CBC results, received telephone orders to do type and screen and give 1 unit pRBC during dialysis. Stop heparin now for line placement. Discontinue bicarb drip.     0920- Dr León Luis at bedside. Per MD, decrease Versed drip to 2mg/hr. Wean patient off neosynephrine today for MRI. ID at bedside, per MD xavier to place CL today. Patient to get MRI today with levophed and versed drips only. PRN 100mg Fentanyl if needed during procedure. TF held for line placement. Consent in chart. .  1200- Reassessment complete, no changes to previous. Dr León Luis at bedside for CL placement. CXR reviewed by morenita NAJERA to use line. 1300- Stopped amiodarone for MRI, OK per Ahmed ordered by Dr. León Luis. Stopped Fentanyl drip for MRI, ordered by León Luis.     1400- Patient off unit for MRI, RN and RT accompanied. 1445- Patient's BP elevated on 2 of levophed, drip stopped. Will monitor closely while in MRI.     1600- Patient back on unit from MRI. Reassessment complete, see EMR. Fentanyl and amiodarone restarted. Femoral line removed per MD orders. Per Dr León Luis, blood cultures should be redrawn in the morning. 1645- Heparin drip restarted, PTT lab ordered for 2245. Noted papular rash on patient's legs, MD notified and orders received fro solumedrol, given per MAR.     1700- HD nurse at bedside for dialysis. 80- Dr Nilsa Lockwood at bedside. Received verbal orders to give Lopressor 2.5mg IV Q6H starting after dialysis is complete. Hold for SBP <100 and HR<60.     1730- 1 unit pRBC's given by HD nurse.      1930-Bedside and Verbal shift change report given to Dennis Ann RN (oncoming nurse) by Arelis Hickey RN (offgoing nurse). Report included the following information SBAR, Kardex, Intake/Output, MAR, Recent Results and Cardiac Rhythm A Fib.

## 2018-02-15 NOTE — PROGRESS NOTES
Palliative medicine follow up. Patient's wife did not show. Called her and she stated she had taken a pill that \"knocked her out\". She stated she was coming in later as ICU had called her but that she was feeling very groggy so would wait until she felt able to drive.   Rescheduled meeting for tomorrow at 1 pm.     Ayde Weber RN

## 2018-02-15 NOTE — PROGRESS NOTES
Hospitalist Progress Note    Patient: Moncho Berrios MRN: [id number]  CSN: [id number]    YOB: 1955  Age: 57 y.o. Sex: male    DOA: 2/11/2018 LOS:  LOS: 3 days          Chief Complaint:    Septic shock      Assessment/Plan     Septic shock-pressor support, IVF  Ac resp failure-on ventilator, femoral line to neck CVL by Critical care MD  MRSA bacteremia-ID following, await MRI spine results-had TERRY to r/o vegetation, unclear currently where MRSA is from  AMANDA  IDDM  Anemia  hypokalemia  PAF/aflutter  Demand ischemia-amio gtt, heparin  Back pain  Recent knee surgery    Critically ill gentleman with septic shock, MRSA bacteremia and cardiac stress due to sepsis with resp failure  Appreciate Pulm, Cardio, and ID consultants  cotniuing IV abx, await MRI results  Heparin gtt  amio and pressor gtt for support  Vent mgmt  CVL, d/c femoral line      Disposition :tbd  Patient Active Problem List   Diagnosis Code    Osteoarthritis of right knee M17.11    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[address] T84.018A, Z96.659    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 XXXA    Back pain M54.9    Weakness of right lower extremity R29.898    Anemia D64.9    Shock (HCC) R57.9    Acute respiratory failure (HCC) J96.00    Elevated troponin R74.8    Demand ischemia of myocardium (HCC) I24.8    MRSA bacteremia R78.81       Subjective:    He cannot respond, sedate on vent    Review of systems:  As above      Vital signs/Intake and Output:  Visit Vitals    /60    Pulse 92    Temp 99.4 °F (37.4 °C)    Resp 20    Ht 6' (1.829 m)    Wt 147.9 kg (326 lb 1 oz)    SpO2 95%    BMI 44.22 kg/m2     Current Shift:  02/15 0701 - 02/15 1900  In: -   Out: 420 [Urine:420]  Last three shifts:  02/13 1901 - 02/15 0700  In: 6996.1 [I.V.:6489.1]  Out: 1250 [Urine:1250]    Exam:    General:obese wm, sedate on vent  CVS:Regular rate and rhythm, no M/R/G, S1/S2 heard, no thrill  Lungs:Clear to auscultation bilaterally, no wheezes, rhonchi, or rales  Abdomen: Soft, Nontender, No distention, Normal Bowel sounds, No hepatomegaly  Extremities: No C/C/E, pulses palpable 2+  Skin: no rashes  Neuro:sedate                Labs: Results:       Chemistry Recent Labs      02/15/18   0625  02/14/18   0630  02/13/18   1715   02/13/18   0310   GLU  199*  149*   --    --   178*   NA  138  136   --    --   138   K  3.0*  3.9  4.0   < >  3.2*   CL  102  103   --    --   103   CO2  26  23   --    --   20*   BUN  43*  57*   --    --   44*   CREA  3.12*  4.06*   --    --   2.69*   CA  8.2*  7.4*   --    --   8.0*   AGAP  10  10   --    --   15   BUCR  14  14   --    --   16   AP  68  62   --    --   54   TP  5.3*  5.3*   --    --   5.8*   ALB  1.5*  1.5*   --    --   2.0*   GLOB  3.8  3.8   --    --   3.8   AGRAT  0.4*  0.4*   --    --   0.5*    < > = values in this interval not displayed. CBC w/Diff Recent Labs      02/15/18   0800  02/15/18   0625  02/13/18   0310  02/12/18   1726   WBC  6.9  7.0  4.9  5.7   RBC  2.41*  2.48*  3.29*  3.17*   HGB  7.5*  7.8*  10.1*  9.9*   HCT  21.8*  22.5*  29.8*  29.4*   PLT  129*  141  130*  116*   GRANS   --    --    --   76*   LYMPH   --    --    --   9*   EOS   --    --    --   0      Cardiac Enzymes Recent Labs      02/14/18   0630  02/13/18   0310   CPK  647*  1354*      Coagulation Recent Labs      02/15/18   0625  02/15/18   0613  02/15/18   0030   02/14/18   0630   PTP  18.2*   --    --    --   16.9*   INR  1.6*   --    --    --   1.4*   APTT   --   66.2*  82.0*   < >  53.6*    < > = values in this interval not displayed.        Lipid Panel Lab Results   Component Value Date/Time    Cholesterol, total 116 02/11/2018 08:45 PM    HDL Cholesterol 27 (L) 02/11/2018 08:45 PM    LDL, calculated 57.4 02/11/2018 08:45 PM    VLDL, calculated 31.6 02/11/2018 08:45 PM    Triglyceride 158 (H) 02/11/2018 08:45 PM    CHOL/HDL Ratio 4.3 02/11/2018 08:45 PM      BNP No results for input(s): BNPP in the last 72 hours.    Liver Enzymes Recent Labs      02/15/18   0625   TP  5.3*   ALB  1.5*   AP  68   SGOT  47*      Thyroid Studies Lab Results   Component Value Date/Time    TSH 0.43 02/12/2018 10:05 AM        Procedures/imaging: see electronic medical records for all procedures/Xrays and details which were not copied into this note but were reviewed prior to creation of Tayla Sequeira MD

## 2018-02-15 NOTE — PROGRESS NOTES
Maria Del Rosario Patel M.D  [address] 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 XXXA    Back pain M54.9    Weakness of right lower extremity R29.898    Anemia D64.9    Shock (HCC) R57.9    Acute respiratory failure ([facility]) J96.00 ·   Sepsis ? Source ? Related to lll air space vs endocarditis as CT showed concern for septic embolie . Arvid Jinny MRSA in blood TERRY negative for endocarditis  · Septic shock on phenylephrine  · Elevated troponin ? Demand ischemia vs NSTMI   · Respiratory failure intubated on   · Acute renal failure with worsening noted today  · Rhabdo improving  · Afib poorly controlled S/P cardioversion and on amio and heparin drip  · Back pain fall  · Severe hypokalemia hypomagnesemia improving  · Elevated blood sugars better controlled   · Lactic acidosis improved  · H/O ETOH    PLAN:  -- Spoke with ID at length  -- Suggested MRI needs to be done as repeated cultures are coming positive and there is a source which need to be addressed  -- Spoke with nursing and advised to go to MRI on levophed and versed and use fentanyl prn  -- Taper phenylephrine/ continue levophed  -- DC levaquin.   -- Increase lantus to 18 unit  -- DC bicarb drip  -- HB dropped but no active bleed give one unit of PRBC due to hypotension during dialysis  -- Decrease RR to 20   -- RPT culture tomorrow am   - Continue Amio taper phenylephrine as tolerated  - MRSA isolation  - MRI  Spine   Monitor for ETOH withdrawal   CVS:Currently on Amiodarone drip, Heparin drip, Phenylephrine follow on cardiology recommendations. Taper phenylephrine as tolerated keep MAP around 65. Continue levophed for now on low dose    Follow on cardiology might need some work up once recover  Once BP permits will add betablocker  On aspirin  S/P cardioversion with 200 j on 2/13  RS: On vent protocol,ABG improved post HD  , Aspiration precaution, LLL air space ? Due to septic embolie and infract vs pnemonia, Sputum GPC most likely MRSA   ID:Sepsis ? Etiology, MRSA on multiple cultures D/W ID recommended MRI   Vanco 2/11  Azactam 2/11 stop on 2/14  On  levaquin 2/12  Stop date 2/15  ENDO:Hold metformin, SSI TSH is ok bs controlled increase lantus keep sugar <160  GI:NG tube will consider started feeding . Continue as tolerated. RENAL:Wilson for I/O, Replace k and MG and replace per protocol. DC bicarb drip. Renal planning another HD today give Bt with it. Patient is started putting out on urine. CNS: Sedated intubated wakes up with minimal stimulus MRI head pending  HEMATOLOGY:PLT is stable, INR slightly elevated give vitamin k, Mild drop in hb most likely due to all this fluid as no active bleed, Heparin drip for Afib   MUSCULOSKELETAL:CK is elevated but improved   · PAIN AND SEDATION: On versed and fentanyl per protocol maintain RAAS -1, on thiamine and folic acid for ETOH, Propofol caused drop in BP  · Skin/Wound: Monitor back  · Electrolytes: Replace electrolytes per ICU electrolyte replacement protocol. · IVF: Jenna Dopp   · Nutrition: Start feeding today   · Prophylaxis: DVT Prophylaxis with Heparin,. GI Prophylaxis. · Restraints: soft to avoid on pulling tube and lines  · PT/OT eval and treat. OOB when appropriate. · Lines/Tubes: none. Femoral line 2/12.  Wilson 2/12/18 Right IJ dialysis catheter 2/14  ADVANCE DIRECTIVE:Full code/ Palliative is consulted spoke with wife at length  FAMILY DISCUSSION:spoke with family  Quality Care: PPI, DVT prophylaxis, HOB elevated, Infection control all reviewed and addressed. Events and notes from last 24 hours reviewed. Care plan discussed with nursing CC TIME:60  min excluding procedure    · Labs and images personally seen and available reports reviewed. · All current medicines are reviewed and doses and prescription adjusted. Subjective/History:   Radha Brady is a 57 y.o. male who came to the ed with complaints of fall. Patient states he has been feeling generalized weakness at home for the last day or so with low grade subjective fevers but yesterday he noted RLE weakness around 5pm. Since then he has fallen about 4 times in his back without any head trauma or LOC. He has been using his tripod cane to assist him with walking. Due to persistence of his weakness, falls, fevers and low back pain he decided to come to the ED. In the ED, CT of the head was neg and his labs showed significant hypoK with elevated Cr (unknown baseline). He was also febrile and tachycardic. He was started on broad spectrum Abx. Overnight RRT called found to be tachycardic, tachypnic and ABG showed alkalosis and was transferred to ICU  Now patient is awake, denies any complains except some back pain   Tachycardic with HR in 120s   No nausea vomiting  Recently about 1 month a go treated for pna at office  No diarrhoea  Ex smoker  Drinks about 2 glasses of mix drinks every day  Recently lost his son to multiorgan failure and sepsis     2/13/18    Yesterday became more tachypnic and difficult to control hr  Intubated 2/12, femoral line 2/12 <neck line not placed due to concern for colonization with ongoing sepsis>  Became hypotensive managed with phenylephrine  Afib was poorly controlled started on amiodarone drip.   Cardiology is closely following  Blood culture Staph  K 3.2 replaced  Mg 1.1 getting replaced   UO and Cr is improving  Still spiking fever 102   Flu is negative     2/14  No significant overnight event  S/P TERRY negative for vegetation or blood clot  S/P cardioversion  ID evaluated suggested MSSA sepsis but initial culture is growing MRSA.   Am labs showed worsening cr to 4.06 and PH 7.26  ALB 1.5  Currently on phenylephring 100, versed, amiodarone, fentanyl and bicarb drip    2/15  Doing ok  No overnight event   S/P HD  Also put out 900 cc of urine in last 24 hrs  HB dropped to 7.5 but no active bleed  PLT is 129 INR is 1.6 K 3.0   Alb 1.5     Current Facility-Administered Medications   Medication Dose Route Frequency    vancomycin (VANCOCIN) 1500 mg in  ml infusion  1,500 mg IntraVENous ONCE    PHENYLephrine (ERWIN-SYNEPHRINE) 90 mg in 0.9% sodium chloride 250 mL infusion (premix or compounded)   mcg/min IntraVENous TITRATE    insulin glargine (LANTUS) injection 18 Units  18 Units SubCUTAneous DAILY WITH LUNCH    phytonadione (vitamin K1) (AQUA-MEPHYTON) 5 mg in 0.9% sodium chloride 50 mL IVPB  5 mg IntraVENous ONCE    aspirin chewable tablet 81 mg  81 mg Oral DAILY    pantoprazole (PROTONIX) 40 mg in sodium chloride 0.9 % 10 mL injection  40 mg IntraVENous DAILY    folic acid (FOLVITE) tablet 1 mg  1 mg Oral DAILY    thiamine 100 mg in 50 mL NS   IntraVENous Q24H    Vancomycin- Rx to Dose & Monitor  1 Each Other Rx Dosing/Monitoring    heparin 25,000 units in D5W 250 ml infusion  7-25 Units/kg/hr IntraVENous TITRATE    chlorhexidine (PERIDEX) 0.12 % mouthwash 10 mL  10 mL Oral Q12H    fentaNYL (PF) 900 mcg/30 ml infusion soln  0-200 mcg/hr IntraVENous TITRATE    midazolam in normal saline (VERSED) 100 mg in 0.9% sodium chloride (MBP/ADV) 100 mL infusion  2-6 mg/hr IntraVENous CONTINUOUS    amiodarone (NEXTERONE) 360 mg in dextrose 200 mL (1.8 mg/mL) infusion  0.5-1 mg/min IntraVENous TITRATE    insulin lispro (HUMALOG) injection   SubCUTAneous Q6H  NOREPINephrine (LEVOPHED) 8 mg in 5% dextrose 250mL infusion  2-16 mcg/min IntraVENous TITRATE         Objective:   Vital Signs:    Visit Vitals    /47    Pulse 75    Temp 98.8 °F (37.1 °C)    Resp 24    Ht 6' (1.829 m)    Wt 147.9 kg (326 lb 1 oz)    SpO2 99%    BMI 44.22 kg/m2       O2 Device: Endotracheal tube   O2 Flow Rate (L/min): 2 l/min   Temp (24hrs), Av.7 °F (38.2 °C), Min:98.8 °F (37.1 °C), Max:102.8 °F (39.3 °C)       Intake/Output:   Last shift:      02/15 0701 - 02/15 1900  In: -   Out: 120 [Urine:120]  Last 3 shifts: 1901 - 02/15 07  In: 6996.1 [I.V.:6489.1]  Out: 1250 [Urine:1250]    Intake/Output Summary (Last 24 hours) at 02/15/18 0924  Last data filed at 02/15/18 0745   Gross per 24 hour   Intake          4076.08 ml   Output              970 ml   Net          3106.08 ml       Review of Systems:  Intubated sedated     Objective:    General: Sedated minimally responsive   HEENT: pupils reactive, sclera anicteric, EOM intact  Neck: No adenopathy or thyroid swelling, no lymphadenopathy or JVD, supple  CVS: S1S2 no murmurs  RS: Mod AE bilaterally, minimal rales   Abd: soft, non tender, distended sluggish bowel sound  Neuro: non focal, awake, alert  Extrm: no leg edema, Right knee scar  Lymph node: No obvious palpable lymph node appreciated.   Skin: no rash  CBC w/Diff Recent Labs      02/15/18   0800  02/15/18   0625  18   0310  18   1726   WBC  6.9  7.0  4.9  5.7   RBC  2.41*  2.48*  3.29*  3.17*   HGB  7.5*  7.8*  10.1*  9.9*   HCT  21.8*  22.5*  29.8*  29.4*   PLT  129*  141  130*  116*   GRANS   --    --    --   76*   LYMPH   --    --    --   9*   EOS   --    --    --   0        Chemistry Recent Labs      02/15/18   0625  18   0630  18   1740  18   1715   18   0310   GLU  199*  149*   --    --    --   178*   NA  138  136   --    --    --   138   K  3.0*  3.9   --   4.0   < >  3.2*   CL  102  103   --    --    --   103   CO2  26  23 --    --    --   20*   BUN  43*  57*   --    --    --   44*   CREA  3.12*  4.06*   --    --    --   2.69*   CA  8.2*  7.4*   --    --    --   8.0*   MG  1.8  2.0   --   1.8   --   1.9   PHOS  2.1*  2.3*  2.0*   --    --    --    AGAP  10  10   --    --    --   15   BUCR  14  14   --    --    --   16   AP  68  62   --    --    --   54   TP  5.3*  5.3*   --    --    --   5.8*   ALB  1.5*  1.5*   --    --    --   2.0*   GLOB  3.8  3.8   --    --    --   3.8   AGRAT  0.4*  0.4*   --    --    --   0.5*    < > = values in this interval not displayed. Lactic Acid Lactic acid   Date Value Ref Range Status   02/13/2018 1.8 0.4 - 2.0 MMOL/L Final     Recent Labs      02/13/18   0130  02/12/18   1726   LAC  1.8  2.2*        Micro  Recent Labs      02/14/18   0920  02/14/18   0915  02/13/18   0858   CULT  CULTURE IN PROGRESS,FURTHER UPDATES TO FOLLOW  Sent to [facility] for ID/Susceptibility if indicated  CULTURE IN PROGRESS,FURTHER UPDATES TO FOLLOW  Sent to [facility] for ID/Susceptibility if indicated  RARE STAPHYLOCOCCUS AUREUS*  FEW NORMAL RESPIRATORY MAYNOR     Recent Labs      02/13/18   0858  02/13/18   0315  02/13/18   0310  02/12/18   0900  02/11/18   1846   CULT  PENDING  AEROBIC BOTTLE STAPHYLOCOCCUS AUREUS*  AEROBIC BOTTLE STAPHYLOCOCCUS AUREUS*  NO GROWTH AFTER 20 HOURS  AEROBIC AND ANAEROBIC BOTTLES **METHICILLIN RESISTANT STAPHYLOCOCCUS AUREUS  CALLED TO AND CORRECTLY REPEATED BY:  Avelino MACHADO RN ICU TO [address] [email or web address] AT 0825 ON 2/14/18.           ABG Recent Labs      02/15/18   0547  02/14/18 2007 02/14/18   1202   PHI  7.410  7.378  7.245*   PCO2I  38.0  43.1  46.1*   PO2I  71*  67*  83   HCO3I  24.0  25.1  19.5*   FIO2I  0.4  0.4  40        Liver Enzymes Protein, total   Date Value Ref Range Status   02/15/2018 5.3 (L) 6.4 - 8.2 g/dL Final     Albumin   Date Value Ref Range Status   02/15/2018 1.5 (L) 3.4 - 5.0 g/dL Final     Globulin   Date Value Ref Range Status   02/15/2018 3.8 2.0 - 4.0 g/dL Final     A-G Ratio   Date Value Ref Range Status   02/15/2018 0.4 (L) 0.8 - 1.7   Final     AST (SGOT)   Date Value Ref Range Status   02/15/2018 47 (H) 15 - 37 U/L Final     Alk. phosphatase   Date Value Ref Range Status   02/15/2018 68 45 - 117 U/L Final     Recent Labs      02/15/18   0625  02/14/18   0630  02/13/18   0310   TP  5.3*  5.3*  5.8*   ALB  1.5*  1.5*  2.0*   GLOB  3.8  3.8  3.8   AGRAT  0.4*  0.4*  0.5*   SGOT  47*  48*  80*   AP  68  62  54        Cardiac Enzymes No results found for: CPK, CK, CKMMB, CKMB, RCK3, CKMBT, CKNDX, CKND1, LESLIE, TROPT, TROIQ, BELLA, TROPT, TNIPOC, BNP, BNPP     BNP No results found for: BNP, BNPP, XBNPT     Coagulation Recent Labs      02/15/18   0625  02/15/18   0613  02/15/18   0030  02/14/18   1405  02/14/18   0630   02/12/18   1005   PTP  18.2*   --    --    --   16.9*   --   14.2   INR  1.6*   --    --    --   1.4*   --   1.2   APTT   --   66.2*  82.0*  55.4*  53.6*   < >  47.0*    < > = values in this interval not displayed.          Thyroid  Lab Results   Component Value Date/Time    TSH 0.43 02/12/2018 10:05 AM          Lipid Panel Lab Results   Component Value Date/Time    Cholesterol, total 116 02/11/2018 08:45 PM    HDL Cholesterol 27 (L) 02/11/2018 08:45 PM    LDL, calculated 57.4 02/11/2018 08:45 PM    VLDL, calculated 31.6 02/11/2018 08:45 PM    Triglyceride 158 (H) 02/11/2018 08:45 PM    CHOL/HDL Ratio 4.3 02/11/2018 08:45 PM          Urinalysis Lab Results   Component Value Date/Time    Color DARK YELLOW 02/11/2018 10:45 PM    Appearance CLOUDY 02/11/2018 10:45 PM    Specific gravity 1.019 02/11/2018 10:45 PM    pH (UA) 5.5 02/11/2018 10:45 PM    Protein 300 (A) 02/11/2018 10:45 PM    Glucose >1000 (A) 02/11/2018 10:45 PM    Ketone TRACE (A) 02/11/2018 10:45 PM    Bilirubin NEGATIVE  02/11/2018 10:45 PM    Urobilinogen 1.0 02/11/2018 10:45 PM    Nitrites NEGATIVE  02/11/2018 10:45 PM    Leukocyte Esterase NEGATIVE  02/11/2018 10:45 PM    Epithelial cells 0 01/08/2014 02:46 PM Bacteria 2+ (A) 02/11/2018 10:45 PM    WBC 0 to 2 02/11/2018 10:45 PM    RBC 0 to 2 02/11/2018 10:45 PM        XR (Most Recent). CXR reviewed by me and compared with previous CXR   Results from Hospital Encounter encounter on 02/11/18   XR CHEST PORT   Narrative EXAM: One-view chest    CLINICAL HISTORY: Central line placed , , respiratory failure    COMPARISON: None    FINDINGS:    Frontal view of the chest demonstrate opacities in the left lung base similar to  prior exam. Prominent bilateral interstitial markings. ET tube approximately 5  cm from the lorena. Right approach dialysis catheter tip near the atrial caval  junction. Janeal Nakul No pneumothorax. . Cardiac silhouette is normal in size and contour. No acute bony or soft tissue abnormality. Impression IMPRESSION: Central line in place. No pneumothorax. Otherwise stable chest x-ray. Opacities in the left lung base likely combination  of pleural fluid and atelectasis or other nonspecific airspace process         CT (Most Recent)   Results from Hospital Encounter encounter on 02/11/18   CT CHEST ABD PELV WO CONT   Narrative COMPARISON: Prior abdomen and pelvic CT 5/27/2015    INDICATION: Sepsis, elevated liver enzymes, basilar airspace disease,  hypertension. TECHNIQUE:   Multislice helical CT was performed through the chest, abdomen and pelvis  without contrast. Coronal and sagittal reformations were generated. Dose reduction: One or more dose reduction techniques were used on this CT:  automated exposure control, adjustment of the mAs and/or kVp according to  patient's size, and iterative reconstruction techniques.  The specific techniques  utilized on this CT exam have been documented in the patient's electronic  medical record.      ==========    FINDINGS:    ------------------    CHEST:    LUNGS: There are multiple peripheral subpleural airspace nodular densities  bilaterally, the largest 15 mm right apex, with several smaller nodular  densities surrounding right apex, as well as involvement posterior superior  segment right lower lobe, and lingula. There are streaky areas of parenchymal  density bilateral lower lobes and lingula. PLEURA: Very small bilateral pleural effusions. AIRWAY: Normal.    MEDIASTINUM: Diffuse cardiac enlargement without pericardial effusion. No  abnormally enlarged lymph nodes with thyroid unremarkable. There is suggestion  of diffuse esophageal wall thickening. OTHER: Several chronic left-sided rib fractures with multilevel degenerative  thoracic spondylosis with no acute osseous finding.    ------------------    ABDOMEN/PELVIS:    LIVER/BILIARY: No suspicious liver lesion. No biliary dilation. Small layering  hyperdensity dependent gallbladder without gallbladder dilatation or surrounding  inflammation. SPLEEN: Normal.    PANCREAS: Normal.    ADRENALS: Normal.    KIDNEYS: Several stable small bilateral renal cysts, nonobstructing tiny  calculus right lower pole kidney. No hydronephrosis with stable nonspecific mild  perirenal stranding. LYMPH NODES: No new abnormally enlarged lymph nodes with stable small yenifer  hepatis lymph nodes. GI TRACT: There is dilated stomach and mildly dilated duodenum with air-fluid  levels without evidence of obstructing lesion proximal small bowel with no  jejunal dilatation. No other small or large bowel dilatation or wall thickening. Normal-appearing appendix. No ascites or free air. VASCULATURE: Moderate aortoiliac atherosclerotic changes. PELVIC ORGANS: Prostate not significantly enlarged, with Wilson catheter in place  with nondistended bladder. OTHER: Small sclerotic focus right pubic symphysis stable since prior study. Lower lumbar facet arthropathy asymmetric narrowing most pronounced on the right  L4/5, with no new acute osseous finding.    ==========         Impression IMPRESSION:        1.  Mild areas of atelectasis and/or scarring lower lobes and lingula with very  small pleural effusions. 2. Multifocal peripheral bilateral subpleural nodular densities the largest 15  mm right apex, which may represent peripheral areas of pneumonia or other  inflammatory etiology, although differential diagnosis includes less likely  metastatic disease or infarcts from pulmonary emboli. Follow-up chest CT  recommended in one month following treatment. 3. Diffuse esophageal wall thickening most likely related to esophagitis. 4. Cholelithiasis without CT evidence of acute cholecystitis. 5. Dilated stomach and duodenum with air-fluid levels without evidence of  mechanical obstruction, most likely related to ileus. 6. Stable additional ancillary findings as above. EKG No results found for this or any previous visit. ECHO  2/12/18 [facility] SUMMARY:  Left ventricle: Systolic function was normal. Ejection fraction was estimated   in the range of 55 % to 60 %. There was  mild hypokinesis of the basal inferior wall(s). There was moderate concentric   hypertrophy. Right ventricle: The size was normal. Systolic function was normal.    Inferior vena cava, hepatic veins: The inferior vena cava was dilated. The   respirophasic change in diameter was more  than 50%. Blood culture, MRSA last culture on 2/14 still showing GPC   Sputum GPC/Staph       Imaging:  I have personally reviewed the patients radiographs and have reviewed the reports:              Yoanna Juarez M.D.   Pulmonary Critical Care & Sleep Medicine

## 2018-02-15 NOTE — PROGRESS NOTES
ID progress Note    Anita Mayberry is a 57 y.o. male who is being seen on consult for antibiotic management for MSSA septic shock  . The requesting  physician is Floyd Putnam M.D. Current  antibiotics:   Vancomycin 2/12      Past antibiotics:   azactam 2/11 to 2/14  Levofloxacin  2/12  To 2/15       Impression:   Persistent  MRSA septic shock of Unclear source r/o discitis or epidural abscess   TERRY negative for vegetation or mass   surveillance blood cultures 2/12 , 2/14 continues to be positive   MRSA vancomycin DIMITRIS of 1 and 0.5       Bibasilar pneumonia vs atelectasis   Sputum cx with staph aureus   CT chest showed  Multifocal peripheral bilateral subpleural nodular densities the largest 15  mm right apex, which may represent peripheral areas of pneumonia or other inflammatory etiology,       AMANDA with Rhabdomyolysis : started on HD on 2/14 , having Good UOP  Severe sepsis on admission  Fever , tachycardia persists  Mild thrombocytopenia   PCN allergy : rash listed in system  BL knee arthroplasty : not clinically suspected for infection       Plan:   Agree with changing femoral line to neck line   Will need to get MRI of lumbar and thoracic spine done today to look for the source of ongoing MRSA sepsis    will continue vancomycin for now   With sputum culture growing staph aureus , want to keep on vancomycin for lungs coverage as well   Today vancomycin level is 16.2  Likely to need another dose of vancomycin after today HD   Keep trough 15 to 20   Pharmacy to dose vancomycin   Discontinue levofloxacin   Discontinued azactam on 2/14   Continue supportive care  We are watching pt closely for toxicities and side effects to abx and drug-drug interactions. We will adjust antibiotics upon the results of the pending tests and the clinical improvement of the patient.    Dicussed with ICU team at bedside   Case discussed with: []Patient []Family [X]Nursing [ ]Case Management  [ ] Paresh Dub ]MD/Care team Interval history     Patient condition is not much improving   Still febrile   Still on 1 pressor NEOsynephrine   and  blood cutlures still  Positive with MRSA and GPC   Planning to get MRI done today       Review of Systems:   Interval notes, available laboratory, microbiology and radiographic data reviewed. Discussed with nursing  where appropriate. Critically ill  I d/w nursing staff  Intubated. On Mech Vent  Continues to be febrile   Minimal ET secretion  On 2 pressors but coming off from 1 pressor  Coming off amiodarone drip   Other direct ROS were unobtainable due to pt's condition      Skin: negative for rash   HENT: negative for ear discharge   Eyes: negative for eye discharge   Respiratory: negative for hemoptysis   Gastointestinal: negative for diarrhea,  vomiting   Genitourinary: negative for hematuria   Hem/Lymph: negative for easy bleeding   Allergy/Imm: negative for hives   Neurological: negative for seizures                Physical Assessment:     VITAL SIGNS  Blood pressure 112/47, pulse 75, temperature 98.8 °F (37.1 °C), resp. rate 24, height 6' (1.829 m), weight 147.9 kg (326 lb 1 oz), SpO2 99 %.   Temp (24hrs), Av.7 °F (38.2 °C), Min:98.8 °F (37.1 °C), Max:102.8 °F (39.3 °C)        Intubated - On mech ventilation  Wilson in place - urine  With dark brown color   R femoral line in place   L neck HD TDC in place     CONSTITUTIONAL:    Intubated sedated   PSYCH:   Judgement/insight - unobtainable due to pt's condition   Affect -  unobtainable due to pt's condition  EYES:    Conjunctivae normal   PERRL  ENMT:    Left exterior ear normal, right external ear normal, nose normal   Inspection lips - normal   Orally intubated   NECK:    Exam neck - no masses, normal symmetry, trachea midline   PULMONARY/CHEST WALL:   Resp Effort - No use of accessory muscles, no intercostal retractions   Breath sounds -  BL coarse BS   CARDIOVASCULAR:    Heart sounds tachycardic, irregularly irregular   Intact pedal pulses by doppler   ABDOMINAL:    Appearance normal, soft, bowel sounds  Present    No tenderness   Liver/spleen - could not palpate organomegaly  GENITOURINARY: External genitalia - no ulcers, no drainage  , mcdonald cath in place   MUSCULOSKELETAL:   Gait  unobtainable due to pt's condition   Digits, nails - no clubbing, no cyanosis, no infection   RUE, LUE, RLE, LLE ROM -  unobtainable due to pt's condition .  No obvious joint swelling or erythema or warmth noted , BL knee incision intact and dry    Muscle strength  unobtainable due to pt's condition   Head and neck ROM -  unobtainable due to pt's condition  SKIN    Inspection - as above - otherwise no rash, no ulcers, no crepitus, intact   Palpation - as above - otherwise no induration, no nodules, dry, warm  NEUROLOGICAL:  unobtainable due to pt's condition      MICROBIOLOGY DATA  All Micro Results     Procedure Component Value Units Date/Time    CULTURE, BLOOD [[id number]]  (Abnormal) Collected:  02/13/18 0315    Order Status:  Completed Specimen:  Whole Blood from Blood Updated:  02/15/18 0835     Special Requests: NO SPECIAL REQUESTS        GRAM STAIN         GRAM POSITIVE COCCI IN CLUSTERS AEROBIC BOTTLE              SMEAR CALLED TO AND CORRECTLY REPEATED BY: Toy Chun RN ICU ON [id number] AT 1700 BY JDR     Culture result:         AEROBIC BOTTLE **METHICILLIN RESISTANT STAPHYLOCOCCUS AUREUS** (A)            For Susceptibility Refer to Culture  [id number]      CULTURE, BLOOD [[id number]]  (Abnormal)  (Susceptibility) Collected:  02/13/18 0310    Order Status:  Completed Specimen:  Whole Blood from Blood Updated:  02/15/18 0834     Special Requests: NO SPECIAL REQUESTS        GRAM STAIN         GRAM POSITIVE COCCI IN CLUSTERS AEROBIC BOTTLE              SMEAR CALLED TO AND CORRECTLY REPEATED BY: MICHELLE RN ICU ON [id number] AT [address]     Culture result:         AEROBIC BOTTLE **METHICILLIN RESISTANT STAPHYLOCOCCUS AUREUS** (A)      PATIENT IS A KNOWN MRSA       CULTURE, BLOOD [[id number]] Collected:  02/14/18 0915    Order Status:  Completed Specimen:  Whole Blood from Blood Updated:  02/15/18 0607     Special Requests: left femoral,     GRAM STAIN         GRAM POSITIVE COCCI IN CLUSTERS AEROBIC BOTTLE              SMEAR CALLED TO AND CORRECTLY REPEATED BY: Toyin Gatica RN ICU TO [place] [address]. AT [address] [place] [address] ON [id number]     Culture result:         CULTURE IN PROGRESS,FURTHER UPDATES TO FOLLOW              Sent to [facility] for ID/Susceptibility if indicated    CULTURE, BLOOD [[id number]] Collected:  02/14/18 0920    Order Status:  Completed Specimen:  Whole Blood from Blood Updated:  02/15/18 0153     Special Requests: venipuncture     GRAM STAIN         GRAM POSITIVE COCCI IN CLUSTERS AEROBIC BOTTLE              SMEAR CALLED TO AND CORRECTLY REPEATED BY: Toyin Gatica RN ICU TO [place] [address].  J [facility] ON [id number]     Culture result:         CULTURE IN PROGRESS,FURTHER UPDATES TO FOLLOW              Sent to [facility] for ID/Susceptibility if indicated    CULTURE, RESPIRATORY/SPUTUM/BRONCH Milla Query STAIN [[id number]]  (Abnormal) Collected:  02/13/18 0858    Order Status:  Completed Specimen:  Sputum from Sputum Updated:  02/14/18 1218     Special Requests: NO SPECIAL REQUESTS        GRAM STAIN 10-25 WBC/lpf         <10 EPI/lpf         MUCUS PRESENT         NO ORGANISMS SEEN        Culture result:         RARE STAPHYLOCOCCUS AUREUS (A)              FEW NORMAL RESPIRATORY MAYNOR    CULTURE, URINE [[id number]] Collected:  02/12/18 0900    Order Status:  Completed Specimen:  Urine from Wilson Specimen Updated:  02/14/18 0932     Special Requests: NO SPECIAL REQUESTS        Culture result: NO GROWTH 2 DAYS       CULTURE, BLOOD [[id number]]  (Abnormal)  (Susceptibility) Collected:  02/11/18 1846    Order Status:  Completed Specimen:  Blood from Blood Updated:  02/14/18 0837     Special Requests: NO SPECIAL REQUESTS        GRAM STAIN         GRAM POSITIVE COCCI IN CLUSTERS AEROBIC BOTTLE ANAEROBIC BOTTLE              SMEAR CALLED TO AND CORRECTLY REPEATED BY: Kimberli Dyer RN ICU TO [facility] AT 1110 ON [id number]     Culture result:         AEROBIC AND ANAEROBIC BOTTLES **METHICILLIN RESISTANT STAPHYLOCOCCUS AUREUS** (A)            CALLED TO AND CORRECTLY REPEATED BY:  Roosevelt Callaway RN ICU [facility] [address] [facility] AT 0825 ON 2/14/18. INFLUENZA A & B AG (RAPID TEST) [[id number]] Collected:  02/12/18 0211    Order Status:  Completed Specimen:  Nasopharyngeal from Nasal washing Updated:  02/12/18 0237     Influenza A Antigen NEGATIVE          A negative result does not exclude influenza virus infection, seasonal or H1N1 due to suboptimal sensitivity. If influenza is circulating in your community, a diagnosis of influenza should be considered based on a patients clinical presentation and empiric antiviral treatment should be considered, if indicated.         Influenza B Antigen NEGATIVE            LAB DATA    Recent Results (from the past 24 hour(s))   CULTURE, BLOOD    Collection Time: 02/14/18  9:15 AM   Result Value Ref Range    Special Requests: left femoral,     GRAM STAIN GRAM POSITIVE COCCI IN CLUSTERS AEROBIC BOTTLE      GRAM STAIN        SMEAR CALLED TO AND CORRECTLY REPEATED BY: CARTER HEMPHILL RN ICU TO [facility] [address]. AT [address] [place] [address] ON [id number]    Culture result: CULTURE IN [place] UPDATES TO FOLLOW      Culture result: Sent to [facility] for ID/Susceptibility if indicated     CULTURE, BLOOD    Collection Time: 02/14/18  9:20 AM   Result Value Ref Range    Special Requests: venipuncture     GRAM STAIN GRAM POSITIVE COCCI IN CLUSTERS AEROBIC BOTTLE      GRAM STAIN        SMEAR CALLED TO AND CORRECTLY REPEATED BY: CARTER HEMPHILL RN ICU TO   [address] ON [id number]    Culture result: CULTURE IN [place] UPDATES TO FOLLOW      Culture result: Sent to [facility] for ID/Susceptibility if indicated     GLUCOSE, POC    Collection Time: 02/14/18 12:01 PM   Result Value Ref Range    Glucose (POC) 188 (H) 70 - 110 mg/dL   POC G3    Collection Time: 02/14/18 12:02 PM   Result Value Ref Range Device: VENT      FIO2 (POC) 40 %    pH (POC) 7.245 (LL) 7.35 - 7.45      pCO2 (POC) 46.1 (H) 35.0 - 45.0 MMHG    pO2 (POC) 83 80 - 100 MMHG    HCO3 (POC) 19.5 (L) 22 - 26 MMOL/L    sO2 (POC) 92 92 - 97 %    Base deficit (POC) 7 mmol/L    Mode ASSIST CONTROL      Tidal volume 450 ml    Set Rate 20 bpm    PEEP/CPAP (POC) 5.0 cmH2O    Allens test (POC) YES      Inspiratory Time 0.9 sec    Total resp. rate 20      Site RIGHT RADIAL      Patient temp. 102.5      Specimen type (POC) ARTERIAL      Performed by Gracie Douglas    PTT    Collection Time: 02/14/18  2:05 PM   Result Value Ref Range    aPTT 55.4 (H) 23.0 - 36.4 SEC   GLUCOSE, POC    Collection Time: 02/14/18  5:52 PM   Result Value Ref Range    Glucose (POC) 158 (H) 70 - 110 mg/dL   POC G3    Collection Time: 02/14/18  8:07 PM   Result Value Ref Range    Device: VENT      FIO2 (POC) 0.4 %    pH (POC) 7.378 7.35 - 7.45      pCO2 (POC) 43.1 35.0 - 45.0 MMHG    pO2 (POC) 67 (L) 80 - 100 MMHG    HCO3 (POC) 25.1 22 - 26 MMOL/L    sO2 (POC) 91 (L) 92 - 97 %    Base excess (POC) 0 mmol/L    Mode ASSIST CONTROL      Tidal volume 450 ml    Set Rate 24 bpm    PEEP/CPAP (POC) 5 cmH2O    PIP (POC) 26      Allens test (POC) N/A      Inspiratory Time 0.9 sec    Total resp.  rate 24      Site RIGHT RADIAL      Patient temp. 100.7      Specimen type (POC) ARTERIAL      Performed by Jeremy Harrison     Volume control plus YES     GLUCOSE, POC    Collection Time: 02/14/18 11:39 PM   Result Value Ref Range    Glucose (POC) 172 (H) 70 - 110 mg/dL   PTT    Collection Time: 02/15/18 12:30 AM   Result Value Ref Range    aPTT 82.0 (H) 23.0 - 36.4 SEC   VANCOMYCIN, TROUGH    Collection Time: 02/15/18 12:35 AM   Result Value Ref Range    Vancomycin,trough 16.4 10.0 - 20.0 ug/mL    Reported dose date: [id number]      Reported dose time: 2231      Reported dose: 1500 MG UNITS   POC G3    Collection Time: 02/15/18  5:47 AM   Result Value Ref Range    Device: VENT      FIO2 (POC) 0.4 %    pH (POC) 7.410 7.35 - 7.45      pCO2 (POC) 38.0 35.0 - 45.0 MMHG    pO2 (POC) 71 (L) 80 - 100 MMHG    HCO3 (POC) 24.0 22 - 26 MMOL/L    sO2 (POC) 94 92 - 97 %    Base deficit (POC) 1 mmol/L    Mode ASSIST CONTROL      Tidal volume 450 ml    Set Rate 24 bpm    PEEP/CPAP (POC) 5 cmH2O    PIP (POC) 29      Allens test (POC) N/A      Inspiratory Time 0.9 sec    Total resp. rate 24      Site RIGHT RADIAL      Patient temp. 99.5      Specimen type (POC) ARTERIAL      Performed by Malik Banuelos     Volume control plus YES     GLUCOSE, POC    Collection Time: 02/15/18  6:09 AM   Result Value Ref Range    Glucose (POC) 200 (H) 70 - 110 mg/dL   PTT    Collection Time: 02/15/18  6:13 AM   Result Value Ref Range    aPTT 66.2 (H) 23.0 - 98.6 SEC   METABOLIC PANEL, COMPREHENSIVE    Collection Time: 02/15/18  6:25 AM   Result Value Ref Range    Sodium 138 136 - 145 mmol/L    Potassium 3.0 (L) 3.5 - 5.5 mmol/L    Chloride 102 100 - 108 mmol/L    CO2 26 21 - 32 mmol/L    Anion gap 10 3.0 - 18 mmol/L    Glucose 199 (H) 74 - 99 mg/dL    BUN 43 (H) 7.0 - 18 MG/DL    Creatinine 3.12 (H) 0.6 - 1.3 MG/DL    BUN/Creatinine ratio 14 12 - 20      GFR est AA 25 (L) >60 ml/min/1.73m2    GFR est non-AA 20 (L) >60 ml/min/1.73m2    Calcium 8.2 (L) 8.5 - 10.1 MG/DL    Bilirubin, total 1.4 (H) 0.2 - 1.0 MG/DL    ALT (SGPT) 21 16 - 61 U/L    AST (SGOT) 47 (H) 15 - 37 U/L    Alk.  phosphatase 68 45 - 117 U/L    Protein, total 5.3 (L) 6.4 - 8.2 g/dL    Albumin 1.5 (L) 3.4 - 5.0 g/dL    Globulin 3.8 2.0 - 4.0 g/dL    A-G Ratio 0.4 (L) 0.8 - 1.7     MAGNESIUM    Collection Time: 02/15/18  6:25 AM   Result Value Ref Range    Magnesium 1.8 1.6 - 2.6 mg/dL   PHOSPHORUS    Collection Time: 02/15/18  6:25 AM   Result Value Ref Range    Phosphorus 2.1 (L) 2.5 - 4.9 MG/DL   CBC W/O DIFF    Collection Time: 02/15/18  6:25 AM   Result Value Ref Range    WBC 7.0 4.6 - 13.2 K/uL    RBC 2.48 (L) 4.70 - 5.50 M/uL    HGB 7.8 (L) 13.0 - 16.0 g/dL    HCT 22.5 (L) 36.0 - 48.0 %    MCV 90.7 74.0 - 97.0 FL    MCH 31.5 24.0 - 34.0 PG    MCHC 34.7 31.0 - 37.0 g/dL    RDW 16.9 (H) 11.6 - 14.5 %    PLATELET 273 325 - 889 K/uL    MPV 12.4 (H) 9.2 - 11.8 FL   PROTHROMBIN TIME + INR    Collection Time: 02/15/18  6:25 AM   Result Value Ref Range    Prothrombin time 18.2 (H) 11.5 - 15.2 sec    INR 1.6 (H) 0.8 - 1.2     CALCIUM, IONIZED    Collection Time: 02/15/18  6:45 AM   Result Value Ref Range    Ionized Calcium 1.158 1.12 - 1.32 MMOL/L   CBC W/O DIFF    Collection Time: 02/15/18  8:00 AM   Result Value Ref Range    WBC 6.9 4.6 - 13.2 K/uL    RBC 2.41 (L) 4.70 - 5.50 M/uL    HGB 7.5 (L) 13.0 - 16.0 g/dL    HCT 21.8 (L) 36.0 - 48.0 %    MCV 90.5 74.0 - 97.0 FL    MCH 31.1 24.0 - 34.0 PG    MCHC 34.4 31.0 - 37.0 g/dL    RDW 16.8 (H) 11.6 - 14.5 %    PLATELET 142 (L) 095 - 420 K/uL    MPV 11.8 9.2 - 11.8 FL           IMAGING     I reviewed images CXR from  2/15 - official report showed  Endotracheal tube approximately 3 cm above the lorena.   2. Persistent hypoinflation with prominence of the pulmonary vasculature may  represent edema or may be accentuated due to hypoinflation.   3. Persistent left basilar opacity, atelectasis or pneumonia, similar to prior  study.     Current medications reviewed in EPIC      Current Facility-Administered Medications   Medication Dose Route Frequency    vancomycin (VANCOCIN) 1500 mg in  ml infusion  1,500 mg IntraVENous ONCE    PHENYLephrine (ERWIN-SYNEPHRINE) 90 mg in 0.9% sodium chloride 250 mL infusion (premix or compounded)   mcg/min IntraVENous TITRATE    0.9% sodium chloride infusion 250 mL  250 mL IntraVENous PRN    heparin (porcine) 1,000 unit/mL injection 1,500 Units  1,500 Units IntraVENous PRN    acetaminophen (TYLENOL) solution 650 mg  650 mg Oral Q4H PRN    aspirin chewable tablet 81 mg  81 mg Oral DAILY    pantoprazole (PROTONIX) 40 mg in sodium chloride 0.9 % 10 mL injection  40 mg IntraVENous DAILY    insulin glargine (LANTUS) injection 15 Units  15 Units SubCUTAneous DAILY WITH LUNCH    HYDROcodone-acetaminophen (NORCO) 5-325 mg per tablet 1 Tab  1 Tab Oral Q4H PRN    morphine injection 1 mg  1 mg IntraVENous Q4H PRN    naloxone (NARCAN) injection 0.4 mg  0.4 mg IntraVENous PRN    docusate sodium (COLACE) capsule 100 mg  100 mg Oral BID PRN    acetaminophen (TYLENOL) tablet 650 mg  650 mg Oral Q4H PRN    levoFLOXacin (LEVAQUIN) 500 mg in D5W IVPB  500 mg IntraVENous Q24H    ELECTROLYTE REPLACEMENT PROTOCOL-MAGNESIUM  1 Each Other PRN    folic acid (FOLVITE) tablet 1 mg  1 mg Oral DAILY    ELECTROLYTE REPLACEMENT PROTOCOL-MAGNESIUM  1 Each Other PRN    ELECTROLYTE REPLACEMENT PROTOCOL-PHOSPHORUS  1 Each Other PRN    ELECTROLYTE REPLACEMENT PROTOCOL-CALCIUM  1 Each Other PRN    thiamine 100 mg in 50 mL NS   IntraVENous Q24H    Vancomycin- Rx to Dose & Monitor  1 Each Other Rx Dosing/Monitoring    heparin 25,000 units in D5W 250 ml infusion  7-25 Units/kg/hr IntraVENous TITRATE    chlorhexidine (PERIDEX) 0.12 % mouthwash 10 mL  10 mL Oral Q12H    fentaNYL (PF) 900 mcg/30 ml infusion soln  0-200 mcg/hr IntraVENous TITRATE    midazolam in normal saline (VERSED) 100 mg in 0.9% sodium chloride (MBP/ADV) 100 mL infusion  2-6 mg/hr IntraVENous CONTINUOUS    amiodarone (NEXTERONE) 360 mg in dextrose 200 mL (1.8 mg/mL) infusion  0.5-1 mg/min IntraVENous TITRATE    insulin lispro (HUMALOG) injection   SubCUTAneous Q6H    NOREPINephrine (LEVOPHED) 8 mg in 5% dextrose 250mL infusion  2-16 mcg/min IntraVENous TITRATE    ELECTROLYTE RPLACEMENT PROTOCOL- POTASSIUM  1 Each Other PRN    sodium chloride (NS) flush 5-10 mL  5-10 mL IntraVENous PRN    ondansetron (ZOFRAN ODT) tablet 4 mg  4 mg Oral Q4H PRN    glucose chewable tablet 16 g  4 Tab Oral PRN    glucagon (GLUCAGEN) injection 1 mg  1 mg IntraMUSCular PRN    dextrose (D50W) injection syrg 12.5-25 g  25-50 mL IntraVENous PRN           Medical Decision Making:     I reviewed and summarized data from old medical records and obtained history from other sources than patient. I reviewed laboratory tests, Radiology data, and diagnostic tests in the CPT medicine section. I discussed with the physicians and the nursed involved in this patient's care about this patient's current medical problems. I have  Discussed plan of care with providers and  nursing staff. The total visit duration was of 40 minutes of which more than 50% was spent in coordination of care and counseling (time spent with patient/family face to face, physical exam, reviewing microbiology data, laboratory results, radiographic data, speaking with physicians and nursing staff involved in this pt's care).

## 2018-02-15 NOTE — PROGRESS NOTES
Spoke with ID suggested MRI due to ongoing sepsis  I again get calls from nursing and MRI tech about patient study. I advised patient to have  MRI done with versed drip and levophed and taper other drips off during procedure  I am not sure but seems like a big concern in getting this tests done since yesterday as I am getting this repeated calls to some how convince me to cancel these tests which are recommended by ID. Again as documented yesterday I will leave it up to ID and nursing to decide on it. I think from ICU point of view patient is sick but so does all the patient and that is why they are in ICU. I can not predict but he is on 1 pressor and other drips can be stopped so MRI can be done.     I will be available for any assistance and plan of care discussed with nursing at length

## 2018-02-15 NOTE — DIABETES MGMT
GLYCEMIC CONTROL & NUTRITION:    - Discussed in rounds, known h/o DM, on dual oral therapy at home, current A1C 5.8%  - BG has been trending up with TF, will be at goal today (40 ml/hr, was at 30 ml/hr yesterday & overnight), currently on-hold r/t procedure  - recommend continue basal dose at Lantus 15 units every day and add scheduled dose of Humalog 3 units q 6 hours, continue corrective coverage q 6 ours as well, make sure scheduled mealtime dose is held if TF paused    Recent Glucose Results:   Lab Results   Component Value Date/Time     (H) 02/15/2018 06:25 AM    GLUCPOC 168 (H) 02/15/2018 12:10 PM    GLUCPOC 200 (H) 02/15/2018 06:09 AM    GLUCPOC 172 (H) 02/14/2018 11:39 PM     Lab Results   Component Value Date/Time    Hemoglobin A1c 5.8 (H) 02/11/2018 06:45 PM               Poncho Manuel, MPH, RD, CDE

## 2018-02-15 NOTE — PROGRESS NOTES
Patient came back from MRI was doing ok    Later on I received a call that he is having some rash on legs papular,  Only new thing patient received was vitamin k    Plan give solumedrol for possible allergic reaction due to viramin k   Monitor as staph can have issues like EM and TSS  If continue to have worsening of rash consider adding clindamycine    Later on nurse also suggested that patient is more hypoxic now on 60% FIO2    CXR done did not show any acute worsening    Plan:  Increase peep 8  Monitor and sedate as possible with trip patient has waken up and resulting in hypoxia  Monitor with dialysis  Give unit of blood as planned  Call if things change      Kaiser Paul M.D  [address]  [facility] [address] [address]  Phone [phone]   Fax [phone]  Pulmonary Critical Care & Sleep Medicine

## 2018-02-15 NOTE — PROGRESS NOTES
Nephrology Progress note    Subjective:     Mohit Longo is a 57 y.o. male with history of hypertension, diabetes, hyperlipidemia, gout and arthritis who presented with a fever and multiple falls. On initial evaluation, he had borderline low blood pressure, and normal white cell count, but elevated creatinine at 2.5. Blood culture was positive and started on broad spectrum antibiotics, on IV fluid and IV pressors. A culture finally came back pos for MRSA. His preadmission baseline creatinine back in 2015 was 1.2. On admission, it was 2.5. It continued to increase and it jumped up to 4.06. Of note, the patient also had slightly elevated CPK level on admission, which is trending down now. CAT scan did not show any significant finding as a possible source. Echocardiography was negative for valvular vegetation. Patient is currently intubated in ICU on pressors. He is also sedated. No further history could be obtained at this point. He was initiated on HD last night- had 2 hr tx    Admit Date: 2/11/2018    Allergy:  Allergies   Allergen Reactions    Bees [Hymenoptera Allergenic Extract] Anaphylaxis    Cocoa Butter-Zinc Oxide Rash    Dilaudid [Hydromorphone (Bulk)] Rash     capsules    Pcn [Penicillins] Rash        Objective:     Visit Vitals    /60    Pulse 92  Comment: Simultaneous filing. User may not have seen previous data.  Temp 99.4 °F (37.4 °C)    Resp 20  Comment: Simultaneous filing. User may not have seen previous data.  Ht 6' (1.829 m)    Wt 147.9 kg (326 lb 1 oz)    SpO2 95%  Comment: Simultaneous filing. User may not have seen previous data.     BMI 44.22 kg/m2         Intake/Output Summary (Last 24 hours) at 02/15/18 1345  Last data filed at 02/15/18 1319   Gross per 24 hour   Intake          4076.08 ml   Output             1120 ml   Net          2956.08 ml       Physical Exam:       General: Vented   HENT: Atraumatic and normocephalic   Eyes: Normal conjunctiva   Neck: Supple Cardiovascular: Normal S1 & S2, no m/r/g   Pulmonary/Chest Wall: Clear to auscultation bilaterally   Abdominal: Soft and non-tender   Musculoskeletal: + edema   Neurological: No focal deficits       Data Review:      Lab Results   Component Value Date/Time    WBC 6.9 02/15/2018 08:00 AM    RBC 2.41 (L) 02/15/2018 08:00 AM    HCT 21.8 (L) 02/15/2018 08:00 AM    MCV 90.5 02/15/2018 08:00 AM    MCH 31.1 02/15/2018 08:00 AM    MCHC 34.4 02/15/2018 08:00 AM    RDW 16.8 (H) 02/15/2018 08:00 AM      No results found for: IRONNo components found for: FERRITIN  No components found for: PTHINT  Urinalysis  No results found for: UGLU        Impression:     1. Acute kidney injury, likely acute tubular necrosis from septic shock. Creatinine trending up from 2.5 on admission, to 4.06. Patient also becoming oliguric and had first HD 2/14. Cr 3.1 today, UOP increasing, 950 cc out  2. Methicillin-resistant Staphylococcus aureus septic shock, unclear source, oral antibiotics, IV pressor, IV fluid. 3.  Hypophosphatemia. 4.  Hypokalemia, corrected. 5.  Acidosis, mixed respiratory and metabolic. 6.  Mild rhabdomyolysis, improving with a CK down to 647 from high of 3300.  7. Anemia. 8.  Thrombocytopenia  9. Diabetes mellitus. 10.  Morbid obesity.    11.  Respiratory failure, intubated.       Plan:     HD today, 2 hr and monitor I/Os, renal panel  hopefully will see signs of recovery  MRI today  Continue abx    MD Blas Dorado  [phone]

## 2018-02-15 NOTE — PROGRESS NOTES
0700 - Bedside and Verbal shift change report given to Aguila Morris RN (oncoming nurse) by Author Loza RN (offgoing nurse). Report included the following information SBAR, Kardex, Procedure Summary, Intake/Output, Accordion, Med Rec Status and Cardiac Rhythm ST, NSR. Drip rates verified with offgoing nurse. Drips verified with offcoming RN. Amio at 0.5, Heparin bolus given 3,000 units and drip increased to 10units/kg/hr, Jeffry at 100mcg/min, Fentanyl at 50mcg/hr, Versed at 3mg/hr, and Sodium bicarb at 100   0800 - Pt assessed and vitals obtained. Pt in no acute distress at this time. Pt follows commands when opening eyes and squeezing fingers. BP controled on Jeffry at this time, decreased by 10mcg/min. See EMR for full details. 1030 - Interdisciplinary rounds competed: Palliative care consult added, lasix 100mg per nephro, Blood gas, Attmept to obtain MRI. Obtain consent for HD line and possibly HD. Continue intubation and continue to monitor. 1200 - Pt assessed and vitals obtained. Wife at the bedside, tearful. Pt in no acute distress at this time. BP lower, Jeffry maxed at 100mcg/min. See EMR for full details. 1315 - Levo began for continued hypotension. 0 - MRI tech and RN spoke extensively about MRI compatibility and pt safety, MD paged concerning ability to complete MRI today. 1430 - ID consulted about importance and immediate need for MRI. Per ID not emergent, MRI when pt is stable. 12 - Wife called and consent obtained for HD line placement and HD.   1515 - 2mg versed given for HD line placement. Dr Terald Romberg at the bedside to insert line. Tube feeds held for line insertion 1529 - Fentanyl increased to 100mcg/hr during HD line placement. 1600 - Pt assessed and vitals obtained. Pt stable post HD line insertion. Xray to confirm. HD to follow. See EMR for full details. 1710 - HD started   1925 - HD completed.  1L pulled off.   1930 - Bedside and Verbal shift change report given to Evon Hall RN (oncoming nurse) by Marquis Bolanos RN (offgoing nurse). Report included the following information SBAR, Kardex, Procedure Summary, Intake/Output, Accordion, Recent Results, Med Rec Status and Cardiac Rhythm NSR. Drip rates verified with oncoming RN.

## 2018-02-16 NOTE — PROGRESS NOTES
Patient is extubated doing well  ABG checked PH 7.51 but PO2 is low  On high flow and oxygenation is better about 97%    Plan:  Continue high flow  BIPAP at night  Give lasix 80 ivp one dose  If contineu to have issue will ask renal for dialysis      CHARLY Doran 177.  Caroline Filler [address] [facility] [address] [facility] [address]  Phone [phone]   Fax [phone]  Pulmonary Critical Care & Sleep Medicine

## 2018-02-16 NOTE — PROGRESS NOTES
Hospitalist Progress Note    Patient: Faith Rodriguez MRN: [id number]  CSN: [id number]    YOB: 1955  Age: 57 y.o. 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 XXXA    Back pain M54.9    Weakness of right lower extremity R29.898    Anemia D64.9    Shock (HCC) R57.9    Acute respiratory failure ([facility]) J96.00    Elevated troponin R74.8    Demand ischemia of myocardium ([facility]) I24.8    MRSA bacteremia R65.80        58 yo male admitted for pneumonia, respiratory distress. RRT called on this patient for respiratory distress in morning of 02/12/2018, high mews score of 8, fever of 103, tachycardia, resp rate of 44. He was transferred to ICU. Patient continued to decline in condition with elevated resp rate. He was intubated and central line placed. CRITICAL CARE PLAN    Patient opens eyes on verbal stim.     Resp -   Acute respiratory failure - extubated 02/16/2018     ID -   Sepsis unclear source of infection. Recent pneumonia. Blood cultures 02/11/2018 positive for MRSA. blood cx 2/2 02/13/2018 MRSA. blood cultures 2/2 02/14/2018 staph aureus. Follow blood cultures 2/2 02/16/2018  resp cultures 02/13/2018 MRSA  Urine culture 02/12/2018 no growth  CT chest Multifocal peripheral bilateral subpleural nodular densities the largest 15  mm right apex, which may represent peripheral areas of pneumonia or other  inflammatory etiology, although differential diagnosis includes less likely  metastatic disease or infarcts from pulmonary emboli. S/p TERRY with no evidence of vegetations.   MRI per ID,     ANTIBIOTICS vancomycin, levaquin. .        [facility] - Monitor HD. Septic shock - off pressors  A-fib - on amiodarone drip. S/p cardioversion. Converted to sinus rhythm. Elevated troponin - demand ischemia vs NSTEMI. Will need ischemia work up when stable per cardiology. On heparin drip     Heme/onc - Follow H&H, plts.      Renal -   AMANDA - improving, nephrology following. Metabolic acidosis - improved with bicarb drip  Trend BUN, Cr, follow I/O. Check and replace Mg, K, phos.     Endocrine -  Follow FSG  DM - on lantus and SSI     Neuro/ Pain/ Sedation -   RLE weakenss/generalized weakness. Head CT negative       GI - SLP eval and diet per SLP.    Prophylaxis - DVT: heparin drip, GI: protonix.              Disposition : TBD    Physical Exam:  General: As above  HEENT: NC, Atraumatic. PERRLA, anicteric sclerae. Lungs: CTA Bilaterally. No Wheezing/Rhonchi/Rales.   Heart:  Regular  rhythm,  No murmur, No Rubs, No Gallops  Abdomen: Soft, Non distended, Non tender.  +Bowel sounds,   Extremities: No c/c/e            Vital signs/Intake and Output:  Visit Vitals    /62    Pulse 96    Temp 98 °F (36.7 °C)    Resp 27    Ht 6' (1.829 m)    Wt 149.3 kg (329 lb 2.4 oz)    SpO2 96%    BMI 44.64 kg/m2     Current Shift:     Last three shifts:  02/14 1901 - 02/16 0700  In: 5576.6 [I.V.:4477.6]  Out: 2095 [Urine:1945]            Labs: Results:       Chemistry Recent Labs      02/16/18   1212  02/16/18   0440  02/15/18   0625  02/14/18   0630   GLU   --   226*  199*  149*   NA   --   141  138  136   K  3.1*  2.9*  3.0*  3.9   CL   --   103  102  103   CO2   --   31  26  23   BUN   --   34*  43*  57*   CREA   --   2.22*  3.12*  4.06*   CA   --   8.6  8.2*  7.4*   AGAP   --   7  10  10   BUCR   --   15  14  14   AP   --   72  68  62   TP   --   5.4*  5.3*  5.3*   ALB   --   1.5*  1.5*  1.5*   GLOB   --   3.9  3.8  3.8   AGRAT   --   0.4*  0.4*  0.4*      CBC w/Diff Recent Labs      02/16/18   0440  02/15/18   2130  02/15/18   0800  02/15/18 0625   WBC  7.6   --   6.9  7.0   RBC  2.85*   --   2.41*  2.48*   HGB  8.8*  8.4*  7.5*  7.8*   HCT  24.9*  24.5*  21.8*  22.5*   PLT  187   --   129*  141      Cardiac Enzymes Recent Labs      02/14/18   0630   CPK  647*      Coagulation Recent Labs      02/16/18   1210  02/16/18   0440   02/15/18   0625   PTP   --   14.2   --   18.2*   INR   --   1.2   --   1.6*   APTT  56.2*  61.4*   < >   --     < > = values in this interval not displayed. Lipid Panel Lab Results   Component Value Date/Time    Cholesterol, total 116 02/11/2018 08:45 PM    HDL Cholesterol 27 (L) 02/11/2018 08:45 PM    LDL, calculated 57.4 02/11/2018 08:45 PM    VLDL, calculated 31.6 02/11/2018 08:45 PM    Triglyceride 158 (H) 02/11/2018 08:45 PM    CHOL/HDL Ratio 4.3 02/11/2018 08:45 PM      BNP No results for input(s): BNPP in the last 72 hours.    Liver Enzymes Recent Labs      02/16/18   0440   TP  5.4*   ALB  1.5*   AP  72   SGOT  45*      Thyroid Studies Lab Results   Component Value Date/Time    TSH 0.43 02/12/2018 10:05 AM        Procedures/imaging: see electronic medical records for all procedures/Xrays and details which were not copied into this note but were reviewed prior to creation of Plan

## 2018-02-16 NOTE — CONSULTS
[facility] [place]: [phone]  [facility]: [address] [place] [address] Way: [phone]    Patient Name: Eden Cedillo  YOB: 1955    Date of Initial Consult: 2/16/18  Reason for Consult: care decisions  Requesting Provider: Dr. Colleen Astudillo   Primary Care Physician: Dianne Henry MD      SUMMARY:   Eden Cedillo is a 57y.o. year old who presented after two days of malaise with overwhelming MERSA sepsis and multi organ system failure. On pressors initially, has required dialysis and amiodarone drip with ventilator support. Now off pressors, with good UOP, extubated this am. Still minimally responsive. Only negative recent care finding is persistently + blood cultures and fevers. Previously reasonably healthy, last hospitalization for TKR. Recently lost son in January with hepatorenal syndrome. Wife at bedside.                                                                                                                                                                                                                                                                                                                                                                                                                                                                                                                                                                                                                                                                                                                                    PALLIATIVE DIAGNOSES:     Patient Active Problem List   Diagnosis Code    Osteoarthritis of right knee M17.11    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[address] T84.018A, Z96.659    Failed total knee arthroplasty ([address] T84.018A, Z96.659    Dysphagia R13.10    Sepsis ([address] A41.9    Hypokalemia E87.6    Glucosuria R81    AMANDA (acute kidney injury) [facility]) N17.9    Fall W19. XXXA    Back pain M54.9    Weakness of right lower extremity R29.898    Anemia D64.9    Shock ([address] R57.9    Acute respiratory failure (HCC) J96.00    Elevated troponin R74.8    Demand ischemia of myocardium (HCC) I24.8    MRSA bacteremia R78.81     1. PLAN:   1. Met with wife and answered questions regarding current clinical status. She understand that while he is improved he remains critically ill. Discussed implications of persistent + blood cultures. Reviewed his AMD and asked if she thinks he would be willing to undergo reintubation and reescalation of care should he decline rapidly again. She is not certain but knows he would not want aggressive measures if he had little or no chance of making a good recovery. Emotional support provided. 2. Initial consult note routed to primary continuity provider  3. Communicated plan of care with: Palliative IDT    GOALS OF CARE:  Patient/Health Care Proxy Stated Goals: Cure      TREATMENT PREFERENCES:   Code Status: Full Code    Advance Care Planning:  Advance Care Planning 2/14/2018   Patient's Healthcare Decision Maker is: Named in scanned ACP document   Primary Decision Maker Name Osvaldo Ruiz   Primary Decision Maker Phone Number [phone]   Primary Decision Maker Relationship to Patient Spouse   Secondary Decision Maker Name Dulce Jose   Secondary Decision Maker Phone Number [phone]   Secondary Decision Maker Relationship to Patient Sibling   Confirm Advance Directive Yes, on file       Medical Interventions: Full interventions   Other Instructions: Other:  As far as possible, the palliative care team has discussed with patient / health care proxy about goals of care / treatment preferences for patient.      HISTORY:     History obtained from: wife    CHIEF COMPLAINT: see summary    HPI/SUBJECTIVE:    The patient is:   [] Verbal and participatory  [x] Non-participatory due to:   sedation     Clinical Pain Assessment (nonverbal scale for nonverbal patients): Activity (Movement): Lying quietly, normal position    Duration: for how long has pt been experiencing pain (e.g., 2 days, 1 month, years)  Frequency: how often pain is an issue (e.g., several times per day, once every few days, constant)     FUNCTIONAL ASSESSMENT:     Palliative Performance Scale (PPS):  PPS: 30    ECOG        PSYCHOSOCIAL/SPIRITUAL SCREENING:      Any spiritual / [employer] concerns:  [] Yes /  [x] No    Caregiver Burnout:  [] Yes /  [x] No /  [] No Caregiver Present      Anticipatory grief assessment:   [x] Normal  / [] Maladaptive        REVIEW OF SYSTEMS:     Positive and pertinent negative findings in ROS are noted above in HPI. The following systems were [] reviewed / [x] unable to be reviewed as noted in HPI  Other findings are noted below. Systems: constitutional, ears/nose/mouth/throat, respiratory, gastrointestinal, genitourinary, musculoskeletal, integumentary, neurologic, psychiatric, endocrine. Positive findings noted below. Modified ESAS Completed by: provider                                   Stool Occurrence(s): 0        PHYSICAL EXAM:     Wt Readings from Last 3 Encounters:   02/16/18 149.3 kg (329 lb 2.4 oz)   02/14/18 144.8 kg (319 lb 3.6 oz)   08/02/15 138.8 kg (306 lb)     Blood pressure 149/82, pulse (!) 101, temperature 99.6 °F (37.6 °C), resp. rate 27, height 6' (1.829 m), weight 149.3 kg (329 lb 2.4 oz), SpO2 96 %.   Pain:  Pain Scale 1: FLACC  Pain Intensity 1: 0  Pain Onset 1: chronic  Pain Location 1: Back  Pain Orientation 1: Lower  Pain Description 1: Aching, Constant  Pain Intervention(s) 1: Medication (see MAR)  Last bowel movement:     Constitutional: obese, sedated, but arousable  Eyes: pupils equal, anicteric  ENMT: no nasal discharge, moist mucous membranes  Cardiovascular: regular rhythm, distal pulses intact  Respiratory: breathing not labored, symmetric  Gastrointestinal: soft non-tender, +bowel sounds  Musculoskeletal: no deformity, no tenderness to palpation  Skin: warm, dry  Neurologic: sedated  Not following commands, moving all extremities  Psychiatric: full affect, no hallucinations  Other:       HISTORY:     Principal Problem:    Sepsis ([address] (2/11/2018)    Active Problems:    Osteoarthritis of right knee (1/18/2014)      Hypokalemia (2/11/2018)      Glucosuria (2/11/2018)      AMANDA (acute kidney injury) ([address] (2/11/2018)      Fall (2/11/2018)      Back pain (2/11/2018)      Weakness of right lower extremity (2/11/2018)      Anemia (2/11/2018)      Shock ([address] (2/13/2018)      Acute respiratory failure (HCC) (2/13/2018)      Elevated troponin (2/12/2018)      Demand ischemia of myocardium ([address] (2/12/2018)      MRSA bacteremia (2/15/2018)      Past Medical History:   Diagnosis Date    Arrhythmia     irregular heart beat    Arthritis     knees- osteo    Chronic pain     knees    Diabetes (HCC) 1990's    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[address] 10/21/2014    GERD (gastroesophageal reflux disease)     well controlled    Gout     High cholesterol     Hypertension 1990's    Irregular heart beat     palpitations    Morbid obesity ([address]     Osteoarthritis of right knee 1/18/2014    Other ill-defined conditions(799.89)     asbestosis    Other ill-defined conditions(799.89)     irregular heart beat    Other ill-defined conditions(799.89)     h/o migraines    Other ill-defined conditions(799.89)     gout    Other ill-defined conditions(799.89)     cholesterol    PUD (peptic ulcer disease) 1970's    Tinnitus of left ear       Past Surgical History:   Procedure Laterality Date    HX CARPAL TUNNEL RELEASE      HX KNEE ARTHROSCOPY  2010    left    HX KNEE ARTHROSCOPY  1980s    right    HX KNEE REPLACEMENT      HX ORTHOPAEDIC  1970s    right hand tendenitis    HX ORTHOPAEDIC  2012    left hand ring finger released    HX ORTHOPAEDIC  8-2013    left carpal    TOTAL KNEE ARTHROPLASTY 2009/2010    left      No family history on file. History reviewed, no pertinent family history.   Social History   Substance Use Topics    Smoking status: Former Smoker     Quit date: 10/1/1975    Smokeless tobacco: Never Used      Comment: 1970s    Alcohol use 1.5 oz/week     3 Shots of liquor per week     Allergies   Allergen Reactions    Bees [Hymenoptera Allergenic Extract] Anaphylaxis    Cocoa Butter-Zinc Oxide Rash    Dilaudid [Hydromorphone (Bulk)] Rash     capsules    Pcn [Penicillins] Rash      Current Facility-Administered Medications   Medication Dose Route Frequency    potassium chloride 10 mEq in 100 ml IVPB  10 mEq IntraVENous Q1H    furosemide (LASIX) injection 80 mg  80 mg IntraVENous ONCE    PHENYLephrine (ERWIN-SYNEPHRINE) 90 mg in 0.9% sodium chloride 250 mL infusion (premix or compounded)   mcg/min IntraVENous TITRATE    0.9% sodium chloride infusion 250 mL  250 mL IntraVENous PRN    insulin glargine (LANTUS) injection 15 Units  15 Units SubCUTAneous DAILY WITH LUNCH    insulin lispro (HUMALOG) injection 3 Units  3 Units SubCUTAneous Q6H    metoprolol (LOPRESSOR) injection 2.5 mg  2.5 mg IntraVENous Q6H    midazolam in normal saline (VERSED) 100 mg in 0.9% sodium chloride (MBP/ADV) 100 mL infusion  2-6 mg/hr IntraVENous CONTINUOUS    heparin (porcine) 1,000 unit/mL injection 1,500 Units  1,500 Units IntraVENous PRN    acetaminophen (TYLENOL) solution 650 mg  650 mg Oral Q4H PRN    aspirin chewable tablet 81 mg  81 mg Oral DAILY    pantoprazole (PROTONIX) 40 mg in sodium chloride 0.9 % 10 mL injection  40 mg IntraVENous DAILY    HYDROcodone-acetaminophen (NORCO) 5-325 mg per tablet 1 Tab  1 Tab Oral Q4H PRN    morphine injection 1 mg  1 mg IntraVENous Q4H PRN    naloxone (NARCAN) injection 0.4 mg  0.4 mg IntraVENous PRN    docusate sodium (COLACE) capsule 100 mg  100 mg Oral BID PRN    acetaminophen (TYLENOL) tablet 650 mg  650 mg Oral T6A PRN    folic acid (FOLVITE) tablet 1 mg  1 mg Oral DAILY    thiamine 100 mg in 50 mL NS   IntraVENous Q24H    Vancomycin- Rx to Dose & Monitor  1 Each Other Rx Dosing/Monitoring    heparin 25,000 units in D5W 250 ml infusion  7-25 Units/kg/hr IntraVENous TITRATE    chlorhexidine (PERIDEX) 0.12 % mouthwash 10 mL  10 mL Oral Q12H    fentaNYL (PF) 900 mcg/30 ml infusion soln  0-200 mcg/hr IntraVENous TITRATE    midazolam in normal saline (VERSED) 100 mg in 0.9% sodium chloride (MBP/ADV) 100 mL infusion  2-6 mg/hr IntraVENous CONTINUOUS    amiodarone (NEXTERONE) 360 mg in dextrose 200 mL (1.8 mg/mL) infusion  0.5-1 mg/min IntraVENous TITRATE    insulin lispro (HUMALOG) injection   SubCUTAneous Q6H    NOREPINephrine (LEVOPHED) 8 mg in 5% dextrose 250mL infusion  2-16 mcg/min IntraVENous TITRATE    sodium chloride (NS) flush 5-10 mL  5-10 mL IntraVENous PRN    ondansetron (ZOFRAN ODT) tablet 4 mg  4 mg Oral Q4H PRN    glucose chewable tablet 16 g  4 Tab Oral PRN    glucagon (GLUCAGEN) injection 1 mg  1 mg IntraMUSCular PRN    dextrose (D50W) injection syrg 12.5-25 g  25-50 mL IntraVENous PRN        LAB AND IMAGING FINDINGS:     Lab Results   Component Value Date/Time    WBC 7.6 02/16/2018 04:40 AM    HGB 8.8 (L) 02/16/2018 04:40 AM    PLATELET 746 64/18/1700 04:40 AM     Lab Results   Component Value Date/Time    Sodium 141 02/16/2018 04:40 AM    Potassium 3.1 (L) 02/16/2018 12:12 PM    Chloride 103 02/16/2018 04:40 AM    CO2 31 02/16/2018 04:40 AM    BUN 34 (H) 02/16/2018 04:40 AM    Creatinine 2.22 (H) 02/16/2018 04:40 AM    Calcium 8.6 02/16/2018 04:40 AM    Magnesium 1.8 02/16/2018 04:40 AM    Phosphorus 1.9 (L) 02/16/2018 04:40 AM      Lab Results   Component Value Date/Time    AST (SGOT) 45 (H) 02/16/2018 04:40 AM    Alk.  phosphatase 72 02/16/2018 04:40 AM    Protein, total 5.4 (L) 02/16/2018 04:40 AM    Albumin 1.5 (L) 02/16/2018 04:40 AM    Globulin 3.9 02/16/2018 04:40 AM     Lab Results   Component Value Date/Time    INR 1.2 02/16/2018 04:40 AM    Prothrombin time 14.2 02/16/2018 04:40 AM    aPTT 56.2 (H) 02/16/2018 12:10 PM      No results found for: IRON, FE, TIBC, IBCT, PSAT, FERR   No results found for: PH, PCO2, PO2  No components found for: [address] [place] [address]   Lab Results   Component Value Date/Time     (H) 02/14/2018 06:30 AM    CK - MB <0.5 (L) 08/02/2015 11:47 PM              Total time: 50  Counseling / coordination time, spent as noted above38 min  > 50% counseling / coordination        Ace Humphries MD  Palliative Medicine

## 2018-02-16 NOTE — PROGRESS NOTES
NUTRITION FOLLOW-UP    RECOMMENDATIONS/PLAN:   - Continue w/ POC  Monitor labs/lytes,  skin integrity, wt, fluid status, BM    NUTRITION ASSESSMENT:   Client Update: 57 yrs old Male with septic shock, MRSA bacteremia, and cardiac stress due to sepsis w/ resp failure, hypokalemia, and low phosphorus. Pt had dialysis yesterday and received a unit of blood. Tube feeds on hold possible extubation per nurse. FOOD/NUTRITION INTAKE   Diet Order:  Tube Feeds   Supplements: Prosource x1/day   Food Allergies: NKFA/  Average PO Intake:      No data found. Pertinent Medications:  [x] Reviewed; folic acid, protonix, heparin, thiamine,   Electrolyte Replacement Protocol: []K []Mg []PO4  Insulin:  [x]SSI  [x]Pre-meal   []Basal    []Drip  []None  Cultural/Adventism Food Preferences: None Identified    BIOCHEMICAL DATA & MEDICAL TESTS  Pertinent Labs:  [x] Reviewed; K-2.9, Phos-1.9   ANTHROPOMETRICS  Height: 6' (182.9 cm)       Weight: 149.3 kg (329 lb 2.4 oz)         BMI: 44.6 kg/m^2 morbidly obese (Greater than or = to 40% BMI)   Adm Weight: 306 lbs                Weight change: +23lbs since admission  Adjusted Body Weight: 95.5kg     NUTRITION-FOCUSED PHYSICAL ASSESSMENT  Skin: No PU      GI: No BM noted since last review     NUTRITION PRESCRIPTION  Calories: 3087-6203 kcal/day based on 11-14kcal/kg  Protein: 162 g/day based on 2.0 g/kg of IBW  CHO: 191-243 g/day based on 50% of total energy  Fluid: 6205-3094 ml/day based on 1 kcal/ml        NUTRITION DIAGNOSES:   1. Inadequate oral intake related to intubation as evidence by NPO diet order and need for nutrition support. NUTRITION INTERVENTIONS:   INTERVENTIONS:        GOALS:  1. Other:continue w/ POC 1.  Either restart tube feeds or adv diet by next review 3 days             LEARNING NEEDS (Diet, Supplementation, Food/Nutrient-Drug Interaction):   [x] None Identified   [] Education provided/documented      Identified and patient: [] Declined   [] Was not appropriate/indicated        NUTRITION MONITORING /EVALUATION:   Adjust EN/PN as appropriate  Monitor wt  Monitor renal labs, electrolytes, fluid status     Previous Recommendations Implemented: yes        Previous Goals Met:  yes -Tube Feeds were restarted at goal rate      [x] Participated in Interdisciplinary Rounds    [x] Interdisciplinary Care Plan Reviewed  DISCHARGE NUTRITION RECOMMENDATIONS ADDRESSED:        [x] To be determined closer to discharge    NUTRITION RISK:           [x] At risk                        [] Not currently at risk        Will follow-up per policy.   Carey Oalkey, [address] [facility]

## 2018-02-16 NOTE — PROGRESS NOTES
Initial assessment completed. Arrosable when name called & follow command by squeezin both hands lightly. Vented w/ spontaneous resp, Monitor shows a-fib w/ svr. No c/o of any pain. On versed, fentanyl,amiodarone,heparin, se mar for dosages & concentrations. Tube feeding w/ 50 cc residual.    1000- Dr Olivia Hilton visited w/ new orders, to extubate. 1200- Assessment no changed. VS stable. Monitor. Soon to be extubated. Temp, 99.0 po    1300- Extubated & placed on high flow. 1400- Urine output 300cc,then lasix 80mg dose iv given as ordered. Dr Robert Aguilar spoke w/ wife. 1500- !000cc of urine clear iglesia after lasix dose. 1600- Assessment no changed. No sob on present hi-flow settings. Monitor no changed. Npo @ present. 1800- Resting w/out sob. Bipap to placed during the night. Monitoring VS manish b/p & HR  , lopressor iv earlier was d/c. Ada Lin [address] [facility]- Notified Dr Abdoulaye Chin of b/p 173/88 & monitor shows a-fib w/ rate 125 & lopressor 5mg for nxt shift given & q 6hrs ,to hold if HRis <60 & sb/p < 100 as ordered. Pulse ox 92 & & fio2 upto 70%. To placed on Bipap tonight. As ordered & resp is aware. 1910-Bedside and Verbal shift change report given to [address] [place] [address]  (oncoming nurse) by  Mary Glez RN (offgoing nurse). Report included the following information SBAR, Kardex, ED Summary, Procedure Summary, Intake/Output, MAR and Recent Results.

## 2018-02-16 NOTE — PROGRESS NOTES
Primary nurse unable to access phone at this time, critical result taken by this nurse. See flow sheet. Called and notified Dr. Lissett Nichols. Updated her with current medication. New orders received for repeat blood cultures from two sites. Notified Gladys Kong RN.

## 2018-02-16 NOTE — PROGRESS NOTES
Cardiology Progress Note        Patient: Jacque Issa        Sex: male          DOA: 2/11/2018  YOB: 1955      Age:  57 y.o.        LOS:  LOS: 4 days   Assessment/Plan     Principal Problem:    Sepsis ([address] (2/11/2018)    Active Problems:    Osteoarthritis of right knee (1/18/2014)      Hypokalemia (2/11/2018)      Glucosuria (2/11/2018)      AMANDA (acute kidney injury) ([address] (2/11/2018)      Fall (2/11/2018)      Back pain (2/11/2018)      Weakness of right lower extremity (2/11/2018)      Anemia (2/11/2018)      Shock ([address] (2/13/2018)      Acute respiratory failure ([address] (2/13/2018)      Elevated troponin (2/12/2018)      Demand ischemia of myocardium ([address] (2/12/2018)      MRSA bacteremia (2/15/2018)        Plan: 2/162/18 recurrent Afib  Increase metoprolol to 5 mg IV q6h   Continue with amiodarone/heparin        Plan:  2/15/2018  Maintaining NSR  Continue with amiodarone and Heparin  OK to hold amiodarone for MRI scan  Discussed with Dr. Marbella Luna and RN              2/14/2018  Marinating NSR now  On HD for worsening acidosis/renal failure  Continue with amiodarone and heparin   Will need ischemia work up when stable with stress test or LHC/RHC  Discussed with Dr. Marbella Luna. Subjective:    cc:  intuabted      REVIEW OF SYSTEMS: unable to obtained  Objective:      Visit Vitals    /62    Pulse 96    Temp 99.6 °F (37.6 °C)    Resp 27    Ht 6' (1.829 m)    Wt 149.3 kg (329 lb 2.4 oz)    SpO2 96%    BMI 44.64 kg/m2     Body mass index is 44.64 kg/(m^2). Physical Exam:  General Appearance: intubated  HEENT: TERRANCE. HEAD: Atraumatic  NECK: No JVD, no thyroidomeglay. LUNGS: Clear bilaterally. HEART: S1+S2 audible    ABD: Non-tender, BS Audible    EXT: No edema, and no cysnosis. VASCULAR EXAM: Pulses are intact.     MUSCULOSKELETAL: Grossly no joint deformity  Medication:  Current Facility-Administered Medications   Medication Dose Route Frequency    potassium chloride 10 mEq in 100 ml IVPB  10 mEq IntraVENous Q1H    metoprolol (LOPRESSOR) injection 5 mg  5 mg IntraVENous Q6H    [START ON 2/17/2018] Vancomycin Random Level 2/17/18 @ 0400  1 Each Other ONCE    PHENYLephrine (ERWIN-SYNEPHRINE) 90 mg in 0.9% sodium chloride 250 mL infusion (premix or compounded)   mcg/min IntraVENous TITRATE    0.9% sodium chloride infusion 250 mL  250 mL IntraVENous PRN    insulin glargine (LANTUS) injection 15 Units  15 Units SubCUTAneous DAILY WITH LUNCH    insulin lispro (HUMALOG) injection 3 Units  3 Units SubCUTAneous Q6H    midazolam in normal saline (VERSED) 100 mg in 0.9% sodium chloride (MBP/ADV) 100 mL infusion  2-6 mg/hr IntraVENous CONTINUOUS    heparin (porcine) 1,000 unit/mL injection 1,500 Units  1,500 Units IntraVENous PRN    acetaminophen (TYLENOL) solution 650 mg  650 mg Oral Q4H PRN    aspirin chewable tablet 81 mg  81 mg Oral DAILY    pantoprazole (PROTONIX) 40 mg in sodium chloride 0.9 % 10 mL injection  40 mg IntraVENous DAILY    HYDROcodone-acetaminophen (NORCO) 5-325 mg per tablet 1 Tab  1 Tab Oral Q4H PRN    morphine injection 1 mg  1 mg IntraVENous Q4H PRN    naloxone (NARCAN) injection 0.4 mg  0.4 mg IntraVENous PRN    docusate sodium (COLACE) capsule 100 mg  100 mg Oral BID PRN    acetaminophen (TYLENOL) tablet 650 mg  650 mg Oral C0L PRN    folic acid (FOLVITE) tablet 1 mg  1 mg Oral DAILY    thiamine 100 mg in 50 mL NS   IntraVENous Q24H    Vancomycin- Rx to Dose & Monitor  1 Each Other Rx Dosing/Monitoring    heparin 25,000 units in D5W 250 ml infusion  7-25 Units/kg/hr IntraVENous TITRATE    chlorhexidine (PERIDEX) 0.12 % mouthwash 10 mL  10 mL Oral Q12H    fentaNYL (PF) 900 mcg/30 ml infusion soln  0-200 mcg/hr IntraVENous TITRATE    midazolam in normal saline (VERSED) 100 mg in 0.9% sodium chloride (MBP/ADV) 100 mL infusion  2-6 mg/hr IntraVENous CONTINUOUS    amiodarone (NEXTERONE) 360 mg in dextrose 200 mL (1.8 mg/mL) infusion  0.5-1 mg/min IntraVENous TITRATE    insulin lispro (HUMALOG) injection   SubCUTAneous Q6H    NOREPINephrine (LEVOPHED) 8 mg in 5% dextrose 250mL infusion  2-16 mcg/min IntraVENous TITRATE    sodium chloride (NS) flush 5-10 mL  5-10 mL IntraVENous PRN    ondansetron (ZOFRAN ODT) tablet 4 mg  4 mg Oral Q4H PRN    glucose chewable tablet 16 g  4 Tab Oral PRN    glucagon (GLUCAGEN) injection 1 mg  1 mg IntraMUSCular PRN    dextrose (D50W) injection syrg 12.5-25 g  25-50 mL IntraVENous PRN               Lab/Data Reviewed: All lab results for the last 24 hours reviewed.      Recent Labs      02/16/18   0440  02/15/18   2130  02/15/18   0800  02/15/18   0625   WBC  7.6   --   6.9  7.0   HGB  8.8*  8.4*  7.5*  7.8*   HCT  24.9*  24.5*  21.8*  22.5*   PLT  187   --   129*  141     Recent Labs      02/16/18   1212  02/16/18   0440  02/15/18   0625  02/14/18   0630   NA   --   141  138  136   K  3.1*  2.9*  3.0*  3.9   CL   --   103  102  103   CO2   --   31  26  23   GLU   --   226*  199*  149*   BUN   --   34*  43*  57*   CREA   --   2.22*  3.12*  4.06*   CA   --   8.6  8.2*  7.4*       Signed By: Jayesh Emanuel MD     February 16, 2018

## 2018-02-16 NOTE — PROGRESS NOTES
Nephrology Progress note    Subjective:     Josh King is a 57 y.o. male with history of hypertension, diabetes, hyperlipidemia, gout and arthritis who presented with a fever and multiple falls. On initial evaluation, he had borderline low blood pressure, and normal white cell count, but elevated creatinine at 2.5. Blood culture was positive and started on broad spectrum antibiotics, on IV fluid and IV pressors. A culture finally came back pos for MRSA. His preadmission baseline creatinine back in 2015 was 1.2. On admission, it was 2.5. It continued to increase and it jumped up to 4.06. Of note, the patient also had slightly elevated CPK level on admission, which is trending down now. CAT scan did not show any significant finding as a possible source. Echocardiography was negative for valvular vegetation. Patient is currently intubated in ICU on pressors. He is also sedated.     He was initiated on HD 2/14 and repeated 2/15  Urine output increasing, 1,345ml past 24hrs      Admit Date: 2/11/2018    Allergy:  Allergies   Allergen Reactions    Bees [Hymenoptera Allergenic Extract] Anaphylaxis    Cocoa Butter-Zinc Oxide Rash    Dilaudid [Hydromorphone (Bulk)] Rash     capsules    Pcn [Penicillins] Rash        Objective:     Visit Vitals    /52    Pulse 85    Temp 99.6 °F (37.6 °C)    Resp 22    Ht 6' (1.829 m)    Wt 149.3 kg (329 lb 2.4 oz)    SpO2 99%    BMI 44.64 kg/m2         Intake/Output Summary (Last 24 hours) at 02/16/18 1234  Last data filed at 02/16/18 8398   Gross per 24 hour   Intake          2847.35 ml   Output             1300 ml   Net          1547.35 ml       Physical Exam:       General: Vented   HENT: Atraumatic and normocephalic   Eyes: Normal conjunctiva   Neck: Supple    Cardiovascular: Normal S1 & S2, no m/r/g   Pulmonary/Chest Wall: Clear to auscultation bilaterally   Abdominal: Soft and non-tender   Musculoskeletal: ++ edema   Neurological: No focal deficits       Data Review:      Lab Results   Component Value Date/Time    WBC 7.6 02/16/2018 04:40 AM    RBC 2.85 (L) 02/16/2018 04:40 AM    HCT 24.9 (L) 02/16/2018 04:40 AM    MCV 87.4 02/16/2018 04:40 AM    MCH 30.9 02/16/2018 04:40 AM    MCHC 35.3 02/16/2018 04:40 AM    RDW 18.1 (H) 02/16/2018 04:40 AM      No results found for: IRONNo components found for: FERRITIN  No components found for: PTHINT  Urinalysis  No results found for: UGLU        Impression:     1. Acute kidney injury, likely acute tubular necrosis from septic shock. Creatinine trended up from 2.5 on admission to 4.06. Patient was oliguric and had first HD 2/14. Cr down to 2.2 today and UOP increasing. AMANDA appears to be recovering. 2.  Methicillin-resistant Staphylococcus aureus septic shock, unclear source, oral antibiotics, IV pressor, IV fluid. 3.  Hypophosphatemia. 4.  Hypokalemia  5. Acidosis, mixed respiratory and metabolic. 6.  Mild rhabdomyolysis, improving with a CK down to 647 from high of 3300.  7. Anemia. 8.  Thrombocytopenia  9. Diabetes mellitus. 10.  Morbid obesity. 11.  Respiratory failure, intubated and remains on MV.       Plan:   Continue supportive care, Vent, antibiotics,Nutrition  Supplement Potassium IV  Weaning vent per Pulmonologist  We will reevaluate need for HD tomorrow, Renal Panel in a.m.   Monitor I/0's      Kentrell Geiger MD  [facility]  [phone]

## 2018-02-16 NOTE — PROGRESS NOTES
ID follow up note     Chart reviewed remotely  Fever pattern has improved last 24 hrs , T max 101.2F, tachycardia has improved   Off pressors and remains on amiodarone drip   ICU physician note from 2/15 evening noted for rash on legs papular,  Renal function has improved with HD   MRI Thoracic and lumbar spine did not show discitis or abscess   MRI brain did not show acute infarct, . Minimal amount of localized extra-axial near fluid signal along the right  convexity without restricted diffusion or mass effect. Difficult to exclude  a small subdural fluid collection such as hygroma or chronic hematoma  given history of falls. as per report. Not expecting subdural empyema, follow up if recommended. 2 sets of blood cultures have been repeated today   Sputum culture has staph aureus   Vancomycin post HD level on 2/15 was 14.7 and received 1500mg dose of vancomycin after that level     Recommendation   Continue vancomycin with expected trough 15 to 20  Follow up surveillance blood cultures from today .   Monitor skin rash  Please call with questions or concerns regarding ID issue     [address] MD  Infectious diseases specialist   Pager [phone]

## 2018-02-16 NOTE — PROGRESS NOTES
1900-Bedside report received from Yue Nguyen RN. Sbar, mar, labs, kardex and patient status reviewed. Assumed care of patient. Drips Verified with off-going nurse. Small vesicle like areas witnessed off-going nurse, no changes from previous assessment. 1937-Dialysis completed. Per Dialysis RN, pulled 1.5L off and patient well tolerated. 1 Unit blood given during dialysis. Will recheck cbc per protocol. 2000-No changes from previous assessment. 2130-PM dose lopressor held due to bp 105/54. Javier PTT recheck and H&H Post blood protocol. 2240-Labs resulted. Gave 3000Units Heparin Bolus per protocol and increased by 2Units/KG. 2330-Patient is alarming TV not reached on ventilator. Cuff pressure checked, suctioned scant amount white semi-thick secretions, and assessed patient. RT notified and came to bedside. Pt is tachyphemic and breathing over the vent. Increased Versed to 3mg/hr. 0000-Patient appears comfortable on 3mg/hr Versed. CHG bath given Temp remains 99.0F (Aux) at this time. Scrotum remains very enlarged, elevated with pillow. 0400-No changes from previous assessment. 0450-Am labs drawn and PTT drawn for Heparin drip; awaiting results. 0520-Called lab to ask for results of Ptt, per Stephanie Grubbs in lab, lab will be down until 6am.   0545-Tim from Lab called critical Potassium of 2.9. Dr Christal Arizmendi notified and orders received for 1x dose Potassium 40meq liquid via NG tube. 0630-Results in for PTT, remains low. Per protocol bolus 3000units and increased rate by 2units.  See Mar*

## 2018-02-16 NOTE — PROGRESS NOTES
Yanci Jacob M.D  [facility] 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 XXXA    Back pain M54.9    Weakness of right lower extremity R29.898    Anemia D64.9    Shock ([address] R57.9    Acute respiratory failure (HCC) J96.00    Elevated troponin R74.8    Demand ischemia of myocardium (HCC) I24.8    MRSA bacteremia R78.81 ·   Sepsis ? Source ? Related to lll air space  as CT showed concern for septic embolie . Elma Dunlap MRSA in blood TERRY negative for endocarditis. Elma Dunlap Spine MRI is negative also. Some skin lesion   · Septic shock improved off pressors  · Elevated troponin ?  Demand ischemia vs NSTMI   · Respiratory failure intubated on   · Acute renal failure S/P HD doing ok improvement in Cr is noted  · Rhabdo improved  · Afib poorly controlled S/P cardioversion and on amio and heparin drip  · Back pain fall no absess or open wound noted   · Severe hypokalemia hypomagnesemia replaced  · Elevated blood sugars better controlled   · Lactic acidosis improved  · H/O ETOH    PLAN:  -- Lopressor iv started  -- Sedation vacation  -- SBT in am, decrease fio2 40% and decrease peep 5 and rpt abg in 1 hr  --Fever better   -- On lantus 15 unit mild elevation of blood sugar due to steroids last night  -- HB stable  - Continue Amio per cardiology  - MRSA isolation  Monitor for ETOH withdrawal   CVS:Currently on Amiodarone drip, Heparin drip,  follow on cardiology recommendations. Follow on cardiology might need some work up once recover  Started on betablocker  On aspirin  S/P cardioversion with 200 j on 2/13  RS: On vent protocol,ABG improved post HD  , Aspiration precaution, LLL air space ? Due to septic embolie and infract vs pnemonia, Sputum MRSA, on SBT and doing well   ID:Sepsis ? Etiology, MRSA on multiple cultures   Vanco 2/11  Azactam 2/11 stop on 2/14  On  levaquin 2/12  Stop date 2/15  Monitor skin lesions if worsenign will add clindamycin defer to ID   ENDO:Hold metformin, SSI TSH is ok bs controlled increase lantus keep sugar <160  GI: Feeding on hold for SBT   RENAL:Wilson for I/O, Replace k and MG and replace per protocol. Patient is putting out urine, Off bicarb drip s/p HD  CNS: Sedated intubated wakes up with minimal stimulus MRI head is ok  HEMATOLOGY:PLT is stable, Heparin drip for Afib   MUSCULOSKELETAL:CK is elevated but improved   · PAIN AND SEDATION: On versed and fentanyl per protocol maintain RAAS -1, on thiamine and folic acid for ETOH, Propofol caused drop in BP, Currently off sedation for SBT  · Skin/Wound: Monitor back, No skin absess in back examined again today  · Electrolytes: Replace electrolytes per ICU electrolyte replacement protocol. · IVF: [facility] Hdz   · Nutrition: Hold feeding for SBT  · Prophylaxis: DVT Prophylaxis with Heparin,. GI Prophylaxis. · Restraints: soft to avoid on pulling tube and lines  · PT/OT eval and treat. OOB when appropriate. · Lines/Tubes: none. Femoral line 2/12 removed on 2/15. Wilson 2/12/18 Right IJ dialysis catheter 2/14, Left subclavian line 2/15/18  ADVANCE DIRECTIVE:Full code/ Palliative is consulted spoke with wife at length  FAMILY DISCUSSION:spoke with family  Quality Care: PPI, DVT prophylaxis, HOB elevated, Infection control all reviewed and addressed.   Events and notes from last 24 hours reviewed. Care plan discussed with nursing CC TIME:50  min excluding procedure    · Labs and images personally seen and available reports reviewed. · All current medicines are reviewed and doses and prescription adjusted. Subjective/History:   Josh King is a 57 y.o. male who came to the ed with complaints of fall. Patient states he has been feeling generalized weakness at home for the last day or so with low grade subjective fevers but yesterday he noted RLE weakness around 5pm. Since then he has fallen about 4 times in his back without any head trauma or LOC. He has been using his tripod cane to assist him with walking. Due to persistence of his weakness, falls, fevers and low back pain he decided to come to the ED. In the ED, CT of the head was neg and his labs showed significant hypoK with elevated Cr (unknown baseline). He was also febrile and tachycardic. He was started on broad spectrum Abx. Overnight RRT called found to be tachycardic, tachypnic and ABG showed alkalosis and was transferred to ICU  Now patient is awake, denies any complains except some back pain   Tachycardic with HR in 120s   No nausea vomiting  Recently about 1 month a go treated for pna at office  No diarrhoea  Ex smoker  Drinks about 2 glasses of mix drinks every day  Recently lost his son to multiorgan failure and sepsis     2/13/18    Yesterday became more tachypnic and difficult to control hr  Intubated 2/12, femoral line 2/12 <neck line not placed due to concern for colonization with ongoing sepsis>  Became hypotensive managed with phenylephrine  Afib was poorly controlled started on amiodarone drip.   Cardiology is closely following  Blood culture Staph  K 3.2 replaced  Mg 1.1 getting replaced   UO and Cr is improving  Still spiking fever 102   Flu is negative     2/14  No significant overnight event  S/P TERRY negative for vegetation or blood clot  S/P cardioversion  ID evaluated suggested MSSA sepsis but initial culture is growing MRSA.   Am labs showed worsening cr to 4.06 and PH 7.26  ALB 1.5  Currently on phenylephring 100, versed, amiodarone, fentanyl and bicarb drip    2/15  Doing ok  No overnight event   S/P HD  Also put out 900 cc of urine in last 24 hrs  HB dropped to 7.5 but no active bleed  PLT is 129 INR is 1.6 K 3.0   Alb 1.5     2/16  Doing well   Yesterday started noticing some sking lesions all over body looks like septic staph infection  Nothing to suggest allergic rash  Put out about 1400cc yesterday  K 2.9 and replaced  Cr improved to 2.22 s/p hd yesterday  Mild elevation of billirubin to 1.9   HB and PLT is stable   Sputum MRSA     Current Facility-Administered Medications   Medication Dose Route Frequency    potassium chloride 10 mEq in 100 ml IVPB  10 mEq IntraVENous Q1H    PHENYLephrine (ERWIN-SYNEPHRINE) 90 mg in 0.9% sodium chloride 250 mL infusion (premix or compounded)   mcg/min IntraVENous TITRATE    insulin glargine (LANTUS) injection 15 Units  15 Units SubCUTAneous DAILY WITH LUNCH    insulin lispro (HUMALOG) injection 3 Units  3 Units SubCUTAneous Q6H    metoprolol (LOPRESSOR) injection 2.5 mg  2.5 mg IntraVENous Q6H    midazolam in normal saline (VERSED) 100 mg in 0.9% sodium chloride (MBP/ADV) 100 mL infusion  2-6 mg/hr IntraVENous CONTINUOUS    aspirin chewable tablet 81 mg  81 mg Oral DAILY    pantoprazole (PROTONIX) 40 mg in sodium chloride 0.9 % 10 mL injection  40 mg IntraVENous DAILY    folic acid (FOLVITE) tablet 1 mg  1 mg Oral DAILY    thiamine 100 mg in 50 mL NS   IntraVENous Q24H    Vancomycin- Rx to Dose & Monitor  1 Each Other Rx Dosing/Monitoring    heparin 25,000 units in D5W 250 ml infusion  7-25 Units/kg/hr IntraVENous TITRATE    chlorhexidine (PERIDEX) 0.12 % mouthwash 10 mL  10 mL Oral Q12H    fentaNYL (PF) 900 mcg/30 ml infusion soln  0-200 mcg/hr IntraVENous TITRATE    midazolam in normal saline (VERSED) 100 mg in 0.9% sodium chloride (MBP/ADV) 100 mL infusion  2-6 mg/hr IntraVENous CONTINUOUS    amiodarone (NEXTERONE) 360 mg in dextrose 200 mL (1.8 mg/mL) infusion  0.5-1 mg/min IntraVENous TITRATE    insulin lispro (HUMALOG) injection   SubCUTAneous Q6H    NOREPINephrine (LEVOPHED) 8 mg in 5% dextrose 250mL infusion  2-16 mcg/min IntraVENous TITRATE         Objective:   Vital Signs:    Visit Vitals    /52    Pulse 85    Temp 99.6 °F (37.6 °C)    Resp 22    Ht 6' (1.829 m)    Wt 149.3 kg (329 lb 2.4 oz)    SpO2 99%    BMI 44.64 kg/m2       O2 Device: Endotracheal tube   O2 Flow Rate (L/min): 2 l/min   Temp (24hrs), Av.3 °F (37.4 °C), Min:98.7 °F (37.1 °C), Max:101.2 °F (38.4 °C)       Intake/Output:   Last shift:         Last 3 shifts:  1901 -  0700  In: 5576.6 [I.V.:4477.6]  Out: 2095 [Urine:1945]    Intake/Output Summary (Last 24 hours) at 18 0946  Last data filed at 18 0650   Gross per 24 hour   Intake          2847.35 ml   Output             1300 ml   Net          1547.35 ml       Review of Systems:  Intubated sedated     Objective:    General: Sedated but responds appropriately   HEENT: pupils reactive, sclera anicteric, EOM intact  Neck: No adenopathy or thyroid swelling, no lymphadenopathy or JVD, supple  CVS: S1S2 no murmurs  RS: Mod AE bilaterally, minimal rales   Abd: soft, non tender, distended sluggish bowel sound  Neuro: non focal, awake, alert  Extrm: no leg edema, Right knee scar  Lymph node: No obvious palpable lymph node appreciated.   Skin: Pustular lesions   CBC w/Diff Recent Labs      18   0440  02/15/18   2130  02/15/18   0800  02/15/18   0625   WBC  7.6   --   6.9  7.0   RBC  2.85*   --   2.41*  2.48*   HGB  8.8*  8.4*  7.5*  7.8*   HCT  24.9*  24.5*  21.8*  22.5*   PLT  187   --   129*  141        Chemistry Recent Labs      18   0440  02/15/18   0625  18   0630   GLU  226*  199*  149*   NA  141  138  136   K  2.9*  3.0*  3.9   CL  103  102  103   CO2  31  26  23   BUN  34* 43*  57*   CREA  2.22*  3.12*  4.06*   CA  8.6  8.2*  7.4*   MG  1.8  1.8  2.0   PHOS  1.9*  2.1*  2.3*   AGAP  7  10  10   BUCR  15  14  14   AP  72  68  62   TP  5.4*  5.3*  5.3*   ALB  1.5*  1.5*  1.5*   GLOB  3.9  3.8  3.8   AGRAT  0.4*  0.4*  0.4*        Lactic Acid Lactic acid   Date Value Ref Range Status   02/13/2018 1.8 0.4 - 2.0 MMOL/L Final     No results for input(s): LAC in the last 72 hours. Micro  Recent Labs      02/16/18   0508  02/14/18   0920  02/14/18   0915   CULT  NO GROWTH AFTER 2 HOURS  AEROBIC BOTTLE STAPHYLOCOCCUS AUREUS*  AEROBIC BOTTLE STAPHYLOCOCCUS AUREUS*     Recent Labs      02/13/18   0858  02/13/18   0315  02/13/18   0310  02/12/18   0900  02/11/18   1846   CULT  PENDING  AEROBIC BOTTLE STAPHYLOCOCCUS AUREUS*  AEROBIC BOTTLE STAPHYLOCOCCUS AUREUS*  NO GROWTH AFTER 20 HOURS  AEROBIC AND ANAEROBIC BOTTLES **METHICILLIN RESISTANT STAPHYLOCOCCUS AUREUS  CALLED TO AND CORRECTLY REPEATED BY:  Christopher MACHADO RN ICU TO [employer] AT 0825 ON 2/14/18. ABG Recent Labs      02/16/18   0528  02/15/18   0547  02/14/18 2007   PHI  7.482*  7.410  7.378   PCO2I  39.5  38.0  43.1   PO2I  70*  71*  67*   HCO3I  29.4*  24.0  25.1   FIO2I  0.6  0.4  0.4        Liver Enzymes Protein, total   Date Value Ref Range Status   02/16/2018 5.4 (L) 6.4 - 8.2 g/dL Final     Albumin   Date Value Ref Range Status   02/16/2018 1.5 (L) 3.4 - 5.0 g/dL Final     Globulin   Date Value Ref Range Status   02/16/2018 3.9 2.0 - 4.0 g/dL Final     A-G Ratio   Date Value Ref Range Status   02/16/2018 0.4 (L) 0.8 - 1.7   Final     AST (SGOT)   Date Value Ref Range Status   02/16/2018 45 (H) 15 - 37 U/L Final     Alk.  phosphatase   Date Value Ref Range Status   02/16/2018 72 45 - 117 U/L Final     Recent Labs      02/16/18   0440  02/15/18   0625  02/14/18   0630   TP  5.4*  5.3*  5.3*   ALB  1.5*  1.5*  1.5*   GLOB  3.9  3.8  3.8   AGRAT  0.4*  0.4*  0.4*   SGOT  45*  47*  48*   AP  72  68  62        Cardiac Enzymes No results found for: CPK, CK, CKMMB, CKMB, RCK3, CKMBT, CKNDX, CKND1, LESLIE, TROPT, TROIQ, BELLA, TROPT, TNIPOC, BNP, BNPP     BNP No results found for: BNP, BNPP, XBNPT     Coagulation Recent Labs      02/16/18   0440  02/15/18   2130  02/15/18   0625  02/15/18   0613   02/14/18   0630   PTP  14.2   --   18.2*   --    --   16.9*   INR  1.2   --   1.6*   --    --   1.4*   APTT  61.4*  53.9*   --   66.2*   < >  53.6*    < > = values in this interval not displayed. Thyroid  Lab Results   Component Value Date/Time    TSH 0.43 02/12/2018 10:05 AM          Lipid Panel Lab Results   Component Value Date/Time    Cholesterol, total 116 02/11/2018 08:45 PM    HDL Cholesterol 27 (L) 02/11/2018 08:45 PM    LDL, calculated 57.4 02/11/2018 08:45 PM    VLDL, calculated 31.6 02/11/2018 08:45 PM    Triglyceride 158 (H) 02/11/2018 08:45 PM    CHOL/HDL Ratio 4.3 02/11/2018 08:45 PM          Urinalysis Lab Results   Component Value Date/Time    Color DARK YELLOW 02/11/2018 10:45 PM    Appearance CLOUDY 02/11/2018 10:45 PM    Specific gravity 1.019 02/11/2018 10:45 PM    pH (UA) 5.5 02/11/2018 10:45 PM    Protein 300 (A) 02/11/2018 10:45 PM    Glucose >1000 (A) 02/11/2018 10:45 PM    Ketone TRACE (A) 02/11/2018 10:45 PM    Bilirubin NEGATIVE  02/11/2018 10:45 PM    Urobilinogen 1.0 02/11/2018 10:45 PM    Nitrites NEGATIVE  02/11/2018 10:45 PM    Leukocyte Esterase NEGATIVE  02/11/2018 10:45 PM    Epithelial cells 0 01/08/2014 02:46 PM    Bacteria 2+ (A) 02/11/2018 10:45 PM    WBC 0 to 2 02/11/2018 10:45 PM    RBC 0 to 2 02/11/2018 10:45 PM        XR (Most Recent).  CXR reviewed by me and compared with previous CXR   Results from Hospital Encounter encounter on 02/11/18   XR CHEST PORT   Narrative EXAM:Chest X-Ray      History: Hypoxia, intubated    Technique:  Portable Frontal View    Comparison: 02/15/2018, 02/14/2018    _______________    FINDINGS:  -Endotracheal tube tip is 3.9 cm above the lorena.  -Stable left subclavian central catheter tip at the caval confluence. Stable  right neck large bore IJ catheter tip in the distal SVC. Nasogastric tube tip is collimated off the left inferior margin of the film  below the level of the GE junction. Midline tracheal air column. Stable midline cardiac silhouette. Hilar vascular  congestion/crowding with mild persistent diffuse interstitial opacities in both  upper and lower lungs. Persistent left hilar and retrocardiac confluent  parenchymal opacity with obscured left diaphragmatic margin and potential small  left-sided effusion. Minimal right basilar reticular opacity similar prior exam.  Stable osseous structures. _______________         Impression IMPRESSION:    1. Stable support lines and tubes as above. 2. Mild pulmonary hypoinflation with no significant interval change. Unchanged  confluent retrocardiac left lower lung opacity representing  atelectasis/pneumonia with potential small left-sided effusion. 3. Atelectasis versus interstitial edema. CT (Most Recent)   Results from Hospital Encounter encounter on 02/11/18   CT CHEST ABD PELV WO CONT   Narrative COMPARISON: Prior abdomen and pelvic CT 5/27/2015    INDICATION: Sepsis, elevated liver enzymes, basilar airspace disease,  hypertension. TECHNIQUE:   Multislice helical CT was performed through the chest, abdomen and pelvis  without contrast. Coronal and sagittal reformations were generated. Dose reduction: One or more dose reduction techniques were used on this CT:  automated exposure control, adjustment of the mAs and/or kVp according to  patient's size, and iterative reconstruction techniques.  The specific techniques  utilized on this CT exam have been documented in the patient's electronic  medical record.      ==========    FINDINGS:    ------------------    CHEST:    LUNGS: There are multiple peripheral subpleural airspace nodular densities  bilaterally, the largest 15 mm right apex, with several smaller nodular  densities surrounding right apex, as well as involvement posterior superior  segment right lower lobe, and lingula. There are streaky areas of parenchymal  density bilateral lower lobes and lingula. PLEURA: Very small bilateral pleural effusions. AIRWAY: Normal.    MEDIASTINUM: Diffuse cardiac enlargement without pericardial effusion. No  abnormally enlarged lymph nodes with thyroid unremarkable. There is suggestion  of diffuse esophageal wall thickening. OTHER: Several chronic left-sided rib fractures with multilevel degenerative  thoracic spondylosis with no acute osseous finding.    ------------------    ABDOMEN/PELVIS:    LIVER/BILIARY: No suspicious liver lesion. No biliary dilation. Small layering  hyperdensity dependent gallbladder without gallbladder dilatation or surrounding  inflammation. SPLEEN: Normal.    PANCREAS: Normal.    ADRENALS: Normal.    KIDNEYS: Several stable small bilateral renal cysts, nonobstructing tiny  calculus right lower pole kidney. No hydronephrosis with stable nonspecific mild  perirenal stranding. LYMPH NODES: No new abnormally enlarged lymph nodes with stable small yenifer  hepatis lymph nodes. GI TRACT: There is dilated stomach and mildly dilated duodenum with air-fluid  levels without evidence of obstructing lesion proximal small bowel with no  jejunal dilatation. No other small or large bowel dilatation or wall thickening. Normal-appearing appendix. No ascites or free air. VASCULATURE: Moderate aortoiliac atherosclerotic changes. PELVIC ORGANS: Prostate not significantly enlarged, with Wilson catheter in place  with nondistended bladder. OTHER: Small sclerotic focus right pubic symphysis stable since prior study. Lower lumbar facet arthropathy asymmetric narrowing most pronounced on the right  L4/5, with no new acute osseous finding.    ==========         Impression IMPRESSION:        1.  Mild areas of atelectasis and/or scarring lower lobes and lingula with very  small pleural effusions. 2. Multifocal peripheral bilateral subpleural nodular densities the largest 15  mm right apex, which may represent peripheral areas of pneumonia or other  inflammatory etiology, although differential diagnosis includes less likely  metastatic disease or infarcts from pulmonary emboli. Follow-up chest CT  recommended in one month following treatment. 3. Diffuse esophageal wall thickening most likely related to esophagitis. 4. Cholelithiasis without CT evidence of acute cholecystitis. 5. Dilated stomach and duodenum with air-fluid levels without evidence of  mechanical obstruction, most likely related to ileus. 6. Stable additional ancillary findings as above. EKG No results found for this or any previous visit. ECHO  2/12/18 [facility] SUMMARY:  Left ventricle: Systolic function was normal. Ejection fraction was estimated   in the range of 55 % to 60 %. There was  mild hypokinesis of the basal inferior wall(s). There was moderate concentric   hypertrophy. Right ventricle: The size was normal. Systolic function was normal.    Inferior vena cava, hepatic veins: The inferior vena cava was dilated. The   respirophasic change in diameter was more  than 50%. Blood culture, MRSA last culture on 2/14 still showing GPC   Sputum GPC/Staph     MRI spine:   2. Prior left L5 laminotomy, partial left facetectomy and possibly previous  partial L4-5 discectomy. Minimal left asymmetric disc bulge but no evidence of  recurrent disc herniation or retained disc fragment.     3. Minimal L3-4 disc bulge and annular fissure.     4. Multilevel mostly mild facet joint osteoarthritis. Greatest and mild to  moderate level right L4-5 facet joint osteoarthritis with moderate-sized facet  joint effusion and synovitis. No adjacent periarticular marrow or soft tissue  edema to definitively suggest superimposed infective/septic arthritis.     5.  Unremarkable upper sacroiliac joints though incompletely included. Mild body  wall edema.     Findings discussed with Dr. Ariel Rehman. Imaging:  I have personally reviewed the patients radiographs and have reviewed the reports:                  Floyd Putnam M.D.   Pulmonary Critical Care & Sleep Medicine

## 2018-02-16 NOTE — PROGRESS NOTES
Hospitalist Progress Note    Patient: Luca Bryant MRN: [id number]  CSN: [id number]    YOB: 1955  Age: 57 y.o.   Sex: male    DOA: 2/11/2018 LOS:  LOS: 4 days          CDMP answer: staph aureus sepsis associated with AMANDA and resp failure      Chepe Ghosh MD

## 2018-02-16 NOTE — PROGRESS NOTES
Pt experiencing irregular breathing pattern with breath stacking and what appears to be air hunger. Pt lavaged and suctioned. Pt appears to be uncomfortable. Temp has increased. RN notified.

## 2018-02-16 NOTE — PROGRESS NOTES
Dr Terald Romberg placed pt on SBT at 0930, at 0945 pt reduced to 40% FiO2 and PEEP of 5. Will monitor, ABG to be drawn at 1030 per order of Dr Terald Romberg.    1030  ABG drawn, results to Dr Terald Romberg, no vent changes ordered    1150  Leak test performed, other parameters for extubation look positive, RR is in mid 20's, O2 sat 97%, spontaneous volumes in 400s. RSBI 62, pt was extubated successfully and placed on High Flow at 50L, now at 70%, will wean as able.     1346  ABG drawn and results called to Dr Terald Romberg, he states to continue to wean FiO2 on HFNC per O2 sats if able, FiO2 is currently 65% with 50L

## 2018-02-17 NOTE — PROGRESS NOTES
0730-Bedside and Verbal shift change report given to Sofia Patel, RN (oncoming nurse) by Gm Tay RN (offgoing nurse). Report included the following information SBAR, Kardex, Intake/Output, MAR, Recent Results and Cardiac Rhythm A Fib . Initial assessment complete. Patient lethargic, but able to answer some questions. A&O to self and place. Able to follow commands. Complains of pain all over, medicated per MAR. On HF NC on assessment, then placed back on BIPAP by RT per MD orders. Potassium replacement hanging now. Last potassium level 2.9 per off going RN.     Delman Cheadle Dr Grady Slain, reported 's-190's sustained despite PRN hydralazine dose. Received telephone orders for 5mg IV metoprolol once and 80mg IV Lasix once. States he will be here soon. 1000- Dr Dayne Grewal at bedside. MD to put in electrolyte orders. Also ordered multiple CT exams. Also states he has put in scheduled hydralazine orders and I am to give the 1100 dose. 1100- Patient off BIPAP, shortly placed on HF NC then switched to NC by RT per MD's orders. Patient maintaining at 92% on 6L.     1200- Reassessment complete. Patient more lethargic than previous assessment, MD aware. Awaiting CT to call back when they are ready for patient. 1300- Patient re-intubated, verbal orders received to give 3mg versed one time and succinylcholine 50mg iv push, and patient medicated for procedure. Started on propofol drip per orders. [address]- Per Dr Dayne Grewal, ET tube needs to be pulled out by 1cm. OG tube placement good. Start TF after CT's are complete. RT at bedside to adjust ET tube. Spoke with Dr Holly Yun, per MD ok for patient PTT to be in the  range. [address] Per Dr Dayne Grewal, give diamox after 4th run of potassium is complete. 1620- Reassessment complete, no changes to previous. Verbal orders received from Dr Dayne Grewal to discontinue metoprolol and scheduled hydralazine orders.     1835- Patient transported to CT and back, [facility] bath provided, all linens changed. 1930-Bedside and Verbal shift change report given to Larry Cortes RN (oncoming nurse) by William Paredes RN (offgoing nurse). Report included the following information SBAR, Kardex, Intake/Output, MAR, Recent Results and Cardiac Rhythm A Fib.     1930- Fentanyl PCA never started today as patient is adequately sedated with propofol. Unopened Fentanyl PCA returned to pharmacy tech, witnessed by Larry Cortes RN.

## 2018-02-17 NOTE — PROGRESS NOTES
2210:Bedside and Verbal shift change report received from Renuka Yun RN. Report included the following information SBAR, Intake/Output, MAR, Recent Results, Cardiac Rhythm Afib and Alarm Parameters . Pt currently on BIPAP. Verified IV gtts. See MAR.     2300: Assessment complete, see flow sheets. 2321: Pt has temp of 101.2. All blankets removed. Fan therapy continues. Pt recently had administration of tylenol, see MAR. Will continue to monitor. 4751: Called and notified Dr. Quang Larios of current blood culture results, see critical results flow sheet. 0400: Reassessment, no change. 0515: RT at the bedside, BIPAP removed. Pt placed on hi flow O2 at 40L and 50%. Pt remains lethargic, does not respond to verbal commands. June Glee 0740  : Bedside and Verbal shift change report given to CHRYSTAL Magdaleno RN (oncoming nurse) by Manjula Mckeon RN   (offgoing nurse). Report included the following information SBAR.

## 2018-02-17 NOTE — PROGRESS NOTES
0815  On HFNC Pt RR noted to be in mid to high 30's consistently at this time, called Intensivist who confirmed  that BIPAP should be reinitiated, which was done. RN aware, will monitor closely    [id number]  Pt continues to have high RR and high minute volume on BIPAP, RN notes high BP as well, and has given him pain medication when he indicated pain. O2 sat stable in mid 90's. Will continue to monitor. Upon arrival in ICU, Dr Keyana Temple wishes pt to be placed on HFNC and this was done. Upon information by this RT that pt cannot be transported to CT with HFNC, Dr Keyana Temple ordered NC at [address]. Dr Keyana Temple did not want BIPAP replaced for the transport, if at all possible, per his direction to this RT. The NC  was placed approximately 1100 or 1130. RR at this time in high 20's.  Will monitor

## 2018-02-17 NOTE — PROCEDURES
Simeon Witt M.D  [address] [facility] [address] [facility] [address]  Phone [phone]   Fax [phone]    Pulmonary Critical Care & Sleep Medicine         Name: Viral Guillaume MRN: [id number]   : 1955 Hospital: [facility] [place]   Date: 2018        Intubation Note    Procedure: elective/emergent intubation    Indication: respiratory insufficiency    Anesthesia- Rapid sequence:  Fentanyl 50mcg(1.5mcg/kg), versed 4 mg   Paralysis: Succinylcholine 50 mg(1mg/kg)    After assessing the airway, the patient underwent preoxygenation with 100% FiO2 for 5 min. Fentanyl was then given and after 3 min, etomidate and succnylcholine was given sequentially in rapid IV push. The Sellick maneuver was performed throughout the entire sequence. After adequate sedation and paralisys emergent intubation was performed. After positive identification of the vocal cords, .7.5 ET tube was placed into the trachea with direct visualization. The tube was seen passing through the vocal chords without / with some difficulty. CO2 colorimetry was employed immediately to verify tube in airway with /without appropriate color change indicating detection/lack of CO2. Water vapor was seen within the ET tube, and auscultation of the abdomen revealed no bubbling souds. Auscultation  and inspection of the chest after intubation showed symmetric chest excursion and symmetric air entry bilaterally. Chest X-ray has been ordered and is pending. The patient has been placed on a mechanical ventilator. There were no complications.     Fidel Beard MD

## 2018-02-17 NOTE — PROGRESS NOTES
Cardiology Progress Note        Patient: Mateo Carpio        Sex: male          DOA: 2/11/2018  YOB: 1955      Age:  57 y.o.        LOS:  LOS: 5 days   Assessment/Plan     Principal Problem:    Sepsis ([address] (2/11/2018)    Active Problems:    Osteoarthritis of right knee (1/18/2014)      Hypokalemia (2/11/2018)      Glucosuria (2/11/2018)      AMANDA (acute kidney injury) ([address] (2/11/2018)      Fall (2/11/2018)      Back pain (2/11/2018)      Weakness of right lower extremity (2/11/2018)      Anemia (2/11/2018)      Shock ([address] (2/13/2018)      Acute respiratory failure ([address] (2/13/2018)      Elevated troponin (2/12/2018)      Demand ischemia of myocardium ([address] (2/12/2018)      MRSA bacteremia (2/15/2018)        Plan:  Maintaining nSR with PACs  Continue with amiodarone 0.5 mg  Continue with metoprolol 5 mg IV Q6h  Continue heparin and it is acceptable low level ACT>64. Once kidney function is stable may switch to Lovenox/warfarin  Will get limited echo again to assess valve if there is interval changes                  Subjective:    cc:  intubated      REVIEW OF SYSTEMS: intubated  Objective:      Visit Vitals    /70    Pulse (!) 112    Temp (!) 100.8 °F (38.2 °C)    Resp (!) 37    Ht 6' (1.829 m)    Wt 149.3 kg (329 lb 2.4 oz)    SpO2 95%    BMI 44.64 kg/m2     Body mass index is 44.64 kg/(m^2). Physical Exam:  General Appearance: Comfortable, not using accessory muscles of respiration. HEENT: TERRANCE. HEAD: Atraumatic  NECK: No JVD, no thyroidomeglay. CAROTIDS:  LUNGS: Clear bilaterally. HEART: S1+S2 audible    ABD: Non-tender, BS Audible    EXT: No edema, and no cysnosis. VASCULAR EXAM: Pulses are intact. MUSCULOSKELETAL: Grossly no joint deformity.   NEUROLOGICAL: sedated  Medication:  Current Facility-Administered Medications   Medication Dose Route Frequency    vancomycin (VANCOCIN) 1,000 mg in 0.9% sodium chloride (MBP/ADV) 250 mL adv  1,000 mg IntraVENous Q12H    furosemide (LASIX) 10 mg/mL injection        hydrALAZINE (APRESOLINE) 20 mg/mL injection 20 mg  20 mg IntraVENous Q6H    potassium chloride 10 mEq in 100 ml IVPB  10 mEq IntraVENous Q1H    ELECTROLYTE REPLACEMENT PROTOCOL -- Potassium  1 Each Other PRN    ELECTROLYTE REPLACEMENT PROTOCOL -- Magnesium  1 Each Other PRN    ELECTROLYTE REPLACEMENT PROTOCOL -- Phosphorus  1 Each Other PRN    ELECTROLYTE REPLACEMENT PROTOCOL -- Calcium  1 Each Other PRN    midazolam (VERSED) 1 mg/mL injection        succinylcholine (ANECTINE) 20 mg/mL injection        propofol (DIPRIVAN) infusion  0-50 mcg/kg/min IntraVENous TITRATE    fentaNYL (PF)  mcg/30 ml   mcg/hr IntraVENous TITRATE    chlorhexidine (PERIDEX) 0.12 % mouthwash 10 mL  10 mL Oral Q12H    albuterol-ipratropium (DUO-NEB) 2.5 MG-0.5 MG/3 ML  3 mL Nebulization Q6H RT    fentaNYL citrate (PF) injection 50 mcg  50 mcg IntraVENous ONCE    succinylcholine (ANECTINE) injection 50 mg  50 mg IntraVENous NOW    midazolam (VERSED) injection 3 mg  3 mg IntraVENous ONCE    metoprolol (LOPRESSOR) injection 5 mg  5 mg IntraVENous Q6H    acetaminophen (TYLENOL) suppository 650 mg  650 mg Rectal Q6H PRN    hydrALAZINE (APRESOLINE) 20 mg/mL injection 10 mg  10 mg IntraVENous Q6H PRN    0.9% sodium chloride infusion 250 mL  250 mL IntraVENous PRN    insulin glargine (LANTUS) injection 15 Units  15 Units SubCUTAneous DAILY WITH LUNCH    insulin lispro (HUMALOG) injection 3 Units  3 Units SubCUTAneous Q6H    heparin (porcine) 1,000 unit/mL injection 1,500 Units  1,500 Units IntraVENous PRN    acetaminophen (TYLENOL) solution 650 mg  650 mg Oral Q4H PRN    aspirin chewable tablet 81 mg  81 mg Oral DAILY    pantoprazole (PROTONIX) 40 mg in sodium chloride 0.9 % 10 mL injection  40 mg IntraVENous DAILY    naloxone (NARCAN) injection 0.4 mg  0.4 mg IntraVENous PRN    docusate sodium (COLACE) capsule 100 mg  100 mg Oral BID PRN    acetaminophen (TYLENOL) tablet 650 mg  650 mg Oral A8S PRN    folic acid (FOLVITE) tablet 1 mg  1 mg Oral DAILY    thiamine 100 mg in 50 mL NS   IntraVENous Q24H    Vancomycin- Rx to Dose & Monitor  1 Each Other Rx Dosing/Monitoring    heparin 25,000 units in D5W 250 ml infusion  7-25 Units/kg/hr IntraVENous TITRATE    amiodarone (NEXTERONE) 360 mg in dextrose 200 mL (1.8 mg/mL) infusion  0.5-1 mg/min IntraVENous TITRATE    insulin lispro (HUMALOG) injection   SubCUTAneous Q6H    sodium chloride (NS) flush 5-10 mL  5-10 mL IntraVENous PRN    ondansetron (ZOFRAN ODT) tablet 4 mg  4 mg Oral Q4H PRN    glucose chewable tablet 16 g  4 Tab Oral PRN    glucagon (GLUCAGEN) injection 1 mg  1 mg IntraMUSCular PRN    dextrose (D50W) injection syrg 12.5-25 g  25-50 mL IntraVENous PRN               Lab/Data Reviewed: All lab results for the last 24 hours reviewed.      Recent Labs      02/17/18   0410  02/16/18   0440  02/15/18   2130  02/15/18   0800   WBC  8.8  7.6   --   6.9   HGB  8.9*  8.8*  8.4*  7.5*   HCT  25.9*  24.9*  24.5*  21.8*   PLT  297  187   --   129*     Recent Labs      02/17/18   0410  02/16/18   2100  02/16/18   1212  02/16/18   0440  02/15/18   0625   NA  141   --    --   141  138   K  2.9*  3.0*  3.1*  2.9*  3.0*   CL  105   --    --   103  102   CO2  27   --    --   31  26   GLU  161*   --    --   226*  199*   BUN  44*   --    --   34*  43*   CREA  2.24*   --    --   2.22*  3.12*   CA  8.5   --    --   8.6  8.2*       Signed By: Costa Christensen MD     February 17, 2018

## 2018-02-17 NOTE — PROGRESS NOTES
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 XXXA    Back pain M54.9    Weakness of right lower extremity R29.898    Anemia D64.9    Shock ([address] R57.9    Acute respiratory failure (HCC) J96.00    Elevated troponin R74.8    Demand ischemia of myocardium (HCC) I24.8    MRSA bacteremia R78.81 ·   Sepsis ? Source ? Related to lll air space  as CT showed concern for septic embolie . Ada Lin MRSA in blood TERRY negative for endocarditis. Ada Delia Spine MRI is negative also. Some skin lesion . Ada Lin Blood culture still is positive 2:2 GPC   · Septic shock improved off pressors  · Elevated blood pressure now  · Elevated troponin ?  Demand ischemia vs NSTMI   · Respiratory failure intubated on  extubated on  now on bipap  · Acute renal failure S/P HD doing ok improvement in Cr is noted  · Rhabdo improved  · Afib poorly controlled S/P cardioversion and on amio and heparin drip  · Back pain fall no absess or open wound noted   · Severe hypokalemia hypomagnesemia replaced  · Elevated blood sugars better controlled   · Lactic acidosis improved  · H/O ETOH    PLAN:  -- Add hydralazine   -- Will try better control of blood pressure  -- If bp continue to remain elevated consider cardene/cardizem drip  -- Large dose lasix 80 one time  -- K and MG replaced and started   -- Lopressor iv started  -- Overnight fever and still blood culture is positive not sure where source is will rpt abd pelvis ct and ext ct to reevaluate, Billirubin is elevated   -- Can go on high flow on CT  -- Recheck abg . .worsening mental status might be due to morphine monitor if condition continue to decline we might consider re intubation on patient   -- On lantus 15 unit mild elevation of blood sugar due to steroids last night  -- HB stable  - Continue Amio per cardiology  - MRSA isolation   CVS:Currently on Amiodarone drip, Heparin drip,  follow on cardiology recommendations. Follow on cardiology might need some work up once recover  Started on betablocker  Add hydralazine  Keep SYS <160  On aspirin  S/P cardioversion with 200 j on 2/13  RS: S/P liberation borderline respiratory status, Increase wob is not due to poor ventilation but most likely due to underline sepsis will monitor with ABG on bipap and high flow. ID:Sepsis ? Etiology, MRSA on multiple cultures   Vanco 2/11  Azactam 2/11 stop on 2/14  On  levaquin 2/12  Stop date 2/15  Monitor skin lesions if worsenign will add clindamycin defer to ID   Get further imaging including CXR/ABD CT and Pelvis CT and Ext ct  ENDO:Hold metformin, SSI TSH is ok bs controlled increase lantus keep sugar <160  GI: Hold feeds PPI, Elevated billirubin ? Due to sepsis follow up on ABD pelvis CT   RENAL:Wilson for I/O, Replace k and MG and replace per protocol.  Patient is putting out urine, Off bicarb drip s/p HD, Give another lasix dialysis per renal K and MG replaced  CNS: Sedated intubated wakes up with minimal stimulus MRI head is ok  HEMATOLOGY:PLT is stable, Heparin drip for Afib   MUSCULOSKELETAL:CK is elevated but improved   · PAIN AND SEDATION: Off drips doing ok monitor for ETOH withdrawal, WIll change thiamine to injection, Avoid morphine  · Skin/Wound: Monitor back, No skin absess in back examined again today  · Electrolytes: Replace electrolytes per ICU electrolyte replacement protocol. · IVF: Haley Radford   · Nutrition: Hold feeds for now due to mentation and bipap  · Prophylaxis: DVT Prophylaxis with Heparin,. GI Prophylaxis. · Restraints: minimal restrain to avoid pulling BIPAP  · PT/OT eval and treat. OOB when appropriate. · Lines/Tubes: none. Femoral line 2/12 removed on 2/15. Wilson 2/12/18 Right IJ dialysis catheter 2/14, Left subclavian line 2/15/18  ADVANCE DIRECTIVE:Full code/ Palliative is consulted spoke with wife at length  FAMILY DISCUSSION:spoke with family  Quality Care: PPI, DVT prophylaxis, HOB elevated, Infection control all reviewed and addressed. Events and notes from last 24 hours reviewed. Care plan discussed with nursing CC TIME:50  min excluding procedure    · Labs and images personally seen and available reports reviewed. · All current medicines are reviewed and doses and prescription adjusted. Addendum:  Patient continue to remain lethargic  Breathing very fast  ABG was ok   But worry on impending respiratory failure I think he is breathing fast due to underline sepsis but as he is going for CTscans and ongoing sepsis patient was reintubated at 1:20 pm    Plan:  Vent protocol  Sedation protocol  Insert NG tube  CXR per protocol  Plan of care discussed with family    Spent 15 min excluding procedure  CXR pending    Subjective/History:   Savannah Foss is a 57 y.o. male who came to the ed with complaints of fall. Patient states he has been feeling generalized weakness at home for the last day or so with low grade subjective fevers but yesterday he noted RLE weakness around 5pm. Since then he has fallen about 4 times in his back without any head trauma or LOC. He has been using his tripod cane to assist him with walking. Due to persistence of his weakness, falls, fevers and low back pain he decided to come to the ED.    In the ED, CT of the head was neg and his labs showed significant hypoK with elevated Cr (unknown baseline). He was also febrile and tachycardic. He was started on broad spectrum Abx. Overnight RRT called found to be tachycardic, tachypnic and ABG showed alkalosis and was transferred to ICU  Now patient is awake, denies any complains except some back pain   Tachycardic with HR in 120s   No nausea vomiting  Recently about 1 month a go treated for pna at office  No diarrhoea  Ex smoker  Drinks about 2 glasses of mix drinks every day  Recently lost his son to multiorgan failure and sepsis     2/13/18    Yesterday became more tachypnic and difficult to control hr  Intubated 2/12, femoral line 2/12 <neck line not placed due to concern for colonization with ongoing sepsis>  Became hypotensive managed with phenylephrine  Afib was poorly controlled started on amiodarone drip. Cardiology is closely following  Blood culture Staph  K 3.2 replaced  Mg 1.1 getting replaced   UO and Cr is improving  Still spiking fever 102   Flu is negative     2/14  No significant overnight event  S/P TERRY negative for vegetation or blood clot  S/P cardioversion  ID evaluated suggested MSSA sepsis but initial culture is growing MRSA.   Am labs showed worsening cr to 4.06 and PH 7.26  ALB 1.5  Currently on phenylephring 100, versed, amiodarone, fentanyl and bicarb drip    2/15  Doing ok  No overnight event   S/P HD  Also put out 900 cc of urine in last 24 hrs  HB dropped to 7.5 but no active bleed  PLT is 129 INR is 1.6 K 3.0   Alb 1.5     2/16  Doing well   Yesterday started noticing some sking lesions all over body looks like septic staph infection  Nothing to suggest allergic rash  Put out about 1400cc yesterday  K 2.9 and replaced  Cr improved to 2.22 s/p hd yesterday  Mild elevation of billirubin to 1.9   HB and PLT is stable   Sputum MRSA     2/17/18  Was extubated on 2/16  Did reasonably well yesterday  Overnight still had fever  Early am was breathing little fast. As per nursing was awake and following command but received morphine now not waking up much  Put out 3300 urine  K 2.9 only replaced by 10 meq  Mg 1.5  PH 7.56/34/30/107  Currently on BIPAP still breathing little fast     Current Facility-Administered Medications   Medication Dose Route Frequency    vancomycin (VANCOCIN) 1,000 mg in 0.9% sodium chloride (MBP/ADV) 250 mL adv  1,000 mg IntraVENous Q12H    furosemide (LASIX) 10 mg/mL injection        hydrALAZINE (APRESOLINE) 20 mg/mL injection 20 mg  20 mg IntraVENous Q6H    magnesium sulfate 2 g/50 ml IVPB (premix or compounded)  2 g IntraVENous ONCE    potassium chloride 10 mEq in 100 ml IVPB  10 mEq IntraVENous Q1H    calcium gluconate 1 g in 0.9% sodium chloride 100 mL IVPB  1 g IntraVENous ONCE    metoprolol (LOPRESSOR) injection 5 mg  5 mg IntraVENous Q6H    insulin glargine (LANTUS) injection 15 Units  15 Units SubCUTAneous DAILY WITH LUNCH    insulin lispro (HUMALOG) injection 3 Units  3 Units SubCUTAneous Q6H    aspirin chewable tablet 81 mg  81 mg Oral DAILY    pantoprazole (PROTONIX) 40 mg in sodium chloride 0.9 % 10 mL injection  40 mg IntraVENous DAILY    folic acid (FOLVITE) tablet 1 mg  1 mg Oral DAILY    thiamine 100 mg in 50 mL NS   IntraVENous Q24H    Vancomycin- Rx to Dose & Monitor  1 Each Other Rx Dosing/Monitoring    heparin 25,000 units in D5W 250 ml infusion  7-25 Units/kg/hr IntraVENous TITRATE    chlorhexidine (PERIDEX) 0.12 % mouthwash 10 mL  10 mL Oral Q12H    amiodarone (NEXTERONE) 360 mg in dextrose 200 mL (1.8 mg/mL) infusion  0.5-1 mg/min IntraVENous TITRATE    insulin lispro (HUMALOG) injection   SubCUTAneous Q6H         Objective:   Vital Signs:    Visit Vitals    /71    Pulse (!) 116    Temp 99 °F (37.2 °C)    Resp (!) 38    Ht 6' (1.829 m)    Wt 149.3 kg (329 lb 2.4 oz)    SpO2 95%    BMI 44.64 kg/m2       O2 Device: BIPAP   O2 Flow Rate (L/min): 40 l/min   Temp (24hrs), Av.3 °F (37.9 °C), Min:98 °F (36.7 °C), Max:101.7 °F (38.7 °C) Intake/Output:   Last shift:      02/17 0701 - 02/17 1900  In: -   Out: 700 [Urine:700]  Last 3 shifts: 02/15 1901 - 02/17 0700  In: 2082 [I.V.:1145]  Out: 0430 [Urine:3825]    Intake/Output Summary (Last 24 hours) at 02/17/18 1021  Last data filed at 02/17/18 0951   Gross per 24 hour   Intake           850.49 ml   Output             4075 ml   Net         -3224.51 ml       Review of Systems:  Intubated sedated     Objective:    General: On BIPAP breathing little fast   HEENT: pupils reactive, sclera anicteric, EOM intact  Neck: No adenopathy or thyroid swelling, no lymphadenopathy or JVD, supple  CVS: S1S2 no murmurs  RS: Mod AE bilaterally, minimal rales   Abd: soft, non tender, distended sluggish bowel sound  Neuro: non focal, awake, alert  Extrm: no leg edema, Right knee scar  Lymph node: No obvious palpable lymph node appreciated.   Skin: Pustular lesions   CBC w/Diff Recent Labs      02/17/18   0410  02/16/18   0440  02/15/18   2130  02/15/18   0800   WBC  8.8  7.6   --   6.9   RBC  2.97*  2.85*   --   2.41*   HGB  8.9*  8.8*  8.4*  7.5*   HCT  25.9*  24.9*  24.5*  21.8*   PLT  297  187   --   129*        Chemistry Recent Labs      02/17/18   0410  02/16/18   2100  02/16/18   1212  02/16/18   0440  02/15/18   0625   GLU  161*   --    --   226*  199*   NA  141   --    --   141  138   K  2.9*  3.0*  3.1*  2.9*  3.0*   CL  105   --    --   103  102   CO2  27   --    --   31  26   BUN  44*   --    --   34*  43*   CREA  2.24*   --    --   2.22*  3.12*   CA  8.5   --    --   8.6  8.2*   MG  1.5*   --    --   1.8  1.8   PHOS  1.4*   --    --   1.9*  2.1*   AGAP  9   --    --   7  10   BUCR  20   --    --   15  14   AP  97   --    --   72  68   TP  4.8*   --    --   5.4*  5.3*   ALB  1.6*   --    --   1.5*  1.5*   GLOB  3.2   --    --   3.9  3.8   AGRAT  0.5*   --    --   0.4*  0.4*        Lactic Acid Lactic acid   Date Value Ref Range Status   02/13/2018 1.8 0.4 - 2.0 MMOL/L Final     No results for input(s): LAC in the last 72 hours. Micro  Recent Labs      02/16/18   0508   CULT  Sent to [facility] for ID/Susceptibility if indicated  CULTURE IN PROGRESS,FURTHER UPDATES TO FOLLOW  Sent to [facility] for ID/Susceptibility if indicated  CULTURE IN [address] UPDATES TO FOLLOW     Recent Labs      02/13/18   0858  02/13/18   0315  02/13/18   0310  02/12/18   0900  02/11/18   1846   CULT  PENDING  AEROBIC BOTTLE STAPHYLOCOCCUS AUREUS*  AEROBIC BOTTLE STAPHYLOCOCCUS AUREUS*  NO GROWTH AFTER 20 HOURS  AEROBIC AND ANAEROBIC BOTTLES **METHICILLIN RESISTANT STAPHYLOCOCCUS AUREUS  CALLED TO AND CORRECTLY REPEATED BY:  Bryon MACHADO RN ICU TO 2001 [place] AT 0825 ON 2/14/18. ABG Recent Labs      02/17/18   0513  02/16/18   1346  02/16/18   1043   PHI  7.562*  7.504*  7.535*   PCO2I  33.6*  39.1  37.6   PO2I  107*  54*  59*   HCO3I  30.1*  30.7*  31.7*   FIO2I  70  65  40        Liver Enzymes Protein, total   Date Value Ref Range Status   02/17/2018 4.8 (L) 6.4 - 8.2 g/dL Final     Albumin   Date Value Ref Range Status   02/17/2018 1.6 (L) 3.4 - 5.0 g/dL Final     Globulin   Date Value Ref Range Status   02/17/2018 3.2 2.0 - 4.0 g/dL Final     A-G Ratio   Date Value Ref Range Status   02/17/2018 0.5 (L) 0.8 - 1.7   Final     AST (SGOT)   Date Value Ref Range Status   02/17/2018 55 (H) 15 - 37 U/L Final     Alk.  phosphatase   Date Value Ref Range Status   02/17/2018 97 45 - 117 U/L Final     Recent Labs      02/17/18   0410  02/16/18   0440  02/15/18   0625   TP  4.8*  5.4*  5.3*   ALB  1.6*  1.5*  1.5*   GLOB  3.2  3.9  3.8   AGRAT  0.5*  0.4*  0.4*   SGOT  55*  45*  47*   AP  97  72  68        Cardiac Enzymes Lab Results   Component Value Date/Time     02/17/2018 04:10 AM        BNP No results found for: BNP, BNPP, XBNPT     Coagulation Recent Labs      02/17/18   0410  02/16/18   2100  02/16/18   1210  02/16/18   0440   02/15/18   0625   PTP   --    --    --   14.2   --   18.2*   INR   --    --    --   1.2   --   1.6*   APTT  57.5* 52.4*  56.2*  61.4*   < >   --     < > = values in this interval not displayed. Thyroid  Lab Results   Component Value Date/Time    TSH 0.43 02/12/2018 10:05 AM          Lipid Panel Lab Results   Component Value Date/Time    Cholesterol, total 116 02/11/2018 08:45 PM    HDL Cholesterol 27 (L) 02/11/2018 08:45 PM    LDL, calculated 57.4 02/11/2018 08:45 PM    VLDL, calculated 31.6 02/11/2018 08:45 PM    Triglyceride 158 (H) 02/11/2018 08:45 PM    CHOL/HDL Ratio 4.3 02/11/2018 08:45 PM          Urinalysis Lab Results   Component Value Date/Time    Color DARK YELLOW 02/11/2018 10:45 PM    Appearance CLOUDY 02/11/2018 10:45 PM    Specific gravity 1.019 02/11/2018 10:45 PM    pH (UA) 5.5 02/11/2018 10:45 PM    Protein 300 (A) 02/11/2018 10:45 PM    Glucose >1000 (A) 02/11/2018 10:45 PM    Ketone TRACE (A) 02/11/2018 10:45 PM    Bilirubin NEGATIVE  02/11/2018 10:45 PM    Urobilinogen 1.0 02/11/2018 10:45 PM    Nitrites NEGATIVE  02/11/2018 10:45 PM    Leukocyte Esterase NEGATIVE  02/11/2018 10:45 PM    Epithelial cells 0 01/08/2014 02:46 PM    Bacteria 2+ (A) 02/11/2018 10:45 PM    WBC 0 to 2 02/11/2018 10:45 PM    RBC 0 to 2 02/11/2018 10:45 PM        XR (Most Recent). CXR reviewed by me and compared with previous CXR   Results from Hospital Encounter encounter on 02/11/18   XR CHEST PORT   Narrative EXAM:Chest X-Ray      History: Hypoxia, intubated    Technique:  Portable Frontal View    Comparison: 02/15/2018, 02/14/2018    _______________    FINDINGS:  -Endotracheal tube tip is 3.9 cm above the lorena.  -Stable left subclavian central catheter tip at the caval confluence. Stable  right neck large bore IJ catheter tip in the distal SVC. Nasogastric tube tip is collimated off the left inferior margin of the film  below the level of the GE junction. Midline tracheal air column. Stable midline cardiac silhouette.  Hilar vascular  congestion/crowding with mild persistent diffuse interstitial opacities in both  upper and lower lungs. Persistent left hilar and retrocardiac confluent  parenchymal opacity with obscured left diaphragmatic margin and potential small  left-sided effusion. Minimal right basilar reticular opacity similar prior exam.  Stable osseous structures. _______________         Impression IMPRESSION:    1. Stable support lines and tubes as above. 2. Mild pulmonary hypoinflation with no significant interval change. Unchanged  confluent retrocardiac left lower lung opacity representing  atelectasis/pneumonia with potential small left-sided effusion. 3. Atelectasis versus interstitial edema. CT (Most Recent)   Results from Hospital Encounter encounter on 02/11/18   CT CHEST ABD PELV WO CONT   Narrative COMPARISON: Prior abdomen and pelvic CT 5/27/2015    INDICATION: Sepsis, elevated liver enzymes, basilar airspace disease,  hypertension. TECHNIQUE:   Multislice helical CT was performed through the chest, abdomen and pelvis  without contrast. Coronal and sagittal reformations were generated. Dose reduction: One or more dose reduction techniques were used on this CT:  automated exposure control, adjustment of the mAs and/or kVp according to  patient's size, and iterative reconstruction techniques. The specific techniques  utilized on this CT exam have been documented in the patient's electronic  medical record.      ==========    FINDINGS:    ------------------    CHEST:    LUNGS: There are multiple peripheral subpleural airspace nodular densities  bilaterally, the largest 15 mm right apex, with several smaller nodular  densities surrounding right apex, as well as involvement posterior superior  segment right lower lobe, and lingula. There are streaky areas of parenchymal  density bilateral lower lobes and lingula. PLEURA: Very small bilateral pleural effusions. AIRWAY: Normal.    MEDIASTINUM: Diffuse cardiac enlargement without pericardial effusion.  No  abnormally enlarged lymph nodes with thyroid unremarkable. There is suggestion  of diffuse esophageal wall thickening. OTHER: Several chronic left-sided rib fractures with multilevel degenerative  thoracic spondylosis with no acute osseous finding.    ------------------    ABDOMEN/PELVIS:    LIVER/BILIARY: No suspicious liver lesion. No biliary dilation. Small layering  hyperdensity dependent gallbladder without gallbladder dilatation or surrounding  inflammation. SPLEEN: Normal.    PANCREAS: Normal.    ADRENALS: Normal.    KIDNEYS: Several stable small bilateral renal cysts, nonobstructing tiny  calculus right lower pole kidney. No hydronephrosis with stable nonspecific mild  perirenal stranding. LYMPH NODES: No new abnormally enlarged lymph nodes with stable small yenifer  hepatis lymph nodes. GI TRACT: There is dilated stomach and mildly dilated duodenum with air-fluid  levels without evidence of obstructing lesion proximal small bowel with no  jejunal dilatation. No other small or large bowel dilatation or wall thickening. Normal-appearing appendix. No ascites or free air. VASCULATURE: Moderate aortoiliac atherosclerotic changes. PELVIC ORGANS: Prostate not significantly enlarged, with Wilson catheter in place  with nondistended bladder. OTHER: Small sclerotic focus right pubic symphysis stable since prior study. Lower lumbar facet arthropathy asymmetric narrowing most pronounced on the right  L4/5, with no new acute osseous finding.    ==========         Impression IMPRESSION:        1. Mild areas of atelectasis and/or scarring lower lobes and lingula with very  small pleural effusions. 2. Multifocal peripheral bilateral subpleural nodular densities the largest 15  mm right apex, which may represent peripheral areas of pneumonia or other  inflammatory etiology, although differential diagnosis includes less likely  metastatic disease or infarcts from pulmonary emboli.  Follow-up chest CT  recommended in one month following treatment. 3. Diffuse esophageal wall thickening most likely related to esophagitis. 4. Cholelithiasis without CT evidence of acute cholecystitis. 5. Dilated stomach and duodenum with air-fluid levels without evidence of  mechanical obstruction, most likely related to ileus. 6. Stable additional ancillary findings as above. EKG No results found for this or any previous visit. ECHO  2/12/18 [facility] SUMMARY:  Left ventricle: Systolic function was normal. Ejection fraction was estimated   in the range of 55 % to 60 %. There was  mild hypokinesis of the basal inferior wall(s). There was moderate concentric   hypertrophy. Right ventricle: The size was normal. Systolic function was normal.    Inferior vena cava, hepatic veins: The inferior vena cava was dilated. The   respirophasic change in diameter was more  than 50%. Blood culture, MRSA last culture on 2/14 still showing GPC   Sputum GPC/Staph     MRI spine:   2. Prior left L5 laminotomy, partial left facetectomy and possibly previous  partial L4-5 discectomy. Minimal left asymmetric disc bulge but no evidence of  recurrent disc herniation or retained disc fragment.     3. Minimal L3-4 disc bulge and annular fissure.     4. Multilevel mostly mild facet joint osteoarthritis. Greatest and mild to  moderate level right L4-5 facet joint osteoarthritis with moderate-sized facet  joint effusion and synovitis. No adjacent periarticular marrow or soft tissue  edema to definitively suggest superimposed infective/septic arthritis.     5. Unremarkable upper sacroiliac joints though incompletely included. Mild body  wall edema.     Findings discussed with Dr. Dayne Grewal. Imaging:  I have personally reviewed the patients radiographs and have reviewed the reports:                  Edu Saldivar M.D.   Pulmonary Critical Care & Sleep Medicine

## 2018-02-17 NOTE — PROGRESS NOTES
Per Dr. Ariel Rehman: h/o on dysphagia evaluation until a later time/date secondary to current medical status. Will complete at a later time/date or as medical condition permits.      Thank you for this referral.    Alyse Goodwin M.S. CCC-SLP/L  Speech-Language Pathologist

## 2018-02-17 NOTE — PROGRESS NOTES
1910-Report received from Jung Claire RN. Luis, mar, labs, kardex, cardiac rhythm and patient status reviewed. Assumed care of patient. 2000-Assessment completed. Aux Temp is 101.7F, patient only has Oral Tylenol and currently unable to swallow after being extubated. Paged Dr. Julio Eugene. 2030-Francine Yun returned paged. Orders received for Hydralazine 10mg Q6hrs for SBP greater than 180. 650mg Tylenol suppository Q6hrs for temp greater than 100.5. Discussed K of 3.1 at 12pm, not on protocol but has diuresed almost 2L since lab draw. Orders received to recheck K and if under 3 to call Dr. Darci Johnson with results. Per Dr. Darci Johnson, no new orders and will check in AM.  2100-HR continues to be 110-120's at this time. Increased Amiodarone drip to 1mg/hr at this time. 2300- Report given to to CALEB Ariza RN. Luis, mar, labs, kardex, cardiac rhythm and pt status reviewed.

## 2018-02-17 NOTE — PROGRESS NOTES
Hospitalist Progress Note    Patient: Bonita Sewell MRN: [id number]  CSN: [id number]    YOB: 1955  Age: 57 y.o. Sex: male    DOA: 2/11/2018 LOS:  LOS: 5 days                Assessment/Plan     Patient Active Problem List   Diagnosis Code    Osteoarthritis of right knee M17.11    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[facility] T84.018A, Z96.659    Failed total knee arthroplasty ([facility] [facility] T84.018A, Z96.659    Dysphagia R13.10    Sepsis ([facility] A41.9    Hypokalemia E87.6    Glucosuria R81    AMANDA (acute kidney injury) ([facility] [address] N17.9    Fall W19. XXXA    Back pain M54.9    Weakness of right lower extremity R29.898    Anemia D64.9    Shock (formerly [facility]) R57.9    Acute respiratory failure (formerly [facility]) J96.00    Elevated troponin R74.8    Demand ischemia of myocardium (formerly [facility]) I24.8    MRSA bacteremia R65.80        58 yo male admitted for pneumonia, respiratory distress. RRT called on this patient for respiratory distress in morning of 02/12/2018, high mews score of 8, fever of 103, tachycardia, resp rate of 44. He was transferred to ICU. Patient continued to decline in condition with elevated resp rate. He was intubated and central line placed. CRITICAL CARE PLAN         Resp -   Acute respiratory failure - extubated 02/16/2018. Patient reintubated 02/17/2018     ID -   Sepsis unclear source of infection. Recent pneumonia. Blood cultures 02/11/2018 positive for MRSA. blood cx 2/2 02/13/2018 MRSA. blood cultures 2/2 02/14/2018 MRSA. Follow blood cultures 2/2 02/16/2018  resp cultures 02/13/2018 MRSA  Urine culture 02/12/2018 no growth  CT chest Multifocal peripheral bilateral subpleural nodular densities the largest 15  mm right apex, which may represent peripheral areas of pneumonia or other  inflammatory etiology, although differential diagnosis includes less likely  metastatic disease or infarcts from pulmonary emboli. S/p TERRY with no evidence of vegetations.   MRI per ID,     ANTIBIOTICS vancomycin.     [facility] - Monitor HD. Septic shock - off pressors  A-fib - on amiodarone drip. S/p cardioversion. Converted to sinus rhythm. Elevated troponin - demand ischemia vs NSTEMI. Will need ischemia work up when stable per cardiology. On heparin drip     Heme/onc - Follow H&H, plts.      Renal -   AMANDA - improving, nephrology following. Metabolic acidosis - improved with bicarb drip  Trend BUN, Cr, follow I/O. Check and replace Mg, K, phos.     Endocrine -  Follow FSG  DM - on lantus and SSI     Neuro/ Pain/ Sedation -   RLE weakenss/generalized weakness. Head CT negative       GI - NPO, tube feeding.     Prophylaxis - DVT: heparin drip, GI: protonix.     40 minutes of critical care time spent in the direct evaluation and treatment of this high risk patient. The reason for providing this level of medical care for this critically ill patient was due a critical illness that impaired one or more vital organ systems such that there was a high probability of imminent or life threatening deterioration in the patients condition. This care involved high complexity decision making to assess, manipulate, and support vital system functions, to treat this degreee vital organ system failure and to prevent further life threatening deterioration of the patients condition.             Disposition : TBD    Physical Exam:  General: As above  HEENT: NC, Atraumatic. PERRLA, anicteric sclerae. Lungs: CTA Bilaterally. No Wheezing/Rhonchi/Rales.   Heart:  Regular  rhythm,  No murmur, No Rubs, No Gallops  Abdomen: Soft, Non distended, Non tender.  +Bowel sounds,   Extremities: No c/c/e            Vital signs/Intake and Output:  Visit Vitals    /57    Pulse 93    Temp 99.3 °F (37.4 °C)    Resp (!) 35    Ht 6' (1.829 m)    Wt 149.3 kg (329 lb 2.4 oz)    SpO2 98%    BMI 44.64 kg/m2     Current Shift:  02/17 0701 - 02/17 1900  In: -   Out: 5880 [Urine:2675]  Last three shifts:  02/15 1901 - 02/17 0700  In: 2082 [I.V.:1145]  Out: 3975 [JGWIZ:4878]            Labs: Results:       Chemistry Recent Labs      02/17/18   0410  02/16/18   2100  02/16/18   1212  02/16/18   0440  02/15/18   0625   GLU  161*   --    --   226*  199*   NA  141   --    --   141  138   K  2.9*  3.0*  3.1*  2.9*  3.0*   CL  105   --    --   103  102   CO2  27   --    --   31  26   BUN  44*   --    --   34*  43*   CREA  2.24*   --    --   2.22*  3.12*   CA  8.5   --    --   8.6  8.2*   AGAP  9   --    --   7  10   BUCR  20   --    --   15  14   AP  97   --    --   72  68   TP  4.8*   --    --   5.4*  5.3*   ALB  1.6*   --    --   1.5*  1.5*   GLOB  3.2   --    --   3.9  3.8   AGRAT  0.5*   --    --   0.4*  0.4*      CBC w/Diff Recent Labs      02/17/18   0410  02/16/18   0440  02/15/18   2130  02/15/18   0800   WBC  8.8  7.6   --   6.9   RBC  2.97*  2.85*   --   2.41*   HGB  8.9*  8.8*  8.4*  7.5*   HCT  25.9*  24.9*  24.5*  21.8*   PLT  297  187   --   129*      Cardiac Enzymes Recent Labs      02/17/18   0410   CPK  260      Coagulation Recent Labs      02/17/18   1105  02/17/18   0410   02/16/18   0440   02/15/18   0625   PTP   --    --    --   14.2   --   18.2*   INR   --    --    --   1.2   --   1.6*   APTT  58.3*  57.5*   < >  61.4*   < >   --     < > = values in this interval not displayed. Lipid Panel Lab Results   Component Value Date/Time    Cholesterol, total 116 02/11/2018 08:45 PM    HDL Cholesterol 27 (L) 02/11/2018 08:45 PM    LDL, calculated 57.4 02/11/2018 08:45 PM    VLDL, calculated 31.6 02/11/2018 08:45 PM    Triglyceride 158 (H) 02/11/2018 08:45 PM    CHOL/HDL Ratio 4.3 02/11/2018 08:45 PM      BNP No results for input(s): BNPP in the last 72 hours.    Liver Enzymes Recent Labs      02/17/18   0410   TP  4.8*   ALB  1.6*   AP  97   SGOT  55*      Thyroid Studies Lab Results   Component Value Date/Time    TSH 0.43 02/12/2018 10:05 AM        Procedures/imaging: see electronic medical records for all procedures/Xrays and details which were not copied into this note but were reviewed prior to creation of Plan

## 2018-02-17 NOTE — PROGRESS NOTES
1240 Call received from ICU RN that pt was to be transported to CT. Upon arrival in ICU, this RT assessed pt and noted RR in high 40's. Call placed by me to Dr Braulio Gonzalez who decided to intubate. This was accomplished and pt placed on 840 vent, settings and alarms per doc flowsheet.

## 2018-02-17 NOTE — PROGRESS NOTES
Pharmacy Dosing Services: Vancomycin    Consult for Vancomycin Dosing by Pharmacy by Dr. Esau Carballo provided for this 57y.o. year old male , for indication of Sepsis- MRSA. Day of Therapy 06    Ht Readings from Last 1 Encounters:   02/12/18 182.9 cm (72\")        Wt Readings from Last 1 Encounters:   02/16/18 149.3 kg (329 lb 2.4 oz)        Previous Regimen Vancomycin 1500 mg (Random times d/t HD)   Last Level 15.7 mcg/ml   Other Current Antibiotics NA   Significant Cultures BCx==>  +MRSA   Serum Creatinine Lab Results   Component Value Date/Time    Creatinine 2.24 (H) 02/17/2018 04:10 AM      Creatinine Clearance Estimated Creatinine Clearance: 50.1 mL/min (based on Cr of 2.24). BUN Lab Results   Component Value Date/Time    BUN 44 (H) 02/17/2018 04:10 AM      WBC Lab Results   Component Value Date/Time    WBC 8.8 02/17/2018 04:10 AM      H/H Lab Results   Component Value Date/Time    HGB 8.9 (L) 02/17/2018 04:10 AM      Platelets Lab Results   Component Value Date/Time    PLATELET 249 91/98/4686 04:10 AM      Temp (!) 100.8 °F (38.2 °C)       AMANDA apparently resolving (preadmission SCr Baseline: ~1.2), currently SCr:2.24 today, down from high of SCr: 4.06 on [id number]. Current CrCl: ~50 ml/min. Previous 1500 mg doses of vancomycin were given ~q24h (variability d/t HD schedule). Will now convert to Vancomycin 1500 mg q24 scheduled. Dose calculated to approximate a therapeutic trough of 15-20mcg/mL. Pharmacy to follow daily and will make changes to dose and/or frequency based on clinical status. Yoko Holm, Pharm. D.   Clinical Pharmacist  [phone]

## 2018-02-17 NOTE — PROGRESS NOTES
Nephrology Inpatient Progress note    Subjective:     Marco Antonio Ravi is a 57 y.o. male with history of hypertension, diabetes, hyperlipidemia, gout and arthritis who presented with a fever and multiple falls. On initial evaluation, he had borderline low blood pressure, and normal white cell count, but elevated creatinine at 2.5. Blood culture was positive and started on broad spectrum antibiotics, on IV fluid and IV pressors. A culture finally came back pos for MRSA. His preadmission baseline creatinine back in 2015 was 1.2. On admission, it was 2.5. It continued to increase and it jumped up to 4.06. Of note, the patient also had slightly elevated CPK level on admission, which is trending down now. CAT scan did not show any significant finding as a possible source. Echocardiography was negative for valvular vegetation. He was initiated on HD 2/14 and repeated 2/15. Urine output increasing, over 3.2L yesterday. Cr unchanged at 2.2 today.     Admit Date: 2/11/2018    Allergy:  Allergies   Allergen Reactions    Bees [Hymenoptera Allergenic Extract] Anaphylaxis    Cocoa Butter-Zinc Oxide Rash    Dilaudid [Hydromorphone (Bulk)] Rash     capsules    Pcn [Penicillins] Rash        Objective:     Visit Vitals    /71    Pulse (!) 116    Temp 99 °F (37.2 °C)    Resp (!) 38    Ht 6' (1.829 m)    Wt 149.3 kg (329 lb 2.4 oz)    SpO2 95%    BMI 44.64 kg/m2         Intake/Output Summary (Last 24 hours) at 02/17/18 1015  Last data filed at 02/17/18 6382   Gross per 24 hour   Intake           850.49 ml   Output             4075 ml   Net         -3224.51 ml       Physical Exam:       General: NAD   HENT: Atraumatic and normocephalic   Eyes: Normal conjunctiva   Neck: Supple with no JVD   Cardiovascular: Normal S1 & S2, no m/r/g   Pulmonary/Chest Wall: Clear to auscultation bilaterally   Abdominal: Soft and non-tender   Musculoskeletal: ++ edema   Neurological: No change       Data Review: latest labs reviewed      Lab Results   Component Value Date/Time    WBC 8.8 02/17/2018 04:10 AM    RBC 2.97 (L) 02/17/2018 04:10 AM    HCT 25.9 (L) 02/17/2018 04:10 AM    MCV 87.2 02/17/2018 04:10 AM    MCH 30.0 02/17/2018 04:10 AM    MCHC 34.4 02/17/2018 04:10 AM    RDW 17.7 (H) 02/17/2018 04:10 AM      No results found for: IRONNo components found for: FERRITIN  No components found for: PTHINT  Urinalysis  No results found for: UGLU        Impression:     1. AMANDA, likely acute tubular necrosis from septic shock. Creatinine trended up from 2.5 on admission to 4.06. Patient was oliguric and had first HD 2/14. Cr down to 2.2 in the last 2 days with increasing UOP  2. Methicillin-resistant Staphylococcus aureus septic shock, unclear source, oral antibiotics, IV pressor, IV fluid. 3.  Hypophosphatemia. 4.  Hypokalemia  5. Acidosis, mixed respiratory and metabolic. 6.  Mild rhabdomyolysis, improving with a CK down to 647 from high of 3300.  7. Anemia. 8.  Thrombocytopenia  9. Diabetes mellitus. 10.  Morbid obesity. 11.  Respiratory failure, intubated and remains on MV.     Plan:   Continue supportive care, Vent, antibiotics, and nutrition  Supplement Potassium as needed  Pulmonologist on board  No need for dialysis today  Recheck Renal Panel in a.m.   Monitor urine output    MD Blas Crabtree  [phone]

## 2018-02-18 NOTE — PROGRESS NOTES
Cardiology Progress Note        Patient: Charles Wood        Sex: male          DOA: 2/11/2018  YOB: 1955      Age:  57 y.o.        LOS:  LOS: 6 days   Assessment/Plan     Principal Problem:    Sepsis ([address] (2/11/2018)    Active Problems:    Osteoarthritis of right knee (1/18/2014)      Hypokalemia (2/11/2018)      Glucosuria (2/11/2018)      AMANDA (acute kidney injury) ([address] (2/11/2018)      Fall (2/11/2018)      Back pain (2/11/2018)      Weakness of right lower extremity (2/11/2018)      Anemia (2/11/2018)      Shock ([address] (2/13/2018)      Acute respiratory failure ([address] (2/13/2018)      Elevated troponin (2/12/2018)      Demand ischemia of myocardium ([address] (2/12/2018)      MRSA bacteremia (2/15/2018)        Plan: marinating NSR with frequent PACs  Intubated  Continue with heparin and metoprolol, amiodarone  No valvular vegetation on echocardiography                     Subjective:    cc:  intubated      REVIEW OF SYSTEMS: unable to obtained  Objective:      Visit Vitals    /59    Pulse 84    Temp 98.8 °F (37.1 °C)    Resp (!) 34    Ht 6' (1.829 m)    Wt 149.3 kg (329 lb 2.4 oz)    SpO2 98%    BMI 44.64 kg/m2     Body mass index is 44.64 kg/(m^2). Physical Exam:  General Appearance: Comfortable, not using accessory muscles of respiration. HEENT: TERRANCE. HEAD: Atraumatic  NECK: No JVD  CAROTIDS: clear  LUNGS: Clear bilaterally. HEART: S1+S2 audible    ABD: Non-tender, BS Audible    EXT: No edema, and no cyanosis, warm  VASCULAR EXAM: Pulses are intact. PSYCHIATRIC EXAM: Mood is appropriate. MUSCULOSKELETAL: Grossly no joint deformity.     Medication:  Current Facility-Administered Medications   Medication Dose Route Frequency    ipratropium (ATROVENT) 0.02 % nebulizer solution 0.5 mg  0.5 mg Nebulization Q6H RT    metoprolol (LOPRESSOR) injection 5 mg  5 mg IntraVENous Q6H    insulin lispro (HUMALOG) injection 5 Units  5 Units SubCUTAneous Q6H    ELECTROLYTE REPLACEMENT PROTOCOL -- Potassium  1 Each Other PRN    ELECTROLYTE REPLACEMENT PROTOCOL -- Magnesium  1 Each Other PRN    ELECTROLYTE REPLACEMENT PROTOCOL -- Phosphorus  1 Each Other PRN    ELECTROLYTE REPLACEMENT PROTOCOL -- Calcium  1 Each Other PRN    propofol (DIPRIVAN) infusion  0-50 mcg/kg/min IntraVENous TITRATE    fentaNYL (PF)  mcg/30 ml   mcg/hr IntraVENous TITRATE    chlorhexidine (PERIDEX) 0.12 % mouthwash 10 mL  10 mL Oral Q12H    amiodarone (CORDARONE) 450 mg in dextrose 5% 250 ml infusion  0.5-1 mg/min IntraVENous TITRATE    vancomycin (VANCOCIN) 1500 mg in  ml infusion  1,500 mg IntraVENous Q24H    acetaminophen (TYLENOL) suppository 650 mg  650 mg Rectal Q6H PRN    hydrALAZINE (APRESOLINE) 20 mg/mL injection 10 mg  10 mg IntraVENous Q6H PRN    0.9% sodium chloride infusion 250 mL  250 mL IntraVENous PRN    insulin glargine (LANTUS) injection 15 Units  15 Units SubCUTAneous DAILY WITH LUNCH    heparin (porcine) 1,000 unit/mL injection 1,500 Units  1,500 Units IntraVENous PRN    acetaminophen (TYLENOL) solution 650 mg  650 mg Oral Q4H PRN    aspirin chewable tablet 81 mg  81 mg Oral DAILY    pantoprazole (PROTONIX) 40 mg in sodium chloride 0.9 % 10 mL injection  40 mg IntraVENous DAILY    naloxone (NARCAN) injection 0.4 mg  0.4 mg IntraVENous PRN    docusate sodium (COLACE) capsule 100 mg  100 mg Oral BID PRN    acetaminophen (TYLENOL) tablet 650 mg  650 mg Oral U6Z PRN    folic acid (FOLVITE) tablet 1 mg  1 mg Oral DAILY    thiamine 100 mg in 50 mL NS   IntraVENous Q24H    Vancomycin- Rx to Dose & Monitor  1 Each Other Rx Dosing/Monitoring    heparin 25,000 units in D5W 250 ml infusion  7-25 Units/kg/hr IntraVENous TITRATE    insulin lispro (HUMALOG) injection   SubCUTAneous Q6H    sodium chloride (NS) flush 5-10 mL  5-10 mL IntraVENous PRN    ondansetron (ZOFRAN ODT) tablet 4 mg  4 mg Oral Q4H PRN    glucose chewable tablet 16 g  4 Tab Oral PRN    glucagon (GLUCAGEN) injection 1 mg  1 mg IntraMUSCular PRN    dextrose (D50W) injection syrg 12.5-25 g  25-50 mL IntraVENous PRN               Lab/Data Reviewed: All lab results for the last 24 hours reviewed.      Recent Labs      02/18/18   0405  02/17/18   0410  02/16/18   0440   WBC  10.7  8.8  7.6   HGB  8.8*  8.9*  8.8*   HCT  25.9*  25.9*  24.9*   PLT  363  297  187     Recent Labs      02/18/18   1015  02/18/18   0405  02/17/18   2100   NA  145  149*  150*   K  3.2*  3.2*  3.2*   CL  107  110*  110*   CO2  31  28  30   GLU  198*  179*  203*   BUN  44*  45*  44*   CREA  1.95*  1.99*  2.10*   CA  8.3*  8.6  8.8       Signed By: Jayesh Emanuel MD     February 18, 2018

## 2018-02-18 NOTE — PROGRESS NOTES
0730-Bedside and Verbal shift change report given to Aga Cuadra RN (oncoming nurse) by Teja Andrews, GOGO (offgoing nurse). Report included the following information SBAR, Kardex, Intake/Output, MAR, Recent Results and Cardiac Rhythm A Fib. Drip rates verified with off going RN. 0800- Initial shift assessment complete. RASS -1. Able to follow some commands. Will continue to monitor. 3486- Patient RASS 0, moving arms, propofol rate increased. Patient resting quietly after at RASS -1. ICU monitoring continues. Nephrologist at bedside. No new orders received. 26[address] [facility] with Pj Larry in lab, let them know MD would like vancomycin antibiotic sensitivity done on positive blood culture from 2/16/18. She states it was already set up yesterday to be done on the positive blood cultures from the day prior, but she will call [facility] and let them know about the MD's request. Pj Larry called back and said sensitivity results are in for 2/14/18 culture and [facility] is setting up the 2/16/18 culture today results will be back tomorrow. 1200- Dr Francisca Maya at bedside. Received orders to increase the Q6 insulin dose to 5units. Let MD know patient's TF is not at goal. MD states still ok to give the 5 units Q6. Reassessment complete, no changes to previous. Will continue to monitor. 1600- Reassessment, no changes to previous. 1930- Bedside and Verbal shift change report given to Raj Villa RN (oncoming nurse) by Aga Cuadra RN (offgoing nurse). Report included the following information SBAR, Kardex, Intake/Output, MAR, Recent Results and Cardiac Rhythm SR. Drips rates verified with oncoming RN.

## 2018-02-18 NOTE — PROGRESS NOTES
Matheus Vega M.D  [facility] 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 XXXA    Back pain M54.9    Weakness of right lower extremity R29.898    Anemia D64.9    Shock ([address] R57.9    Acute respiratory failure (HCC) J96.00    Elevated troponin R74.8    Demand ischemia of myocardium (HCC) I24.8    MRSA bacteremia R78.81 ·   Sepsis ? Source ? Related to lll air space  as CT showed concern for septic embolie . Ada Lin MRSA in blood TERRY negative for endocarditis. Ada Pummel Spine MRI is negative also. NEck CT rpt abd ct and ext ct without abscess Some skin lesion . Ada Lin Blood culture still is positive 2:2 GPC  pending  culture  · Septic shock improved off pressors  · Elevated blood pressure now  · Elevated troponin ?  Demand ischemia vs NSTMI   · Respiratory failure intubated on  extubated on  , Reintubated on  on ARDS vent protocol  · Acute renal failure S/P HD doing ok improvement in Cr is noted  · Rhabdo improved  · Afib poorly controlled S/P cardioversion and on amio and heparin drip  · Back pain fall no absess or open wound noted   · Severe hypokalemia hypomagnesemia replaced  · Elevated blood sugars better controlled   · Lactic acidosis improved  · H/O ETOH    PLAN:  -- I strongly think without source of Abscess and MRSA on multiple culture suggest that DIMITRIS of vanco in vitro is not sensitive in VIVO  -- Teflaro ordered but patient has PCN allergy wanted to discuss with ID but no ID coverage today  -- Micro lab to recheck sensitivity on Vanco if DIMITRIS is changing consider teflaro after discussing with id especially if culture from today is positive  -- Resume Metoprolol <stopped yesterday as became hypotensive after propofol but better today>  -- DC duoneb and continue atrovent due to afib and tachycardia  -- Check TG and Lipase tomorrow as on propofol  -- Mild elevation of NA improving with free water flush  -- Will try better control of blood pressure  -- K and MG replaced protocol    -- On lantus 15 unit mild elevation of blood sugar monitor increase humalog 3 time with feeding  -- HB stable  - Continue Amio per cardiology  - MRSA isolation   CVS:Currently on Amiodarone drip, Heparin drip,  follow on cardiology recommendations. Follow on cardiology might need some work up once recover  Started on betablocker  PRN hydralazine  Keep SYS <160  On aspirin  S/P cardioversion with 200 j on 2/13  RS: S/P liberation borderline respiratory status, Increase wob is not due to poor ventilation but most likely due to underline sepsis reintubated on 2/17  ARDS vent protocol, Aspiration precatuion  BD  ID:Sepsis ? Etiology, MRSA on multiple cultures   Vanco 2/11, DIMITRIS 1 I think patient is in vivo resistant to vanco  Azactam 2/11 stop on 2/14  On  levaquin 2/12  Stop date 2/15  Monitor skin lesions if worsenign will add clindamycin defer to ID   ENDO:Hold metformin, SSI TSH is ok bs controlled increase lantus keep sugar <160  GI: Hold feeds PPI, Elevated billirubin ? Due to sepsis follow up   RENAL:Wilson for I/O, Replace k and MG and replace per protocol.  Patient is putting out urine, Off bicarb drip s/p HD,  dialysis per renal K and MG replaced, Mild elevation of NA on free water  CNS: Sedated intubated wakes up with minimal stimulus MRI head is ok  HEMATOLOGY:PLT is stable, Heparin drip for Afib   MUSCULOSKELETAL:CK is elevated but improved, Dc checking that   · PAIN AND SEDATION: On propofol and fentanyl, Thiamine and folic acid  · Skin/Wound: Monitor back, No skin absess in back examined again today  · Electrolytes: Replace electrolytes per ICU electrolyte replacement protocol. · IVF: Christelle Donate   · Nutrition: resume feeds   · Prophylaxis: DVT Prophylaxis with Heparin,. GI Prophylaxis. · Restraints: minimal restrain to avoid pulling BIPAP  · PT/OT eval and treat. OOB when appropriate. · Lines/Tubes: none. Femoral line 2/12 removed on 2/15. Wilson 2/12/18 Right IJ dialysis catheter 2/14, Left subclavian line 2/15/18  ADVANCE DIRECTIVE:Full code/ Palliative is consulted spoke with wife at length  FAMILY DISCUSSION:spoke with family  Quality Care: PPI, DVT prophylaxis, HOB elevated, Infection control all reviewed and addressed. Events and notes from last 24 hours reviewed. Care plan discussed with nursing CC TIME:60  min excluding procedure    · Labs and images personally seen and available reports reviewed. · All current medicines are reviewed and doses and prescription adjusted. Addendum:  Patient continue to remain lethargic  Breathing very fast  ABG was ok   But worry on impending respiratory failure I think he is breathing fast due to underline sepsis but as he is going for CTscans and ongoing sepsis patient was reintubated at 1:20 pm    Plan:  Vent protocol  Sedation protocol  Insert NG tube  CXR per protocol  Plan of care discussed with family    Spent 15 min excluding procedure  CXR pending    Subjective/History:   Amrita Mcnamara is a 57 y.o. male who came to the ed with complaints of fall. Patient states he has been feeling generalized weakness at home for the last day or so with low grade subjective fevers but yesterday he noted RLE weakness around 5pm. Since then he has fallen about 4 times in his back without any head trauma or LOC.  He has been using his tripod cane to assist him with walking. Due to persistence of his weakness, falls, fevers and low back pain he decided to come to the ED. In the ED, CT of the head was neg and his labs showed significant hypoK with elevated Cr (unknown baseline). He was also febrile and tachycardic. He was started on broad spectrum Abx. Overnight RRT called found to be tachycardic, tachypnic and ABG showed alkalosis and was transferred to ICU  Now patient is awake, denies any complains except some back pain   Tachycardic with HR in 120s   No nausea vomiting  Recently about 1 month a go treated for pna at office  No diarrhoea  Ex smoker  Drinks about 2 glasses of mix drinks every day  Recently lost his son to multiorgan failure and sepsis     2/13/18    Yesterday became more tachypnic and difficult to control hr  Intubated 2/12, femoral line 2/12 <neck line not placed due to concern for colonization with ongoing sepsis>  Became hypotensive managed with phenylephrine  Afib was poorly controlled started on amiodarone drip. Cardiology is closely following  Blood culture Staph  K 3.2 replaced  Mg 1.1 getting replaced   UO and Cr is improving  Still spiking fever 102   Flu is negative     2/14  No significant overnight event  S/P TERRY negative for vegetation or blood clot  S/P cardioversion  ID evaluated suggested MSSA sepsis but initial culture is growing MRSA.   Am labs showed worsening cr to 4.06 and PH 7.26  ALB 1.5  Currently on phenylephring 100, versed, amiodarone, fentanyl and bicarb drip    2/15  Doing ok  No overnight event   S/P HD  Also put out 900 cc of urine in last 24 hrs  HB dropped to 7.5 but no active bleed  PLT is 129 INR is 1.6 K 3.0   Alb 1.5     2/16  Doing well   Yesterday started noticing some sking lesions all over body looks like septic staph infection  Nothing to suggest allergic rash  Put out about 1400cc yesterday  K 2.9 and replaced  Cr improved to 2.22 s/p hd yesterday  Mild elevation of billirubin to 1.9   HB and PLT is stable   Sputum MRSA     2/17/18  Was extubated on 2/16  Did reasonably well yesterday  Overnight still had fever  Early am was breathing little fast. As per nursing was awake and following command but received morphine now not waking up much  Put out 3300 urine  K 2.9 only replaced by 10 meq  Mg 1.5  PH 7.56/34/30/107  Currently on BIPAP still breathing little fast     2/18  Intubated again for work of [address] [place] [address] on 2/17  CT ext, ABD pel, Neck all came out ok no source of abscess  No fever overnight  BP is ok  ON /14/50% peep 5 abg ok  Cr is improving with good urine out put  Renal not planning any dialysis at this point    Current Facility-Administered Medications   Medication Dose Route Frequency    potassium chloride 10 mEq in 100 ml IVPB  10 mEq IntraVENous Q1H    perflutren lipid microspheres (DEFINITY) in NS bolus IV  1 mL IntraVENous RAD ONCE    propofol (DIPRIVAN) infusion  0-50 mcg/kg/min IntraVENous TITRATE    fentaNYL (PF)  mcg/30 ml   mcg/hr IntraVENous TITRATE    chlorhexidine (PERIDEX) 0.12 % mouthwash 10 mL  10 mL Oral Q12H    albuterol-ipratropium (DUO-NEB) 2.5 MG-0.5 MG/3 ML  3 mL Nebulization Q6H RT    amiodarone (CORDARONE) 450 mg in dextrose 5% 250 ml infusion  0.5-1 mg/min IntraVENous TITRATE    vancomycin (VANCOCIN) 1500 mg in  ml infusion  1,500 mg IntraVENous Q24H    insulin glargine (LANTUS) injection 15 Units  15 Units SubCUTAneous DAILY WITH LUNCH    insulin lispro (HUMALOG) injection 3 Units  3 Units SubCUTAneous Q6H    aspirin chewable tablet 81 mg  81 mg Oral DAILY    pantoprazole (PROTONIX) 40 mg in sodium chloride 0.9 % 10 mL injection  40 mg IntraVENous DAILY    folic acid (FOLVITE) tablet 1 mg  1 mg Oral DAILY    thiamine 100 mg in 50 mL NS   IntraVENous Q24H    Vancomycin- Rx to Dose & Monitor  1 Each Other Rx Dosing/Monitoring    heparin 25,000 units in D5W 250 ml infusion  7-25 Units/kg/hr IntraVENous TITRATE    insulin lispro (HUMALOG) injection   SubCUTAneous Q6H         Objective:   Vital Signs:    Visit Vitals    /59    Pulse (!) 103    Temp 99.5 °F (37.5 °C)    Resp (!) 37    Ht 6' (1.829 m)    Wt 149.3 kg (329 lb 2.4 oz)    SpO2 93%    BMI 44.64 kg/m2       O2 Device: Endotracheal tube, Ventilator   O2 Flow Rate (L/min): 6 l/min   Temp (24hrs), Av.5 °F (37.5 °C), Min:98.8 °F (37.1 °C), Max:100.8 °F (38.2 °C)       Intake/Output:   Last shift:       0701 -  1900  In: 200   Out: -   Last 3 shifts:  190 -  0700  In: 2006.2 [I.V.:1866.2]  Out: 4558 [Urine:3775]    Intake/Output Summary (Last 24 hours) at 18 0916  Last data filed at 18 0800   Gross per 24 hour   Intake          1881.22 ml   Output             3375 ml   Net         -1493.78 ml       Review of Systems:  Intubated sedated     Objective:    General: On BIPAP breathing little fast   HEENT: pupils reactive, sclera anicteric, EOM intact  Neck: No adenopathy or thyroid swelling, no lymphadenopathy or JVD, supple  CVS: S1S2 no murmurs  RS: Mod AE bilaterally, minimal rales   Abd: soft, non tender, distended sluggish bowel sound  Neuro: non focal, awake, alert  Extrm: no leg edema, Right knee scar  Lymph node: No obvious palpable lymph node appreciated.   Skin: Pustular lesions   CBC w/Diff Recent Labs      18   0405  18   0410  18   0440   WBC  10.7  8.8  7.6   RBC  2.95*  2.97*  2.85*   HGB  8.8*  8.9*  8.8*   HCT  25.9*  25.9*  24.9*   PLT  363  297  187        Chemistry Recent Labs      18   0405  18   2100  18   1914  18   0410   18   0440   GLU  179*  203*   --   161*   --   226*   NA  149*  150*   --   141   --   141   K  3.2*  3.2*   --   2.9*   < >  2.9*   CL  110*  110*   --   105   --   103   CO2  28  30   --   27   --   31   BUN  45*  44*   --   44*   --   34*   CREA  1.99*  2.10*   --   2.24*   --   2.22*   CA  8.6  8.8   --   8.5   --   8.6 MG  1.9  1.6   --   1.5*   --   1.8   PHOS  2.3*   --   2.2*  1.4*   --   1.9*   AGAP  11  10   --   9   --   7   BUCR  23*  21*   --   20   --   15   AP  113   --    --   97   --   72   TP  5.7*   --    --   4.8*   --   5.4*   ALB  1.6*   --    --   1.6*   --   1.5*   GLOB  4.1*   --    --   3.2   --   3.9   AGRAT  0.4*   --    --   0.5*   --   0.4*    < > = values in this interval not displayed. Lactic Acid Lactic acid   Date Value Ref Range Status   02/13/2018 1.8 0.4 - 2.0 MMOL/L Final     No results for input(s): LAC in the last 72 hours. Micro  Recent Labs      02/18/18   0405  02/18/18   0400  02/16/18   0508   CULT  NO GROWTH AFTER 3 HOURS  NO GROWTH AFTER 3 HOURS  AEROBIC BOTTLE **METHICILLIN RESISTANT STAPHYLOCOCCUS AUREUS  For Susceptibility Refer to Culture  [id number]    AEROBIC BOTTLE **METHICILLIN RESISTANT STAPHYLOCOCCUS AUREUS  For Susceptibility Refer to Culture  [id number]       Recent Labs      02/13/18   0858  02/13/18   0315  02/13/18   0310  02/12/18   0900  02/11/18   1846   CULT  PENDING  AEROBIC BOTTLE STAPHYLOCOCCUS AUREUS*  AEROBIC BOTTLE STAPHYLOCOCCUS AUREUS*  NO GROWTH AFTER 20 HOURS  AEROBIC AND ANAEROBIC BOTTLES **METHICILLIN RESISTANT STAPHYLOCOCCUS AUREUS  CALLED TO AND CORRECTLY REPEATED BY:  Swetha MACHADO RN ICU [facility] AT 0825 ON 2/14/18.           ABG Recent Labs      02/18/18   0523  02/17/18   1448  02/17/18   1035   PHI  7.498*  7.511*  7.593*   PCO2I  35.8  36.1  30.5*   PO2I  100  96  47*   HCO3I  27.7*  28.6*  29.4*   FIO2I  50  50  40        Liver Enzymes Protein, total   Date Value Ref Range Status   02/18/2018 5.7 (L) 6.4 - 8.2 g/dL Final     Albumin   Date Value Ref Range Status   02/18/2018 1.6 (L) 3.4 - 5.0 g/dL Final     Globulin   Date Value Ref Range Status   02/18/2018 4.1 (H) 2.0 - 4.0 g/dL Final     A-G Ratio   Date Value Ref Range Status   02/18/2018 0.4 (L) 0.8 - 1.7   Final     AST (SGOT)   Date Value Ref Range Status   02/18/2018 44 (H) 15 - 37 U/L Final     Alk. phosphatase   Date Value Ref Range Status   02/18/2018 113 45 - 117 U/L Final     Recent Labs      02/18/18   0405  02/17/18   0410  02/16/18   0440   TP  5.7*  4.8*  5.4*   ALB  1.6*  1.6*  1.5*   GLOB  4.1*  3.2  3.9   AGRAT  0.4*  0.5*  0.4*   SGOT  44*  55*  45*   AP  113  97  72        Cardiac Enzymes Lab Results   Component Value Date/Time     02/18/2018 04:05 AM        BNP No results found for: BNP, BNPP, XBNPT     Coagulation Recent Labs      02/18/18   0405  02/17/18   1914  02/17/18   1105   02/16/18   0440   PTP   --    --    --    --   14.2   INR   --    --    --    --   1.2   APTT  94.9*  83.8*  58.3*   < >  61.4*    < > = values in this interval not displayed. Thyroid  Lab Results   Component Value Date/Time    TSH 0.43 02/12/2018 10:05 AM          Lipid Panel Lab Results   Component Value Date/Time    Cholesterol, total 116 02/11/2018 08:45 PM    HDL Cholesterol 27 (L) 02/11/2018 08:45 PM    LDL, calculated 57.4 02/11/2018 08:45 PM    VLDL, calculated 31.6 02/11/2018 08:45 PM    Triglyceride 158 (H) 02/11/2018 08:45 PM    CHOL/HDL Ratio 4.3 02/11/2018 08:45 PM          Urinalysis Lab Results   Component Value Date/Time    Color DARK YELLOW 02/11/2018 10:45 PM    Appearance CLOUDY 02/11/2018 10:45 PM    Specific gravity 1.019 02/11/2018 10:45 PM    pH (UA) 5.5 02/11/2018 10:45 PM    Protein 300 (A) 02/11/2018 10:45 PM    Glucose >1000 (A) 02/11/2018 10:45 PM    Ketone TRACE (A) 02/11/2018 10:45 PM    Bilirubin NEGATIVE  02/11/2018 10:45 PM    Urobilinogen 1.0 02/11/2018 10:45 PM    Nitrites NEGATIVE  02/11/2018 10:45 PM    Leukocyte Esterase NEGATIVE  02/11/2018 10:45 PM    Epithelial cells 0 01/08/2014 02:46 PM    Bacteria 2+ (A) 02/11/2018 10:45 PM    WBC 0 to 2 02/11/2018 10:45 PM    RBC 0 to 2 02/11/2018 10:45 PM        XR (Most Recent).  CXR reviewed by me and compared with previous CXR   Results from [place] encounter on 02/11/18   XR ABD PORT  1 V Narrative EXAM: Portable supine abdomen, one view    INDICATION: Orogastric tube placement    COMPARISON: 02/12/2018    _______________    FINDINGS:    Orogastric tube has been placed with its tip at the proximal stomach. Previous  gastric distention has resolved. Previous bowel distention has resolved. _______________         Impression IMPRESSION:    Orogastric tube tip at the expected position of the proximal stomach with  interval resolution of previous GI tract distention. CT (Most Recent)   Results from Hospital Encounter encounter on 02/11/18   CT NECK SOFT TISSUE WO CONT   Narrative CT soft tissue of the neck without contrast.    INDICATION: Sepsis workup. Persistent positive blood cultures with unknown  source. COMPARISON: Neck CT from 8/3/2015. TECHNIQUE:Helically acquired and axial images were obtained from the posterior  fossa to the thoracic inlet without IV contrast. IV contrast not administered  due to recent history of acute renal injury. One or more dose reduction techniques were used on this CT: automated exposure  control, adjustment of the mAs and/or kVp according to patient's size, and  iterative reconstruction techniques. The specific techniques utilized on this CT  exam have been documented in the patient's electronic medical record. FINDINGS:   Endotracheal and enteric tubes are partially visualized. There is a right  internal jugular central venous catheter which is partially visualized. There is  a left subclavian central venous catheter which is partially visualized. Visualized intracranial structures are unremarkable. There is mucosal thickening of the left maxillary sinus and an air-fluid level  within the right maxillary sinus. There is no cervical lymphadenopathy. No mass identified within the limitations  of this noncontrast exam. The thyroid has unremarkable appearance.     There is patchy opacity at the lung apices which may represent  pleural-parenchymal scarring. There are multilevel degenerative changes of the cervical spine without acute  osseous abnormality. Impression IMPRESSION:      1. Tubes and lines as described. 2. Mild inflammatory changes within the maxillary sinuses which may be related  to intubation. There is no large abscess or other findings to suggest an occult  source of infection. EKG No results found for this or any previous visit. ECHO  2/12/18 [facility] SUMMARY:  Left ventricle: Systolic function was normal. Ejection fraction was estimated   in the range of 55 % to 60 %. There was  mild hypokinesis of the basal inferior wall(s). There was moderate concentric   hypertrophy. Right ventricle: The size was normal. Systolic function was normal.    Inferior vena cava, hepatic veins: The inferior vena cava was dilated. The   respirophasic change in diameter was more  than 50%. Blood culture, MRSA last culture on 2/14 still showing GPC   Sputum GPC/Staph     MRI spine:   2. Prior left L5 laminotomy, partial left facetectomy and possibly previous  partial L4-5 discectomy. Minimal left asymmetric disc bulge but no evidence of  recurrent disc herniation or retained disc fragment.     3. Minimal L3-4 disc bulge and annular fissure.     4. Multilevel mostly mild facet joint osteoarthritis. Greatest and mild to  moderate level right L4-5 facet joint osteoarthritis with moderate-sized facet  joint effusion and synovitis. No adjacent periarticular marrow or soft tissue  edema to definitively suggest superimposed infective/septic arthritis.     5. Unremarkable upper sacroiliac joints though incompletely included. Mild body  wall edema.   ah. Imaging:  I have personally reviewed the patients radiographs and have reviewed the reports:                  Governreece Adrian M.D.   Pulmonary Critical Care & Sleep Medicine

## 2018-02-18 NOTE — PROGRESS NOTES
SLP NOTE -    ST evaluation orders received and chart review completed. SLP discussed medical status with RN, Charli Davis. Pt is reintubated and on ventilator; not appropriate for swallow eval at this time. SLP will s/o.  Please reconsult as medically appropriate.      Thank you for this referral.    Lenore Luna M.S., CF-SLP  Speech-Language Pathologist

## 2018-02-18 NOTE — PROGRESS NOTES
Hospitalist Progress Note    Patient: Sis Ford MRN: [id number]  [facility]: [id number]    YOB: 1955  Age: 57 y.o. Sex: male    DOA: 2/11/2018 LOS:  LOS: 6 days                Assessment/Plan     Patient Active Problem List   Diagnosis Code    Osteoarthritis of right knee M17.11    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[facility] T84.018A, Z96.659    Failed total knee arthroplasty ([facility] [facility] T84.018A, Z96.659    Dysphagia R13.10    Sepsis ([facility] [facility] A41.9    Hypokalemia E87.6    Glucosuria R81    AMANDA (acute kidney injury) ([facility] [address] [facility] N17.9    Fall W19. XXXA    Back pain M54.9    Weakness of right lower extremity R29.898    Anemia D64.9    Shock (HCC) R57.9    Acute respiratory failure ([facility]) J96.00    Elevated troponin R74.8    Demand ischemia of myocardium ([facility]) I24.8    MRSA bacteremia R65.80        58 yo male admitted for pneumonia, respiratory distress. RRT called on this patient for respiratory distress in morning of 02/12/2018, high mews score of 8, fever of 103, tachycardia, resp rate of 44. He was transferred to ICU. Patient continued to decline in condition with elevated resp rate. He was intubated and central line placed. CRITICAL CARE PLAN         Resp -   Acute respiratory failure - extubated 02/16/2018. Patient reintubated 02/17/2018     ID -   Sepsis unclear source of infection. Recent pneumonia. Blood cultures 02/11/2018 positive for MRSA. blood cx 2/2 02/13/2018 MRSA. blood cultures 2/2 02/14/2018 MRSA. blood cultures 2/2 02/16/2018 MRSA  Follow blood cultures 2/2 02/18/2018  resp cultures 02/13/2018 MRSA  Urine culture 02/12/2018 no growth  CT chest Multifocal peripheral bilateral subpleural nodular densities the largest 15  mm right apex, which may represent peripheral areas of pneumonia or other  inflammatory etiology, although differential diagnosis includes less likely  metastatic disease or infarcts from pulmonary emboli. CT of ext and neck with no source of infection.   No MRI evidence of osteomyelitis and discitis. S/p TERRY with no evidence of vegetations. ANTIBIOTICS vancomycin.     CVS - Monitor HD. Septic shock - off pressors  A-fib - on amiodarone drip. S/p cardioversion. Converted to sinus rhythm. Elevated troponin - demand ischemia vs NSTEMI. Will need ischemia work up when stable per cardiology. On heparin drip     Heme/onc - Follow H&H, plts.      Renal -   AMANDA - improving, nephrology following. Metabolic acidosis - improved with bicarb drip  Trend BUN, Cr, follow I/O. Check and replace Mg, K, phos.     Endocrine -  Follow FSG  DM - on lantus and SSI     Neuro/ Pain/ Sedation -   RLE weakenss/generalized weakness. Head CT negative       GI - NPO, tube feeding.     Prophylaxis - DVT: heparin drip, GI: protonix.      35 minutes of critical care time spent in the direct evaluation and treatment of this high risk patient. The reason for providing this level of medical care for this critically ill patient was due a critical illness that impaired one or more vital organ systems such that there was a high probability of imminent or life threatening deterioration in the patients condition. This care involved high complexity decision making to assess, manipulate, and support vital system functions, to treat this degreee vital organ system failure and to prevent further life threatening deterioration of the patients condition.             Disposition : TBD    Physical Exam:  General: As above  HEENT: NC, Atraumatic. PERRLA, anicteric sclerae. Lungs: CTA Bilaterally. No Wheezing/Rhonchi/Rales.   Heart:  Regular  rhythm,  No murmur, No Rubs, No Gallops  Abdomen: Soft, Non distended, Non tender.  +Bowel sounds,   Extremities: No c/c/e            Vital signs/Intake and Output:  Visit Vitals    /59    Pulse (!) 103    Temp 99.5 °F (37.5 °C)    Resp (!) 37    Ht 6' (1.829 m)    Wt 149.3 kg (329 lb 2.4 oz)    SpO2 93%    BMI 44.64 kg/m2     Current Shift:  02/18 0701 - 02/18 1900  In: 200   Out: 450 [Urine:450]  Last three shifts:  02/16 1901 - 02/18 0700  In: 2006.2 [I.V.:1866.2]  Out: 9740 [Urine:3775]            Labs: Results:       Chemistry Recent Labs      02/18/18   0405  02/17/18   2100  02/17/18 0410   02/16/18 0440   GLU  179*  203*  161*   --   226*   NA  149*  150*  141   --   141   K  3.2*  3.2*  2.9*   < >  2.9*   CL  110*  110*  105   --   103   CO2  28  30  27   --   31   BUN  45*  44*  44*   --   34*   CREA  1.99*  2.10*  2.24*   --   2.22*   CA  8.6  8.8  8.5   --   8.6   AGAP  11  10  9   --   7   BUCR  23*  21*  20   --   15   AP  113   --   97   --   72   TP  5.7*   --   4.8*   --   5.4*   ALB  1.6*   --   1.6*   --   1.5*   GLOB  4.1*   --   3.2   --   3.9   AGRAT  0.4*   --   0.5*   --   0.4*    < > = values in this interval not displayed. CBC w/Diff Recent Labs      02/18/18 0405 02/17/18 0410 02/16/18 0440   WBC  10.7  8.8  7.6   RBC  2.95*  2.97*  2.85*   HGB  8.8*  8.9*  8.8*   HCT  25.9*  25.9*  24.9*   PLT  363  297  187      Cardiac Enzymes Recent Labs      02/18/18 0405  02/17/18   0410   CPK  217  260      Coagulation Recent Labs      02/18/18 0405 02/17/18   1914   02/16/18 0440   PTP   --    --    --   14.2   INR   --    --    --   1.2   APTT  94.9*  83.8*   < >  61.4*    < > = values in this interval not displayed. Lipid Panel Lab Results   Component Value Date/Time    Cholesterol, total 116 02/11/2018 08:45 PM    HDL Cholesterol 27 (L) 02/11/2018 08:45 PM    LDL, calculated 57.4 02/11/2018 08:45 PM    VLDL, calculated 31.6 02/11/2018 08:45 PM    Triglyceride 158 (H) 02/11/2018 08:45 PM    CHOL/HDL Ratio 4.3 02/11/2018 08:45 PM      BNP No results for input(s): BNPP in the last 72 hours.    Liver Enzymes Recent Labs      02/18/18   0405   TP  5.7*   ALB  1.6*   AP  113   SGOT  44*      Thyroid Studies Lab Results   Component Value Date/Time    TSH 0.43 02/12/2018 10:05 AM        Procedures/imaging: see electronic medical records for all procedures/Xrays and details which were not copied into this note but were reviewed prior to creation of Plan

## 2018-02-18 NOTE — PROGRESS NOTES
Nephrology Inpatient Progress note    Subjective:     Za Tucker is a 57 y.o. male with history of hypertension, diabetes, hyperlipidemia, gout and arthritis who presented with a fever and multiple falls. On initial evaluation, he had borderline low blood pressure, and normal white cell count, but elevated creatinine at 2.5. Blood culture was positive and started on broad spectrum antibiotics, on IV fluid and IV pressors. A culture finally came back pos for MRSA. His preadmission baseline creatinine back in 2015 was 1.2. On admission, it was 2.5. It continued to increase and it jumped up to 4.06. Of note, the patient also had slightly elevated CPK level on admission, which is trending down now. CAT scan did not show any significant finding as a possible source. Echocardiography was negative for valvular vegetation. He was initiated on HD 2/14 and repeated 2/15. Pt was reintubated, stable overnight on vent. Currently on amio and heparin gtt.   Cr down to 1.9 with UO over 3L yesterday    Admit Date: 2/11/2018    Allergy:  Allergies   Allergen Reactions    Bees [Hymenoptera Allergenic Extract] Anaphylaxis    Cocoa Butter-Zinc Oxide Rash    Dilaudid [Hydromorphone (Bulk)] Rash     capsules    Pcn [Penicillins] Rash        Objective:     Visit Vitals    /52    Pulse 87    Temp 98.8 °F (37.1 °C)    Resp (!) 31    Ht 6' (1.829 m)    Wt 149.3 kg (329 lb 2.4 oz)    SpO2 98%    BMI 44.64 kg/m2         Intake/Output Summary (Last 24 hours) at 02/18/18 0837  Last data filed at 02/18/18 0342   Gross per 24 hour   Intake          1681.22 ml   Output             3375 ml   Net         -1693.78 ml       Physical Exam:       General: Intubated   HENT: Atraumatic and normocephalic   Eyes: Normal conjunctiva   Neck: Supple with no JVD   Cardiovascular: Normal S1 & S2, no m/r/g   Pulmonary/Chest Wall: Clear to auscultation bilaterally   Abdominal: Soft and has NABS   Musculoskeletal: ++ edema Neurological: sedated   Dialysis catheter: right IJ Uldall    Data Review: latest labs reviewed      Lab Results   Component Value Date/Time    WBC 10.7 02/18/2018 04:05 AM    RBC 2.95 (L) 02/18/2018 04:05 AM    HCT 25.9 (L) 02/18/2018 04:05 AM    MCV 87.8 02/18/2018 04:05 AM    MCH 29.8 02/18/2018 04:05 AM    MCHC 34.0 02/18/2018 04:05 AM    RDW 18.2 (H) 02/18/2018 04:05 AM      No results found for: IRONNo components found for: FERRITIN  No components found for: PTHINT  Urinalysis  No results found for: UGLU        Impression:     1. AMANDA, likely acute tubular necrosis from septic shock. Creatinine trended up from 2.5 on admission to 4.06. Patient was oliguric and had first HD 2/14. Cr down 1.9 today with increasing UOP  2. Methicillin-resistant Staphylococcus aureus septic shock, unclear source. Has been off pressor  3. Hypophosphatemia. 4.  Hypokalemia  5. Acidosis, mixed respiratory and metabolic. 6.  Mild rhabdomyolysis, improving with a CK down to 647 from high of 3300.  7. Anemia. 8.  Thrombocytopenia  9. Diabetes mellitus. 10.  Morbid obesity. 11.  Respiratory failure, reintubated and remains on MV.     Plan:     Continue supportive care: MV and nutrition  Supplement Potassium as needed  Pulmonologist and cardiologist on board  No need for dialysis, OK to remove Uldall tomorrow  Recheck Renal Panel in a.m.   Monitor urine output  D/w ICU staff    MD Blas Clements  [phone]

## 2018-02-18 NOTE — PROGRESS NOTES
1910-Bedside report received from Brittani Parks RN. Sbar, mar, labs, kardex, cardiac rhythm and patient status reviewed. Drips and vent settings verified. 2000-Assessment completed. No changes from previous assessment. PTT back from lab, therapeutic per patients specific protocol. Will recheck in AM.  2100-Labs drawn for electrolyte replacement protocol. 2200-Labs returned back from Pharmacy. Na elevated 150, orders received from Dr. Temi Millard for 100ml free water flushes Q4hrs with feeds; clarified Feed orders. Continue to follow protocol for electrolytes. 2230-Dr. Temi Millard called to speak to pharmacy about dc Vancomycin and starting a new ABT. Sally Chand from pharmacy called to clarify that pt has an allergy to Penicillin and new ABT, Teflaro is 5th generation PCN. Notified Dr. Temi Millard about possible allergy rx and orders received to continue vanc and hold off till am to discuss with ANAHI NAJERA in am.   0000-Assessment completed. No changes from previous assessment. 0400-CHG bath given. Feed increased to 15ml per orders. Appears comfortable. Remains Rass -1.   0550-Results in chart from lab draw. Electrolytes being replaced per protocol. Unable to weigh patient; bed error, states to call [facility]. PTT results in chart, Therapeutic level; no change in Heparin dose.

## 2018-02-19 NOTE — CONSULTS
Palliative Medicine Progress Note  [facility]: [phone])  [facility]: [address] [place] [address] Way: [phone]    Patient Name: Ricco Mauricio  YOB: 1955    Date of Initial Consult: 2/16/18  Reason for Consult: care decisions  Requesting Provider: Dr. Francisca Maya   Primary Care Physician: Paris Esposito MD      SUMMARY:   Ricco Mauricio is a 57y.o. year old who presented after two days of malaise with overwhelming MERSA sepsis and multi organ system failure. On pressors initially, has required dialysis and amiodarone drip with ventilator support. Now off pressors, with good UOP, extubated this am. Still minimally responsive. Only negative recent care finding is persistently + blood cultures and fevers. Previously reasonably healthy, last hospitalization for TKR. Recently lost son in January with hepatorenal syndrome. Wife at bedside. 2/19/18: Reintubated < 24 hr post extubation. Persistent MERSA + blood cultures since 2/11 despite MICs demonstrating sensitivity to vanc. No identified sequestered source of bacteremia. No family at bedside.                                                                                                                                                                                                                                                                                                                                                                                                                                                                                                                                                                                                                                                                                                                                  PALLIATIVE DIAGNOSES:     Patient Active Problem List   Diagnosis Code    Osteoarthritis of right knee M17.11    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[facility]) T84.018A, Z96.659    Failed total knee arthroplasty ([facility] [address] T84.018A, Z96.659    Dysphagia R13.10    Sepsis ([facility] [address] A41.9    Hypokalemia E87.6    Glucosuria R81    AMANDA (acute kidney injury) ([facility] [address] N17.9    Fall W19. XXXA    Back pain M54.9    Weakness of right lower extremity R29.898    Anemia D64.9    Shock ([facility] [address] R57.9    Acute respiratory failure ([facility]) J96.00    Elevated troponin R74.8    Demand ischemia of myocardium ([facility]) I24.8    MRSA bacteremia R78.81     1. PLAN:   1. Met with wife and answered questions regarding current clinical status. She understand that while he is improved he remains critically ill. Discussed implications of persistent + blood cultures. Reviewed his AMD and asked if she thinks he would be willing to undergo reintubation and reescalation of care should he decline rapidly again. She is not certain but knows he would not want aggressive measures if he had little or no chance of making a good recovery. Emotional support provided. 2/19/18: No evidence of improvement in underlying source of his multisystem critical illness. With reintubation and persistent bacteremia, prognosis appears to be worsening. 2. Initial consult note routed to primary continuity provider  3.  Communicated plan of care with: Palliative IDT    GOALS OF CARE:  Patient/Health Care Proxy Stated Goals: Cure      TREATMENT PREFERENCES:   Code Status: Full Code    Advance Care Planning:  Advance Care Planning 2/14/2018   Patient's Healthcare Decision Maker is: Named in scanned ACP document   Primary Decision Maker Name Saira Harrington   Primary Decision Maker Phone Number [phone]   Primary Decision Maker Relationship to Patient Spouse   Secondary Decision Maker Name Marcela Munoz   Secondary Decision Maker Phone Number [phone]   Secondary Decision Maker Relationship to Patient Sibling   Confirm Advance Directive Yes, on file       Medical Interventions: Full interventions   Other Instructions: Other:  As far as possible, the palliative care team has discussed with patient / health care proxy about goals of care / treatment preferences for patient. HISTORY:     History obtained from: wife    CHIEF COMPLAINT: see summary    HPI/SUBJECTIVE:    The patient is:   [] Verbal and participatory  [x] Non-participatory due to:   sedation     Clinical Pain Assessment (nonverbal scale for nonverbal patients): Activity (Movement): Lying quietly, normal position    Duration: for how long has pt been experiencing pain (e.g., 2 days, 1 month, years)  Frequency: how often pain is an issue (e.g., several times per day, once every few days, constant)     FUNCTIONAL ASSESSMENT:     Palliative Performance Scale (PPS):  PPS: 30    ECOG        PSYCHOSOCIAL/SPIRITUAL SCREENING:      Any spiritual / Hindu concerns:  [] Yes /  [x] No    Caregiver Burnout:  [] Yes /  [x] No /  [] No Caregiver Present      Anticipatory grief assessment:   [x] Normal  / [] Maladaptive        REVIEW OF SYSTEMS:     Positive and pertinent negative findings in ROS are noted above in HPI. The following systems were [] reviewed / [x] unable to be reviewed as noted in HPI  Other findings are noted below. Systems: constitutional, ears/nose/mouth/throat, respiratory, gastrointestinal, genitourinary, musculoskeletal, integumentary, neurologic, psychiatric, endocrine. Positive findings noted below. Modified ESAS Completed by: provider                                   Stool Occurrence(s): 0        PHYSICAL EXAM:     Wt Readings from Last 3 Encounters:   02/19/18 148 kg (326 lb 4.5 oz)   02/14/18 144.8 kg (319 lb 3.6 oz)   08/02/15 138.8 kg (306 lb)     Blood pressure 189/68, pulse 93, temperature 99 °F (37.2 °C), resp. rate (!) 38, height 6' (1.829 m), weight 148 kg (326 lb 4.5 oz), SpO2 94 %.   Pain:  Pain Scale 1: Adult Nonverbal Pain Scale  Pain Intensity 1: 2  Pain Onset 1: chronic  Pain Location 1: Back  Pain Orientation 1: Lower  Pain Description 1: Aching, Constant  Pain Intervention(s) 1: Medication (see MAR)  Last bowel movement:     Constitutional: obese, sedated, intubated  Eyes: pupils equal, anicteric  ENMT: no nasal discharge, moist mucous membranes  Cardiovascular: regular rhythm, distal pulses intact  Respiratory: breathing not labored, symmetric  Gastrointestinal: soft non-tender, +bowel sounds  Musculoskeletal: no deformity, no tenderness to palpation  Skin: warm, dry  Neurologic: sedated  Not following commands  Psychiatric:   Other:       HISTORY:     Principal Problem:    Sepsis ([address] (2/11/2018)    Active Problems:    Osteoarthritis of right knee (1/18/2014)      Hypokalemia (2/11/2018)      Glucosuria (2/11/2018)      AMANDA (acute kidney injury) ([address] (2/11/2018)      Fall (2/11/2018)      Back pain (2/11/2018)      Weakness of right lower extremity (2/11/2018)      Anemia (2/11/2018)      Shock ([address] (2/13/2018)      Acute respiratory failure (HCC) (2/13/2018)      Elevated troponin (2/12/2018)      Demand ischemia of myocardium ([address] (2/12/2018)      MRSA bacteremia (2/15/2018)      Past Medical History:   Diagnosis Date    Arrhythmia     irregular heart beat    Arthritis     knees- osteo    Chronic pain     knees    Diabetes (HCC) 1990's    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[address] 10/21/2014    GERD (gastroesophageal reflux disease)     well controlled    Gout     High cholesterol     Hypertension 1990's    Irregular heart beat     palpitations    Morbid obesity (HCC)     Osteoarthritis of right knee 1/18/2014    Other ill-defined conditions(799.89)     asbestosis    Other ill-defined conditions(799.89)     irregular heart beat    Other ill-defined conditions(799.89)     h/o migraines    Other ill-defined conditions(799.89)     gout    Other ill-defined conditions(799.89)     cholesterol    PUD (peptic ulcer disease) 1970's    Tinnitus of left ear       Past Surgical History:   Procedure Laterality Date    HX CARPAL TUNNEL RELEASE      HX KNEE ARTHROSCOPY  2010    left    HX KNEE ARTHROSCOPY  1980s    right    HX KNEE REPLACEMENT      HX ORTHOPAEDIC  1970s    right hand tendenitis    HX ORTHOPAEDIC  2012    left hand ring finger released    HX ORTHOPAEDIC  8-2013    left carpal    TOTAL KNEE ARTHROPLASTY  2009/2010    left      No family history on file. History reviewed, no pertinent family history.   Social History   Substance Use Topics    Smoking status: Former Smoker     Quit date: 10/1/1975    Smokeless tobacco: Never Used      Comment: 1970s    Alcohol use 1.5 oz/week     3 Shots of liquor per week     Allergies   Allergen Reactions    Bees [Hymenoptera Allergenic Extract] Anaphylaxis    Cocoa Butter-Zinc Oxide Rash    Dilaudid [Hydromorphone (Bulk)] Rash     capsules    Pcn [Penicillins] Rash      Current Facility-Administered Medications   Medication Dose Route Frequency    potassium chloride 10 mEq in 100 ml IVPB  10 mEq IntraVENous Q1H    ipratropium (ATROVENT) 0.02 % nebulizer solution 0.5 mg  0.5 mg Nebulization Q6H RT    metoprolol (LOPRESSOR) injection 5 mg  5 mg IntraVENous Q6H    insulin lispro (HUMALOG) injection 5 Units  5 Units SubCUTAneous Q6H    ELECTROLYTE REPLACEMENT PROTOCOL -- Potassium  1 Each Other PRN    ELECTROLYTE REPLACEMENT PROTOCOL -- Magnesium  1 Each Other PRN    ELECTROLYTE REPLACEMENT PROTOCOL -- Phosphorus  1 Each Other PRN    ELECTROLYTE REPLACEMENT PROTOCOL -- Calcium  1 Each Other PRN    propofol (DIPRIVAN) infusion  0-50 mcg/kg/min IntraVENous TITRATE    fentaNYL (PF)  mcg/30 ml   mcg/hr IntraVENous TITRATE    chlorhexidine (PERIDEX) 0.12 % mouthwash 10 mL  10 mL Oral Q12H    amiodarone (CORDARONE) 450 mg in dextrose 5% 250 ml infusion  0.5-1 mg/min IntraVENous TITRATE    vancomycin (VANCOCIN) 1500 mg in  ml infusion  1,500 mg IntraVENous Q24H    acetaminophen (TYLENOL) suppository 650 mg 650 mg Rectal Q6H PRN    hydrALAZINE (APRESOLINE) 20 mg/mL injection 10 mg  10 mg IntraVENous Q6H PRN    0.9% sodium chloride infusion 250 mL  250 mL IntraVENous PRN    insulin glargine (LANTUS) injection 15 Units  15 Units SubCUTAneous DAILY WITH LUNCH    heparin (porcine) 1,000 unit/mL injection 1,500 Units  1,500 Units IntraVENous PRN    acetaminophen (TYLENOL) solution 650 mg  650 mg Oral Q4H PRN    aspirin chewable tablet 81 mg  81 mg Oral DAILY    pantoprazole (PROTONIX) 40 mg in sodium chloride 0.9 % 10 mL injection  40 mg IntraVENous DAILY    naloxone (NARCAN) injection 0.4 mg  0.4 mg IntraVENous PRN    docusate sodium (COLACE) capsule 100 mg  100 mg Oral BID PRN    acetaminophen (TYLENOL) tablet 650 mg  650 mg Oral K7B PRN    folic acid (FOLVITE) tablet 1 mg  1 mg Oral DAILY    thiamine 100 mg in 50 mL NS   IntraVENous Q24H    Vancomycin- Rx to Dose & Monitor  1 Each Other Rx Dosing/Monitoring    heparin 25,000 units in D5W 250 ml infusion  7-25 Units/kg/hr IntraVENous TITRATE    insulin lispro (HUMALOG) injection   SubCUTAneous Q6H    sodium chloride (NS) flush 5-10 mL  5-10 mL IntraVENous PRN    ondansetron (ZOFRAN ODT) tablet 4 mg  4 mg Oral Q4H PRN    glucose chewable tablet 16 g  4 Tab Oral PRN    glucagon (GLUCAGEN) injection 1 mg  1 mg IntraMUSCular PRN    dextrose (D50W) injection syrg 12.5-25 g  25-50 mL IntraVENous PRN        LAB AND IMAGING FINDINGS:     Lab Results   Component Value Date/Time    WBC 12.9 02/19/2018 05:15 AM    HGB 8.5 (L) 02/19/2018 05:15 AM    PLATELET 886 35/43/9253 05:15 AM     Lab Results   Component Value Date/Time    Sodium 143 02/19/2018 05:15 AM    Potassium 3.2 (L) 02/19/2018 05:15 AM    Chloride 108 02/19/2018 05:15 AM    CO2 26 02/19/2018 05:15 AM    BUN 37 (H) 02/19/2018 05:15 AM    Creatinine 1.59 (H) 02/19/2018 05:15 AM    Calcium 8.2 (L) 02/19/2018 05:15 AM    Magnesium 1.5 (L) 02/19/2018 05:15 AM    Phosphorus 3.3 02/19/2018 05:15 AM      Lab Results   Component Value Date/Time    AST (SGOT) 54 (H) 02/19/2018 05:15 AM    Alk.  phosphatase 181 (H) 02/19/2018 05:15 AM    Protein, total 5.5 (L) 02/19/2018 05:15 AM    Albumin 1.5 (L) 02/19/2018 05:15 AM    Globulin 4.0 02/19/2018 05:15 AM     Lab Results   Component Value Date/Time    INR 1.1 02/19/2018 05:15 AM    Prothrombin time 13.5 02/19/2018 05:15 AM    aPTT 82.3 (H) 02/19/2018 05:15 AM      No results found for: IRON, FE, TIBC, IBCT, PSAT, FERR   No results found for: PH, PCO2, PO2  No components found for: [place]   Lab Results   Component Value Date/Time     02/18/2018 04:05 AM    CK - MB <0.5 (L) 08/02/2015 11:47 PM              Total time: 25  Counseling / coordination time, spent as noted above 22 min  > 50% counseling / coordination        Ayad Mccormick MD  Palliative Medicine

## 2018-02-19 NOTE — PROGRESS NOTES
0745 Completedf shift report and transfer of care from SOLO Nails, [place]. We reviewed SBAR, labs, meds, plan of care, as well as verified drip rates. 2772 Completed shift assessment, provided oral care  0945 Lab called to inform me of positive anaerobic blood cultures, notified Dr. Diamante Vail. He was already aware (ongoing) and pt is already being treated, so no new orders received at this time. 1025 Removed temp dialysis catheter, per order, held pressure ~10 mins, no bleeding noted, dressed site  1030 Adjusted tube feed rate to 35ml/hr, per order  1130 Completed IDR for this pt  1200 Reassessment completed  1345 Pt vent high alarmed, pt coughing and sounds coarse upon auscultation, suctioned, lung fields sound clear post-suctioning  1555 Reassessment completed  1925 Completed shift report and transferred care to SOLO Nails, [place]. We reviewed SBAR, labs, meds, plan of care, as well as verified drip rates.

## 2018-02-19 NOTE — PROGRESS NOTES
NUTRITION FOLLOW-UP    RECOMMENDATIONS/PLAN:   - Adjust tube feeds to Nepro @ 35ml/hr to provide 1386kcal, 62g PRO, 558ml H20, 128g CHO, 74g Fat  Propofol provide 528kcal   Prosouce 1xday provides 60kcal 15g PRO  Total: 1964kcal, 77g PRO, 558ml H20, 128g CHO, 74g Fat  -Will continue to monitor and adjust reccs  Monitor labs/lytes, PO intakes, skin integrity, wt, fluid status, BM    NUTRITION ASSESSMENT:   Client Update: 57 yrs old Male with septic shock, MRSA bacteremia, and cardiac stress due to sepsis w/ resp failure, hypokalemia, and low magnesium. Pt was extubated on 2/16/18, however was reintubated on 2/18/18. Currently intubated in ICU     FOOD/NUTRITION INTAKE   Diet Order:  Tube Feed  Supplements: Prosource x1/day  Food Allergies: NKFA  Average PO Intake:      No data found. Pertinent Medications:  [x] Reviewed; folic acid, heparin, protonix, lopressor, KCl, propofol, thiamine  Electrolyte Replacement Protocol: [x]K [x]Mg [x]PO4  Insulin:  [x]SSI  [x]Pre-meal   []Basal    []Drip  []None  Cultural/Buddhism Food Preferences: None Identified    BIOCHEMICAL DATA & MEDICAL TESTS  Pertinent Labs:  [x] Reviewed; K-3.2, Mg-1.5 Triglycerides-270  ANTHROPOMETRICS  Height: 6' (182.9 cm)       Weight: 148 kg (326 lb 4.5 oz)         BMI: 44.2 kg/m^2 morbidly obese (Greater than or = to 40% BMI)   Adm Weight: 306 lbs                Weight change: +26lbs since admission noted +2 and +3 non-pitting edema   Adjusted Body Weight: 95.5kg     NUTRITION-FOCUSED PHYSICAL ASSESSMENT  Skin: No pressure injury      GI: No new BM since last review     NUTRITION PRESCRIPTION  Calories: 1861-8978 kcal/day based on 11-14kcal/kg  Protein: 97 g/day based on 1.2 g/kg of IBW  CHO: 191-243 g/day based on 50% of total energy  Fluid: 4485-0954 ml/day based on 1 kcal/ml                   NUTRITION DIAGNOSES:   1. Inadequate oral intake related to intubation as evidence by NPO diet order and need for nutrition support.      NUTRITION INTERVENTIONS:   INTERVENTIONS:                                                                                                                                                                         GOALS:  1. EN:Nepro @ 35ml/hr to provide 1386kcal, 62g PRO, 558ml H20, 128g CHO, 74g Fat 1. Be at goal rate for tube feeds by next review 3 days                   LEARNING NEEDS (Diet, Supplementation, Food/Nutrient-Drug Interaction):   [x] None Identified   [] Education provided/documented      Identified and patient: [] Declined   [] Was not appropriate/indicated        NUTRITION MONITORING /EVALUATION:   Adjust EN/PN as appropriate  Monitor wt  Monitor renal labs, electrolytes, fluid status  Monitor for additional supplement needs     Previous Recommendations Implemented: yes        Previous Goals Met:  yes -      [x] Participated in Interdisciplinary Rounds    [x] Interdisciplinary Care Plan Reviewed  DISCHARGE NUTRITION RECOMMENDATIONS ADDRESSED:        [x] To be determined closer to discharge    NUTRITION RISK:           [x] At risk                        [] Not currently at risk        Will follow-up per policy.   Oliverio Lorenzo, [address] [place] [address]

## 2018-02-19 NOTE — PROGRESS NOTES
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 XXXA    Back pain M54.9    Weakness of right lower extremity R29.898    Anemia D64.9    Shock ([address] R57.9    Acute respiratory failure (HCC) J96.00    Elevated troponin R74.8    Demand ischemia of myocardium (HCC) I24.8    MRSA bacteremia R78.81 ·   MRSA Sepsis with persistent bacteremia- Source Related to lll air space  as CT showed concern for septic emboli? But TERRY negative for vegetation. Spine MRI is negative also. Neck, abdomena and pelvis CT and ext CT without evidence of infection or abscess. Few skin folliculitis lesion bl LE but too small and superficial to be source. Blood culture still is positive  and  cultures  · Septic shock resolved and off pressors  · Acute respiratory failure- intubated on  extubated on , reintubated on  on hospital vent protocol  · Elevated blood pressure - medications adjusted by hospitalist  · Elevated troponin ?  Demand ischemia vs NSTMI   · Acute renal failure S/P HD doing well with improvement in Cr and now not on HD, HD cath removed 18  · Rhabdo improved  · Afib, rate controlled S/P cardioversion and on amiodarone, Metoprolol and heparin drip  · Back pain, fall - no absess or open wound   · Severe hypokalemia hypomagnesemia on presentation- improved and now on replacements  · Elevated blood sugars improving control  · Lactic acidosis resolved  · H/O ETOH    PLAN:  -- Mech. Ventilated patients- aim to keep peak plateau pressure less than or equal to 30cm H2O. Titrate FiO2 for goal SPO2> 90%  -- VAP prevention bundle, head of the bed at 30' all times  -- Daily sedation holiday and assessment for weaning with SBT as tolerated  -- Repeat sensitivity for MRSA with DIMITRIS of vanco < 1 and Vanco > 7 days dosing [day 8]. Spoke with ID. Teflaro ordered by ID. Patient has PCN allergy- spoke with staff to monitor for any rash. Patient is already intubated  -- Amiodarone and Metoprolol for Afib per cardiology  -- Off of duoneb and continue atrovent due to afib and tachycardia previously  -- monitor TG and Lipase periodically as on propofol. May switch to other agents if needed. Spoke with Nutrition service to see if adjustment in calorie and fat intake could be done  -- NA improving with free water flush. UO better and not on HD  -- Adjust BP meds for control of blood pressure  -- K and MG replacement per protocol    -- On lantus 18 unit and humalog 5 units with feeding plus ISSC  -- HB stable  -- MRSA isolation   CVS:Currently on Amiodarone drip, Heparin drip, follow per cardiology recommendations. S/P cardioversion with 200 j on 2/13  Adjust BP medications per hospitalist. Keep SYS <160 mm Hg  On aspirin  RS: as above. Lung protective vent protocol, Aspiration precatuion  ID:Sepsis? Etiology, MRSA on multiple cultures   Vanco day 8. Persistent bacteremia. Azactam 2/11 stop on 2/14  levaquin 2/12  Stop on 2/15  Monitor skin lesions - no changes compared to previously   ENDO: TSH is ok. On Lantus, Humalog  GI: feeds as tolerated, PPI, relatively stable bilirubin, due to stasis from sepsis- follow up   RENAL:Wilson for I/O, Replace k and MG per protocol.  Patient is putting out urine, Off HD,  dialysis per renal  CNS: Sedated intubated wakes up with sedation holiday in AM  HEMATOLOGY:PLT is stable, Heparin drip for Afib   MUSCULOSKELETAL:CK was elevated but improved  · PAIN AND SEDATION: On propofol and fentanyl, Thiamine and folic acid  · Skin/Wound: Monitor back, No skin absess  · Electrolytes: As above   · IVF: Julianne Sergo   · Nutrition: Tube feeds   · Prophylaxis: DVT Prophylaxis with Heparin,. GI Prophylaxis. · Restraints: minimal restrain to avoid pulling lines and tubes  · PT/OT eval and treat. OOB when appropriate. · Lines/Tubes: none. Femoral line- 2/12 removed on 2/15. Wilson 2/12/18. Right IJ dialysis catheter 2/14- removed 2/19, Left subclavian line 2/15/18  ADVANCE DIRECTIVE:Full code/ Palliative is consulted spoke with wife at length  FAMILY DISCUSSION:spoke with family  Quality Care: PPI, DVT prophylaxis, HOB elevated, Infection control all reviewed and addressed. Events and notes from last 24 hours reviewed. Care plan discussed with nursing CC TIME: 40  min excluding procedure/s   · Labs and images personally seen and available reports reviewed. · All current medicines are reviewed and doses and prescription adjusted. Subjective:  2/19/18  Patient remained on ventilator and sedated. No fever. Hemodynamically stable, rather on hypertensive side, hospitalist adjusting meds. Repeat bl cultures from 2/18/18 both set positive for GPC. Spoke with Dr. Viet Hermosillo and he recommended change to Teflaro and follow up ECHO  HD catheter removed by staff per renal recommendations  No plans for HD per renal  Tolerating TF well and adjusted per protocol by staff  The staff denies abnormal bruising or abnormal bleeding from any body orifice such as bleeding from nose or gums, blood in urine or stool, or melena, hemoptysis or hematemesis.      2/18  Intubated again for work of [address] [place] [address] [employer] on 2/17  CT ext, ABD pel, Neck all came out ok no source of abscess  No fever overnight  BP is ok  ON /14/50% peep 5 abg ok  Cr is improving with good urine out put  Renal not planning any dialysis at this point    2/17/18  Was extubated on 2/16  Did reasonably well yesterday  Overnight still had fever  Early am was breathing little fast. As per nursing was awake and following command but received morphine now not waking up much  Put out 3300 urine  K 2.9 only replaced by 10 meq  Mg 1.5  PH 7.56/34/30/107  Currently on BIPAP still breathing little fast    2/16  Doing well   Yesterday started noticing some sking lesions all over body looks like septic staph infection  Nothing to suggest allergic rash  Put out about 1400cc yesterday  K 2.9 and replaced  Cr improved to 2.22 s/p hd yesterday  Mild elevation of billirubin to 1.9   HB and PLT is stable   Sputum MRSA    2/15  Doing ok  No overnight event   S/P HD  Also put out 900 cc of urine in last 24 hrs  HB dropped to 7.5 but no active bleed  PLT is 129 INR is 1.6 K 3.0   Alb 1.5     2/14  No significant overnight event  S/P TERRY negative for vegetation or blood clot  S/P cardioversion  ID evaluated suggested MSSA sepsis but initial culture is growing MRSA. Am labs showed worsening cr to 4.06 and PH 7.26  ALB 1.5  Currently on phenylephring 100, versed, amiodarone, fentanyl and bicarb drip    2/13/18  Yesterday became more tachypnic and difficult to control hr  Intubated 2/12, femoral line 2/12 <neck line not placed due to concern for colonization with ongoing sepsis>  Became hypotensive managed with phenylephrine  Afib was poorly controlled started on amiodarone drip. Cardiology is closely following  Blood culture Staph  K 3.2 replaced  Mg 1.1 getting replaced   UO and Cr is improving  Still spiking fever 102   Flu is negative       History:   Indu Jay is a 57 y.o. male who came to the ed with complaints of fall. Patient states he has been feeling generalized weakness at home for the last day or so with low grade subjective fevers but he noted RLE weakness around 5pm. Since then he has fallen about 4 times in his back without any head trauma or LOC.  He has been using his tripod cane to assist him with walking. Recently about 1 month a go treated for pna at office. Ex smoker. Drinks about 2 glasses of mix drinks every day. Recently lost his son to multiorgan failure and sepsis. Due to persistence of his weakness, falls, fevers and low back pain he decided to come to the ED. In the ED, CT of the head was neg and his labs showed significant hypoK with elevated Cr (unknown baseline). He was also febrile and tachycardiac. He was started on broad spectrum Abx.  Overnight RRT called out after he was found to be tachycardic, tachypnic and ABG showed alkalosis and was transferred to ICU        Current Facility-Administered Medications   Medication Dose Route Frequency    [START ON 2/20/2018] Vancomycin Trough Level @ 0530 2/20/18  1 Each Other ONCE    insulin glargine (LANTUS) injection 18 Units  18 Units SubCUTAneous DAILY WITH LUNCH    cefTARoline (TEFLARO) 600 mg in 0.9% sodium chloride (MBP/ADV) 50 mL MBP  600 mg IntraVENous Q12H    ipratropium (ATROVENT) 0.02 % nebulizer solution 0.5 mg  0.5 mg Nebulization Q6H RT    metoprolol (LOPRESSOR) injection 5 mg  5 mg IntraVENous Q6H    insulin lispro (HUMALOG) injection 5 Units  5 Units SubCUTAneous Q6H    propofol (DIPRIVAN) infusion  0-50 mcg/kg/min IntraVENous TITRATE    fentaNYL (PF)  mcg/30 ml   mcg/hr IntraVENous TITRATE    chlorhexidine (PERIDEX) 0.12 % mouthwash 10 mL  10 mL Oral Q12H    amiodarone (CORDARONE) 450 mg in dextrose 5% 250 ml infusion  0.5-1 mg/min IntraVENous TITRATE    aspirin chewable tablet 81 mg  81 mg Oral DAILY    pantoprazole (PROTONIX) 40 mg in sodium chloride 0.9 % 10 mL injection  40 mg IntraVENous DAILY    folic acid (FOLVITE) tablet 1 mg  1 mg Oral DAILY    thiamine 100 mg in 50 mL NS   IntraVENous Q24H    Vancomycin- Rx to Dose & Monitor  1 Each Other Rx Dosing/Monitoring    heparin 25,000 units in D5W 250 ml infusion  7-25 Units/kg/hr IntraVENous TITRATE    insulin lispro (HUMALOG) injection SubCUTAneous Q6H         Objective:   Vital Signs:    Visit Vitals    /84 (BP 1 Location: Right arm, BP Patient Position: At rest)    Pulse 91    Temp 98.7 °F (37.1 °C)    Resp (!) 39    Ht 6' (1.829 m)    Wt 148 kg (326 lb 4.5 oz)    SpO2 95%    BMI 44.25 kg/m2       O2 Device: Endotracheal tube, Ventilator   O2 Flow Rate (L/min): 6 l/min   Temp (24hrs), Av.9 °F (37.2 °C), Min:98.5 °F (36.9 °C), Max:99.2 °F (37.3 °C)       Intake/Output:   Last shift:       07 -  1900  In: 933.3 [I.V.:893.3]  Out: 600 [Urine:600]  Last 3 shifts:  190 -  0700  In: 5204.6 [I.V.:3527.6]  Out: 2075 [Urine:2075]    Intake/Output Summary (Last 24 hours) at 18 1237  Last data filed at 18 1226   Gross per 24 hour   Intake          5597.85 ml   Output             1525 ml   Net          4072. 85 ml       Review of Systems:  Intubated sedated   Review of systems not obtained due to patient factors. Physical Exam:  General: in no respiratory distress and acyanotic, appears older than stated age, on  ventilator  HEENT: PERRL, orally intubated  Neck: No abnormally enlarged lymph nodes or thyroid, supple  Chest: normal  Lungs: moderate air entry and rhonchi, normal percussion bilaterally, no tenderness/ rash  Heart: Regular rate and rhythm, S1S2 present or without murmur or extra heart sounds  Abdomen: obese, bowel sounds normoactive, tympanic, abdomen is soft without significant tenderness, masses, organomegaly or guarding  Extremity: Trace, pitting and bl ankle edema, negative for cyanosis, clubbing. Neuro: sedated, moves all extremities well, no involuntary movements, exam limitations  Skin: Skin color, texture, turgor fair.  Leg bl folliculitis rashes or lesions unchanged      CBC w/Diff Recent Labs      18   0515  18   0405  18   0410   WBC  12.9  10.7  8.8   RBC  2.87*  2.95*  2.97*   HGB  8.5*  8.8*  8.9*   HCT  26.0*  25.9*  25.9*   PLT  405  363  297 Chemistry Recent Labs      02/19/18   0515  02/18/18   1705  02/18/18   1015  02/18/18   0405  02/17/18   2100   GLU  200*   --   198*  179*  203*   NA  143   --   145  149*  150*   K  3.2*  3.2*  3.2*  3.2*  3.2*   CL  108   --   107  110*  110*   CO2  26   --   31  28  30   BUN  37*   --   44*  45*  44*   CREA  1.59*   --   1.95*  1.99*  2.10*   CA  8.2*   --   8.3*  8.6  8.8   MG  1.5*   --    --   1.9  1.6   PHOS  3.3  3.5   --   2.3*   --    AGAP  9   --   7  11  10   BUCR  23*   --   23*  23*  21*   AP  181*   --   122*  113   --    TP  5.5*   --   5.5*  5.7*   --    ALB  1.5*   --   1.6*  1.6*   --    GLOB  4.0   --   3.9  4.1*   --    AGRAT  0.4*   --   0.4*  0.4*   --         Lactic Acid Lactic acid   Date Value Ref Range Status   02/13/2018 1.8 0.4 - 2.0 MMOL/L Final     No results for input(s): LAC in the last 72 hours. Micro  Recent Labs      02/18/18   0405  02/18/18   0400  02/16/18   0508   CULT  NO GROWTH AFTER 3 HOURS  NO GROWTH AFTER 3 HOURS  AEROBIC BOTTLE **METHICILLIN RESISTANT STAPHYLOCOCCUS AUREUS  For Susceptibility Refer to Culture  [id number]    AEROBIC BOTTLE **METHICILLIN RESISTANT STAPHYLOCOCCUS AUREUS  For Susceptibility Refer to Culture  [id number]       Recent Labs      02/13/18   0858  02/13/18   0315  02/13/18   0310  02/12/18   0900  02/11/18   1846   CULT  PENDING  AEROBIC BOTTLE STAPHYLOCOCCUS AUREUS*  AEROBIC BOTTLE STAPHYLOCOCCUS AUREUS*  NO GROWTH AFTER 20 HOURS  AEROBIC AND ANAEROBIC BOTTLES **METHICILLIN RESISTANT STAPHYLOCOCCUS AUREUS  CALLED TO AND CORRECTLY REPEATED BY:  Amrit MACHADO RN ICU [facility] [address] AT 0825 ON 2/14/18.           ABG Recent Labs      02/19/18   0520  02/18/18   0523  02/17/18   1448   PHI  7.468*  7.498*  7.511*   PCO2I  33.4*  35.8  36.1   PO2I  97  100  96   HCO3I  24.1  27.7*  28.6*   FIO2I  50  50  50        Liver Enzymes Protein, total   Date Value Ref Range Status   02/19/2018 5.5 (L) 6.4 - 8.2 g/dL Final     Albumin   Date Value Ref Range Status 02/19/2018 1.5 (L) 3.4 - 5.0 g/dL Final     Globulin   Date Value Ref Range Status   02/19/2018 4.0 2.0 - 4.0 g/dL Final     A-G Ratio   Date Value Ref Range Status   02/19/2018 0.4 (L) 0.8 - 1.7   Final     AST (SGOT)   Date Value Ref Range Status   02/19/2018 54 (H) 15 - 37 U/L Final     Alk.  phosphatase   Date Value Ref Range Status   02/19/2018 181 (H) 45 - 117 U/L Final     Recent Labs      02/19/18   0515  02/18/18   1015  02/18/18   0405   TP  5.5*  5.5*  5.7*   ALB  1.5*  1.6*  1.6*   GLOB  4.0  3.9  4.1*   AGRAT  0.4*  0.4*  0.4*   SGOT  54*  46*  44*   AP  181*  122*  113        Cardiac Enzymes No results found for: CPK, CK, CKMMB, CKMB, RCK3, CKMBT, CKNDX, CKND1, LESLIE, TROPT, TROIQ, BELLA, TROPT, TNIPOC, BNP, BNPP     BNP No results found for: BNP, BNPP, XBNPT     Coagulation Recent Labs      02/19/18   0515  02/18/18   0405  02/17/18   1914   PTP  13.5   --    --    INR  1.1   --    --    APTT  82.3*  94.9*  83.8*         Thyroid  Lab Results   Component Value Date/Time    TSH 0.43 02/12/2018 10:05 AM          Lipid Panel Lab Results   Component Value Date/Time    Cholesterol, total 116 02/11/2018 08:45 PM    HDL Cholesterol 27 (L) 02/11/2018 08:45 PM    LDL, calculated 57.4 02/11/2018 08:45 PM    VLDL, calculated 31.6 02/11/2018 08:45 PM    Triglyceride 278 (H) 02/19/2018 05:15 AM    CHOL/HDL Ratio 4.3 02/11/2018 08:45 PM          Urinalysis Lab Results   Component Value Date/Time    Color DARK YELLOW 02/11/2018 10:45 PM    Appearance CLOUDY 02/11/2018 10:45 PM    Specific gravity 1.019 02/11/2018 10:45 PM    pH (UA) 5.5 02/11/2018 10:45 PM    Protein 300 (A) 02/11/2018 10:45 PM    Glucose >1000 (A) 02/11/2018 10:45 PM    Ketone TRACE (A) 02/11/2018 10:45 PM    Bilirubin NEGATIVE  02/11/2018 10:45 PM    Urobilinogen 1.0 02/11/2018 10:45 PM    Nitrites NEGATIVE  02/11/2018 10:45 PM    Leukocyte Esterase NEGATIVE  02/11/2018 10:45 PM    Epithelial cells 0 01/08/2014 02:46 PM    Bacteria 2+ (A) 02/11/2018 10:45 PM    WBC 0 to 2 02/11/2018 10:45 PM    RBC 0 to 2 02/11/2018 10:45 PM        XR (Most Recent). CXR reviewed by me and compared with previous CXR   Results from Hospital Encounter encounter on 02/11/18   XR CHEST PORT   Narrative EXAM: Portable AP upright chest, one view    INDICATION: Respiratory failure. Every morning while intubated. COMPARISON: 02/18/2018    _______________    FINDINGS:    ETT is 4.6 cm above the lorena, right jugular central venous catheter projects  at the distal SVC, subclavian central venous catheter projects at the innominate  confluence, nasogastric tube is present at least to the EG junction and below  the range of imaging. Cardiac silhouette is slightly prominent. Reticular interstitial markings are  present throughout both lungs. More confluent markings are present at the  bilateral lung bases along with obscuration of the left costophrenic angle,  without significant changes. _______________         Impression IMPRESSION:      1. Pulmonary vascular congestion/fluid overload without change. 2. Bibasilar atelectasis plus or minus infiltrate and probable small left  pleural effusion, also unchanged. CT (Most Recent)   Results from Hospital Encounter encounter on 02/11/18   CT NECK SOFT TISSUE WO CONT   Narrative CT soft tissue of the neck without contrast.    INDICATION: Sepsis workup. Persistent positive blood cultures with unknown  source. COMPARISON: Neck CT from 8/3/2015. TECHNIQUE:Helically acquired and axial images were obtained from the posterior  fossa to the thoracic inlet without IV contrast. IV contrast not administered  due to recent history of acute renal injury. One or more dose reduction techniques were used on this CT: automated exposure  control, adjustment of the mAs and/or kVp according to patient's size, and  iterative reconstruction techniques.  The specific techniques utilized on this CT  exam have been documented in the patient's electronic medical record. FINDINGS:   Endotracheal and enteric tubes are partially visualized. There is a right  internal jugular central venous catheter which is partially visualized. There is  a left subclavian central venous catheter which is partially visualized. Visualized intracranial structures are unremarkable. There is mucosal thickening of the left maxillary sinus and an air-fluid level  within the right maxillary sinus. There is no cervical lymphadenopathy. No mass identified within the limitations  of this noncontrast exam. The thyroid has unremarkable appearance. There is patchy opacity at the lung apices which may represent  pleural-parenchymal scarring. There are multilevel degenerative changes of the cervical spine without acute  osseous abnormality. Impression IMPRESSION:      1. Tubes and lines as described. 2. Mild inflammatory changes within the maxillary sinuses which may be related  to intubation. There is no large abscess or other findings to suggest an occult  source of infection. EKG No results found for this or any previous visit. ECHO  2/12/18 [facility] SUMMARY:  Left ventricle: Systolic function was normal. Ejection fraction was estimated   in the range of 55 % to 60 %. There was  mild hypokinesis of the basal inferior wall(s). There was moderate concentric   hypertrophy. Right ventricle: The size was normal. Systolic function was normal.    Inferior vena cava, hepatic veins: The inferior vena cava was dilated. The   respirophasic change in diameter was more  than 50%. MRI spine:   2. Prior left L5 laminotomy, partial left facetectomy and possibly previous  partial L4-5 discectomy. Minimal left asymmetric disc bulge but no evidence of  recurrent disc herniation or retained disc fragment.     3. Minimal L3-4 disc bulge and annular fissure.     4. Multilevel mostly mild facet joint osteoarthritis.  Greatest and mild to  moderate level right L4-5 facet joint osteoarthritis with moderate-sized facet  joint effusion and synovitis. No adjacent periarticular marrow or soft tissue  edema to definitively suggest superimposed infective/septic arthritis.     5. Unremarkable upper sacroiliac joints though incompletely included. Mild body  wall edema.   ah. Imaging:  I have personally reviewed the patients radiographs and have reviewed the reports:                  Darrell Coppola M.D.   Pulmonary Critical Care & Sleep Medicine

## 2018-02-19 NOTE — PROGRESS NOTES
Bedside and Verbal shift change report received from CHRYSTAL Doss (offgoing nurse). Report included the following information SBAR, Kardex, Intake/Output, MAR and Recent Results. 2030 Shift assessment completed, see EMR    2112 Called Dr. Simone Littlejohn and notified him of patient's blood culture in aerobic bottle was positive for gram + cocci in clusters. No new orders given at this time. 2345 [facility] and notified him of patient's blood culture in aerobic bottle was positive for gram + cocci in clusters. No new orders given a this time. 0400 Reassessment completed, see EMR. CHG bath given at this time. 0515  PTT 82.3 within therapeutic range, No change to heparin drip. Bedside and Verbal shift change report given to BALA Serrano RN (oncoming nurse)Report included the following information SBAR, Kardex, Intake/Output, MAR and Recent Results.

## 2018-02-19 NOTE — PROGRESS NOTES
Cardiology Progress Note        Patient: Mert Samano        Sex: male          DOA: 2/11/2018  YOB: 1955      Age:  57 y.o.        LOS:  LOS: 7 days   Assessment/Plan     Principal Problem:    Sepsis ([address] (2/11/2018)    Active Problems:    Osteoarthritis of right knee (1/18/2014)      Hypokalemia (2/11/2018)      Glucosuria (2/11/2018)      AMANDA (acute kidney injury) ([address] (2/11/2018)      Fall (2/11/2018)      Back pain (2/11/2018)      Weakness of right lower extremity (2/11/2018)      Anemia (2/11/2018)      Shock ([address] (2/13/2018)      Acute respiratory failure ([address] (2/13/2018)      Elevated troponin (2/12/2018)      Demand ischemia of myocardium ([address] (2/12/2018)      MRSA bacteremia (2/15/2018)        Plan: SR with PACs  Intubated  Will switch heparin to Lovenox once renal function is normal and stable  Continue with metoprolol, amiodarone, will change to PO when permissive                    Subjective:    cc:  intubated      REVIEW OF SYSTEMS: unobtaineable  Objective:      Visit Vitals    /75    Pulse 87    Temp 99 °F (37.2 °C)    Resp (!) 31    Ht 6' (1.829 m)    Wt 148 kg (326 lb 4.5 oz)    SpO2 96%    BMI 44.25 kg/m2     Body mass index is 44.25 kg/(m^2). Physical Exam:  General Appearance: Comfortable-intubated  HEENT: TERRANCE. NECK: No JVD, no thyroidomeglay. CAROTIDS:clear  LUNGS: Clear bilaterally. HEART: S1+S2 audible    ABD: Non-tender, BS Audible    EXT: No edema, and no cysnosis. VASCULAR EXAM: Pulses are intact. MUSCULOSKELETAL: Grossly no joint deformity.   NEUROLOGICAL: unable to assess   Medication:  Current Facility-Administered Medications   Medication Dose Route Frequency    potassium chloride 10 mEq in 100 ml IVPB  10 mEq IntraVENous Q1H    [START ON 2/20/2018] Vancomycin Trough Level @ 0530 2/20/18  1 Each Other ONCE    ipratropium (ATROVENT) 0.02 % nebulizer solution 0.5 mg  0.5 mg Nebulization Q6H RT    metoprolol (LOPRESSOR) injection 5 mg  5 mg IntraVENous Q6H    insulin lispro (HUMALOG) injection 5 Units  5 Units SubCUTAneous Q6H    ELECTROLYTE REPLACEMENT PROTOCOL -- Potassium  1 Each Other PRN    ELECTROLYTE REPLACEMENT PROTOCOL -- Magnesium  1 Each Other PRN    ELECTROLYTE REPLACEMENT PROTOCOL -- Phosphorus  1 Each Other PRN    ELECTROLYTE REPLACEMENT PROTOCOL -- Calcium  1 Each Other PRN    propofol (DIPRIVAN) infusion  0-50 mcg/kg/min IntraVENous TITRATE    fentaNYL (PF)  mcg/30 ml   mcg/hr IntraVENous TITRATE    chlorhexidine (PERIDEX) 0.12 % mouthwash 10 mL  10 mL Oral Q12H    amiodarone (CORDARONE) 450 mg in dextrose 5% 250 ml infusion  0.5-1 mg/min IntraVENous TITRATE    vancomycin (VANCOCIN) 1500 mg in  ml infusion  1,500 mg IntraVENous Q24H    acetaminophen (TYLENOL) suppository 650 mg  650 mg Rectal Q6H PRN    hydrALAZINE (APRESOLINE) 20 mg/mL injection 10 mg  10 mg IntraVENous Q6H PRN    0.9% sodium chloride infusion 250 mL  250 mL IntraVENous PRN    insulin glargine (LANTUS) injection 15 Units  15 Units SubCUTAneous DAILY WITH LUNCH    heparin (porcine) 1,000 unit/mL injection 1,500 Units  1,500 Units IntraVENous PRN    acetaminophen (TYLENOL) solution 650 mg  650 mg Oral Q4H PRN    aspirin chewable tablet 81 mg  81 mg Oral DAILY    pantoprazole (PROTONIX) 40 mg in sodium chloride 0.9 % 10 mL injection  40 mg IntraVENous DAILY    naloxone (NARCAN) injection 0.4 mg  0.4 mg IntraVENous PRN    docusate sodium (COLACE) capsule 100 mg  100 mg Oral BID PRN    acetaminophen (TYLENOL) tablet 650 mg  650 mg Oral Z0H PRN    folic acid (FOLVITE) tablet 1 mg  1 mg Oral DAILY    thiamine 100 mg in 50 mL NS   IntraVENous Q24H    Vancomycin- Rx to Dose & Monitor  1 Each Other Rx Dosing/Monitoring    heparin 25,000 units in D5W 250 ml infusion  7-25 Units/kg/hr IntraVENous TITRATE    insulin lispro (HUMALOG) injection   SubCUTAneous Q6H    sodium chloride (NS) flush 5-10 mL  5-10 mL IntraVENous PRN    ondansetron (ZOFRAN ODT) tablet 4 mg  4 mg Oral Q4H PRN    glucose chewable tablet 16 g  4 Tab Oral PRN    glucagon (GLUCAGEN) injection 1 mg  1 mg IntraMUSCular PRN    dextrose (D50W) injection syrg 12.5-25 g  25-50 mL IntraVENous PRN               Lab/Data Reviewed: All lab results for the last 24 hours reviewed.      Recent Labs      02/19/18   0515  02/18/18   0405  02/17/18   0410   WBC  12.9  10.7  8.8   HGB  8.5*  8.8*  8.9*   HCT  26.0*  25.9*  25.9*   PLT  405  363  297     Recent Labs      02/19/18   0515  02/18/18   1705  02/18/18   1015  02/18/18   0405   NA  143   --   145  149*   K  3.2*  3.2*  3.2*  3.2*   CL  108   --   107  110*   CO2  26   --   31  28   GLU  200*   --   198*  179*   BUN  37*   --   44*  45*   CREA  1.59*   --   1.95*  1.99*   CA  8.2*   --   8.3*  8.6       Signed By: Costa Christensen MD     February 19, 2018

## 2018-02-19 NOTE — INTERDISCIPLINARY ROUNDS
CRITICAL CARE INTERDISCIPLINARY ROUNDS    Patient Information:    Name:   Bonita Sewell    Age:   57 y.o. Admission Date:   2/11/2018    Critical Care Day: 9    Surgery Date:  n/a    Attending Provider:   Josie Kimble MD    Surgeon:  n/a    Consultant(s):  Dr. Vilma Chin, Dr. Pernell Mims, Dr. Lucien Sanders Physician:  Dr. 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 Ra Caprice    Back pain M54.9    Weakness of right lower extremity R29.898    Anemia D64.9    Shock ([address] R57.9    Acute respiratory failure (HCC) J96.00    Elevated troponin R74.8    Demand ischemia of myocardium (HCC) I24.8    MRSA bacteremia R78.81       Principal Problem:  Sepsis ([address]    During rounds the following quality care indicators and evidence based practices were addressed :  DVT Prophylaxis, PUD Prophylaxis, Pressure Injury Prevention, Sepsis resuscitation and management, Nutritional Status, Critical Care Interventions Airways, Dialysis, Lines and Pressors and Flu Vaccine Wilson Day 8 (M-Care yes) ; Central Line Day: 5 Isolation: contact-MRSA;  Antibiotic Stewardship: Vancomycin    Ventilator Bundle:  Ventilator Bundle Order Set: yes Sedation Vacation n/a; Spontaneous Breathing Trial n/a; Restraints yes (Order, Necessity, Documentation)  Vent Day: 3    Sepsis Order Set: yes or Elements of Sepsis Bundle Reviewed (Fluids, Antibiotics, Cultures, Glycemic control    Glycemic Control:   Recent Labs      02/19/18   0515  02/18/18   1015  02/18/18   0405  02/17/18   2100   GLU  200*  198*  179*  203*   ;  Recent Labs      02/19/18   0520  02/18/18   0523  02/17/18   1448   PHI  7.468*  7.498* 7.511*   PCO2I  33.4*  35.8  36.1   PO2I  97  100  96    Adjustments     Acute MI/PCI:   Not applicable    Door to Balloon: Admission Time: 1810      Heart Failure:    Not applicable     SCIP Measures for other Surgeries:   Not applicable CHG Bath Daily yes    Pneumonia:    Not applicable    Interdisciplinary team rounds were held with the following team membersCare Management, Diabetes Treatment Specialist, Nursing, Nutrition, Pharmacy, Physical Therapy, Physician and Respiratory Therapy. Plan of care discussed. Goals of Care/ Recommendations: continue to monitor pt VS, labs, electrolyte replacement protocol  See clinical pathway and/or care plan for interventions and desired outcomes.     Critical Care Discharge Status:  Red

## 2018-02-19 NOTE — PROGRESS NOTES
conducted a Follow up consultation and Spiritual Assessment for Zohreh Massey, who is a 57 y.o.,male. Spent time with wife Kieran. She seemed a bit more rested today. She is still anxious about \"everything\" and not getting enough sleep but very grateful for the care he is receiving. She was glad that patient was able to open his eyes and acknowledge her today. We had prayer. The  provided the following Interventions:  Continued the relationship of care and support. Listened empathically. Offered prayer and assurance of continued prayer on patients behalf. Chart reviewed. The following outcomes were achieved: Wife expressed gratitude for Spiritual Care visit. Patient was reviewed in ICU Interdisciplinary Rounds. Assessment:  There are no further spiritual or Scientologist issues which require Spiritual Care Services intervention at this time. Plan:  Chaplains will continue to follow and will provide pastoral care as needed or requested.  recommends bedside caregivers page the  on duty if patient shows signs of acute spiritual or emotional distress. [address], [place].    Board Certified   [phone] - Office

## 2018-02-19 NOTE — DIABETES MGMT
GLYCEMIC CONTROL & NUTRITION:    - Discussed in rounds, known h/o DM, on dual oral therapy at home, current A1C 5.8%  - pt was re-intubated over the weekend, TF restarted, at goal (35 ml/hr)  - BG not within target range   - recommend increas basal dose to Lantus 18 units every day and continue current dose of scheduled dose of Humalog 5 units q 6 hours, continue corrective coverage q 6 ours as well, make sure scheduled mealtime dose is held if TF paused  - pt received 37 units TDD of insulin yesterday (15 Lantus, 22 Humalog - 16 scheduled, 6 corrective)  - see Clinical RD notes for full nutrition assessment    Recent Glucose Results:   Lab Results   Component Value Date/Time     (H) 02/19/2018 05:15 AM    GLUCPOC 176 (H) 02/19/2018 11:49 AM    GLUCPOC 198 (H) 02/19/2018 05:27 AM    GLUCPOC 132 (H) 02/18/2018 11:36 PM     Lab Results   Component Value Date/Time    Hemoglobin A1c 5.8 (H) 02/11/2018 06:45 PM               Johnny Muhammad, MPH, RD, CDE

## 2018-02-19 NOTE — PROGRESS NOTES
ID follow up note     Chart reviewed remotely   He has been  reintubated and remains on MV. Still has low grade fever Tmax 100.8F last 24 hrs  Off pressors  Wbc count increased to 12K and creatinine has improved to 1.6 with good UOP  Blood cultures from 2/16 and 2/18 still positive with MRSA  2/18 MRSA vanco DIMITRIS is 0.5 but he is clinically not responding to vancomycin   Last vancomycin level on 2/17 was 15.8     Recommendation   Will change vancomycin to ceftaroline 600mg Q12H for persistant bacteremia   He has PCN allergy listed with rash but at this point, closely monitor for any allergic reaction while on ceftaroline   Patient is currently intubated in ICU setting   Plan to  Repeat echocardiogram on 2/20 to see he has developed any vegetation with persistant bacteremia  Discussed with dr Pauline Muñiz ( ICU attending ) over the phone      Please call with questions or concerns regarding ID issue .      [address] MD  Infectious diseases specialist   Pager [phone]

## 2018-02-19 NOTE — PROGRESS NOTES
Nephrology Inpatient Progress note    Subjective:     Kamila Jiménez is a 57 y.o. male with history of hypertension, diabetes, hyperlipidemia, gout and arthritis who presented with a fever and multiple falls. On initial evaluation, he had borderline low blood pressure, and normal white cell count, but elevated creatinine at 2.5. Blood culture was positive and started on broad spectrum antibiotics, on IV fluid and IV pressors. A culture finally came back pos for MRSA. His preadmission baseline creatinine back in 2015 was 1.2. On admission, it was 2.5. It continued to increase and it jumped up to 4.06. Of note, the patient also had slightly elevated CPK level on admission, which is trending down now. CAT scan did not show any significant finding as a possible source. Echocardiography was negative for valvular vegetation. He was initiated on HD 2/14 and repeated 2/15. Pt remains on vent,no event overnight; on amio and heparin gtt. BPs up, highest 190s; UOP ~1L. Cr down to 1.6 today.        Admit Date: 2/11/2018    Allergy:  Allergies   Allergen Reactions    Bees [Hymenoptera Allergenic Extract] Anaphylaxis    Cocoa Butter-Zinc Oxide Rash    Dilaudid [Hydromorphone (Bulk)] Rash     capsules    Pcn [Penicillins] Rash        Objective:     Visit Vitals    /60 (BP 1 Location: Right arm, BP Patient Position: At rest)    Pulse 91    Temp 99 °F (37.2 °C)    Resp (!) 38    Ht 6' (1.829 m)    Wt 148 kg (326 lb 4.5 oz)    SpO2 94%    BMI 44.25 kg/m2         Intake/Output Summary (Last 24 hours) at 02/19/18 0845  Last data filed at 02/19/18 0400   Gross per 24 hour   Intake          2125.55 ml   Output              925 ml   Net          1200.55 ml       Physical Exam:       General: Intubated   HENT: Atraumatic and normocephalic   Eyes: Normal conjunctiva   Neck: Supple with no JVD   Cardiovascular: Normal S1 & S2, no m/r/g   Pulmonary/Chest Wall: Clear to auscultation bilaterally   Abdominal: Soft and has NABS   Musculoskeletal: Trace LE edema   Neurological: sedated   Dialysis catheter: right IJ Uldall    Data Review: latest labs reviewed      Lab Results   Component Value Date/Time    WBC 12.9 02/19/2018 05:15 AM    RBC 2.87 (L) 02/19/2018 05:15 AM    HCT 26.0 (L) 02/19/2018 05:15 AM    MCV 90.6 02/19/2018 05:15 AM    MCH 29.6 02/19/2018 05:15 AM    MCHC 32.7 02/19/2018 05:15 AM    RDW 18.3 (H) 02/19/2018 05:15 AM      No results found for: IRONNo components found for: FERRITIN  No components found for: PTHINT  Urinalysis  No results found for: UGLU        Impression:     1. AMANDA, likely acute tubular necrosis from septic shock. Creatinine trended up from 2.5 on admission to 4.06. Patient was oliguric and had first HD 2/14. Now Cr cont trend down, 1.6 today, with good UOP  2. Methicillin-resistant Staphylococcus aureus septic shock, unclear source. Has been off pressor  3. Hypophosphatemia, improved  4. Hypokalemia  5. Acidosis, mixed respiratory and metabolic. 6.  Mild rhabdomyolysis, improving with a CK down to 647 from high of 3300.  7. Anemia. 8.  Thrombocytopenia  9. Diabetes mellitus. 10.  Morbid obesity.    11.  Respiratory failure, reintubated and remains on MV.     Plan:     Continue supportive care: MV and nutrition  Supplement Potassium as needed  Pulmonologist and cardiologist on board  No need for dialysis, OK to remove Uldall today  Hydralazine prn  Monitor urine output & renal panel  D/w ICU staff    MD Blas Bertrand  [phone]

## 2018-02-20 NOTE — PROGRESS NOTES
ID progress Note    Kay Ruiz is a 57 y.o. male who is being seen on consult for antibiotic management for MRSA septic shock    Current  antibiotics:   ceftaroline 2/19/18       Past antibiotics:   Vancomycin 2/12 to 2/19   azactam 2/11 to 2/14  Levofloxacin  2/12  To 2/15       Impression:   Persistent  MRSA septic shock of Unclear source   ( 2/11 , 2/13 2/14, 2/16, 2/18) blood cultures persistantly postiive with MRSA   TERRY negative for vegetation or mass , repeat TTE on 2/18 show no obvious valvular vegetation  CT neck, chest/abd and pelvis and BL LE did not show occult abscess or infection   R knee arthroplasty intact   MRI Thoracic and lumbar spine negative for discitis or abscess    MRSA vancomycin DIMITRIS of 1 and 0.5       Acute respiratory failure s/p reintubation 2/18     Bibasilar pneumonia vs atelectasis   Sputum cx with MRSA  CT chest showed  Multifocal peripheral bilateral subpleural nodular densities the largest 15  mm right apex, which may represent peripheral areas of pneumonia or other inflammatory etiology,     BL LE small pustular lesions suspects due to MRSA skin manifestation but not primary source of infection       AMANDA with Rhabdomyolysis : started on HD on 2/14  Now resolved and good UOP  S/p TDC removal     Severe sepsis on admission impriving   Fever , tachycardia resolving   Mild thrombocytopenia   PCN allergy : rash listed in system  BL knee arthroplasty : not clinically suspected for infection       Plan:   Vancomycin has been changed to iv ceftaroline on 2/19 and will give at least 48 hrs to repeat survillance blood cultures   ceftroline will give lungs coverage for MRSA as well   Will check abscess localization scan whole body to look for occult infection   If non revealing, recommend to discontinue L IJ line and given line holiday   Discussed with ICU team at bedside   Continue supportive care  We are watching pt closely for toxicities and side effects to abx and drug-drug interactions. We will adjust antibiotics upon the results of the pending tests and the clinical improvement of the patient. Dicussed with ICU team at bedside   Case discussed with: []Patient []Family [X]Nursing [ ]Case Management  [ ] Radha Montiel ]MD/Care team           Interval history     Patient condition is not much improving   Now off pressors, with good UOP,remains Intubated, reintubated on    Off pressors , on amioderone and heparin drip  TDC removed    and  blood cutlures still  Positive with MRSA and GPC       Review of Systems:   Interval notes, available laboratory, microbiology and radiographic data reviewed. Discussed with nursing  where appropriate. Critically ill  I d/w nursing staff  Intubated. On Mech Vent  Afebrile   Hypertensive   Minimal ET secretion  On heparin and amiderone drip    Other direct ROS were unobtainable due to pt's condition      Skin: negative for rash   HENT: negative for ear discharge   Eyes: negative for eye discharge   Respiratory: negative for hemoptysis   Gastointestinal: negative for diarrhea,  vomiting   Genitourinary: negative for hematuria   Hem/Lymph: negative for easy bleeding   Allergy/Imm: negative for hives   Neurological: negative for seizures                Physical Assessment:     VITAL SIGNS  Blood pressure 132/67, pulse 86, temperature 98.7 °F (37.1 °C), resp. rate (!) 32, height 6' (1.829 m), weight 148 kg (326 lb 4.5 oz), SpO2 94 %.   Temp (24hrs), Av.4 °F (36.9 °C), Min:97.6 °F (36.4 °C), Max:98.8 °F (37.1 °C)      L IJ line in  place   Intubated - On mech ventilation  Wilson in place - urine  With dark brown color     CONSTITUTIONAL:    Intubated sedated   PSYCH:   Judgement/insight - unobtainable due to pt's condition   Affect -  unobtainable due to pt's condition  EYES:    Conjunctivae normal   PERRL  ENMT:    Left exterior ear normal, right external ear normal, nose normal   Inspection lips - normal   Orally intubated   NECK: Exam neck - no masses, normal symmetry, trachea midline   PULMONARY/CHEST WALL:   Resp Effort - No use of accessory muscles, no intercostal retractions   Breath sounds -  BL coarse BS   CARDIOVASCULAR:    Heart sounds tachycardic, irregularly irregular   Intact pedal pulses by doppler   ABDOMINAL:    Appearance normal, soft, bowel sounds  Present    No tenderness   Liver/spleen - could not palpate organomegaly  GENITOURINARY: External genitalia - no ulcers, no drainage  , mcdonald cath in place   MUSCULOSKELETAL:   Gait  unobtainable due to pt's condition   Digits, nails - no clubbing, no cyanosis, no infection   RUE, LUE, RLE, LLE ROM -  unobtainable due to pt's condition . No obvious joint swelling or erythema or warmth noted , BL knee incision intact and dry    Muscle strength  unobtainable due to pt's condition   Head and neck ROM -  unobtainable due to pt's condition  SKIN    Inspection - as above - BL LE inner thigh areas with small pustular lesions    no ulcers, no crepitus, intact   Palpation - as above - otherwise no induration, no nodules, dry, warm  NEUROLOGICAL:  unobtainable due to pt's condition      MICROBIOLOGY DATA  All Micro Results     Procedure Component Value Units Date/Time    CULTURE, BLOOD [[id number]]  (Abnormal)  (Susceptibility) Collected:  02/18/18 0405    Order Status:  Completed Specimen:  Whole Blood from Blood Updated:  02/20/18 0813     Special Requests: NO SPECIAL REQUESTS        GRAM STAIN         GRAM POSITIVE COCCI IN CLUSTERS AEROBIC BOTTLE              SMEAR CALLED TO AND CORRECTLY REPEATED BY: Sarah Galan RN ICU AT 2048 ON 02/18/18 BY AVELINO.      Culture result:         AEROBIC BOTTLE **METHICILLIN RESISTANT STAPHYLOCOCCUS AUREUS** (A)      PATIENT IS A KNOWN MRSA       CULTURE, BLOOD [[id number]]  (Abnormal) Collected:  02/18/18 0400    Order Status:  Completed Specimen:  Whole Blood from Blood Updated:  02/20/18 0739     Special Requests: NO SPECIAL REQUESTS        GRAM STAIN GRAM POSITIVE COCCI IN CLUSTERS AEROBIC BOTTLE              SMEAR CALLED TO AND CORRECTLY REPEATED BY: Ted Willard RN ICU TO JRW AT 5862 02/19/18              GRAM POSITIVE COCCI IN CLUSTERS ANAEROBIC BOTTLE              SMEAR CALLED TO AND CORRECTLY REPEATED BY: [facility] RN ICU TO [facility] AT 9181 ON 2/19/18.      Culture result:         AEROBIC AND ANAEROBIC BOTTLES **METHICILLIN RESISTANT STAPHYLOCOCCUS AUREUS** (A)            For Susceptibility Refer to Culture  [id number]      CULTURE, BLOOD [[id number]]  (Abnormal) Collected:  02/16/18 0508    Order Status:  Completed Specimen:  Blood from Blood Updated:  02/19/18 0827     Special Requests: NO SPECIAL REQUESTS        GRAM STAIN         GRAM POSITIVE COCCI IN CLUSTERS AEROBIC BOTTLE              SMEAR CALLED TO AND CORRECTLY REPEATED BY: Irma Strong RN ICU TO JRW AT 5366 02/17/18     Culture result:         AEROBIC BOTTLE **METHICILLIN RESISTANT STAPHYLOCOCCUS AUREUS** (A)            For Susceptibility Refer to Culture  [id number]      CULTURE, BLOOD [[id number]]  (Abnormal)  (Susceptibility) Collected:  02/16/18 0508    Order Status:  Completed Specimen:  Blood from Blood Updated:  02/19/18 0827     Special Requests: NO SPECIAL REQUESTS        GRAM STAIN         GRAM POSITIVE COCCI IN CLUSTERS AEROBIC BOTTLE              SMEAR CALLED TO AND CORRECTLY REPEATED BY: Tawanda Nicholas RN ICU TO JRW AT 6953 02/17/18     Culture result:         AEROBIC BOTTLE **METHICILLIN RESISTANT STAPHYLOCOCCUS AUREUS** (A)      PATIENT IS A KNOWN MRSA       CULTURE, RESPIRATORY/SPUTUM/BRONCH Benetta Ling STAIN [[id number]]  (Abnormal)  (Susceptibility) Collected:  02/13/18 0858    Order Status:  Completed Specimen:  Sputum from Sputum Updated:  02/19/18 0801     Special Requests: NO SPECIAL REQUESTS        GRAM STAIN 10-25 WBC/lpf         <10 EPI/lpf         MUCUS PRESENT         NO ORGANISMS SEEN        Culture result:         RARE **METHICILLIN RESISTANT STAPHYLOCOCCUS AUREUS** (A)              RARE EMILIO DUBLINIENSIS (A)              FEW NORMAL RESPIRATORY MAYNOR      PATIENT IS A KNOWN MRSA       CULTURE, BLOOD [[id number]]  (Abnormal) Collected:  02/14/18 0915    Order Status:  Completed Specimen:  Whole Blood from Blood Updated:  02/17/18 0708     Special Requests: left femoral,     GRAM STAIN         GRAM POSITIVE COCCI IN CLUSTERS AEROBIC BOTTLE              SMEAR CALLED TO AND CORRECTLY REPEATED BY: Cintia Cee RN ICU TO  AT 0606 ON [id number]     Culture result:         AEROBIC BOTTLE **METHICILLIN RESISTANT STAPHYLOCOCCUS AUREUS** (A)            For Susceptibility Refer to Culture  [id number]      CULTURE, BLOOD [[id number]]  (Abnormal)  (Susceptibility) Collected:  02/14/18 0920    Order Status:  Completed Specimen:  Whole Blood from Blood Updated:  02/17/18 0630     Special Requests: venipuncture     GRAM STAIN         GRAM POSITIVE COCCI IN CLUSTERS AEROBIC BOTTLE              SMEAR CALLED TO AND CORRECTLY REPEATED BY: CARTER HEMPHILL RN ICU TO [facility] AT 0152 ON [id number]              ANAEROBIC BOTTLE GRAM POSITIVE COCCI IN CLUSTERS              SMEAR CALLED TO AND CORRECTLY REPEATED BY: ramay bowens icu to [facility] on 02/16/18 at 0627     Culture result:         AEROBIC AND ANAEROBIC BOTTLES **METHICILLIN RESISTANT STAPHYLOCOCCUS AUREUS** (A)      PATIENT IS A KNOWN MRSA       CULTURE, BLOOD [[id number]] Collected:  02/16/18 0645    Order Status:  Canceled Specimen:  Blood from Blood     CULTURE, BLOOD [[id number]]  (Abnormal) Collected:  02/13/18 0315    Order Status:  Completed Specimen:  Whole Blood from Blood Updated:  02/15/18 0835     Special Requests: NO SPECIAL REQUESTS        GRAM STAIN         GRAM POSITIVE COCCI IN CLUSTERS AEROBIC BOTTLE              SMEAR CALLED TO AND CORRECTLY REPEATED BY: MICHELLE BOWENS ICU ON [id number] AT 1700 BY JDR     Culture result:         AEROBIC BOTTLE **METHICILLIN RESISTANT STAPHYLOCOCCUS AUREUS** (A)            For Susceptibility Refer to Culture  [id number]      CULTURE, BLOOD [[id number]] (Abnormal)  (Susceptibility) Collected:  02/13/18 0310    Order Status:  Completed Specimen:  Whole Blood from Blood Updated:  02/15/18 0834     Special Requests: NO SPECIAL REQUESTS        GRAM STAIN         GRAM POSITIVE COCCI IN CLUSTERS AEROBIC BOTTLE              SMEAR CALLED TO AND CORRECTLY REPEATED BY: MICHELLE RN ICU ON [id number] AT [address] [facility]     Culture result:         AEROBIC BOTTLE **METHICILLIN RESISTANT STAPHYLOCOCCUS AUREUS** (A)      PATIENT IS A KNOWN MRSA       CULTURE, URINE [[id number]] Collected:  02/12/18 0900    Order Status:  Completed Specimen:  Urine from Wilson Specimen Updated:  02/14/18 0932     Special Requests: NO SPECIAL REQUESTS        Culture result: NO GROWTH 2 DAYS       CULTURE, BLOOD [[id number]]  (Abnormal)  (Susceptibility) Collected:  02/11/18 1846    Order Status:  Completed Specimen:  Blood from Blood Updated:  02/14/18 0837     Special Requests: NO SPECIAL REQUESTS        GRAM STAIN         GRAM POSITIVE COCCI IN CLUSTERS AEROBIC BOTTLE ANAEROBIC BOTTLE              SMEAR CALLED TO AND CORRECTLY REPEATED BY: Ridge Narayan RN ICU TO [facility] AT 1110 ON [id number]     Culture result:         AEROBIC AND ANAEROBIC BOTTLES **METHICILLIN RESISTANT STAPHYLOCOCCUS AUREUS** (A)            CALLED TO AND CORRECTLY REPEATED BY:  Mikki Hanson RN ICU TO [facility] AT 0825 ON 2/14/18. INFLUENZA A & B AG (RAPID TEST) [[id number]] Collected:  02/12/18 0211    Order Status:  Completed Specimen:  Nasopharyngeal from Nasal washing Updated:  02/12/18 0237     Influenza A Antigen NEGATIVE          A negative result does not exclude influenza virus infection, seasonal or H1N1 due to suboptimal sensitivity. If influenza is circulating in your community, a diagnosis of influenza should be considered based on a patients clinical presentation and empiric antiviral treatment should be considered, if indicated.         Influenza B Antigen NEGATIVE            LAB DATA    Recent Results (from the past 24 hour(s))   GLUCOSE, POC    Collection Time: 02/19/18 11:49 AM   Result Value Ref Range    Glucose (POC) 176 (H) 70 - 110 mg/dL   POTASSIUM    Collection Time: 02/19/18  4:40 PM   Result Value Ref Range    Potassium 3.3 (L) 3.5 - 5.5 mmol/L   MAGNESIUM    Collection Time: 02/19/18  4:40 PM   Result Value Ref Range    Magnesium 1.7 1.6 - 2.6 mg/dL   GLUCOSE, POC    Collection Time: 02/19/18  5:33 PM   Result Value Ref Range    Glucose (POC) 179 (H) 70 - 110 mg/dL   GLUCOSE, POC    Collection Time: 02/20/18 12:20 AM   Result Value Ref Range    Glucose (POC) 203 (H) 70 - 110 mg/dL   MAGNESIUM    Collection Time: 02/20/18  1:30 AM   Result Value Ref Range    Magnesium 1.9 1.6 - 2.6 mg/dL   POTASSIUM    Collection Time: 02/20/18  1:30 AM   Result Value Ref Range    Potassium 3.6 3.5 - 5.5 mmol/L   MAGNESIUM    Collection Time: 02/20/18  4:00 AM   Result Value Ref Range    Magnesium 1.9 1.6 - 2.6 mg/dL   METABOLIC PANEL, COMPREHENSIVE    Collection Time: 02/20/18  4:00 AM   Result Value Ref Range    Sodium 143 136 - 145 mmol/L    Potassium 3.8 3.5 - 5.5 mmol/L    Chloride 108 100 - 108 mmol/L    CO2 27 21 - 32 mmol/L    Anion gap 8 3.0 - 18 mmol/L    Glucose 186 (H) 74 - 99 mg/dL    BUN 33 (H) 7.0 - 18 MG/DL    Creatinine 1.39 (H) 0.6 - 1.3 MG/DL    BUN/Creatinine ratio 24 (H) 12 - 20      GFR est AA >60 >60 ml/min/1.73m2    GFR est non-AA 51 (L) >60 ml/min/1.73m2    Calcium 8.6 8.5 - 10.1 MG/DL    Bilirubin, total 2.4 (H) 0.2 - 1.0 MG/DL    ALT (SGPT) 29 16 - 61 U/L    AST (SGOT) 48 (H) 15 - 37 U/L    Alk.  phosphatase 215 (H) 45 - 117 U/L    Protein, total 6.2 (L) 6.4 - 8.2 g/dL    Albumin 1.7 (L) 3.4 - 5.0 g/dL    Globulin 4.5 (H) 2.0 - 4.0 g/dL    A-G Ratio 0.4 (L) 0.8 - 1.7     POC G3    Collection Time: 02/20/18  6:02 AM   Result Value Ref Range    Device: VENT      FIO2 (POC) 40 %    pH (POC) 7.421 7.35 - 7.45      pCO2 (POC) 37.5 35.0 - 45.0 MMHG    pO2 (POC) 64 (L) 80 - 100 MMHG    HCO3 (POC) 24.5 22 - 26 MMOL/L    sO2 (POC) 93 92 - 97 %    Base excess (POC) 0 mmol/L    Mode ASSIST CONTROL      Tidal volume 360 ml    Set Rate 14 bpm    PEEP/CPAP (POC) 5 cmH2O    Allens test (POC) YES      Total resp.  rate 35      Site RIGHT RADIAL      Patient temp. 97.8      Specimen type (POC) ARTERIAL      Performed by Inez Mendoza     Volume control YES     CALCIUM, IONIZED    Collection Time: 02/20/18  6:20 AM   Result Value Ref Range    Ionized Calcium 1.199 1.12 - 1.32 MMOL/L   PTT    Collection Time: 02/20/18  6:20 AM   Result Value Ref Range    aPTT 116.4 (H) 23.0 - 36.4 SEC   GLUCOSE, POC    Collection Time: 02/20/18  6:50 AM   Result Value Ref Range    Glucose (POC) 181 (H) 70 - 110 mg/dL           IMAGING     2/17 Ct neck, chest, abd and pelvis , BL LE reviewed  2/18 repeat TTE reviewed     Current medications reviewed in EPIC      Current Facility-Administered Medications   Medication Dose Route Frequency    potassium chloride (KAON 10%) 20 mEq/15 mL oral liquid 10 mEq  10 mEq Per G Tube DAILY    insulin glargine (LANTUS) injection 18 Units  18 Units SubCUTAneous DAILY WITH LUNCH    cefTARoline (TEFLARO) 600 mg in 0.9% sodium chloride (MBP/ADV) 50 mL MBP  600 mg IntraVENous Q12H    ipratropium (ATROVENT) 0.02 % nebulizer solution 0.5 mg  0.5 mg Nebulization Q6H RT    metoprolol (LOPRESSOR) injection 5 mg  5 mg IntraVENous Q6H    insulin lispro (HUMALOG) injection 5 Units  5 Units SubCUTAneous Q6H    ELECTROLYTE REPLACEMENT PROTOCOL -- Potassium  1 Each Other PRN    ELECTROLYTE REPLACEMENT PROTOCOL -- Magnesium  1 Each Other PRN    ELECTROLYTE REPLACEMENT PROTOCOL -- Phosphorus  1 Each Other PRN    ELECTROLYTE REPLACEMENT PROTOCOL -- Calcium  1 Each Other PRN    propofol (DIPRIVAN) infusion  0-50 mcg/kg/min IntraVENous TITRATE    fentaNYL (PF)  mcg/30 ml   mcg/hr IntraVENous TITRATE    chlorhexidine (PERIDEX) 0.12 % mouthwash 10 mL  10 mL Oral Q12H    amiodarone (CORDARONE) 450 mg in dextrose 5% 250 ml infusion  0.5-1 mg/min IntraVENous TITRATE    acetaminophen (TYLENOL) suppository 650 mg  650 mg Rectal Q6H PRN    hydrALAZINE (APRESOLINE) 20 mg/mL injection 10 mg  10 mg IntraVENous Q6H PRN    0.9% sodium chloride infusion 250 mL  250 mL IntraVENous PRN    heparin (porcine) 1,000 unit/mL injection 1,500 Units  1,500 Units IntraVENous PRN    acetaminophen (TYLENOL) solution 650 mg  650 mg Oral Q4H PRN    aspirin chewable tablet 81 mg  81 mg Oral DAILY    pantoprazole (PROTONIX) 40 mg in sodium chloride 0.9 % 10 mL injection  40 mg IntraVENous DAILY    naloxone (NARCAN) injection 0.4 mg  0.4 mg IntraVENous PRN    docusate sodium (COLACE) capsule 100 mg  100 mg Oral BID PRN    acetaminophen (TYLENOL) tablet 650 mg  650 mg Oral B8N PRN    folic acid (FOLVITE) tablet 1 mg  1 mg Oral DAILY    thiamine 100 mg in 50 mL NS   IntraVENous Q24H    heparin 25,000 units in D5W 250 ml infusion  7-25 Units/kg/hr IntraVENous TITRATE    insulin lispro (HUMALOG) injection   SubCUTAneous Q6H    sodium chloride (NS) flush 5-10 mL  5-10 mL IntraVENous PRN    ondansetron (ZOFRAN ODT) tablet 4 mg  4 mg Oral Q4H PRN    glucose chewable tablet 16 g  4 Tab Oral PRN    glucagon (GLUCAGEN) injection 1 mg  1 mg IntraMUSCular PRN    dextrose (D50W) injection syrg 12.5-25 g  25-50 mL IntraVENous PRN           Medical Decision Making:     I reviewed and summarized data from old medical records and obtained history from other sources than patient. I reviewed laboratory tests, Radiology data, and diagnostic tests in the CPT medicine section. I discussed with the physicians and the nursed involved in this patient's care about this patient's current medical problems. I have  Discussed plan of care with providers and  nursing staff.         The total visit duration was of 45 minutes of which more than 50% was spent in coordination of care and counseling (time spent with patient/family face to face, physical exam, reviewing microbiology data, laboratory results, radiographic data, speaking with physicians and nursing staff involved in this pt's care).

## 2018-02-20 NOTE — PROGRESS NOTES
Palliative Medicine Progress Note  [facility]: [phone] ([phone])  [facility]: [address] [place] [address] Way: [phone]    Patient Name: Meliza Moulton  YOB: 1955    Date of Initial Consult: 2/16/18  Reason for Consult: care decisions  Requesting Provider: Dr. Skyler Sanches   Primary Care Physician: Janneth Villanueva MD      SUMMARY:   Meliza Moulton is a 57y.o. year old who presented after two days of malaise with overwhelming MERSA sepsis and multi organ system failure. On pressors initially, has required dialysis and amiodarone drip with ventilator support. Now off pressors, with good UOP, extubated this am. Still minimally responsive. Only negative recent care finding is persistently + blood cultures and fevers. Previously reasonably healthy, last hospitalization for TKR. Recently lost son in January with hepatorenal syndrome. Wife at bedside. 2/19/18: Reintubated < 24 hr post extubation. Persistent MERSA + blood cultures since 2/11 despite MICs demonstrating sensitivity to vanc. No identified sequestered source of bacteremia. No family at bedside. 2/20/18: Stable overnight, no fever. Dialysis catheter removed. Remains on propofol.                                                                                                                                                                                                                                                                                                                                                                                                                                                                                                                                                                                                                                                                                                                                PALLIATIVE DIAGNOSES:     Patient Active Problem List   Diagnosis Code    Osteoarthritis of right knee M17.11    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[facility] T84.018A, Z96.659    Failed total knee arthroplasty ([facility] T84.018A, Z96.659    Dysphagia R13.10    Sepsis ([facility] A41.9    Hypokalemia E87.6    Glucosuria R81    AMANDA (acute kidney injury) ([facility] [facility] N17.9    Fall W19. XXXA    Back pain M54.9    Weakness of right lower extremity R29.898    Anemia D64.9    Shock ([facility] R57.9    Acute respiratory failure (HCC) J96.00    Elevated troponin R74.8    Demand ischemia of myocardium (HCC) I24.8    MRSA bacteremia R78.81     1. PLAN:   1. Met with wife and answered questions regarding current clinical status. She understand that while he is improved he remains critically ill. Discussed implications of persistent + blood cultures. Reviewed his AMD and asked if she thinks he would be willing to undergo reintubation and reescalation of care should he decline rapidly again. She is not certain but knows he would not want aggressive measures if he had little or no chance of making a good recovery. Emotional support provided. 2/19/18: No evidence of improvement in underlying source of his multisystem critical illness. With reintubation and persistent bacteremia, prognosis appears to be worsening.    2/20/18: On different abx regimen. No clinical change. 2. Initial consult note routed to primary continuity provider  3.  Communicated plan of care with: Palliative IDT    GOALS OF CARE:  Patient/Health Care Proxy Stated Goals: Cure      TREATMENT PREFERENCES:   Code Status: Full Code    Advance Care Planning:  Advance Care Planning 2/14/2018   Patient's Healthcare Decision Maker is: Named in scanned ACP document   Primary Decision Maker Name Carmelina Kyle   Primary Decision Maker Phone Number [phone]   Primary Decision Maker Relationship to Patient Spouse   Secondary Decision Maker Name Dyan Massey   Secondary Decision Maker Phone Number [phone] Secondary Decision Maker Relationship to Patient Sibling   Confirm Advance Directive Yes, on file       Medical Interventions: Full interventions   Other Instructions: Other:  As far as possible, the palliative care team has discussed with patient / health care proxy about goals of care / treatment preferences for patient. HISTORY:     History obtained from: wife    CHIEF COMPLAINT: see summary    HPI/SUBJECTIVE:    The patient is:   [] Verbal and participatory  [x] Non-participatory due to:   sedation     Clinical Pain Assessment (nonverbal scale for nonverbal patients): Activity (Movement): Lying quietly, normal position    Duration: for how long has pt been experiencing pain (e.g., 2 days, 1 month, years)  Frequency: how often pain is an issue (e.g., several times per day, once every few days, constant)     FUNCTIONAL ASSESSMENT:     Palliative Performance Scale (PPS):  PPS: 30    ECOG        PSYCHOSOCIAL/SPIRITUAL SCREENING:      Any spiritual / [employer]ism concerns:  [] Yes /  [x] No    Caregiver Burnout:  [] Yes /  [x] No /  [] No Caregiver Present      Anticipatory grief assessment:   [x] Normal  / [] Maladaptive        REVIEW OF SYSTEMS:     Positive and pertinent negative findings in ROS are noted above in HPI. The following systems were [] reviewed / [x] unable to be reviewed as noted in HPI  Other findings are noted below. Systems: constitutional, ears/nose/mouth/throat, respiratory, gastrointestinal, genitourinary, musculoskeletal, integumentary, neurologic, psychiatric, endocrine. Positive findings noted below. Modified ESAS Completed by: provider                                   Stool Occurrence(s): 0        PHYSICAL EXAM:     Wt Readings from Last 3 Encounters:   02/19/18 148 kg (326 lb 4.5 oz)   02/14/18 144.8 kg (319 lb 3.6 oz)   08/02/15 138.8 kg (306 lb)     Blood pressure 132/67, pulse 86, temperature 97.6 °F (36.4 °C), resp.  rate (!) 32, height 6' (1.829 m), weight 148 kg (326 lb 4.5 oz), SpO2 94 %.   Pain:  Pain Scale 1: FLACC  Pain Intensity 1: 2  Pain Onset 1: chronic  Pain Location 1: Back  Pain Orientation 1: Lower  Pain Description 1: Aching, Constant  Pain Intervention(s) 1: Medication (see MAR)  Last bowel movement:     Constitutional: obese, sedated, intubated  Eyes: pupils equal, anicteric  ENMT: no nasal discharge, moist mucous membranes  Cardiovascular: regular rhythm, distal pulses intact  Respiratory: breathing not labored, symmetric  Gastrointestinal: soft non-tender, +bowel sounds  Musculoskeletal: no deformity, no tenderness to palpation  Skin: warm, dry  Neurologic: sedated  Not following commands  Psychiatric:   Other:       HISTORY:     Principal Problem:    Sepsis ([address] (2/11/2018)    Active Problems:    Osteoarthritis of right knee (1/18/2014)      Hypokalemia (2/11/2018)      Glucosuria (2/11/2018)      AMANDA (acute kidney injury) ([address] (2/11/2018)      Fall (2/11/2018)      Back pain (2/11/2018)      Weakness of right lower extremity (2/11/2018)      Anemia (2/11/2018)      Shock ([address] (2/13/2018)      Acute respiratory failure (HCC) (2/13/2018)      Elevated troponin (2/12/2018)      Demand ischemia of myocardium ([address] (2/12/2018)      MRSA bacteremia (2/15/2018)      Past Medical History:   Diagnosis Date    Arrhythmia     irregular heart beat    Arthritis     knees- osteo    Chronic pain     knees    Diabetes (HCC) 1990's    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[address] 10/21/2014    GERD (gastroesophageal reflux disease)     well controlled    Gout     High cholesterol     Hypertension 1990's    Irregular heart beat     palpitations    Morbid obesity (HCC)     Osteoarthritis of right knee 1/18/2014    Other ill-defined conditions(799.89)     asbestosis    Other ill-defined conditions(799.89)     irregular heart beat    Other ill-defined conditions(799.89)     h/o migraines    Other ill-defined conditions(799.89)     gout    Other ill-defined conditions(799.89)     cholesterol    PUD (peptic ulcer disease) 1970's    Tinnitus of left ear       Past Surgical History:   Procedure Laterality Date    HX CARPAL TUNNEL RELEASE      HX KNEE ARTHROSCOPY  2010    left    HX KNEE ARTHROSCOPY  1980s    right    HX KNEE REPLACEMENT      HX ORTHOPAEDIC  1970s    right hand tendenitis    HX ORTHOPAEDIC  2012    left hand ring finger released    HX ORTHOPAEDIC  8-2013    left carpal    TOTAL KNEE ARTHROPLASTY  2009/2010    left      No family history on file. History reviewed, no pertinent family history.   Social History   Substance Use Topics    Smoking status: Former Smoker     Quit date: 10/1/1975    Smokeless tobacco: Never Used      Comment: 1970s    Alcohol use 1.5 oz/week     3 Shots of liquor per week     Allergies   Allergen Reactions    Bees [Hymenoptera Allergenic Extract] Anaphylaxis    Cocoa Butter-Zinc Oxide Rash    Dilaudid [Hydromorphone (Bulk)] Rash     capsules    Pcn [Penicillins] Rash      Current Facility-Administered Medications   Medication Dose Route Frequency    potassium chloride (KAON 10%) 20 mEq/15 mL oral liquid 10 mEq  10 mEq Per G Tube DAILY    insulin glargine (LANTUS) injection 18 Units  18 Units SubCUTAneous DAILY WITH LUNCH    cefTARoline (TEFLARO) 600 mg in 0.9% sodium chloride (MBP/ADV) 50 mL MBP  600 mg IntraVENous Q12H    ipratropium (ATROVENT) 0.02 % nebulizer solution 0.5 mg  0.5 mg Nebulization Q6H RT    metoprolol (LOPRESSOR) injection 5 mg  5 mg IntraVENous Q6H    insulin lispro (HUMALOG) injection 5 Units  5 Units SubCUTAneous Q6H    ELECTROLYTE REPLACEMENT PROTOCOL -- Potassium  1 Each Other PRN    ELECTROLYTE REPLACEMENT PROTOCOL -- Magnesium  1 Each Other PRN    ELECTROLYTE REPLACEMENT PROTOCOL -- Phosphorus  1 Each Other PRN    ELECTROLYTE REPLACEMENT PROTOCOL -- Calcium  1 Each Other PRN    propofol (DIPRIVAN) infusion  0-50 mcg/kg/min IntraVENous TITRATE    fentaNYL (PF)  mcg/30 ml  mcg/hr IntraVENous TITRATE    chlorhexidine (PERIDEX) 0.12 % mouthwash 10 mL  10 mL Oral Q12H    amiodarone (CORDARONE) 450 mg in dextrose 5% 250 ml infusion  0.5-1 mg/min IntraVENous TITRATE    acetaminophen (TYLENOL) suppository 650 mg  650 mg Rectal Q6H PRN    hydrALAZINE (APRESOLINE) 20 mg/mL injection 10 mg  10 mg IntraVENous Q6H PRN    0.9% sodium chloride infusion 250 mL  250 mL IntraVENous PRN    heparin (porcine) 1,000 unit/mL injection 1,500 Units  1,500 Units IntraVENous PRN    acetaminophen (TYLENOL) solution 650 mg  650 mg Oral Q4H PRN    aspirin chewable tablet 81 mg  81 mg Oral DAILY    pantoprazole (PROTONIX) 40 mg in sodium chloride 0.9 % 10 mL injection  40 mg IntraVENous DAILY    naloxone (NARCAN) injection 0.4 mg  0.4 mg IntraVENous PRN    docusate sodium (COLACE) capsule 100 mg  100 mg Oral BID PRN    acetaminophen (TYLENOL) tablet 650 mg  650 mg Oral N2K PRN    folic acid (FOLVITE) tablet 1 mg  1 mg Oral DAILY    thiamine 100 mg in 50 mL NS   IntraVENous Q24H    heparin 25,000 units in D5W 250 ml infusion  7-25 Units/kg/hr IntraVENous TITRATE    insulin lispro (HUMALOG) injection   SubCUTAneous Q6H    sodium chloride (NS) flush 5-10 mL  5-10 mL IntraVENous PRN    ondansetron (ZOFRAN ODT) tablet 4 mg  4 mg Oral Q4H PRN    glucose chewable tablet 16 g  4 Tab Oral PRN    glucagon (GLUCAGEN) injection 1 mg  1 mg IntraMUSCular PRN    dextrose (D50W) injection syrg 12.5-25 g  25-50 mL IntraVENous PRN        LAB AND IMAGING FINDINGS:     Lab Results   Component Value Date/Time    WBC 12.9 02/19/2018 05:15 AM    HGB 8.5 (L) 02/19/2018 05:15 AM    PLATELET 927 04/30/2536 05:15 AM     Lab Results   Component Value Date/Time    Sodium 143 02/20/2018 04:00 AM    Potassium 3.8 02/20/2018 04:00 AM    Chloride 108 02/20/2018 04:00 AM    CO2 27 02/20/2018 04:00 AM    BUN 33 (H) 02/20/2018 04:00 AM    Creatinine 1.39 (H) 02/20/2018 04:00 AM    Calcium 8.6 02/20/2018 04:00 AM Magnesium 1.9 02/20/2018 04:00 AM    Phosphorus 3.3 02/19/2018 05:15 AM      Lab Results   Component Value Date/Time    AST (SGOT) 48 (H) 02/20/2018 04:00 AM    Alk.  phosphatase 215 (H) 02/20/2018 04:00 AM    Protein, total 6.2 (L) 02/20/2018 04:00 AM    Albumin 1.7 (L) 02/20/2018 04:00 AM    Globulin 4.5 (H) 02/20/2018 04:00 AM     Lab Results   Component Value Date/Time    INR 1.1 02/19/2018 05:15 AM    Prothrombin time 13.5 02/19/2018 05:15 AM    aPTT 116.4 (H) 02/20/2018 06:20 AM      No results found for: IRON, FE, TIBC, IBCT, PSAT, FERR   No results found for: PH, PCO2, PO2  No components found for: [place]   Lab Results   Component Value Date/Time     02/18/2018 04:05 AM    CK - MB <0.5 (L) 08/02/2015 11:47 PM              Total time: 15  Counseling / coordination time, spent as noted above 12 min  > 50% counseling / coordination        Papa Tirado MD  Palliative Medicine

## 2018-02-20 NOTE — PROGRESS NOTES
Bedside shift change report received from BALA Henley (offgoing nurse). Report included the following information SBAR, Kardex, Intake/Output, MAR and Recent Results     1945 Shift assessment completed, see EMR    0030. Reassessment completed, see EMR    0430 Reassessment completed, see EMR    0545 CHG bath completed. Bedside and Verbal shift change report given to BALA Shine RN (oncoming nurse)included the following information SBAR, Kardex, Intake/Output, MAR and Recent Results.

## 2018-02-20 NOTE — PROGRESS NOTES
Nephrology Progress note    Subjective:     Missy David is a 57 y.o. male with history of hypertension, diabetes, hyperlipidemia, gout and arthritis who presented with a fever and multiple falls. On initial evaluation, he had borderline low blood pressure, and normal white cell count, but elevated creatinine at 2.5. Blood culture was positive and started on broad spectrum antibiotics, on IV fluid and IV pressors. A culture finally came back pos for MRSA. His preadmission baseline creatinine back in 2015 was 1.2. On admission, it was 2.5. It continued to increase and it jumped up to 4.06. Of note, the patient also had slightly elevated CPK level on admission, which is trending down now. CAT scan did not show any significant finding as a possible source. Echocardiography was negative for valvular vegetation. Patient is currently intubated in ICU on pressors. He is also sedated. He was initiated on HD 2/14 and repeated 2/15. Now off HD  Remains on vent. Good Urine output. S Cr down to 1.39 today.       Admit Date: 2/11/2018    Allergy:  Allergies   Allergen Reactions    Bees [Hymenoptera Allergenic Extract] Anaphylaxis    Cocoa Butter-Zinc Oxide Rash    Dilaudid [Hydromorphone (Bulk)] Rash     capsules    Pcn [Penicillins] Rash        Objective:     Visit Vitals    /65    Pulse 86    Temp 98.7 °F (37.1 °C)    Resp (!) 33    Ht 6' (1.829 m)    Wt 148 kg (326 lb 4.5 oz)    SpO2 94%    BMI 44.25 kg/m2         Intake/Output Summary (Last 24 hours) at 02/20/18 1029  Last data filed at 02/20/18 0800   Gross per 24 hour   Intake           2799.8 ml   Output             2110 ml   Net            689.8 ml       Physical Exam:       General: On Vent   HENT: Atraumatic and normocephalic   Eyes: Normal conjunctiva   Neck: Supple    Cardiovascular: Normal S1 & S2, no m/r/g   Pulmonary/Chest Wall: Clear to auscultation bilaterally   Abdominal: Soft and non-tender   Musculoskeletal: + edema   Neurological: sedated       Data Review:      Lab Results   Component Value Date/Time    WBC 12.9 02/19/2018 05:15 AM    RBC 2.87 (L) 02/19/2018 05:15 AM    HCT 26.0 (L) 02/19/2018 05:15 AM    MCV 90.6 02/19/2018 05:15 AM    MCH 29.6 02/19/2018 05:15 AM    MCHC 32.7 02/19/2018 05:15 AM    RDW 18.3 (H) 02/19/2018 05:15 AM      No results found for: IRONNo components found for: FERRITIN  No components found for: PTHINT  Urinalysis  No results found for: UGLU        Impression:     1. Acute kidney injury, likely acute tubular necrosis from septic shock. Creatinine trended up from 2.5 on admission to 4.06. Patient was oliguric and had first HD 2/14. Now with good Urine output and Cr down to 1.39 today. AMANDA resolving. 2.  Methicillin-resistant Staphylococcus aureus septic shock, unclear source, oral antibiotics, IV pressor, IV fluid. 3. Hypophosphatemia, corrected. 4. Hypokalemia, repleted  5. Acidosis, mixed respiratory and metabolic, improved. 6.  Mild rhabdomyolysis, improving with a CK down to 647 from high of 3300.  7. Anemia. 8.  Thrombocytopenia  9. Diabetes mellitus. 10.  Morbid obesity.    11.  Respiratory failure, intubated and remains on MV.       Plan:   Continue supportive care, Vent, antibiotics,Nutrition  Weaning vent per Pulmonologist  No indication for further HD  Monitor I/0's  Monitor chemistry  Hydralazine prn      MD Blas Mercado  [phone]

## 2018-02-20 NOTE — PROGRESS NOTES
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 XXXA    Back pain M54.9    Weakness of right lower extremity R29.898    Anemia D64.9    Shock ([address] R57.9    Acute respiratory failure (HCC) J96.00    Elevated troponin R74.8    Demand ischemia of myocardium (HCC) I24.8    MRSA bacteremia R78.81 ·   MRSA Sepsis with persistent bacteremia- Source Related to air space disease vs as CT showed concern for septic emboli? TERRY negative for vegetation. Spine MRI is negative also. Neck, abdomena and pelvis CT and ext CT without evidence of infection or abscess. Few skin folliculitis lesion bl LE but too small and superficial to be source. Blood culture still is positive  and  cultures. Now on Teflaro, ID following  · Septic shock resolved and off pressors  · Acute respiratory failure- intubated on  extubated on , reintubated on  on hospital vent protocol  · HTN - medications adjusted by hospitalist  · Elevated troponin ?  Demand ischemia vs NSTMI   · Acute renal failure S/P HD doing well with improvement in Cr and now off of HD, HD cath removed 18  · Rhabdo improved  · Afib, rate controlled S/P cardioversion and on amiodarone, Metoprolol and heparin drip  · Back pain, fall - no absess or open wound   · Severe hypokalemia hypomagnesemia on presentation- improved and now on replacements  · Elevated blood sugars improving control  · Lactic acidosis resolved  · H/O ETOH    PLAN:  -- Mech. Ventilated patients- aim to keep peak plateau pressure less than or equal to 30cm H2O. Titrate FiO2 for goal SPO2> 90%  -- VAP prevention bundle, head of the bed at 30' all times  -- Daily sedation holiday and assessment for weaning with SBT as tolerated. -- Start Ativan and stop Propofol and if possible Fentanyl  -- Spoke with ID. Nuclear scan ordered per discussion. -- Repeat blood culture after 2 days on Teflaro. -- Teflaro as tolerated. Patient has PCN allergy. Tolerating well. -- If repeat cultures positive and nuclear scan negative then may need line holiday followed by PICC line. -- Amiodarone and Metoprolol for Afib per cardiology  -- Off of duoneb and continue atrovent due to afib and tachycardia previously  -- Monitor TG and Lipase periodically if remains on propofol. -- NA improved with free water flush. UO better and not on HD  -- Adjust BP meds for control of blood pressure  -- K and MG replacement per protocol    -- Increase lantus to 22 unit and humalog 5 units with feeding plus ISSC  -- HB stable, recheck in AM  -- MRSA isolation   CVS:Currently on Amiodarone drip, Heparin drip, follow per cardiology recommendations. S/P cardioversion with 200 j on 2/13  Adjust BP medications per hospitalist. Keep SBP <160 mm Hg  On aspirin  RS: as above. Lung protective vent protocol, Aspiration precatuion  ID:Sepsis source?, MRSA on multiple cultures   Vanco stopped after day 8 [2/19/18]. Persistent bacteremia. Now on Teflaro  Azactam 2/11 stop on 2/14  levaquin 2/12  Stop on 2/15  Monitor skin lesions - no changes compared to previously   ENDO: TSH is ok. On Lantus, Humalog  GI: feeds as tolerated, PPI, relatively stable bilirubin, due to stasis from sepsis- follow up   RENAL:Wilson for I/O, Replace k and MG per protocol.  Patient is putting out urine, Off HD,  dialysis per renal  CNS: Sedated intubated wakes up with sedation holiday in AM  HEMATOLOGY:PLT is stable, Heparin drip for Afib   MUSCULOSKELETAL:CK was elevated but improved  · PAIN AND SEDATION: On propofol and fentanyl, Thiamine and folic acid  · Skin/Wound: Monitor back, No skin absess  · Electrolytes: As above   · IVF: Pranav Rodriguez   · Nutrition: Tube feeds   · Prophylaxis: DVT Prophylaxis with Heparin,. GI Prophylaxis. · Restraints: minimal restrain to avoid pulling lines and tubes  · PT/OT eval and treat. OOB when appropriate. · Lines/Tubes: none. Femoral line- 2/12 removed on 2/15. Wilson 2/12/18. Right IJ dialysis catheter 2/14- removed 2/19, Left subclavian line 2/15/18  ADVANCE DIRECTIVE:Full code/ Palliative is consulted spoke with wife at length  FAMILY DISCUSSION:spoke with family  Quality Care: PPI, DVT prophylaxis, HOB elevated, Infection control all reviewed and addressed. Events and notes from last 24 hours reviewed. Care plan discussed with nursing CC TIME: 39  min excluding procedure/s   · Labs and images personally seen and available reports reviewed. · All current medicines are reviewed and doses and prescription adjusted. Subjective:  2/20/18  Patient on ventilator, sedated. On sedation holiday wakes up. Low grade fever overnight. Hemodynamically stable. Repeat bl cx from 2/18 positive for MRSA  Tolerating Teflaro without any rash or adverse effect. ECHO was done on 2/18, spoke with Dr. Manoj Lyons and no repeat ECHO needed at present. Plan for tagged abscess location scan. Tolerating TF. Good UO. Tolerating Heparin without any bleeding. 2/19/18  Patient remained on ventilator and sedated. No fever. Hemodynamically stable, rather on hypertensive side, hospitalist adjusting meds. Repeat bl cultures from 2/18/18 both set positive for GPC.   Spoke with Dr. Manoj Lyons and he recommended change to Teflaro and follow up ECHO  HD catheter removed by staff per renal recommendations  No plans for HD per renal  Tolerating TF well and adjusted per protocol by staff  The staff denies abnormal bruising or abnormal bleeding from any body orifice such as bleeding from nose or gums, blood in urine or stool, or melena, hemoptysis or hematemesis. 2/18  Intubated again for work of [address] [place] [address] on 2/17  CT ext, ABD pel, Neck all came out ok no source of abscess  No fever overnight  BP is ok  ON /14/50% peep 5 abg ok  Cr is improving with good urine out put  Renal not planning any dialysis at this point    2/17/18  Was extubated on 2/16  Did reasonably well yesterday  Overnight still had fever  Early am was breathing little fast. As per nursing was awake and following command but received morphine now not waking up much  Put out 3300 urine  K 2.9 only replaced by 10 meq  Mg 1.5  PH 7.56/34/30/107  Currently on BIPAP still breathing little fast    2/16  Doing well   Yesterday started noticing some sking lesions all over body looks like septic staph infection  Nothing to suggest allergic rash  Put out about 1400cc yesterday  K 2.9 and replaced  Cr improved to 2.22 s/p hd yesterday  Mild elevation of billirubin to 1.9   HB and PLT is stable   Sputum MRSA    2/15  Doing ok  No overnight event   S/P HD  Also put out 900 cc of urine in last 24 hrs  HB dropped to 7.5 but no active bleed  PLT is 129 INR is 1.6 K 3.0   Alb 1.5     2/14  No significant overnight event  S/P TERRY negative for vegetation or blood clot  S/P cardioversion  ID evaluated suggested MSSA sepsis but initial culture is growing MRSA. Am labs showed worsening cr to 4.06 and PH 7.26  ALB 1.5  Currently on phenylephring 100, versed, amiodarone, fentanyl and bicarb drip    2/13/18  Yesterday became more tachypnic and difficult to control hr  Intubated 2/12, femoral line 2/12 <neck line not placed due to concern for colonization with ongoing sepsis>  Became hypotensive managed with phenylephrine  Afib was poorly controlled started on amiodarone drip.   Cardiology is closely following  Blood culture Staph  K 3.2 replaced  Mg 1.1 getting replaced   UO and Cr is improving  Still spiking fever 102   Flu is negative       History:   Dave Silver is a 57 y.o. male who came to the ed with complaints of fall. Patient states he has been feeling generalized weakness at home for the last day or so with low grade subjective fevers but he noted RLE weakness around 5pm. Since then he has fallen about 4 times in his back without any head trauma or LOC. He has been using his tripod cane to assist him with walking. Recently about 1 month a go treated for pna at office. Ex smoker. Drinks about 2 glasses of mix drinks every day. Recently lost his son to multiorgan failure and sepsis. Due to persistence of his weakness, falls, fevers and low back pain he decided to come to the ED. In the ED, CT of the head was neg and his labs showed significant hypoK with elevated Cr (unknown baseline). He was also febrile and tachycardiac. He was started on broad spectrum Abx.  Overnight RRT called out after he was found to be tachycardic, tachypnic and ABG showed alkalosis and was transferred to ICU        Current Facility-Administered Medications   Medication Dose Route Frequency    potassium chloride (KAON 10%) 20 mEq/15 mL oral liquid 10 mEq  10 mEq Per G Tube DAILY    insulin glargine (LANTUS) injection 22 Units  22 Units SubCUTAneous DAILY WITH LUNCH    amiodarone (NEXTERONE) 360 mg in dextrose 200 mL (1.8 mg/mL) infusion  0.5-1 mg/min IntraVENous TITRATE    cefTARoline (TEFLARO) 600 mg in 0.9% sodium chloride (MBP/ADV) 50 mL MBP  600 mg IntraVENous Q12H    ipratropium (ATROVENT) 0.02 % nebulizer solution 0.5 mg  0.5 mg Nebulization Q6H RT    metoprolol (LOPRESSOR) injection 5 mg  5 mg IntraVENous Q6H    insulin lispro (HUMALOG) injection 5 Units  5 Units SubCUTAneous Q6H    propofol (DIPRIVAN) infusion  0-50 mcg/kg/min IntraVENous TITRATE    fentaNYL (PF)  mcg/30 ml   mcg/hr IntraVENous TITRATE    chlorhexidine (PERIDEX) 0.12 % mouthwash 10 mL  10 mL Oral Q12H    aspirin chewable tablet 81 mg  81 mg Oral DAILY    pantoprazole (PROTONIX) 40 mg in sodium chloride 0.9 % 10 mL injection  40 mg IntraVENous DAILY    folic acid (FOLVITE) tablet 1 mg  1 mg Oral DAILY    thiamine 100 mg in 50 mL NS   IntraVENous Q24H    heparin 25,000 units in D5W 250 ml infusion  7-25 Units/kg/hr IntraVENous TITRATE    insulin lispro (HUMALOG) injection   SubCUTAneous Q6H         Objective:   Vital Signs:    Visit Vitals    /50    Pulse 81    Temp 100.1 °F (37.8 °C)    Resp (!) 32    Ht 6' (1.829 m)    Wt 148 kg (326 lb 4.5 oz)    SpO2 96%    BMI 44.25 kg/m2       O2 Device: Endotracheal tube, Ventilator   O2 Flow Rate (L/min): 6 l/min   Temp (24hrs), Av.6 °F (37 °C), Min:97.6 °F (36.4 °C), Max:100.1 °F (37.8 °C)       Intake/Output:   Last shift:       07 - 1900  In: 45   Out: 150 [Urine:150]  Last 3 shifts:  190 -  0700  In: 5108.8 [I.V.:2757.8]  Out: 8168 [Urine:2885]    Intake/Output Summary (Last 24 hours) at 18 1201  Last data filed at 18 0800   Gross per 24 hour   Intake           2799.8 ml   Output             1510 ml   Net           1289.8 ml       Review of Systems:  Intubated sedated   Review of systems not obtained due to patient factors. Physical Exam:  General: in no respiratory distress and acyanotic, appears older than stated age, on ventilator  HEENT: PERRL, orally intubated  Neck: No abnormally enlarged lymph nodes or thyroid, supple  Chest: normal  Lungs: moderate air entry and rhonchi, normal percussion bilaterally, no tenderness/ rash  Heart: Regular rate and rhythm, S1S2 present or without murmur or extra heart sounds  Abdomen: obese, bowel sounds normoactive, tympanic, abdomen is soft without significant tenderness, masses, organomegaly or guarding  Extremity: Trace, pitting and bl ankle edema, negative for cyanosis, clubbing.   Neuro: sedated, moves all extremities well, no involuntary movements, exam limitations  Skin: Skin color, texture, turgor fair. Leg bl folliculitis rashes or lesions unchanged      CBC w/Diff Recent Labs      02/19/18   0515  02/18/18   0405   WBC  12.9  10.7   RBC  2.87*  2.95*   HGB  8.5*  8.8*   HCT  26.0*  25.9*   PLT  405  363        Chemistry Recent Labs      02/20/18   0400  02/20/18   0130  02/19/18   1640  02/19/18   0515  02/18/18   1705  02/18/18   1015  02/18/18   0405   GLU  186*   --    --   200*   --   198*  179*   NA  143   --    --   143   --   145  149*   K  3.8  3.6  3.3*  3.2*  3.2*  3.2*  3.2*   CL  108   --    --   108   --   107  110*   CO2  27   --    --   26   --   31  28   BUN  33*   --    --   37*   --   44*  45*   CREA  1.39*   --    --   1.59*   --   1.95*  1.99*   CA  8.6   --    --   8.2*   --   8.3*  8.6   MG  1.9  1.9  1.7  1.5*   --    --   1.9   PHOS   --    --    --   3.3  3.5   --   2.3*   AGAP  8   --    --   9   --   7  11   BUCR  24*   --    --   23*   --   23*  23*   AP  215*   --    --   181*   --   122*  113   TP  6.2*   --    --   5.5*   --   5.5*  5.7*   ALB  1.7*   --    --   1.5*   --   1.6*  1.6*   GLOB  4.5*   --    --   4.0   --   3.9  4.1*   AGRAT  0.4*   --    --   0.4*   --   0.4*  0.4*        Lactic Acid Lactic acid   Date Value Ref Range Status   02/13/2018 1.8 0.4 - 2.0 MMOL/L Final     No results for input(s): LAC in the last 72 hours.      Micro  Recent Labs      02/18/18   0405  02/18/18   0400  02/16/18   0508   CULT  AEROBIC BOTTLE **METHICILLIN RESISTANT STAPHYLOCOCCUS AUREUS**  AEROBIC BOTTLE **METHICILLIN RESISTANT STAPHYLOCOCCUS AUREUS**  AEROBIC BOTTLE **METHICILLIN RESISTANT STAPHYLOCOCCUS AUREUS  For Susceptibility Refer to Culture  [id number]    AEROBIC BOTTLE **METHICILLIN RESISTANT STAPHYLOCOCCUS AUREUS  For Susceptibility Refer to Culture  [id number]       Recent Labs      02/13/18   0858  02/13/18   0315  02/13/18   0310  02/12/18   0900  02/11/18   1846   CULT PENDING  AEROBIC BOTTLE STAPHYLOCOCCUS AUREUS*  AEROBIC BOTTLE STAPHYLOCOCCUS AUREUS*  NO GROWTH AFTER 20 HOURS  AEROBIC AND ANAEROBIC BOTTLES **METHICILLIN RESISTANT STAPHYLOCOCCUS AUREUS  CALLED TO AND CORRECTLY REPEATED BY:  Dieter MACHADO RN ICU [facility] [address] AT 0825 ON 2/14/18. ABG Recent Labs      02/20/18   0602  02/19/18   0520  02/18/18   0523   PHI  7.421  7.468*  7.498*   PCO2I  37.5  33.4*  35.8   PO2I  64*  97  100   HCO3I  24.5  24.1  27.7*   FIO2I  40  50  50        Liver Enzymes Protein, total   Date Value Ref Range Status   02/20/2018 6.2 (L) 6.4 - 8.2 g/dL Final     Albumin   Date Value Ref Range Status   02/20/2018 1.7 (L) 3.4 - 5.0 g/dL Final     Globulin   Date Value Ref Range Status   02/20/2018 4.5 (H) 2.0 - 4.0 g/dL Final     A-G Ratio   Date Value Ref Range Status   02/20/2018 0.4 (L) 0.8 - 1.7   Final     AST (SGOT)   Date Value Ref Range Status   02/20/2018 48 (H) 15 - 37 U/L Final     Alk.  phosphatase   Date Value Ref Range Status   02/20/2018 215 (H) 45 - 117 U/L Final     Recent Labs      02/20/18   0400  02/19/18   0515  02/18/18   1015   TP  6.2*  5.5*  5.5*   ALB  1.7*  1.5*  1.6*   GLOB  4.5*  4.0  3.9   AGRAT  0.4*  0.4*  0.4*   SGOT  48*  54*  46*   AP  215*  181*  122*        Cardiac Enzymes No results found for: CPK, CK, CKMMB, CKMB, RCK3, CKMBT, CKNDX, CKND1, LESLIE, TROPT, TROIQ, BELLA, TROPT, TNIPOC, BNP, BNPP     BNP No results found for: BNP, BNPP, XBNPT     Coagulation Recent Labs      02/20/18   0620  02/19/18   0515  02/18/18   0405   PTP   --   13.5   --    INR   --   1.1   --    APTT  116.4*  82.3*  94.9*         Thyroid  Lab Results   Component Value Date/Time    TSH 0.43 02/12/2018 10:05 AM          Lipid Panel Lab Results   Component Value Date/Time    Cholesterol, total 116 02/11/2018 08:45 PM    HDL Cholesterol 27 (L) 02/11/2018 08:45 PM    LDL, calculated 57.4 02/11/2018 08:45 PM    VLDL, calculated 31.6 02/11/2018 08:45 PM    Triglyceride 278 (H) 02/19/2018 05:15 AM CHOL/HDL Ratio 4.3 02/11/2018 08:45 PM          Urinalysis Lab Results   Component Value Date/Time    Color DARK YELLOW 02/11/2018 10:45 PM    Appearance CLOUDY 02/11/2018 10:45 PM    Specific gravity 1.019 02/11/2018 10:45 PM    pH (UA) 5.5 02/11/2018 10:45 PM    Protein 300 (A) 02/11/2018 10:45 PM    Glucose >1000 (A) 02/11/2018 10:45 PM    Ketone TRACE (A) 02/11/2018 10:45 PM    Bilirubin NEGATIVE  02/11/2018 10:45 PM    Urobilinogen 1.0 02/11/2018 10:45 PM    Nitrites NEGATIVE  02/11/2018 10:45 PM    Leukocyte Esterase NEGATIVE  02/11/2018 10:45 PM    Epithelial cells 0 01/08/2014 02:46 PM    Bacteria 2+ (A) 02/11/2018 10:45 PM    WBC 0 to 2 02/11/2018 10:45 PM    RBC 0 to 2 02/11/2018 10:45 PM        XR (Most Recent). CXR reviewed by me and compared with previous CXR   Results from Hospital Encounter encounter on 02/11/18   XR CHEST PORT   Narrative EXAM: Portable AP upright chest, one view    INDICATION: Respiratory failure. Every morning while intubated. COMPARISON: 02/18/2018    _______________    FINDINGS:    ETT is 4.6 cm above the lorena, right jugular central venous catheter projects  at the distal SVC, subclavian central venous catheter projects at the innominate  confluence, nasogastric tube is present at least to the EG junction and below  the range of imaging. Cardiac silhouette is slightly prominent. Reticular interstitial markings are  present throughout both lungs. More confluent markings are present at the  bilateral lung bases along with obscuration of the left costophrenic angle,  without significant changes. _______________         Impression IMPRESSION:      1. Pulmonary vascular congestion/fluid overload without change. 2. Bibasilar atelectasis plus or minus infiltrate and probable small left  pleural effusion, also unchanged.            CT (Most Recent)   Results from Hospital Encounter encounter on 02/11/18   CT NECK SOFT TISSUE WO CONT   Narrative CT soft tissue of the neck without contrast.    INDICATION: Sepsis workup. Persistent positive blood cultures with unknown  source. COMPARISON: Neck CT from 8/3/2015. TECHNIQUE:Helically acquired and axial images were obtained from the posterior  fossa to the thoracic inlet without IV contrast. IV contrast not administered  due to recent history of acute renal injury. One or more dose reduction techniques were used on this CT: automated exposure  control, adjustment of the mAs and/or kVp according to patient's size, and  iterative reconstruction techniques. The specific techniques utilized on this CT  exam have been documented in the patient's electronic medical record. FINDINGS:   Endotracheal and enteric tubes are partially visualized. There is a right  internal jugular central venous catheter which is partially visualized. There is  a left subclavian central venous catheter which is partially visualized. Visualized intracranial structures are unremarkable. There is mucosal thickening of the left maxillary sinus and an air-fluid level  within the right maxillary sinus. There is no cervical lymphadenopathy. No mass identified within the limitations  of this noncontrast exam. The thyroid has unremarkable appearance. There is patchy opacity at the lung apices which may represent  pleural-parenchymal scarring. There are multilevel degenerative changes of the cervical spine without acute  osseous abnormality. Impression IMPRESSION:      1. Tubes and lines as described. 2. Mild inflammatory changes within the maxillary sinuses which may be related  to intubation. There is no large abscess or other findings to suggest an occult  source of infection. EKG No results found for this or any previous visit. ECHO  2/12/18 [facility] SUMMARY:  Left ventricle: Systolic function was normal. Ejection fraction was estimated   in the range of 55 % to 60 %. There was  mild hypokinesis of the basal inferior wall(s).  There was moderate concentric   hypertrophy. Right ventricle: The size was normal. Systolic function was normal.    Inferior vena cava, hepatic veins: The inferior vena cava was dilated. The   respirophasic change in diameter was more  than 50%. MRI spine:   2. Prior left L5 laminotomy, partial left facetectomy and possibly previous  partial L4-5 discectomy. Minimal left asymmetric disc bulge but no evidence of  recurrent disc herniation or retained disc fragment.     3. Minimal L3-4 disc bulge and annular fissure.     4. Multilevel mostly mild facet joint osteoarthritis. Greatest and mild to  moderate level right L4-5 facet joint osteoarthritis with moderate-sized facet  joint effusion and synovitis. No adjacent periarticular marrow or soft tissue  edema to definitively suggest superimposed infective/septic arthritis.     5. Unremarkable upper sacroiliac joints though incompletely included. Mild body  wall edema.   ah. Imaging:  I have personally reviewed the patients radiographs and have reviewed the reports:                  Darrell Coppola M.D.   Pulmonary Critical Care & Sleep Medicine

## 2018-02-20 NOTE — DIABETES MGMT
GLYCEMIC CONTROL & NUTRITION:    - Discussed in rounds, known h/o DM, on dual oral therapy at home, current A1C 5.8%  - pt was re-intubated over the weekend, TF restarted, at goal (35 ml/hr)  - BG not within target range   - recommend increas basal dose to Lantus 22 units every day and continue current dose of scheduled dose of Humalog 5 units q 6 hours, continue corrective coverage q 6 hours as well, make sure scheduled mealtime dose is held if TF paused  - pt received 44 units TDD of insulin yesterday (18 Lantus, 26 Humalog - 20 scheduled, 6 corrective)  - see Clinical RD notes for full nutrition assessment    Recent Glucose Results:   Lab Results   Component Value Date/Time     (H) 02/20/2018 04:00 AM    GLUCPOC 165 (H) 02/20/2018 11:28 AM    GLUCPOC 181 (H) 02/20/2018 06:50 AM    GLUCPOC 203 (H) 02/20/2018 12:20 AM     Lab Results   Component Value Date/Time    Hemoglobin A1c 5.8 (H) 02/11/2018 06:45 PM               Burke Mccarty, MPH, RD, CDE

## 2018-02-20 NOTE — PROGRESS NOTES
Hospitalist Progress Note    Patient: Radha Brady MRN: [id number]  CSN: [id number]    YOB: 1955  Age: 57 y.o. 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 XXXA    Back pain M54.9    Weakness of right lower extremity R29.898    Anemia D64.9    Shock ([facility]) R57.9    Acute respiratory failure ([facility]) J96.00    Elevated troponin R74.8    Demand ischemia of myocardium ([facility]) I24.8    MRSA bacteremia R65.80        58 yo male admitted for pneumonia, respiratory distress. RRT called on this patient for respiratory distress in morning of 02/12/2018, high mews score of 8, fever of 103, tachycardia, resp rate of 44. He was transferred to ICU. Patient continued to decline in condition with elevated resp rate. He was intubated and central line placed. CRITICAL CARE PLAN         Resp -   Acute respiratory failure - extubated 02/16/2018. Patient reintubated 02/17/2018     ID -   Sepsis unclear source of infection. Recent pneumonia. Blood cultures 02/11/2018 positive for MRSA. blood cx 2/2 02/13/2018 MRSA. blood cultures 2/2 02/14/2018 MRSA. blood cultures 2/2 02/16/2018 MRSA  Follow blood cultures 2/2 02/18/2018  resp cultures 02/13/2018 MRSA  Urine culture 02/12/2018 staph aureus  CT chest Multifocal peripheral bilateral subpleural nodular densities the largest 15  mm right apex, which may represent peripheral areas of pneumonia or other  inflammatory etiology, although differential diagnosis includes less likely  metastatic disease or infarcts from pulmonary emboli.   CT of ext and neck with no source of infection. No MRI evidence of osteomyelitis and discitis. S/p TERRY with no evidence of vegetations. ANTIBIOTICS not responding to vancomycin, changes to teflaro. He has pcn allergy as rash. Closely monitor.     [facility] - Monitor HD. Septic shock - off pressors  A-fib - on amiodarone drip. S/p cardioversion. Converted to sinus rhythm. Elevated troponin - demand ischemia vs NSTEMI. Will need ischemia work up when stable per cardiology. On heparin drip     Heme/onc - Follow H&H, plts.      Renal -   AMANDA - improving, nephrology following. Metabolic acidosis - improved with bicarb drip  Trend BUN, Cr, follow I/O. Check and replace Mg, K, phos.     Endocrine -  Follow FSG  DM - on lantus and SSI     Neuro/ Pain/ Sedation -   RLE weakenss/generalized weakness. Head CT negative       GI - NPO, tube feeding.     Prophylaxis - DVT: heparin drip, GI: protonix.      35 minutes of critical care time spent in the direct evaluation and treatment of this high risk patient. The reason for providing this level of medical care for this critically ill patient was due a critical illness that impaired one or more vital organ systems such that there was a high probability of imminent or life threatening deterioration in the patients condition. This care involved high complexity decision making to assess, manipulate, and support vital system functions, to treat this degreee vital organ system failure and to prevent further life threatening deterioration of the patients condition.             Disposition : TBD    Physical Exam:  General: As above  HEENT: NC, Atraumatic. PERRLA, anicteric sclerae. Lungs: CTA Bilaterally. No Wheezing/Rhonchi/Rales.   Heart:  Regular  rhythm,  No murmur, No Rubs, No Gallops  Abdomen: Soft, Non distended, Non tender.  +Bowel sounds,   Extremities: Edema upper and LE bilaterally          Vital signs/Intake and Output:  Visit Vitals    /69    Pulse 89    Temp 98.5 °F (36.9 °C)    Resp (!) 40    Ht 6' (1.829 m)    Wt 148 kg (326 lb 4.5 oz)    SpO2 92%    BMI 44.25 kg/m2     Current Shift:     Last three shifts:  02/18 0701 - 02/19 1900  In: 7028.4 [I.V.:4873.4]  Out: 4608 [Urine:2635]            Labs: Results:       Chemistry Recent Labs      02/19/18   1640  02/19/18   0515  02/18/18   1705  02/18/18   1015  02/18/18   0405   GLU   --   200*   --   198*  179*   NA   --   143   --   145  149*   K  3.3*  3.2*  3.2*  3.2*  3.2*   CL   --   108   --   107  110*   CO2   --   26   --   31  28   BUN   --   37*   --   44*  45*   CREA   --   1.59*   --   1.95*  1.99*   CA   --   8.2*   --   8.3*  8.6   AGAP   --   9   --   7  11   BUCR   --   23*   --   23*  23*   AP   --   181*   --   122*  113   TP   --   5.5*   --   5.5*  5.7*   ALB   --   1.5*   --   1.6*  1.6*   GLOB   --   4.0   --   3.9  4.1*   AGRAT   --   0.4*   --   0.4*  0.4*      CBC w/Diff Recent Labs      02/19/18 0515 02/18/18   0405  02/17/18   0410   WBC  12.9  10.7  8.8   RBC  2.87*  2.95*  2.97*   HGB  8.5*  8.8*  8.9*   HCT  26.0*  25.9*  25.9*   PLT  405  363  297      Cardiac Enzymes Recent Labs      02/18/18   0405  02/17/18   0410   CPK  217  260      Coagulation Recent Labs      02/19/18   0515  02/18/18   0405   PTP  13.5   --    INR  1.1   --    APTT  82.3*  94.9*       Lipid Panel Lab Results   Component Value Date/Time    Cholesterol, total 116 02/11/2018 08:45 PM    HDL Cholesterol 27 (L) 02/11/2018 08:45 PM    LDL, calculated 57.4 02/11/2018 08:45 PM    VLDL, calculated 31.6 02/11/2018 08:45 PM    Triglyceride 278 (H) 02/19/2018 05:15 AM    CHOL/HDL Ratio 4.3 02/11/2018 08:45 PM      BNP No results for input(s): BNPP in the last 72 hours.    Liver Enzymes Recent Labs      02/19/18   0515   TP  5.5*   ALB  1.5*   AP  181*   SGOT  54*      Thyroid Studies Lab Results   Component Value Date/Time    TSH 0.43 02/12/2018 10:05 AM        Procedures/imaging: see electronic medical records for all procedures/Xrays and details which were not copied into this note but were reviewed prior to creation of Plan

## 2018-02-20 NOTE — WOUND CARE
Patient seen during hospital wide pressure injury prevalence. Pt found to have linear tear to gluteal fold, other wise no skin issues noted. Pt requires frequent repositioning. Wound care will continue to monitor during admission.

## 2018-02-20 NOTE — PROGRESS NOTES
Cardiology Progress Note        Patient: Ricco Mauricio        Sex: male          DOA: 2/11/2018  YOB: 1955      Age:  57 y.o.        LOS:  LOS: 8 days   Assessment/Plan     Principal Problem:    Sepsis ([address] (2/11/2018)    Active Problems:    Osteoarthritis of right knee (1/18/2014)      Hypokalemia (2/11/2018)      Glucosuria (2/11/2018)      AMANDA (acute kidney injury) ([address] (2/11/2018)      Fall (2/11/2018)      Back pain (2/11/2018)      Weakness of right lower extremity (2/11/2018)      Anemia (2/11/2018)      Shock ([address] (2/13/2018)      Acute respiratory failure ([address] (2/13/2018)      Elevated troponin (2/12/2018)      Demand ischemia of myocardium ([address] (2/12/2018)      MRSA bacteremia (2/15/2018)        Plan:  SR with PACs  Intubated  Consider stopping heparin and switch SQ Lovenox with adjusted dose for bartolo function  Changes IV amiodarone to Via NG tube 400 mg po BID for 10 days  Change IV metoprolol to via NG tube 25 mg po BID then titrate  I will follow the patient. THX                Subjective:    cc:  intubated    REVIEW OF SYSTEMS: intubated and unable to obtained  Objective:      Visit Vitals    /56    Pulse 85    Temp 99.1 °F (37.3 °C)    Resp 30    Ht 6' (1.829 m)    Wt 148 kg (326 lb 4.5 oz)    SpO2 95%    BMI 44.25 kg/m2     Body mass index is 44.25 kg/(m^2). Physical Exam:  General Appearance: Comfortable  HEENT: TERRANCE. HEAD: Atraumatic  NECK: No JVD, no thyroidomeglay. CAROTIDS:  LUNGS: Clear bilaterally. HEART: S1+S2 audible    ABD: Non-tender, BS Audible    EXT: No edema, and no cysnosis. VASCULAR EXAM: Pulses are intact. MUSCULOSKELETAL: Grossly no joint deformity.     Medication:  Current Facility-Administered Medications   Medication Dose Route Frequency    potassium chloride (KAON 10%) 20 mEq/15 mL oral liquid 10 mEq  10 mEq Per G Tube DAILY    insulin glargine (LANTUS) injection 22 Units  22 Units SubCUTAneous DAILY WITH LUNCH    amiodarone (NEXTERONE) 360 mg in dextrose 200 mL (1.8 mg/mL) infusion  0.5-1 mg/min IntraVENous TITRATE    LORazepam (ATIVAN) 60 mg in 0.9% sodium chloride 60 mL infusion  0-5 mg/kg/hr IntraVENous TITRATE    cefTARoline (TEFLARO) 600 mg in 0.9% sodium chloride (MBP/ADV) 50 mL MBP  600 mg IntraVENous Q12H    ipratropium (ATROVENT) 0.02 % nebulizer solution 0.5 mg  0.5 mg Nebulization Q6H RT    metoprolol (LOPRESSOR) injection 5 mg  5 mg IntraVENous Q6H    insulin lispro (HUMALOG) injection 5 Units  5 Units SubCUTAneous Q6H    ELECTROLYTE REPLACEMENT PROTOCOL -- Potassium  1 Each Other PRN    ELECTROLYTE REPLACEMENT PROTOCOL -- Magnesium  1 Each Other PRN    ELECTROLYTE REPLACEMENT PROTOCOL -- Phosphorus  1 Each Other PRN    ELECTROLYTE REPLACEMENT PROTOCOL -- Calcium  1 Each Other PRN    fentaNYL (PF)  mcg/30 ml   mcg/hr IntraVENous TITRATE    chlorhexidine (PERIDEX) 0.12 % mouthwash 10 mL  10 mL Oral Q12H    acetaminophen (TYLENOL) suppository 650 mg  650 mg Rectal Q6H PRN    hydrALAZINE (APRESOLINE) 20 mg/mL injection 10 mg  10 mg IntraVENous Q6H PRN    0.9% sodium chloride infusion 250 mL  250 mL IntraVENous PRN    heparin (porcine) 1,000 unit/mL injection 1,500 Units  1,500 Units IntraVENous PRN    acetaminophen (TYLENOL) solution 650 mg  650 mg Oral Q4H PRN    aspirin chewable tablet 81 mg  81 mg Oral DAILY    pantoprazole (PROTONIX) 40 mg in sodium chloride 0.9 % 10 mL injection  40 mg IntraVENous DAILY    naloxone (NARCAN) injection 0.4 mg  0.4 mg IntraVENous PRN    docusate sodium (COLACE) capsule 100 mg  100 mg Oral BID PRN    acetaminophen (TYLENOL) tablet 650 mg  650 mg Oral C9Y PRN    folic acid (FOLVITE) tablet 1 mg  1 mg Oral DAILY    thiamine 100 mg in 50 mL NS   IntraVENous Q24H    heparin 25,000 units in D5W 250 ml infusion  7-25 Units/kg/hr IntraVENous TITRATE    insulin lispro (HUMALOG) injection   SubCUTAneous Q6H    sodium chloride (NS) flush 5-10 mL  5-10 mL IntraVENous PRN    ondansetron (ZOFRAN ODT) tablet 4 mg  4 mg Oral Q4H PRN    glucose chewable tablet 16 g  4 Tab Oral PRN    glucagon (GLUCAGEN) injection 1 mg  1 mg IntraMUSCular PRN    dextrose (D50W) injection syrg 12.5-25 g  25-50 mL IntraVENous PRN               Lab/Data Reviewed: All lab results for the last 24 hours reviewed. Recent Labs      02/19/18   0515  02/18/18   0405   WBC  12.9  10.7   HGB  8.5*  8.8*   HCT  26.0*  25.9*   PLT  405  363     Recent Labs      02/20/18   1410  02/20/18   0400  02/20/18   0130   02/19/18   0515   02/18/18   1015   NA   --   143   --    --   143   --   145   K  3.8  3.8  3.6   < >  3.2*   < >  3.2*   CL   --   108   --    --   108   --   107   CO2   --   27   --    --   26   --   31   GLU   --   186*   --    --   200*   --   198*   BUN   --   33*   --    --   37*   --   44*   CREA   --   1.39*   --    --   1.59*   --   1.95*   CA   --   8.6   --    --   8.2*   --   8.3*    < > = values in this interval not displayed.        Signed By: Thor Noble MD     February 20, 2018

## 2018-02-20 NOTE — INTERDISCIPLINARY ROUNDS
CRITICAL CARE INTERDISCIPLINARY ROUNDS     Patient Information:     Name:   Mohit Longo     Age:   57 y.o.     Admission Date:   2/11/2018     Critical Care Day: 10     Surgery Date:  n/a     Attending Provider:   Yecenia Potts MD     Surgeon:  n/a     Consultant(s):  Dr. Judd Goldberg, Dr. Karlene Argueta, Dr. Nirav Chandler Physician:  Dr. Johanny Joseph     Code Status: Full Code     Problem List:          Patient Active Problem List   Diagnosis Code    Osteoarthritis of right knee M17.11    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[address] T84.018A, Z96.659    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 [place]    Back pain M54.9    Weakness of right lower extremity R29.898    Anemia D64.9    Shock ([address] R57.9    Acute respiratory failure ([facility]) J96.00    Elevated troponin R74.8    Demand ischemia of myocardium ([facility]) I24.8    MRSA bacteremia R78.81         Principal Problem:  Sepsis ([address]     During rounds the following quality care indicators and evidence based practices were addressed :  DVT Prophylaxis, PUD Prophylaxis, Pressure Injury Prevention, Sepsis resuscitation and management, Nutritional Status, Critical Care Interventions Airways, Dialysis, Lines and Pressors and Flu Vaccine Wilson Day 8 (M-Care yes) ; Central Line Day: 5 Isolation: contact-MRSA;  Antibiotic Stewardship: Vancomycin     Ventilator Bundle:  Ventilator Bundle Order Set: yes Sedation Vacation n/a; Spontaneous Breathing Trial n/a; Restraints yes (Order, Necessity, Documentation)  Vent Day: 4     Sepsis Order Set: yes or Elements of Sepsis Bundle Reviewed (Fluids, Antibiotics, Cultures, Glycemic control     Glycemic Control:          Recent Labs       02/19/18   0515  02/18/18   1015  02/18/18   0405  02/17/18   2100   GLU  200*  198*  179*  203*   ;        Recent Labs       02/19/18   2442  02/18/18   0343 02/17/18   1448   PHI  7.468*  7.498*  7.511*   PCO2I  33.4*  35.8  36.1   PO2I  97  100  96    Adjustments      Acute MI/PCI:   Not applicable     Door to Balloon: Admission Time: 1810       Heart Failure:    Not applicable      SCIP Measures for other Surgeries:   Not applicable CHG Bath Daily yes     Pneumonia:    Not applicable     Interdisciplinary team rounds were held with the following team membersCare Management, Diabetes Treatment Specialist, Nursing, Nutrition, Pharmacy, Physical Therapy, Physician and Respiratory Therapy.  Plan of care discussed.      Goals of Care/ Recommendations: continue to monitor pt VS, labs, electrolyte replacement protocol  See clinical pathway and/or care plan for interventions and desired outcomes.     Critical Care Discharge Status:  Red

## 2018-02-20 NOTE — PROGRESS NOTES
Hospitalist Progress Note    Patient: Amrita Mcnamara MRN: [id number]  CSN: [id number]    YOB: 1955  Age: 57 y.o. 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 XXXA    Back pain M54.9    Weakness of right lower extremity R29.898    Anemia D64.9    Shock ([facility]) R57.9    Acute respiratory failure ([facility]) J96.00    Elevated troponin R74.8    Demand ischemia of myocardium ([facility]) I24.8    MRSA bacteremia R65.80        58 yo male admitted for pneumonia, respiratory distress. RRT called on this patient for respiratory distress in morning of 02/12/2018, high mews score of 8, fever of 103, tachycardia, resp rate of 44. He was transferred to ICU. Patient continued to decline in condition with elevated resp rate. He was intubated and central line placed. CRITICAL CARE PLAN         Resp -   Acute respiratory failure - extubated 02/16/2018. Patient reintubated 02/17/2018     ID -   Sepsis unclear source of infection. Recent pneumonia. Persistent bacteremia   Blood cultures 02/11/2018 positive for MRSA. blood cx 2/2 02/13/2018 MRSA. blood cultures 2/2 02/14/2018 MRSA. blood cultures 2/2 02/16/2018 MRSA  blood cultures 2/2 02/18/2018  resp cultures 02/13/2018 MRSA  Urine culture 02/12/2018 staph aureus  CT chest Multifocal peripheral bilateral subpleural nodular densities the largest 15  mm right apex, which may represent peripheral areas of pneumonia or other  inflammatory etiology, although differential diagnosis includes less likely  metastatic disease or infarcts from pulmonary emboli.   CT of ext and neck with no source of infection. No MRI evidence of osteomyelitis and discitis. S/p TERRY with no evidence of vegetations. ANTIBIOTICS not responding to vancomycin, changed to teflaro. He has pcn allergy as rash. Closely monitor.     [facility] - Monitor HD. Septic shock - off pressors  A-fib - on amiodarone drip titrate. S/p cardioversion. Converted to sinus rhythm. Elevated troponin - demand ischemia vs NSTEMI. Will need ischemia work up when stable per cardiology. On heparin drip     Heme/onc - Follow H&H, plts.      Renal -   AMANDA - improving, nephrology following. Metabolic acidosis - improved with bicarb drip  Trend BUN, Cr, follow I/O. Check and replace Mg, K, phos.     Endocrine -  Follow FSG  TSH in normal range. DM - on lantus and SSI     Neuro/ Pain/ Sedation -   RLE weakenss/generalized weakness. Head CT negative       GI - NPO, tube feeding.     Prophylaxis - DVT: heparin drip, GI: protonix.     35 minutes of critical care time spent in the direct evaluation and treatment of this high risk patient. The reason for providing this level of medical care for this critically ill patient was due a critical illness that impaired one or more vital organ systems such that there was a high probability of imminent or life threatening deterioration in the patients condition. This care involved high complexity decision making to assess, manipulate, and support vital system functions, to treat this degreee vital organ system failure and to prevent further life threatening deterioration of the patients condition.             Disposition : TBD    Physical Exam:  General: As above  HEENT: NC, Atraumatic. PERRLA, anicteric sclerae. Lungs: CTA Bilaterally. No Wheezing/Rhonchi/Rales.   Heart:  Regular  rhythm,  No murmur, No Rubs, No Gallops  Abdomen: Soft, Non distended, Non tender.  +Bowel sounds,   Extremities: Edema upper and LE bilaterally          Vital signs/Intake and Output:  Visit Vitals    /56    Pulse 85    Temp 99.1 °F (37.3 °C)    Resp 30    Ht 6' (1.829 m)    Wt 148 kg (326 lb 4.5 oz)    SpO2 95%    BMI 44.25 kg/m2     Current Shift:  02/20 0701 - 02/20 1900  In: 310 [I.V.:265]  Out: 150 [Urine:150]  Last three shifts:  02/18 1901 - 02/20 0700  In: 5108.8 [I.V.:2757.8]  Out: 6009 [Urine:2885]            Labs: Results:       Chemistry Recent Labs      02/20/18   0400  02/20/18   0130  02/19/18   1640  02/19/18   0515   02/18/18   1015   GLU  186*   --    --   200*   --   198*   NA  143   --    --   143   --   145   K  3.8  3.6  3.3*  3.2*   < >  3.2*   CL  108   --    --   108   --   107   CO2  27   --    --   26   --   31   BUN  33*   --    --   37*   --   44*   CREA  1.39*   --    --   1.59*   --   1.95*   CA  8.6   --    --   8.2*   --   8.3*   AGAP  8   --    --   9   --   7   BUCR  24*   --    --   23*   --   23*   AP  215*   --    --   181*   --   122*   TP  6.2*   --    --   5.5*   --   5.5*   ALB  1.7*   --    --   1.5*   --   1.6*   GLOB  4.5*   --    --   4.0   --   3.9   AGRAT  0.4*   --    --   0.4*   --   0.4*    < > = values in this interval not displayed. CBC w/Diff Recent Labs      02/19/18   0515  02/18/18   0405   WBC  12.9  10.7   RBC  2.87*  2.95*   HGB  8.5*  8.8*   HCT  26.0*  25.9*   PLT  405  363      Cardiac Enzymes Recent Labs      02/18/18   0405   CPK  217      Coagulation Recent Labs      02/20/18   0620  02/19/18   0515   PTP   --   13.5   INR   --   1.1   APTT  116.4*  82.3*       Lipid Panel Lab Results   Component Value Date/Time    Cholesterol, total 116 02/11/2018 08:45 PM    HDL Cholesterol 27 (L) 02/11/2018 08:45 PM    LDL, calculated 57.4 02/11/2018 08:45 PM    VLDL, calculated 31.6 02/11/2018 08:45 PM    Triglyceride 278 (H) 02/19/2018 05:15 AM    CHOL/HDL Ratio 4.3 02/11/2018 08:45 PM      BNP No results for input(s): BNPP in the last 72 hours.    Liver Enzymes Recent Labs      02/20/18   0400   TP  6.2*   ALB  1.7*   AP  215*   SGOT  48*      Thyroid Studies Lab Results Component Value Date/Time    TSH 0.43 02/12/2018 10:05 AM        Procedures/imaging: see electronic medical records for all procedures/Xrays and details which were not copied into this note but were reviewed prior to creation of Plan

## 2018-02-20 NOTE — PROGRESS NOTES
Spoke with Katarzyna RN at 21 [phone] regarding whole body In111 WBC study. Dose will be ordered today, 2/20/2018 to begin study first thing tomorrow, 2/21/2018. Images will be acquired on Thurs, 2/22/2018, 24hrs post-injection of WBCs.

## 2018-02-20 NOTE — PROGRESS NOTES
0715 Completed shift report and assumed care from SOLO Johnson RN. We reviewed SBAR, labs, meds, and plan of care for this pt, as well as verified drip rates. 0750 Completed shift assessment  1045 Completed IDR for this pt  1100 Bed scale not working, put in order to have fixed. 1200 Reassessed pt  1500 Discussed plan of care with Dr. Kenya Torres, reviewed new med orders  [phone] Reassessment completed  1915 Completed shift report and transferred care to SOLO Johnson RN. We reviewed SBAR, labs, meds, plan of care, as well as verified drip rate for this pt.

## 2018-02-20 NOTE — PROGRESS NOTES
2338-Primary nurse busy in another room. Informed her Propofol was out at this time. Informed can not come to room at this time. New bottle hung at previous rate. Primary RN made aware.

## 2018-02-21 NOTE — PROGRESS NOTES
Cardiology Progress Note        Patient: Bonita Sewell        Sex: male          DOA: 2/11/2018  YOB: 1955      Age:  57 y.o.        LOS:  LOS: 9 days   Assessment/Plan     Principal Problem:    Sepsis ([address] (2/11/2018)    Active Problems:    Osteoarthritis of right knee (1/18/2014)      Hypokalemia (2/11/2018)      Glucosuria (2/11/2018)      AMANDA (acute kidney injury) ([address] (2/11/2018)      Fall (2/11/2018)      Back pain (2/11/2018)      Weakness of right lower extremity (2/11/2018)      Anemia (2/11/2018)      Shock ([address] (2/13/2018)      Acute respiratory failure ([address] (2/13/2018)      Elevated troponin (2/12/2018)      Demand ischemia of myocardium ([address] (2/12/2018)      MRSA bacteremia (2/15/2018)        Plan:  SR  Remained intubated  Amiodarone 400 mg po BID for 10 days and then 200 mg po daily  Increase metoprolol to 50 mg po BID  Continue with Lovenox and switch to coumadin/xarelto before discharge. Subjective:    cc:  intubated      REVIEW OF SYSTEMS: unable to obtained  Objective:      Visit Vitals    /60    Pulse 90    Temp 98.6 °F (37 °C)    Resp 23    Ht 6' (1.829 m)    Wt 148 kg (326 lb 4.5 oz)    SpO2 95%    BMI 44.25 kg/m2     Body mass index is 44.25 kg/(m^2). Physical Exam:  General Appearance: Comfortable,   HEENT: TERRANCE. HEAD: Atraumatic  NECK: No JVD, no thyroidomeglay. CAROTIDS: clear  LUNGS: Clear bilaterally. HEART: S1+S2 audible    ABD: Non-tender, BS Audible    EXT: No edema, and no cysnosis. VASCULAR EXAM: Pulses are intact. MUSCULOSKELETAL: Grossly no joint deformity.     Medication:  Current Facility-Administered Medications   Medication Dose Route Frequency    fentaNYL citrate (PF) injection  mcg   mcg IntraVENous Q4H PRN    insulin glargine (LANTUS) injection 22 Units  22 Units SubCUTAneous DAILY WITH LUNCH    LORazepam (ATIVAN) 60 mg in 0.9% sodium chloride 60 mL infusion  0-5 mg/kg/hr IntraVENous TITRATE    enoxaparin (LOVENOX) injection 150 mg  150 mg SubCUTAneous Q12H    amiodarone (CORDARONE) tablet 400 mg  400 mg Oral Q12H    metoprolol tartrate (LOPRESSOR) tablet 25 mg  25 mg Oral Q12H    cefTARoline (TEFLARO) 600 mg in 0.9% sodium chloride (MBP/ADV) 50 mL MBP  600 mg IntraVENous Q12H    ipratropium (ATROVENT) 0.02 % nebulizer solution 0.5 mg  0.5 mg Nebulization Q6H RT    insulin lispro (HUMALOG) injection 5 Units  5 Units SubCUTAneous Q6H    ELECTROLYTE REPLACEMENT PROTOCOL -- Potassium  1 Each Other PRN    ELECTROLYTE REPLACEMENT PROTOCOL -- Magnesium  1 Each Other PRN    ELECTROLYTE REPLACEMENT PROTOCOL -- Phosphorus  1 Each Other PRN    ELECTROLYTE REPLACEMENT PROTOCOL -- Calcium  1 Each Other PRN    chlorhexidine (PERIDEX) 0.12 % mouthwash 10 mL  10 mL Oral Q12H    acetaminophen (TYLENOL) suppository 650 mg  650 mg Rectal Q6H PRN    hydrALAZINE (APRESOLINE) 20 mg/mL injection 10 mg  10 mg IntraVENous Q6H PRN    0.9% sodium chloride infusion 250 mL  250 mL IntraVENous PRN    heparin (porcine) 1,000 unit/mL injection 1,500 Units  1,500 Units IntraVENous PRN    acetaminophen (TYLENOL) solution 650 mg  650 mg Oral Q4H PRN    aspirin chewable tablet 81 mg  81 mg Oral DAILY    pantoprazole (PROTONIX) 40 mg in sodium chloride 0.9 % 10 mL injection  40 mg IntraVENous DAILY    naloxone (NARCAN) injection 0.4 mg  0.4 mg IntraVENous PRN    docusate sodium (COLACE) capsule 100 mg  100 mg Oral BID PRN    acetaminophen (TYLENOL) tablet 650 mg  650 mg Oral B2Y PRN    folic acid (FOLVITE) tablet 1 mg  1 mg Oral DAILY    thiamine 100 mg in 50 mL NS   IntraVENous Q24H    insulin lispro (HUMALOG) injection   SubCUTAneous Q6H    sodium chloride (NS) flush 5-10 mL  5-10 mL IntraVENous PRN    ondansetron (ZOFRAN ODT) tablet 4 mg  4 mg Oral Q4H PRN    glucose chewable tablet 16 g  4 Tab Oral PRN    glucagon (GLUCAGEN) injection 1 mg  1 mg IntraMUSCular PRN    dextrose (D50W) injection syrg 12.5-25 g  25-50 mL IntraVENous PRN               Lab/Data Reviewed: All lab results for the last 24 hours reviewed. Recent Labs      02/21/18 0530  02/19/18   0515   WBC  10.7  12.9   HGB  8.0*  8.5*   HCT  24.5*  26.0*   PLT  488*  405     Recent Labs      02/21/18   0530  02/20/18   2118  02/20/18   1410  02/20/18   0400   02/19/18   0515   NA  141   --    --   143   --   143   K  3.7  4.0  3.8  3.8   < >  3.2*   CL  108   --    --   108   --   108   CO2  26   --    --   27   --   26   GLU  142*   --    --   186*   --   200*   BUN  29*   --    --   33*   --   37*   CREA  1.44*   --    --   1.39*   --   1.59*   CA  8.7   --    --   8.6   --   8.2*    < > = values in this interval not displayed.        Signed By: Jordan Elena MD     February 21, 2018

## 2018-02-21 NOTE — PROGRESS NOTES
27 Jeanette Nielson [facility] [address]  Phone [phone] Fax [phone]  Pulmonary Critical Care & Sleep Medicine       Name: Rizwana Arora MRN: [id number]   : 1955 Hospital: 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 XXXA    Back pain M54.9    Weakness of right lower extremity R29.898    Anemia D64.9    Shock ([address] R57.9    Acute respiratory failure (HCC) J96.00    Elevated troponin R74.8    Demand ischemia of myocardium (HCC) I24.8    MRSA bacteremia R78.81 ·   MRSA Sepsis with persistent bacteremia- source Related to air space disease? as CT showed concern for septic emboli? TERRY negative for vegetation. Spine MRI is negative also. Neck, abdomena and pelvis CT and ext CT without evidence of infection or abscess. Few skin folliculitis lesion bl LE but too small and superficial to be source. Blood culture is positive  and  cultures. Now on Teflaro, ID following  · Septic shock resolved and off pressors  · Acute respiratory failure- intubated on  extubated on , reintubated on  on hospital vent protocol  · HTN - medications adjusted by hospitalist  · Elevated troponin ?  Demand ischemia vs NSTMI   · Acute renal failure S/P HD doing well with improvement in Cr and off of HD, HD cath removed 18  · Rhabdo improved  · Afib, rate controlled S/P cardioversion and on PO amiodarone, PO Metoprolol and Lovenox  · Back pain, fall - no absess or open wound   · Severe hypokalemia hypomagnesemia on presentation- improved and now on replacements  · Elevated blood sugars improving control  · Lactic acidosis resolved  · H/O ETOH    PLAN:  -- Mech. Ventilated patients- aim to keep peak plateau pressure less than or equal to 30cm H2O. Titrate FiO2 for goal SPO2> 90%  -- VAP prevention bundle, head of the bed at 30' all times  -- Daily sedation holiday and assessment for weaning with SBT as tolerated. -- Ativan and possible Fentanyl change to PRN  -- Nuclear scan starting tomorrow per radiology protocol. -- Repeat blood culture after 2 days on Teflaro. -- Teflaro as tolerated. Patient has PCN allergy. Tolerating well. -- If repeat cultures positive and nuclear scan negative then may need line holiday followed by PICC line. -- Amiodarone and Metoprolol PO, lovenox for Afib per cardiology  -- Off of duoneb and continue atrovent due to afib and tachycardia previously  -- NA improved with free water flush. UO better and not on HD  -- Adjust BP meds for control of blood pressure  -- K and MG replacement per protocol    -- Lantus to 22 unit and humalog 5 units with feeding plus ISSC  -- HB stable, follow up in AM  -- MRSA isolation   CVS:Currently on PO Amiodarone drip, Lovenox follow per cardiology recommendations. S/P cardioversion with 200 j on 2/13  Adjust BP medications per hospitalist. Keep SBP <160 mm Hg  On aspirin  RS: as above. Lung protective vent protocol, Aspiration precatuion  ID:Sepsis source?, MRSA on multiple cultures   Vanco stopped after day 8 [2/19/18]. Persistent bacteremia. Now on Teflaro  Azactam 2/11 stop on 2/14  levaquin 2/12  Stop on 2/15  Monitor skin lesions - no changes compared to previously   ENDO: TSH is ok. On Lantus, Humalog  GI: feeds as tolerated, PPI, relatively stable bilirubin, due to stasis from sepsis- follow up   RENAL:Wilson for I/O, Replace k and MG per protocol.  Patient is putting out urine, Off HD,  dialysis per renal  CNS: Sedated intubated wakes up with sedation holiday in AM  HEMATOLOGY:PLT is stable, Heparin drip for Afib   MUSCULOSKELETAL:CK was elevated but improved  · PAIN AND SEDATION: On propofol and fentanyl, Thiamine and folic acid  · Skin/Wound: Monitor back, No skin absess  · Electrolytes: As above   · IVF: Jenna Dopp   · Nutrition: Tube feeds   · Prophylaxis: DVT Prophylaxis with Heparin,. GI Prophylaxis. · Restraints: minimal restrain to avoid pulling lines and tubes  · PT/OT eval and treat. OOB when appropriate. · Lines/Tubes: none. Femoral line- 2/12 removed on 2/15. Wilson 2/12/18. Right IJ dialysis catheter 2/14- removed 2/19, Left subclavian line 2/15/18  ADVANCE DIRECTIVE:Full code/ Palliative is consulted spoke with wife at length  FAMILY DISCUSSION:spoke with family  Quality Care: PPI, DVT prophylaxis, HOB elevated, Infection control all reviewed and addressed. Events and notes from last 24 hours reviewed. Care plan discussed with nursing CC TIME: 36  min excluding procedure/s   · Labs and images personally seen and available reports reviewed. · All current medicines are reviewed and doses and prescription adjusted. Subjective:  2/21/18  No fever overnight or this AM  He remained hemodynamically stable, on HTN side. He is on ventilator, now on Ativan [1 mg/hr at present] and Fentanyl and off of propofol since yesterday. Tolerating Toflaro and no adverse reaction  Possible tagged scan tomorrow per radiology protocol  Tolerating TF    2/20/18  Patient on ventilator, sedated. On sedation holiday wakes up. Low grade fever overnight. Hemodynamically stable. Repeat bl cx from 2/18 positive for MRSA  Tolerating Teflaro without any rash or adverse effect. ECHO was done on 2/18, spoke with Dr. Jayde Duff and no repeat ECHO needed at present. Plan for tagged abscess location scan. Tolerating TF. Good UO. Tolerating Heparin without any bleeding. 2/19/18  Patient remained on ventilator and sedated. No fever. Hemodynamically stable, rather on hypertensive side, hospitalist adjusting meds.   Repeat bl cultures from 2/18/18 both set positive for GPC. Spoke with Dr. Jessica Kaufman and he recommended change to Teflaro and follow up ECHO  HD catheter removed by staff per renal recommendations  No plans for HD per renal  Tolerating TF well and adjusted per protocol by staff  The staff denies abnormal bruising or abnormal bleeding from any body orifice such as bleeding from nose or gums, blood in urine or stool, or melena, hemoptysis or hematemesis. 2/18  Intubated again for work of [address] [place] [address] on 2/17  CT ext, ABD pel, Neck all came out ok no source of abscess  No fever overnight  BP is ok  ON /14/50% peep 5 abg ok  Cr is improving with good urine out put  Renal not planning any dialysis at this point    2/17/18  Was extubated on 2/16  Did reasonably well yesterday  Overnight still had fever  Early am was breathing little fast. As per nursing was awake and following command but received morphine now not waking up much  Put out 3300 urine  K 2.9 only replaced by 10 meq  Mg 1.5  PH 7.56/34/30/107  Currently on BIPAP still breathing little fast    2/16  Doing well   Yesterday started noticing some sking lesions all over body looks like septic staph infection  Nothing to suggest allergic rash  Put out about 1400cc yesterday  K 2.9 and replaced  Cr improved to 2.22 s/p hd yesterday  Mild elevation of billirubin to 1.9   HB and PLT is stable   Sputum MRSA    2/15  Doing ok  No overnight event   S/P HD  Also put out 900 cc of urine in last 24 hrs  HB dropped to 7.5 but no active bleed  PLT is 129 INR is 1.6 K 3.0   Alb 1.5     2/14  No significant overnight event  S/P TERRY negative for vegetation or blood clot  S/P cardioversion  ID evaluated suggested MSSA sepsis but initial culture is growing MRSA.   Am labs showed worsening cr to 4.06 and PH 7.26  ALB 1.5  Currently on phenylephring 100, versed, amiodarone, fentanyl and bicarb drip    2/13/18  Yesterday became more tachypnic and difficult to control hr  Intubated 2/12, femoral line 2/12 <neck line not placed due to concern for colonization with ongoing sepsis>  Became hypotensive managed with phenylephrine  Afib was poorly controlled started on amiodarone drip. Cardiology is closely following  Blood culture Staph  K 3.2 replaced  Mg 1.1 getting replaced   UO and Cr is improving  Still spiking fever 102   Flu is negative       History:   Ricco Mauricio is a 57 y.o. male who came to the ed with complaints of fall. Patient states he has been feeling generalized weakness at home for the last day or so with low grade subjective fevers but he noted RLE weakness around 5pm. Since then he has fallen about 4 times in his back without any head trauma or LOC. He has been using his tripod cane to assist him with walking. Recently about 1 month a go treated for pna at office. Ex smoker. Drinks about 2 glasses of mix drinks every day. Recently lost his son to multiorgan failure and sepsis. Due to persistence of his weakness, falls, fevers and low back pain he decided to come to the ED. In the ED, CT of the head was neg and his labs showed significant hypoK with elevated Cr (unknown baseline). He was also febrile and tachycardiac. He was started on broad spectrum Abx.  Overnight RRT called out after he was found to be tachycardic, tachypnic and ABG showed alkalosis and was transferred to ICU        Current Facility-Administered Medications   Medication Dose Route Frequency    insulin glargine (LANTUS) injection 22 Units  22 Units SubCUTAneous DAILY WITH LUNCH    LORazepam (ATIVAN) 60 mg in 0.9% sodium chloride 60 mL infusion  0-5 mg/kg/hr IntraVENous TITRATE    enoxaparin (LOVENOX) injection 150 mg  150 mg SubCUTAneous Q12H    amiodarone (CORDARONE) tablet 400 mg  400 mg Oral Q12H    metoprolol tartrate (LOPRESSOR) tablet 25 mg  25 mg Oral Q12H    cefTARoline (TEFLARO) 600 mg in 0.9% sodium chloride (MBP/ADV) 50 mL MBP  600 mg IntraVENous Q12H    ipratropium (ATROVENT) 0.02 % nebulizer solution 0.5 mg  0.5 mg Nebulization Q6H RT    insulin lispro (HUMALOG) injection 5 Units  5 Units SubCUTAneous Q6H    fentaNYL (PF)  mcg/30 ml   mcg/hr IntraVENous TITRATE    chlorhexidine (PERIDEX) 0.12 % mouthwash 10 mL  10 mL Oral Q12H    aspirin chewable tablet 81 mg  81 mg Oral DAILY    pantoprazole (PROTONIX) 40 mg in sodium chloride 0.9 % 10 mL injection  40 mg IntraVENous DAILY    folic acid (FOLVITE) tablet 1 mg  1 mg Oral DAILY    thiamine 100 mg in 50 mL NS   IntraVENous Q24H    insulin lispro (HUMALOG) injection   SubCUTAneous Q6H         Objective:     Vital Signs:    Blood pressure 162/60, pulse 93, temperature 98.8 °F (37.1 °C), resp. rate 29, height 6' (1.829 m), weight 148 kg (326 lb 4.5 oz), SpO2 94 %. Body mass index is 44.25 kg/(m^2). O2 Device: Endotracheal tube   O2 Flow Rate (L/min): 6 l/min   Temp (24hrs), Av.8 °F (37.1 °C), Min:97.6 °F (36.4 °C), Max:100.1 °F (37.8 °C)       Intake/Output:   Last shift:         Last 3 shifts:  1901 -  0700  In: 3033.4 [I.V.:682.4]  Out: 2275 [Urine:2275]    Intake/Output Summary (Last 24 hours) at 18 1028  Last data filed at 18 0800   Gross per 24 hour   Intake          2172.43 ml   Output             1425 ml   Net           747.43 ml       Review of Systems:  Intubated sedated   Review of systems not obtained due to patient factors.        Physical Exam:  General: in no respiratory distress and acyanotic, appears older than stated age, sedated, on ventilator  HEENT: PERRL, orally intubated  Neck: No abnormally enlarged lymph nodes or thyroid, supple  Chest: normal  Lungs: moderate air entry and rhonchi, normal percussion bilaterally, no tenderness/ rash  Heart: Regular rate and rhythm, S1S2 present or without murmur or extra heart sounds  Abdomen: obese, bowel sounds normoactive, tympanic, abdomen is soft without significant tenderness, masses, organomegaly or guarding  Extremity: Trace, pitting and bl ankle edema, negative for cyanosis, clubbing. Neuro: sedated, moves all extremities well, no involuntary movements, exam limitations  Skin: Skin color, texture, turgor fair. Leg bl folliculitis rashes or lesions unchanged      CBC w/Diff Recent Labs      02/21/18   0530 02/19/18   0515   WBC  10.7  12.9   RBC  2.64*  2.87*   HGB  8.0*  8.5*   HCT  24.5*  26.0*   PLT  488*  405   GRANS  73   --    LYMPH  17*   --    EOS  2   --         Chemistry Recent Labs      02/21/18   0530  02/20/18   2118  02/20/18   1410  02/20/18   0400  02/20/18   0130   02/19/18   0515  02/18/18   1705   GLU  142*   --    --   186*   --    --   200*   --    NA  141   --    --   143   --    --   143   --    K  3.7  4.0  3.8  3.8  3.6   < >  3.2*  3.2*   CL  108   --    --   108   --    --   108   --    CO2  26   --    --   27   --    --   26   --    BUN  29*   --    --   33*   --    --   37*   --    CREA  1.44*   --    --   1.39*   --    --   1.59*   --    CA  8.7   --    --   8.6   --    --   8.2*   --    MG  1.6   --    --   1.9  1.9   < >  1.5*   --    PHOS  3.2   --    --    --    --    --   3.3  3.5   AGAP  7   --    --   8   --    --   9   --    BUCR  20   --    --   24*   --    --   23*   --    AP  262*   --    --   215*   --    --   181*   --    TP  5.9*   --    --   6.2*   --    --   5.5*   --    ALB  1.6*   --    --   1.7*   --    --   1.5*   --    GLOB  4.3*   --    --   4.5*   --    --   4.0   --    AGRAT  0.4*   --    --   0.4*   --    --   0.4*   --     < > = values in this interval not displayed. Lactic Acid Lactic acid   Date Value Ref Range Status   02/13/2018 1.8 0.4 - 2.0 MMOL/L Final     No results for input(s): LAC in the last 72 hours.      Micro  Recent Labs      02/18/18   0405  02/18/18   0400  02/16/18   0508   CULT  AEROBIC BOTTLE **METHICILLIN RESISTANT STAPHYLOCOCCUS AUREUS**  AEROBIC BOTTLE **METHICILLIN RESISTANT STAPHYLOCOCCUS AUREUS**  AEROBIC BOTTLE **METHICILLIN RESISTANT STAPHYLOCOCCUS AUREUS  For Susceptibility Refer to Culture  [id number]    AEROBIC BOTTLE **METHICILLIN RESISTANT STAPHYLOCOCCUS AUREUS  For Susceptibility Refer to Culture  [id number]       Recent Labs      02/13/18   0858  02/13/18   0315  02/13/18   0310  02/12/18   0900  02/11/18   1846   CULT  PENDING  AEROBIC BOTTLE STAPHYLOCOCCUS AUREUS*  AEROBIC BOTTLE STAPHYLOCOCCUS AUREUS*  NO GROWTH AFTER 20 HOURS  AEROBIC AND ANAEROBIC BOTTLES **METHICILLIN RESISTANT STAPHYLOCOCCUS AUREUS  CALLED TO AND CORRECTLY REPEATED BY:  Bryon MACHADO RN ICU TO [address] AT 0825 ON 2/14/18. ABG Recent Labs      02/21/18   0532  02/20/18   0602  02/19/18   0520   PHI  7.483*  7.421  7.468*   PCO2I  32.0*  37.5  33.4*   PO2I  71*  64*  97   HCO3I  24.1  24.5  24.1   FIO2I  40  40  50        Liver Enzymes Protein, total   Date Value Ref Range Status   02/21/2018 5.9 (L) 6.4 - 8.2 g/dL Final     Albumin   Date Value Ref Range Status   02/21/2018 1.6 (L) 3.4 - 5.0 g/dL Final     Globulin   Date Value Ref Range Status   02/21/2018 4.3 (H) 2.0 - 4.0 g/dL Final     A-G Ratio   Date Value Ref Range Status   02/21/2018 0.4 (L) 0.8 - 1.7   Final     AST (SGOT)   Date Value Ref Range Status   02/21/2018 57 (H) 15 - 37 U/L Final     Alk.  phosphatase   Date Value Ref Range Status   02/21/2018 262 (H) 45 - 117 U/L Final     Recent Labs      02/21/18   0530  02/20/18   0400  02/19/18   0515   TP  5.9*  6.2*  5.5*   ALB  1.6*  1.7*  1.5*   GLOB  4.3*  4.5*  4.0   AGRAT  0.4*  0.4*  0.4*   SGOT  57*  48*  54*   AP  262*  215*  181*        Cardiac Enzymes No results found for: CPK, CK, CKMMB, CKMB, RCK3, CKMBT, CKNDX, CKND1, LESLIE, TROPT, TROIQ, BELLA, TROPT, TNIPOC, BNP, BNPP     BNP No results found for: BNP, BNPP, XBNPT     Coagulation Recent Labs      02/20/18   1410  02/20/18   0620  02/19/18   0515   PTP   --    --   13.5   INR   --    --   1.1   APTT  127.9*  116.4*  82.3*         Thyroid  Lab Results   Component Value Date/Time    TSH 0.43 02/12/2018 10:05 AM          Lipid Panel Lab Results   Component Value Date/Time    Cholesterol, total 116 02/11/2018 08:45 PM    HDL Cholesterol 27 (L) 02/11/2018 08:45 PM    LDL, calculated 57.4 02/11/2018 08:45 PM    VLDL, calculated 31.6 02/11/2018 08:45 PM    Triglyceride 278 (H) 02/19/2018 05:15 AM    CHOL/HDL Ratio 4.3 02/11/2018 08:45 PM          Urinalysis Lab Results   Component Value Date/Time    Color DARK YELLOW 02/11/2018 10:45 PM    Appearance CLOUDY 02/11/2018 10:45 PM    Specific gravity 1.019 02/11/2018 10:45 PM    pH (UA) 5.5 02/11/2018 10:45 PM    Protein 300 (A) 02/11/2018 10:45 PM    Glucose >1000 (A) 02/11/2018 10:45 PM    Ketone TRACE (A) 02/11/2018 10:45 PM    Bilirubin NEGATIVE  02/11/2018 10:45 PM    Urobilinogen 1.0 02/11/2018 10:45 PM    Nitrites NEGATIVE  02/11/2018 10:45 PM    Leukocyte Esterase NEGATIVE  02/11/2018 10:45 PM    Epithelial cells 0 01/08/2014 02:46 PM    Bacteria 2+ (A) 02/11/2018 10:45 PM    WBC 0 to 2 02/11/2018 10:45 PM    RBC 0 to 2 02/11/2018 10:45 PM        XR (Most Recent). CXR reviewed by me and compared with previous CXR   Results from Hospital Encounter encounter on 02/11/18   XR CHEST PORT   Narrative Chest, single view    Indication: Respiratory distress, intubated, follow-up    Comparison: 2/19/2018    Findings:  Portable upright AP view of the chest was obtained. Endotracheal tube  and enteric tube appear unchanged, adequately located. The cardiomediastinal  silhouette appears unchanged. Persistent central vascular prominence and mildly  increased interstitial markings. Left pleural effusion appears essentially  unchanged with adjacent left basilar opacity. No right pleural effusion. No  pneumothorax. No acute osseous abnormality. Impression Impression:  Unchanged pulmonary vascular congestion with small left pleural effusion and  adjacent left basilar atelectasis/infiltrate.          CT (Most Recent)   Results from Hospital Encounter encounter on 02/11/18   CT NECK SOFT TISSUE WO CONT   Narrative CT soft tissue of the neck without contrast.    INDICATION: Sepsis workup. Persistent positive blood cultures with unknown  source. COMPARISON: Neck CT from 8/3/2015. TECHNIQUE:Helically acquired and axial images were obtained from the posterior  fossa to the thoracic inlet without IV contrast. IV contrast not administered  due to recent history of acute renal injury. One or more dose reduction techniques were used on this CT: automated exposure  control, adjustment of the mAs and/or kVp according to patient's size, and  iterative reconstruction techniques. The specific techniques utilized on this CT  exam have been documented in the patient's electronic medical record. FINDINGS:   Endotracheal and enteric tubes are partially visualized. There is a right  internal jugular central venous catheter which is partially visualized. There is  a left subclavian central venous catheter which is partially visualized. Visualized intracranial structures are unremarkable. There is mucosal thickening of the left maxillary sinus and an air-fluid level  within the right maxillary sinus. There is no cervical lymphadenopathy. No mass identified within the limitations  of this noncontrast exam. The thyroid has unremarkable appearance. There is patchy opacity at the lung apices which may represent  pleural-parenchymal scarring. There are multilevel degenerative changes of the cervical spine without acute  osseous abnormality. Impression IMPRESSION:      1. Tubes and lines as described. 2. Mild inflammatory changes within the maxillary sinuses which may be related  to intubation. There is no large abscess or other findings to suggest an occult  source of infection. EKG No results found for this or any previous visit. ECHO  2/12/18 [facility] SUMMARY:  Left ventricle: Systolic function was normal. Ejection fraction was estimated   in the range of 55 % to 60 %.  There was  mild hypokinesis of the basal inferior wall(s). There was moderate concentric   hypertrophy. Right ventricle: The size was normal. Systolic function was normal.    Inferior vena cava, hepatic veins: The inferior vena cava was dilated. The   respirophasic change in diameter was more  than 50%. MRI spine:   2. Prior left L5 laminotomy, partial left facetectomy and possibly previous partial L4-5 discectomy. Minimal left asymmetric disc bulge but no evidence of recurrent disc herniation or retained disc fragment.     3. Minimal L3-4 disc bulge and annular fissure.     4. Multilevel mostly mild facet joint osteoarthritis. Greatest and mild to moderate level right L4-5 facet joint osteoarthritis with moderate-sized facet joint effusion and synovitis. No adjacent periarticular marrow or soft tissue edema to definitively suggest superimposed infective/septic arthritis.     5. Unremarkable upper sacroiliac joints though incompletely included. Mild body wall edema. Imaging:  I have personally reviewed the patients radiographs and have reviewed the reports:                      Rick Bhakta M.D.   Pulmonary Critical Care & Sleep Medicine

## 2018-02-21 NOTE — PROGRESS NOTES
ID follow up note     Chart reviewed remotely   Patient is afebrile and remains on MV. Wbc has improved to 10.7 and creatinine stable at 1.4  WBC scan to be started on 2/22   Blood cultures from 2/16 and 2/18 still positive with MRSA  Tolerating ceftaroline so far      Recommendation   Continue IV  to ceftaroline 600mg Q12H for persistant MRSA bacteremia   Repeat 2 sets of surveillance blood cultures on 2/22/18     Please call with questions or concerns regarding ID issue .      [address]25 Jb Haddad MD  Infectious diseases specialist   Pager [phone]

## 2018-02-21 NOTE — PROGRESS NOTES
Hospitalist Progress Note    Patient: Papito Sparks MRN: [id number]  [facility]: [id number]    YOB: 1955  Age: 57 y.o. 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 XXXA    Back pain M54.9    Weakness of right lower extremity R29.898    Anemia D64.9    Shock ([facility]) R57.9    Acute respiratory failure ([facility]) J96.00    Elevated troponin R74.8    Demand ischemia of myocardium ([facility]) I24.8    MRSA bacteremia R65.80        56 yo male admitted for pneumonia, respiratory distress. RRT called on this patient for respiratory distress in morning of 02/12/2018, high mews score of 8, fever of 103, tachycardia, resp rate of 44. He was transferred to ICU. Patient continued to decline in condition with elevated resp rate. He was intubated and central line placed. CRITICAL CARE PLAN         Resp -   Acute respiratory failure - extubated 02/16/2018. Patient reintubated 02/17/2018     ID -   Sepsis unclear source of infection. Recent pneumonia. Persistent bacteremia   Blood cultures 02/11/2018 positive for MRSA. blood cx 2/2 02/13/2018 MRSA. blood cultures 2/2 02/14/2018 MRSA. blood cultures 2/2 02/16/2018 MRSA  blood cultures 2/2 02/18/2018  resp cultures 02/13/2018 MRSA  Urine culture 02/12/2018 staph aureus  CT chest Multifocal peripheral bilateral subpleural nodular densities the largest 15  mm right apex, which may represent peripheral areas of pneumonia or other  inflammatory etiology, although differential diagnosis includes less likely  metastatic disease or infarcts from pulmonary emboli.   CT of ext and neck with no source of infection. No MRI evidence of osteomyelitis and discitis. S/p TERRY with no evidence of vegetations. ANTIBIOTICS not responding to vancomycin, changed to teflaro. He has pcn allergy as rash. Closely monitor. Wbc scan 02/22     [facility] - Monitor HD. Septic shock - off pressors  A-fib - on amiodarone drip titrate. S/p cardioversion. Converted to sinus rhythm. Elevated troponin - demand ischemia vs NSTEMI. Will need ischemia work up when stable per cardiology. On heparin drip     Heme/onc - Follow H&H, plts.      Renal -   AMANDA - improving, nephrology following. Metabolic acidosis - improved with bicarb drip  Trend BUN, Cr, follow I/O. Check and replace Mg, K, phos.     Endocrine -  Follow FSG  TSH in normal range. DM - on lantus and SSI     Neuro/ Pain/ Sedation -   RLE weakenss/generalized weakness. Head CT negative       GI - NPO, tube feeding.     Prophylaxis - DVT: heparin drip, GI: protonix.     35 minutes of critical care time spent in the direct evaluation and treatment of this high risk patient. The reason for providing this level of medical care for this critically ill patient was due a critical illness that impaired one or more vital organ systems such that there was a high probability of imminent or life threatening deterioration in the patients condition. This care involved high complexity decision making to assess, manipulate, and support vital system functions, to treat this degreee vital organ system failure and to prevent further life threatening deterioration of the patients condition.             Disposition : TBD    Physical Exam:  General: As above  HEENT: NC, Atraumatic. PERRLA, anicteric sclerae. Lungs: CTA Bilaterally. No Wheezing/Rhonchi/Rales.   Heart:  Regular  rhythm,  No murmur, No Rubs, No Gallops  Abdomen: Soft, Non distended, Non tender.  +Bowel sounds,   Extremities: Edema upper and LE bilaterally          Vital signs/Intake and Output:  Visit Vitals    /69    Pulse (!) 105    Temp 98.6 °F (37 °C)    Resp (!) 33    Ht 6' (1.829 m)    Wt 148 kg (326 lb 4.5 oz)    SpO2 93%    BMI 44.25 kg/m2     Current Shift:  02/21 0701 - 02/21 1900  In: 740 [I.V.:100]  Out: 550 [Urine:550]  Last three shifts:  02/19 1901 - 02/21 0700  In: 3033.4 [I.V.:682.4]  Out: 2275 [Urine:2275]            Labs: Results:       Chemistry Recent Labs      02/21/18   0530  02/20/18 2118 02/20/18   1410  02/20/18   0400   02/19/18   0515   GLU  142*   --    --   186*   --   200*   NA  141   --    --   143   --   143   K  3.7  4.0  3.8  3.8   < >  3.2*   CL  108   --    --   108   --   108   CO2  26   --    --   27   --   26   BUN  29*   --    --   33*   --   37*   CREA  1.44*   --    --   1.39*   --   1.59*   CA  8.7   --    --   8.6   --   8.2*   AGAP  7   --    --   8   --   9   BUCR  20   --    --   24*   --   23*   AP  262*   --    --   215*   --   181*   TP  5.9*   --    --   6.2*   --   5.5*   ALB  1.6*   --    --   1.7*   --   1.5*   GLOB  4.3*   --    --   4.5*   --   4.0   AGRAT  0.4*   --    --   0.4*   --   0.4*    < > = values in this interval not displayed. CBC w/Diff Recent Labs      02/21/18   0530 02/19/18 0515   WBC  10.7  12.9   RBC  2.64*  2.87*   HGB  8.0*  8.5*   HCT  24.5*  26.0*   PLT  488*  405   GRANS  73   --    LYMPH  17*   --    EOS  2   --       Cardiac Enzymes No results for input(s): CPK, CKND1, LESLIE in the last 72 hours.     No lab exists for component: CKRMB, TROIP   Coagulation Recent Labs      02/20/18   1410  02/20/18   0620  02/19/18   0515   PTP   --    --   13.5   INR   --    --   1.1   APTT  127.9*  116.4*  82.3*       Lipid Panel Lab Results   Component Value Date/Time    Cholesterol, total 116 02/11/2018 08:45 PM    HDL Cholesterol 27 (L) 02/11/2018 08:45 PM    LDL, calculated 57.4 02/11/2018 08:45 PM    VLDL, calculated 31.6 02/11/2018 08:45 PM    Triglyceride 278 (H) 02/19/2018 05:15 AM    CHOL/HDL Ratio 4.3 02/11/2018 08:45 PM      BNP No results for input(s): BNPP in the last 72 hours.    Liver Enzymes Recent Labs      02/21/18   0530   TP  5.9*   ALB  1.6*   AP  262*   SGOT  57*      Thyroid Studies Lab Results   Component Value Date/Time    TSH 0.43 02/12/2018 10:05 AM        Procedures/imaging: see electronic medical records for all procedures/Xrays and details which were not copied into this note but were reviewed prior to creation of Plan

## 2018-02-21 NOTE — PROGRESS NOTES
Interdisciplinary Rounds (IDR) / REHAB:    Occupational Therapy: pt still intubated, but working on sedation to allow for more arousable state to participate with therapy  Physical Therapy: pt still intubated, but working on sedation to allow for more arousable state to participate with therapy  Speech Therapy: still on ventilator    Olivia Acosta, OTR/L

## 2018-02-21 NOTE — PROGRESS NOTES
Switched patient from volume control to VC+ on ventilator due to patient oxygen saturation dropping into the high 80s. Suctioned patient for small amount of thick tan secretions. After suctioning and vent change, patient oxygenation now in the mid 90s.

## 2018-02-21 NOTE — PROGRESS NOTES
Bedside and Verbal shift change report received from BALA Carver (offgoing nurse). Report included the following information SBAR, Kardex, Intake/Output, MAR and Recent Results. 2115 Shift assessment completed, see EMR    0015 Reassessment completed, see EMR    0500 Reassessment completed, see EMR    Bedside and Verbal shift change report given to LULY Do RN (oncoming nurse)  Report included the following information SBAR, Kardex, Intake/Output, MAR and Recent Results.

## 2018-02-21 NOTE — DIABETES MGMT
GLYCEMIC CONTROL & NUTRITION:    - Discussed in rounds, known h/o DM, on dual oral therapy at home, current A1C 5.8%  - pt was re-intubated over the weekend, TF restarted, at goal (35 ml/hr)  - BG within target range   - recommend continue current basal/bouls regimen, make sure scheduled mealtime dose is held if TF paused  - pt received 52 units TDD of insulin yesterday (22 Lantus, 30 Humalog - 20 scheduled, 10 corrective)  - see Clinical RD notes for full nutrition assessment    Recent Glucose Results:   Lab Results   Component Value Date/Time     (H) 02/21/2018 05:30 AM    GLUCPOC 118 (H) 02/21/2018 11:55 AM    GLUCPOC 139 (H) 02/21/2018 05:28 AM    GLUCPOC 130 (H) 02/21/2018 12:16 AM     Lab Results   Component Value Date/Time    Hemoglobin A1c 5.8 (H) 02/11/2018 06:45 PM               Sheila Fried, MPH, RD, CDE

## 2018-02-21 NOTE — PROGRESS NOTES
Initial assessment completed. Trying to open eyes when name called & does'nt follows command. Vented w/ spon't resp. Monitor shows nsr w/out ectoypy. Afebrile. Tube feeding w/ 10 cc residual. Quettra tech @ [facility] drawing blood for tomorrow 's procedure. 1000-Dr Merritt visited w/out new order. Monitoring b/p for prn hydralazine. No sob. Suction for same secretions. 1200- Assessment no changed. Dr Smooth Chaudhry. Beverley Willis visited w/ new orders. Monitor no changed. Afebrile. Given SPA bath. Wife visited. Dr Farheen Farrell visited w/out new order. 1400- Urine output fair amount. 1600- Assessment no changed. Fentanyl prn given as ordered. B/p sys up to 190's & resp rate up to 30;s & backing the vent. Afebrile. Monitor no changed. Tolerating tube feeding. 1600- Condition no changed during the shift & breathing better & less discomfort after fentanyl dose.s/bp down to 170's. Monitor no changed. To Fed [employer]book tomorrow. 1915-Bedside and Verbal shift change report given to Genna Gillespie (oncoming nurse) by Maria Esther Espinoza RN (offgoing nurse). Report included the following information SBAR, Kardex, ED Summary, Intake/Output, MAR and Recent Results.

## 2018-02-21 NOTE — PROGRESS NOTES
Palliative Medicine Progress Note  [facility] ER: [phone])  [facility]: [address] [place] [address] Way: [phone]    Patient Name: Moncho Berrios  YOB: 1955    Date of Initial Consult: 2/16/18  Reason for Consult: care decisions  Requesting Provider: Dr. Cheli Estrada   Primary Care Physician: Andrae Hermosillo MD          SUMMARY:   Moncho Berrios is a 57y.o. year old who presented after two days of malaise with overwhelming MERSA sepsis and multi organ system failure. On pressors initially, has required dialysis and amiodarone drip with ventilator support. Now off pressors, with good UOP, extubated this am. Still minimally responsive. Only negative recent care finding is persistently + blood cultures and fevers. Previously reasonably healthy, last hospitalization for TKR. Recently lost son in January with hepatorenal syndrome. Wife at bedside. 2/19/18: Reintubated < 24 hr post extubation. Persistent MERSA + blood cultures since 2/11 despite MICs demonstrating sensitivity to vanc. No identified sequestered source of bacteremia. No family at bedside. 2/20/18: Stable overnight, no fever. Dialysis catheter removed. Remains on propofol. 2/21/18: Arousable today. Appears uncomfortable. On IV abx, for tagged WBC scan tomorrow looking for source of persistent bacteremia. 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 XXXA    Back pain M54.9    Weakness of right lower extremity R29.898    Anemia D64.9    Shock ([facility] [facility] R57.9    Acute respiratory failure (HCC) J96.00    Elevated troponin R74.8    Demand ischemia of myocardium (HCC) I24.8    MRSA bacteremia R78.81     1. PLAN:   1. Met with wife and answered questions regarding current clinical status. She understand that while he is improved he remains critically ill. Discussed implications of persistent + blood cultures. Reviewed his AMD and asked if she thinks he would be willing to undergo reintubation and reescalation of care should he decline rapidly again. She is not certain but knows he would not want aggressive measures if he had little or no chance of making a good recovery. Emotional support provided. 2/19/18: No evidence of improvement in underlying source of his multisystem critical illness. With reintubation and persistent bacteremia, prognosis appears to be worsening.    2/20/18: On different abx regimen. No clinical change. 2/21/18: Clinically unchanged. Repeat BC tomorrow. 2. Initial consult note routed to primary continuity provider  3.  Communicated plan of care with: Palliative IDT    GOALS OF CARE:  Patient/Health Care Proxy Stated Goals: Cure      TREATMENT PREFERENCES:   Code Status: Full Code    Advance Care Planning:  Advance Care Planning 2/14/2018   Patient's Healthcare Decision Maker is: Named in scanned ACP document   Primary Decision Maker Name Dayanna Hoyos Decision Maker Phone Number [phone]   Primary Decision Maker Relationship to Patient Spouse   Secondary Decision Maker Name Jaqui Wilder   Secondary Decision Maker Phone Number [phone]   Secondary Decision Maker Relationship to Patient Sibling   Confirm Advance Directive Yes, on file       Medical Interventions: Full interventions   Other Instructions: Other:  As far as possible, the palliative care team has discussed with patient / health care proxy about goals of care / treatment preferences for patient. HISTORY:     History obtained from: wife    CHIEF COMPLAINT: see summary    HPI/SUBJECTIVE:    The patient is:   [] Verbal and participatory  [x] Non-participatory due to:   sedation     Clinical Pain Assessment (nonverbal scale for nonverbal patients): Activity (Movement): Lying quietly, normal position    Duration: for how long has pt been experiencing pain (e.g., 2 days, 1 month, years)  Frequency: how often pain is an issue (e.g., several times per day, once every few days, constant)     FUNCTIONAL ASSESSMENT:     Palliative Performance Scale (PPS):  PPS: 30    ECOG        PSYCHOSOCIAL/SPIRITUAL SCREENING:      Any spiritual / Caodaism concerns:  [] Yes /  [x] No    Caregiver Burnout:  [] Yes /  [x] No /  [] No Caregiver Present      Anticipatory grief assessment:   [x] Normal  / [] Maladaptive        REVIEW OF SYSTEMS:     Positive and pertinent negative findings in ROS are noted above in HPI. The following systems were [] reviewed / [x] unable to be reviewed as noted in HPI  Other findings are noted below. Systems: constitutional, ears/nose/mouth/throat, respiratory, gastrointestinal, genitourinary, musculoskeletal, integumentary, neurologic, psychiatric, endocrine. Positive findings noted below.   Modified ESAS Completed by: provider                                   Stool Occurrence(s): 0        PHYSICAL EXAM:     Wt Readings from Last 3 Encounters:   02/21/18 148 kg (326 lb 4.5 oz)   02/14/18 144.8 kg (319 lb 3.6 oz)   08/02/15 138.8 kg (306 lb)     Blood pressure 162/60, pulse 90, temperature 98.8 °F (37.1 °C), resp. rate 23, height 6' (1.829 m), weight 148 kg (326 lb 4.5 oz), SpO2 95 %.   Pain:  Pain Scale 1: FLACC  Pain Intensity 1: 2  Pain Onset 1: chronic  Pain Location 1: Back  Pain Orientation 1: Lower  Pain Description 1: Aching, Constant  Pain Intervention(s) 1: Medication (see MAR)  Last bowel movement:     Constitutional: obese, sedated, intubated  Eyes: pupils equal, anicteric  ENMT: no nasal discharge, moist mucous membranes  Cardiovascular: regular rhythm, distal pulses intact  Respiratory: breathing not labored, symmetric  Gastrointestinal: soft non-tender, +bowel sounds  Musculoskeletal: no deformity, no tenderness to palpation  Skin: warm, dry  Neurologic: sedated  Not following commands  Psychiatric:   Other:       HISTORY:     Principal Problem:    Sepsis ([address] (2/11/2018)    Active Problems:    Osteoarthritis of right knee (1/18/2014)      Hypokalemia (2/11/2018)      Glucosuria (2/11/2018)      AMANDA (acute kidney injury) ([address] (2/11/2018)      Fall (2/11/2018)      Back pain (2/11/2018)      Weakness of right lower extremity (2/11/2018)      Anemia (2/11/2018)      Shock ([address] (2/13/2018)      Acute respiratory failure (HCC) (2/13/2018)      Elevated troponin (2/12/2018)      Demand ischemia of myocardium ([address] (2/12/2018)      MRSA bacteremia (2/15/2018)      Past Medical History:   Diagnosis Date    Arrhythmia     irregular heart beat    Arthritis     knees- osteo    Chronic pain     knees    Diabetes (HCC) 1990's    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[address] 10/21/2014    GERD (gastroesophageal reflux disease)     well controlled    Gout     High cholesterol     Hypertension 1990's    Irregular heart beat     palpitations    Morbid obesity (HCC)     Osteoarthritis of right knee 1/18/2014    Other ill-defined conditions(799.89)     asbestosis    Other ill-defined conditions(799.89)     irregular heart beat    Other ill-defined conditions(799.89)     h/o migraines    Other ill-defined conditions(799.89)     gout    Other ill-defined conditions(799.89)     cholesterol    PUD (peptic ulcer disease) 1970's    Tinnitus of left ear       Past Surgical History:   Procedure Laterality Date    HX CARPAL TUNNEL RELEASE      HX KNEE ARTHROSCOPY  2010    left    HX KNEE ARTHROSCOPY  1980s    right    HX KNEE REPLACEMENT      HX ORTHOPAEDIC  1970s    right hand tendenitis    HX ORTHOPAEDIC  2012    left hand ring finger released    HX ORTHOPAEDIC  8-2013    left carpal    TOTAL KNEE ARTHROPLASTY  2009/2010    left      No family history on file. History reviewed, no pertinent family history.   Social History   Substance Use Topics    Smoking status: Former Smoker     Quit date: 10/1/1975    Smokeless tobacco: Never Used      Comment: 1970s    Alcohol use 1.5 oz/week     3 Shots of liquor per week     Allergies   Allergen Reactions    Bees [Hymenoptera Allergenic Extract] Anaphylaxis    Cocoa Butter-Zinc Oxide Rash    Dilaudid [Hydromorphone (Bulk)] Rash     capsules    Pcn [Penicillins] Rash      Current Facility-Administered Medications   Medication Dose Route Frequency    fentaNYL citrate (PF) injection  mcg   mcg IntraVENous Q4H PRN    insulin glargine (LANTUS) injection 22 Units  22 Units SubCUTAneous DAILY WITH LUNCH    LORazepam (ATIVAN) 60 mg in 0.9% sodium chloride 60 mL infusion  0-5 mg/kg/hr IntraVENous TITRATE    enoxaparin (LOVENOX) injection 150 mg  150 mg SubCUTAneous Q12H    amiodarone (CORDARONE) tablet 400 mg  400 mg Oral Q12H    metoprolol tartrate (LOPRESSOR) tablet 25 mg  25 mg Oral Q12H    cefTARoline (TEFLARO) 600 mg in 0.9% sodium chloride (MBP/ADV) 50 mL MBP  600 mg IntraVENous Q12H    ipratropium (ATROVENT) 0.02 % nebulizer solution 0.5 mg  0.5 mg Nebulization Q6H RT    insulin lispro (HUMALOG) injection 5 Units  5 Units SubCUTAneous Q6H    ELECTROLYTE REPLACEMENT PROTOCOL -- Potassium  1 Each Other PRN    ELECTROLYTE REPLACEMENT PROTOCOL -- Magnesium  1 Each Other PRN    ELECTROLYTE REPLACEMENT PROTOCOL -- Phosphorus  1 Each Other PRN    ELECTROLYTE REPLACEMENT PROTOCOL -- Calcium  1 Each Other PRN    chlorhexidine (PERIDEX) 0.12 % mouthwash 10 mL  10 mL Oral Q12H    acetaminophen (TYLENOL) suppository 650 mg  650 mg Rectal Q6H PRN    hydrALAZINE (APRESOLINE) 20 mg/mL injection 10 mg  10 mg IntraVENous Q6H PRN    0.9% sodium chloride infusion 250 mL  250 mL IntraVENous PRN    heparin (porcine) 1,000 unit/mL injection 1,500 Units  1,500 Units IntraVENous PRN    acetaminophen (TYLENOL) solution 650 mg  650 mg Oral Q4H PRN    aspirin chewable tablet 81 mg  81 mg Oral DAILY    pantoprazole (PROTONIX) 40 mg in sodium chloride 0.9 % 10 mL injection  40 mg IntraVENous DAILY    naloxone (NARCAN) injection 0.4 mg  0.4 mg IntraVENous PRN    docusate sodium (COLACE) capsule 100 mg  100 mg Oral BID PRN    acetaminophen (TYLENOL) tablet 650 mg  650 mg Oral G9Q PRN    folic acid (FOLVITE) tablet 1 mg  1 mg Oral DAILY    thiamine 100 mg in 50 mL NS   IntraVENous Q24H    insulin lispro (HUMALOG) injection   SubCUTAneous Q6H    sodium chloride (NS) flush 5-10 mL  5-10 mL IntraVENous PRN    ondansetron (ZOFRAN ODT) tablet 4 mg  4 mg Oral Q4H PRN    glucose chewable tablet 16 g  4 Tab Oral PRN    glucagon (GLUCAGEN) injection 1 mg  1 mg IntraMUSCular PRN    dextrose (D50W) injection syrg 12.5-25 g  25-50 mL IntraVENous PRN        LAB AND IMAGING FINDINGS:     Lab Results   Component Value Date/Time    WBC 10.7 02/21/2018 05:30 AM    HGB 8.0 (L) 02/21/2018 05:30 AM    PLATELET 145 (H) 76/61/1578 05:30 AM     Lab Results   Component Value Date/Time    Sodium 141 02/21/2018 05:30 AM    Potassium 3.7 02/21/2018 05:30 AM    Chloride 108 02/21/2018 05:30 AM    CO2 26 02/21/2018 05:30 AM    BUN 29 (H) 02/21/2018 05:30 AM    Creatinine 1.44 (H) 02/21/2018 05:30 AM    Calcium 8.7 02/21/2018 05:30 AM    Magnesium 1.6 02/21/2018 05:30 AM    Phosphorus 3.2 02/21/2018 05:30 AM      Lab Results   Component Value Date/Time    AST (SGOT) 57 (H) 02/21/2018 05:30 AM    Alk.  phosphatase 262 (H) 02/21/2018 05:30 AM    Protein, total 5.9 (L) 02/21/2018 05:30 AM    Albumin 1.6 (L) 02/21/2018 05:30 AM    Globulin 4.3 (H) 02/21/2018 05:30 AM     Lab Results   Component Value Date/Time    INR 1.1 02/19/2018 05:15 AM    Prothrombin time 13.5 02/19/2018 05:15 AM    aPTT 127.9 (H) 02/20/2018 02:10 PM      No results found for: IRON, FE, TIBC, IBCT, PSAT, FERR   No results found for: PH, PCO2, PO2  No components found for: [place]   Lab Results   Component Value Date/Time     02/18/2018 04:05 AM    CK - MB <0.5 (L) 08/02/2015 11:47 PM              Total time: 15  Counseling / coordination time, spent as noted above 12 min  > 50% counseling / coordination        Lennox Guevara MD  Palliative Medicine

## 2018-02-21 NOTE — PROGRESS NOTES
Chart reviewed attended IDR's pt scheduled for nuclear scan tomorrow, at this time source of infection still undetermined, cm did speak with  regarding LTAC services and criteria would like to see pending test results would like suggestion discussed possible mid next week.

## 2018-02-22 NOTE — INTERDISCIPLINARY ROUNDS
[]Hide copied text           CRITICAL CARE INTERDISCIPLINARY ROUNDS      Patient Information:      Name: Aguila Young  Age:   61 y.o.  Fabio Hair  Admission Date:   2/11/2018      Critical Care Day: 10      Surgery Date:  n/a      Attending Provider: Marcelle Fleming MD      Surgeon:  n/a      Consultant(s):  Dr. John Nieves, Dr. Beena Zhang, Dr. Reinaldo Barrett Physician:  Dr. Bulah Cabot  Code Status: Full Code      Problem List:             Patient Active Problem List   Diagnosis Code    Osteoarthritis of right knee M17.11    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[address] T84.018A, Z96.659    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 Rosezena Duron    Back pain M54.9    Weakness of right lower extremity R29.898    Anemia D64.9    Shock ([address] R57.9    Acute respiratory failure (HCC) J96.00    Elevated troponin R74.8    Demand ischemia of myocardium (HCC) I24.8    MRSA bacteremia R78.81           Principal Problem:  Sepsis ([address]      During rounds the following quality care indicators and evidence based practices were addressed :  DVT Prophylaxis, PUD Prophylaxis, Pressure Injury Prevention, Sepsis resuscitation and management, Nutritional Status, Critical Care Interventions Airways, Dialysis, Lines and Pressors and Flu Vaccine Wilson Day 8 (M-Care yes) ; Central Line Day: 5 Isolation: contact-MRSA;  Antibiotic Stewardship: Vancomycin      Ventilator Bundle:  Ventilator Bundle Order Set: yes Sedation Vacation n/a; Spontaneous Breathing Trial n/a; Restraints yes (Order, Necessity, Documentation)  Vent Day: 4      Sepsis Order Set: yes or Elements of Sepsis Bundle Reviewed (Fluids, Antibiotics, Cultures, Glycemic control      Glycemic Control:               Recent Labs       02/19/18   2829  02/18/18   1015  02/18/18   0405  02/17/18   2100   GLU  200*  486*  503*  203*   ;            Recent Labs       02/19/18   0520  02/18/18   0523  02/17/18   1448   PHI  7.468*  7.498*  7.511*   PCO2I  33.4*  35.8  36.1   PO2I  97  100  96    Adjustments       Acute MI/PCI:   Not applicable      Door to Balloon: Admission Time: 1810        Heart Failure:    Not applicable       SCIP Measures for other Surgeries:   Not applicable CHG Bath Daily yes      Pneumonia:    Not applicable      Interdisciplinary team rounds were held with the following team membersCare Management, Diabetes Treatment Specialist, Nursing, Nutrition, Pharmacy, Physical Therapy, Physician and Respiratory Therapy.  Plan of care discussed.       Goals of Care/ Recommendations: continue to monitor pt VS, labs, electrolyte replacement protocol  See clinical pathway and/or care plan for interventions and desired outcomes.      Critical Care Discharge Status:  Red

## 2018-02-22 NOTE — PROGRESS NOTES
NUTRITION FOLLOW-UP    RECOMMENDATIONS/PLAN:   - Recc increasing Nepro @ 40ml/hr 1584kcal, 71g PRO, 638ml H20, 147g CHO, 84g Fat  Prosource x1/day to provide 15g PRO and 60kcal  Total: 1644kcal, 86g PRO, 638ml H20, 147g CHO, 84g Fat  Will defer Free H20 to MD  Monitor labs/lytes, tube feed tolerance, skin integrity, wt, fluid status, BM    NUTRITION ASSESSMENT:   Client Update: 57 yrs old Male with septic shock, MRSA bacteremia, and cardiac stress due to sepsis w/ resp failure, hypokalemia, and low magnesium. Pt was extubated on 2/16/18, however was reintubated on 2/18/18. Currently intubated in ICU  WBC scan schedule for today. FOOD/NUTRITION INTAKE   Diet Order:  Tube Feeds   Supplements: Prosource    Food Allergies: NKFA/  Average PO Intake:      No data found. Pertinent Medications:  [x] Reviewed; folic acid, heparin, Protonix, thiamine   Electrolyte Replacement Protocol: [x]K [x]Mg [x]PO4  Insulin:  [x]SSI  []Pre-meal   []Basal    []Drip  []None  Cultural/Mu-ism Food Preferences: None Identified    BIOCHEMICAL DATA & MEDICAL TESTS  Pertinent Labs:  [x] Reviewed; ANTHROPOMETRICS  Height: 6' (182.9 cm)       Weight: 148 kg (326 lb 4.5 oz)         BMI: 44.2 kg/m^2 morbidly obese (Greater than or = to 40% BMI)   Adm Weight: 306 lbs                Weight change: +20lbs noted +2 & +3 non-pitting edema noted   Adjusted Body Weight: 95.5kg     NUTRITION-FOCUSED PHYSICAL ASSESSMENT  Skin: No pressure injury noted      GI: No new BM since last review     NUTRITION PRESCRIPTION  Calories: 5696-9736 kcal/day based on 11-14kcal/kg  Protein: 97 g/day based on 1.2 g/kg of IBW  CHO: 191-243 g/day based on 50% of total energy  Fluid: 5328-3173 ml/day based on 1 kcal/ml      NUTRITION DIAGNOSES:   1. Inadequate oral intake related to intubation as evidence by NPO diet order and need for nutrition support.         NUTRITION INTERVENTIONS:   INTERVENTIONS:        GOALS:  1.  EN:Nepro @ 40ml/hr 1584kcal, 71g PRO, 638ml H20, 147g CHO, 84g Fat 1 Meet tube feed goal rate by next review 3 days             LEARNING NEEDS (Diet, Supplementation, Food/Nutrient-Drug Interaction):   [x] None Identified   [] Education provided/documented      Identified and patient: [] Declined   [] Was not appropriate/indicated        NUTRITION MONITORING /EVALUATION:   Adjust EN/PN as appropriate  Monitor wt  Monitor renal labs, electrolytes, fluid status  Monitor for additional supplement needs     Previous Recommendations Implemented: yes        Previous Goals Met:  yes -      [x] Participated in Interdisciplinary Rounds    [x] Interdisciplinary Care Plan Reviewed  DISCHARGE NUTRITION RECOMMENDATIONS ADDRESSED:      [x] To be determined closer to discharge    NUTRITION RISK:           [x] At risk                        [] Not currently at risk        Will follow-up per policy.   Hossein Duong, [id number] [facility]

## 2018-02-22 NOTE — PROGRESS NOTES
Cardiology Progress Note        Patient: Za Tucker        Sex: male          DOA: 2/11/2018  YOB: 1955      Age:  57 y.o.        LOS:  LOS: 10 days   Assessment/Plan     Principal Problem:    Sepsis ([address] (2/11/2018)    Active Problems:    Osteoarthritis of right knee (1/18/2014)      Hypokalemia (2/11/2018)      Glucosuria (2/11/2018)      AMANDA (acute kidney injury) ([address] (2/11/2018)      Fall (2/11/2018)      Back pain (2/11/2018)      Weakness of right lower extremity (2/11/2018)      Anemia (2/11/2018)      Shock ([address] (2/13/2018)      Acute respiratory failure (HCC) (2/13/2018)      Elevated troponin (2/12/2018)      Demand ischemia of myocardium ([address] (2/12/2018)      MRSA bacteremia (2/15/2018)        Plan: 2/22/2018  maintain NSR  Remained intubated  Amiodarone 400 mg po BID for 10 days and then 200 mg po daily  Start Amiodarone 200 mg from 3/1/2018  Continue with metoprolol to 50 mg po BID  May need ACEI/ARB for BP control  Continue with Lovenox and switch to coumadin/xarelto/eliquis before discharge for Afib  Consider stress test in future or out pt setting when stable  I will sign off, please call if any questions concerned. Discussed with Dr. Kya Malhotra. THX                    Subjective:    cc:  intubated      REVIEW OF SYSTEMS: unable to obtained  Objective:      Visit Vitals    /65    Pulse 81    Temp 98.2 °F (36.8 °C)    Resp 26    Ht 6' (1.829 m)    Wt 148 kg (326 lb 4.5 oz)    SpO2 100%    BMI 44.25 kg/m2     Body mass index is 44.25 kg/(m^2). Physical Exam:  General Appearance: Comfortable  HEENT: TERRANCE. HEAD: Atraumatic  NECK: No JVD, no thyroidomeglay. CAROTIDS:  LUNGS: Clear bilaterally. HEART: S1+S2 audible    ABD: Non-tender, BS Audible    EXT: mild edema, and no cysnosis. VASCULAR EXAM: Pulses are intact. MUSCULOSKELETAL: Grossly no joint deformity.     Medication:  Current Facility-Administered Medications   Medication Dose Route Frequency    fentaNYL citrate (PF) injection  mcg   mcg IntraVENous Q4H PRN    metoprolol tartrate (LOPRESSOR) tablet 50 mg  50 mg Oral Q12H    insulin glargine (LANTUS) injection 22 Units  22 Units SubCUTAneous DAILY WITH LUNCH    LORazepam (ATIVAN) 60 mg in 0.9% sodium chloride 60 mL infusion  0-5 mg/kg/hr IntraVENous TITRATE    enoxaparin (LOVENOX) injection 150 mg  150 mg SubCUTAneous Q12H    amiodarone (CORDARONE) tablet 400 mg  400 mg Oral Q12H    cefTARoline (TEFLARO) 600 mg in 0.9% sodium chloride (MBP/ADV) 50 mL MBP  600 mg IntraVENous Q12H    ipratropium (ATROVENT) 0.02 % nebulizer solution 0.5 mg  0.5 mg Nebulization Q6H RT    insulin lispro (HUMALOG) injection 5 Units  5 Units SubCUTAneous Q6H    ELECTROLYTE REPLACEMENT PROTOCOL -- Potassium  1 Each Other PRN    ELECTROLYTE REPLACEMENT PROTOCOL -- Magnesium  1 Each Other PRN    ELECTROLYTE REPLACEMENT PROTOCOL -- Phosphorus  1 Each Other PRN    ELECTROLYTE REPLACEMENT PROTOCOL -- Calcium  1 Each Other PRN    chlorhexidine (PERIDEX) 0.12 % mouthwash 10 mL  10 mL Oral Q12H    acetaminophen (TYLENOL) suppository 650 mg  650 mg Rectal Q6H PRN    hydrALAZINE (APRESOLINE) 20 mg/mL injection 10 mg  10 mg IntraVENous Q6H PRN    0.9% sodium chloride infusion 250 mL  250 mL IntraVENous PRN    heparin (porcine) 1,000 unit/mL injection 1,500 Units  1,500 Units IntraVENous PRN    acetaminophen (TYLENOL) solution 650 mg  650 mg Oral Q4H PRN    aspirin chewable tablet 81 mg  81 mg Oral DAILY    pantoprazole (PROTONIX) 40 mg in sodium chloride 0.9 % 10 mL injection  40 mg IntraVENous DAILY    naloxone (NARCAN) injection 0.4 mg  0.4 mg IntraVENous PRN    docusate sodium (COLACE) capsule 100 mg  100 mg Oral BID PRN    acetaminophen (TYLENOL) tablet 650 mg  650 mg Oral I7W PRN    folic acid (FOLVITE) tablet 1 mg  1 mg Oral DAILY    thiamine 100 mg in 50 mL NS   IntraVENous Q24H    insulin lispro (HUMALOG) injection SubCUTAneous Q6H    sodium chloride (NS) flush 5-10 mL  5-10 mL IntraVENous PRN    ondansetron (ZOFRAN ODT) tablet 4 mg  4 mg Oral Q4H PRN    glucose chewable tablet 16 g  4 Tab Oral PRN    glucagon (GLUCAGEN) injection 1 mg  1 mg IntraMUSCular PRN    dextrose (D50W) injection syrg 12.5-25 g  25-50 mL IntraVENous PRN               Lab/Data Reviewed: All lab results for the last 24 hours reviewed. Recent Labs      02/22/18 0400 02/21/18 0530   WBC  11.6  10.7   HGB  8.0*  8.0*   HCT  25.6*  24.5*   PLT  520*  488*     Recent Labs      02/22/18   0400  02/21/18   1940  02/21/18   0530   02/20/18   0400   NA  143   --   141   --   143   K  3.7  3.8  3.7   < >  3.8   CL  110*   --   108   --   108   CO2  25   --   26   --   27   GLU  130*   --   142*   --   186*   BUN  30*   --   29*   --   33*   CREA  1.49*   --   1.44*   --   1.39*   CA  8.6   --   8.7   --   8.6    < > = values in this interval not displayed.        Signed By: Jonny Angelo MD     February 22, 2018

## 2018-02-22 NOTE — PROGRESS NOTES
Hospitalist Progress Note    Patient: Erick Sainz MRN: [id number]  CSN: [id number]    YOB: 1955  Age: 57 y.o. Sex: male    DOA: 2/11/2018 LOS:  LOS: 10 days                Assessment/Plan     Patient Active Problem List   Diagnosis Code    Osteoarthritis of right knee M17.11    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[facility] T84.018A, Z96.659    Failed total knee arthroplasty ([facility] T84.018A, Z96.659    Dysphagia R13.10    Sepsis ([facility] A41.9    Hypokalemia E87.6    Glucosuria R81    AMANDA (acute kidney injury) ([facility] N17.9    Fall W19. XXXA    Back pain M54.9    Weakness of right lower extremity R29.898    Anemia D64.9    Shock ([facility]) R57.9    Acute respiratory failure ([facility]) J96.00    Elevated troponin R74.8    Demand ischemia of myocardium ([facility]) I24.8    MRSA bacteremia R65.80        58 yo male admitted for pneumonia, respiratory distress. RRT called on this patient for respiratory distress in morning of 02/12/2018, high mews score of 8, fever of 103, tachycardia, resp rate of 44. He was transferred to ICU. Patient continued to decline in condition with elevated resp rate. He was intubated and central line placed. CRITICAL CARE PLAN         Resp -   Acute respiratory failure - extubated 02/16/2018. Patient reintubated 02/17/2018     ID -   Sepsis unclear source of infection. Recent pneumonia. Persistent bacteremia   Blood cultures 02/11/2018 positive for MRSA. blood cx 2/2 02/13/2018 MRSA. blood cultures 2/2 02/14/2018 MRSA.   blood cultures 2/2 02/16/2018 MRSA  blood cultures 2/2 02/18/2018 MRSA  Follow blood cultures 02/22/2018  resp cultures 02/13/2018 MRSA  Urine culture 02/12/2018 staph aureus  CT chest Multifocal peripheral bilateral subpleural nodular densities the largest 15  mm right apex, which may represent peripheral areas of pneumonia or other  inflammatory etiology, although differential diagnosis includes less likely  metastatic disease or infarcts from pulmonary emboli. CT of ext and neck with no source of infection. No MRI evidence of osteomyelitis and discitis. S/p TERRY with no evidence of vegetations. ANTIBIOTICS not responding to vancomycin, changed to teflaro. He has pcn allergy as rash. Closely monitor. Wbc scan 02/22     [facility] - Monitor HD. Septic shock - off pressors  A-fib - Off amiodarone drip started on oral amiodarone. S/p cardioversion. Converted to sinus rhythm. Elevated troponin - demand ischemia vs NSTEMI. Will need ischemia work up when stable per cardiology.       Heme/onc - Follow H&H, plts.      Renal -   AMANDA - improving, nephrology following. Metabolic acidosis - improved with bicarb drip  Trend BUN, Cr, follow I/O. Check and replace Mg, K, phos.     Endocrine -  Follow FSG  TSH in normal range. DM - on lantus and SSI     Neuro/ Pain/ Sedation - sedation bundle  Head CT negative     GI - NPO, tube feeding.     Prophylaxis - DVT: heparin drip, GI: protonix.     35 minutes of critical care time spent in the direct evaluation and treatment of this high risk patient. The reason for providing this level of medical care for this critically ill patient was due a critical illness that impaired one or more vital organ systems such that there was a high probability of imminent or life threatening deterioration in the patients condition. This care involved high complexity decision making to assess, manipulate, and support vital system functions, to treat this degreee vital organ system failure and to prevent further life threatening deterioration of the patients condition.             Disposition : TBD    Physical Exam:  General: As above  HEENT: NC, Atraumatic. PERRLA, anicteric sclerae. Lungs: CTA Bilaterally. No Wheezing/Rhonchi/Rales.   Heart:  Regular  rhythm,  No murmur, No Rubs, No Gallops  Abdomen: Soft, Non distended, Non tender.  +Bowel sounds,   Extremities: Edema upper and LE bilaterally          Vital signs/Intake and Output:  Visit Vitals    /51    Pulse 78    Temp 98.6 °F (37 °C)    Resp 30    Ht 6' (1.829 m)    Wt 148 kg (326 lb 4.5 oz)    SpO2 97%    BMI 44.25 kg/m2     Current Shift:  02/22 0701 - 02/22 1900  In: 0   Out: 550 [Urine:550]  Last three shifts:  02/20 1901 - 02/22 0700  In: 4517.8 [I.V.:2392.8]  Out: 2025 [Urine:2025]            Labs: Results:       Chemistry Recent Labs      02/22/18   0400  02/21/18   1940  02/21/18   0530   02/20/18   0400   GLU  130*   --   142*   --   186*   NA  143   --   141   --   143   K  3.7  3.8  3.7   < >  3.8   CL  110*   --   108   --   108   CO2  25   --   26   --   27   BUN  30*   --   29*   --   33*   CREA  1.49*   --   1.44*   --   1.39*   CA  8.6   --   8.7   --   8.6   AGAP  8   --   7   --   8   BUCR  20   --   20   --   24*   AP  317*   --   262*   --   215*   TP  6.3*   --   5.9*   --   6.2*   ALB  1.7*   --   1.6*   --   1.7*   GLOB  4.6*   --   4.3*   --   4.5*   AGRAT  0.4*   --   0.4*   --   0.4*    < > = values in this interval not displayed. CBC w/Diff Recent Labs      02/22/18 0400 02/21/18   0530   WBC  11.6  10.7   RBC  2.72*  2.64*   HGB  8.0*  8.0*   HCT  25.6*  24.5*   PLT  520*  488*   GRANS  74*  73   LYMPH  16*  17*   EOS  2  2      Cardiac Enzymes No results for input(s): CPK, CKND1, LESLIE in the last 72 hours. No lab exists for component: CKRMB, TROIP   Coagulation Recent Labs      02/20/18   1410  02/20/18   0620   APTT  127.9*  116.4*       Lipid Panel Lab Results   Component Value Date/Time    Cholesterol, total 116 02/11/2018 08:45 PM    HDL Cholesterol 27 (L) 02/11/2018 08:45 PM    LDL, calculated 57.4 02/11/2018 08:45 PM    VLDL, calculated 31.6 02/11/2018 08:45 PM    Triglyceride 278 (H) 02/19/2018 05:15 AM    CHOL/HDL Ratio 4.3 02/11/2018 08:45 PM      BNP No results for input(s): BNPP in the last 72 hours.    Liver Enzymes Recent Labs      02/22/18   0400   TP  6.3*   ALB  1.7*   AP  317*   SGOT  58*      Thyroid Studies Lab Results Component Value Date/Time    TSH 0.43 02/12/2018 10:05 AM        Procedures/imaging: see electronic medical records for all procedures/Xrays and details which were not copied into this note but were reviewed prior to creation of Plan

## 2018-02-22 NOTE — PROGRESS NOTES
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 XXXA    Back pain M54.9    Weakness of right lower extremity R29.898    Anemia D64.9    Shock ([address] R57.9    Acute respiratory failure (HCC) J96.00    Elevated troponin R74.8    Demand ischemia of myocardium (HCC) I24.8    MRSA bacteremia R78.81 ·   MRSA sepsis with persistent bacteremia- source related to air space disease? as CT showed concern for septic emboli? TERRY negative for vegetation. Spine MRI is negative also. Neck, abdomena and pelvis CT and ext CT without evidence of infection or abscess. Few skin folliculitis lesion bl LE but too small and superficial to be source. Blood culture is positive  and  cultures for MRSA. Now on Teflaro, repeat blood cultures drawn today, ID following  · Septic shock resolved and off pressors  · Acute respiratory failure- intubated on  extubated on , reintubated on  on hospital vent protocol  · HTN - medications adjusted by hospitalist  · Elevated troponin ?  Demand ischemia vs NSTMI   · Acute renal failure - improved; S/P HD doing well with improvement in Cr and off of HD, HD cath removed 18  · Rhabdo resolved  · Afib, rate controlled S/P cardioversion and on PO amiodarone, PO Metoprolol and Lovenox  · Back pain, fall - no absess or open wound   · Severe hypokalemia hypomagnesemia on presentation- improved and now on replacements  · Elevated blood sugars improving control  · Lactic acidosis resolved  · H/O ETOH    PLAN:  -- Mech. Ventilated patients- aim to keep peak plateau pressure less than or equal to 30cm H2O. Titrate FiO2 for goal SPO2> 90%  -- VAP prevention bundle, head of the bed at 30' all times  -- Sedation holiday today and then daily and assessment for weaning with SBT as tolerated. Not a candidate given ongoing sepsis concern  -- Ativan per sedation protocol and Fentanyl changed to PRN  -- Await Nuclear scan result. -- Repeat blood culture after 2 days on Teflaro sent. Teflaro tolerated. Patient has PCN allergy. If repeat cultures positive and nuclear scan negative then may need line holiday followed by PICC line. -- Amiodarone and Metoprolol PO, lovenox for Afib per cardiology  -- Off of duoneb and continue atrovent due to afib and tachycardia previously  -- NA improved with free water flush. UO better and not on HD  -- Will discuss with nephrology regarding Lasix to assist with weaning process  -- Adjust BP meds for control of blood pressure  -- K and MG replacement per protocol    -- Lantus to 22 unit and humalog 5 units with feeding plus ISSC  -- HB stable, follow up in AM  -- MRSA isolation   CVS:Currently on PO Amiodarone drip, Lovenox follow per cardiology recommendations. S/P cardioversion with 200 j on 2/13  Adjust BP medications per hospitalist. On aspirin  RS: as above. Lung protective vent protocol, VAP precatuion  ID:Sepsis source?, MRSA on multiple cultures   Vanco stopped after day 8 [2/19/18]. Persistent bacteremia. Now on Teflaro  Azactam 2/11 stop on 2/14  levaquin 2/12  Stop on 2/15  Monitor skin lesions - no changes compared to previously   ENDO: TSH is ok. On Lantus, Humalog- at goal  GI: feeds as tolerated, PPI, improving bilirubin, Alk P could be due to stasis from sepsis- follow up   RENAL:Wilson for I/O, Replace k and MG per protocol.  Patient is putting out urine, Off HD, dialysis per renal  CNS: Sedated intubated wakes up with sedation holiday in AM  HEMATOLOGY:PLT is stable, Lovenox for Afib   MUSCULOSKELETAL:CK was elevated but improved  · PAIN AND SEDATION: On Ativan and PRN Fentanyl, Thiamine and folic acid PO  · Skin/Wound: Monitor back, No skin absess  · Electrolytes: As above   · IVF: Corry Marlene   · Nutrition: Tube feeds   · Prophylaxis: DVT Prophylaxis with Lovenox. GI Prophylaxis. · Restraints: minimal restrain to avoid pulling lines and tubes  · PT/OT eval and treat. OOB when appropriate. · Lines/Tubes: none. Femoral line- 2/12 removed on 2/15. Wilson 2/12/18. Right IJ dialysis catheter 2/14- removed 2/19, Left subclavian line 2/15/18  ADVANCE DIRECTIVE:Full code/ Palliative is consulted spoke with wife at length  FAMILY DISCUSSION: no family at bedside  Quality Care: PPI, DVT prophylaxis, HOB elevated, Infection control all reviewed and addressed. Events and notes from last 24 hours reviewed. Care plan discussed with nursing CC TIME: 36  min excluding procedure/s   · Labs and images personally seen and available reports reviewed. · All current medicines are reviewed and doses and prescription adjusted. Subjective:  2/22/18  Patient remained on ventilator. He remained hemodynamically stable. Tolerating Teflaro. Low grade fever overnight. He remained hemodynamically stable. UO stable. Underwent tagged scan today. He remained on Ativan. Spoke with RN regarding sedation holiday. Will monitor. 2/21/18  No fever overnight or this AM  He remained hemodynamically stable, on HTN side. He is on ventilator, now on Ativan [1 mg/hr at present] and Fentanyl and off of propofol since yesterday. Tolerating Teflaro and no adverse reaction  Possible tagged scan tomorrow per radiology protocol  Tolerating TF    2/20/18  Patient on ventilator, sedated. On sedation holiday wakes up. Low grade fever overnight. Hemodynamically stable.   Repeat bl cx from 2/18 positive for MRSA  Tolerating Teflaro without any rash or adverse effect. ECHO was done on 2/18, spoke with Dr. Yfn Bryan and no repeat ECHO needed at present. Plan for tagged abscess location scan. Tolerating TF. Good UO. Tolerating Heparin without any bleeding. 2/19/18  Patient remained on ventilator and sedated. No fever. Hemodynamically stable, rather on hypertensive side, hospitalist adjusting meds. Repeat bl cultures from 2/18/18 both set positive for GPC. Spoke with Dr. Yfn Bryan and he recommended change to Teflaro and follow up ECHO  HD catheter removed by staff per renal recommendations  No plans for HD per renal  Tolerating TF well and adjusted per protocol by staff  The staff denies abnormal bruising or abnormal bleeding from any body orifice such as bleeding from nose or gums, blood in urine or stool, or melena, hemoptysis or hematemesis.      2/18  Intubated again for work of [address] [place] [address] [employer] on 2/17  CT ext, ABD pel, Neck all came out ok no source of abscess  No fever overnight  BP is ok  ON /14/50% peep 5 abg ok  Cr is improving with good urine out put  Renal not planning any dialysis at this point    2/17/18  Was extubated on 2/16  Did reasonably well yesterday  Overnight still had fever  Early am was breathing little fast. As per nursing was awake and following command but received morphine now not waking up much  Put out 3300 urine  K 2.9 only replaced by 10 meq  Mg 1.5  PH 7.56/34/30/107  Currently on BIPAP still breathing little fast    2/16  Doing well   Yesterday started noticing some sking lesions all over body looks like septic staph infection  Nothing to suggest allergic rash  Put out about 1400cc yesterday  K 2.9 and replaced  Cr improved to 2.22 s/p hd yesterday  Mild elevation of billirubin to 1.9   HB and PLT is stable   Sputum MRSA    2/15  Doing ok  No overnight event   S/P HD  Also put out 900 cc of urine in last 24 hrs  HB dropped to 7.5 but no active bleed  PLT is 129 INR is 1.6 K 3.0 Alb 1.5     2/14  No significant overnight event  S/P TERRY negative for vegetation or blood clot  S/P cardioversion  ID evaluated suggested MSSA sepsis but initial culture is growing MRSA. Am labs showed worsening cr to 4.06 and PH 7.26  ALB 1.5  Currently on phenylephring 100, versed, amiodarone, fentanyl and bicarb drip    2/13/18  Yesterday became more tachypnic and difficult to control hr  Intubated 2/12, femoral line 2/12 <neck line not placed due to concern for colonization with ongoing sepsis>  Became hypotensive managed with phenylephrine  Afib was poorly controlled started on amiodarone drip. Cardiology is closely following  Blood culture Staph  K 3.2 replaced  Mg 1.1 getting replaced   UO and Cr is improving  Still spiking fever 102   Flu is negative       History:   Amrita Mcnamara is a 57 y.o. male who came to the ed with complaints of fall. Patient states he has been feeling generalized weakness at home for the last day or so with low grade subjective fevers but he noted RLE weakness around 5pm. Since then he has fallen about 4 times in his back without any head trauma or LOC. He has been using his tripod cane to assist him with walking. Recently about 1 month a go treated for pna at office. Ex smoker. Drinks about 2 glasses of mix drinks every day. Recently lost his son to multiorgan failure and sepsis. Due to persistence of his weakness, falls, fevers and low back pain he decided to come to the ED. In the ED, CT of the head was neg and his labs showed significant hypoK with elevated Cr (unknown baseline). He was also febrile and tachycardiac. He was started on broad spectrum Abx.  Overnight RRT called out after he was found to be tachycardic, tachypnic and ABG showed alkalosis and was transferred to ICU        Current Facility-Administered Medications   Medication Dose Route Frequency    [START ON 3/1/2018] amiodarone (CORDARONE) tablet 200 mg  200 mg Oral DAILY    Thiamine Mononitrate (B-1) tablet 100 mg  100 mg Oral DAILY    metoprolol tartrate (LOPRESSOR) tablet 50 mg  50 mg Oral Q12H    insulin glargine (LANTUS) injection 22 Units  22 Units SubCUTAneous DAILY WITH LUNCH    LORazepam (ATIVAN) 60 mg in 0.9% sodium chloride 60 mL infusion  0-5 mg/kg/hr IntraVENous TITRATE    enoxaparin (LOVENOX) injection 150 mg  150 mg SubCUTAneous Q12H    amiodarone (CORDARONE) tablet 400 mg  400 mg Oral Q12H    cefTARoline (TEFLARO) 600 mg in 0.9% sodium chloride (MBP/ADV) 50 mL MBP  600 mg IntraVENous Q12H    ipratropium (ATROVENT) 0.02 % nebulizer solution 0.5 mg  0.5 mg Nebulization Q6H RT    insulin lispro (HUMALOG) injection 5 Units  5 Units SubCUTAneous Q6H    chlorhexidine (PERIDEX) 0.12 % mouthwash 10 mL  10 mL Oral Q12H    aspirin chewable tablet 81 mg  81 mg Oral DAILY    pantoprazole (PROTONIX) 40 mg in sodium chloride 0.9 % 10 mL injection  40 mg IntraVENous DAILY    folic acid (FOLVITE) tablet 1 mg  1 mg Oral DAILY    insulin lispro (HUMALOG) injection   SubCUTAneous Q6H         Objective:     Vital Signs:    Blood pressure 123/53, pulse 78, temperature 98.2 °F (36.8 °C), resp. rate 28, height 6' (1.829 m), weight 148 kg (326 lb 4.5 oz), SpO2 97 %. Body mass index is 44.25 kg/(m^2). O2 Device: Ventilator   O2 Flow Rate (L/min): 6 l/min   Temp (24hrs), Av.8 °F (37.1 °C), Min:98.2 °F (36.8 °C), Max:100 °F (37.8 °C)       Intake/Output:   Last shift:      701 - 1900  In: -   Out: 350 [Urine:350]  Last 3 shifts: 1901 - 700  In: 4517.8 [I.V.:2392.8]  Out:  [Urine:]    Intake/Output Summary (Last 24 hours) at 18 1518  Last data filed at 18 1405   Gross per 24 hour   Intake             3050 ml   Output             1100 ml   Net             1950 ml       Review of Systems:  Intubated sedated   Review of systems not obtained due to patient factors.        Physical Exam:  General: in no respiratory distress and acyanotic, appears older than stated age, sedated, on ventilator  HEENT: PERRL, orally intubated  Neck: No abnormally enlarged lymph nodes or thyroid, supple  Chest: normal  Lungs: moderate air entry and rhonchi, normal percussion bilaterally, no tenderness/ rash  Heart: Regular rate and rhythm, S1S2 present or without murmur or extra heart sounds  Abdomen: obese, bowel sounds normoactive, tympanic, abdomen is soft without significant tenderness, masses, organomegaly or guarding  Extremity: Trace, pitting and bl ankle edema, negative for cyanosis, clubbing. Neuro: sedated, moves all extremities well, no involuntary movements, exam limitations  Skin: Skin color, texture, turgor fair. Leg bl folliculitis rashes or lesions unchanged      CBC w/Diff Recent Labs      02/22/18   0400  02/21/18   0530   WBC  11.6  10.7   RBC  2.72*  2.64*   HGB  8.0*  8.0*   HCT  25.6*  24.5*   PLT  520*  488*   GRANS  74*  73   LYMPH  16*  17*   EOS  2  2        Chemistry Recent Labs      02/22/18   0400  02/21/18   1940  02/21/18   0530   02/20/18   0400   GLU  130*   --   142*   --   186*   NA  143   --   141   --   143   K  3.7  3.8  3.7   < >  3.8   CL  110*   --   108   --   108   CO2  25   --   26   --   27   BUN  30*   --   29*   --   33*   CREA  1.49*   --   1.44*   --   1.39*   CA  8.6   --   8.7   --   8.6   MG  1.9  1.6  1.6   --   1.9   PHOS  3.4   --   3.2   --    --    AGAP  8   --   7   --   8   BUCR  20   --   20   --   24*   AP  317*   --   262*   --   215*   TP  6.3*   --   5.9*   --   6.2*   ALB  1.7*   --   1.6*   --   1.7*   GLOB  4.6*   --   4.3*   --   4.5*   AGRAT  0.4*   --   0.4*   --   0.4*    < > = values in this interval not displayed. Lactic Acid Lactic acid   Date Value Ref Range Status   02/13/2018 1.8 0.4 - 2.0 MMOL/L Final     No results for input(s): LAC in the last 72 hours.      Micro  Recent Labs      02/18/18   0405  02/18/18   0400  02/16/18   0508   CULT  AEROBIC BOTTLE **METHICILLIN RESISTANT STAPHYLOCOCCUS AUREUS**  AEROBIC BOTTLE **METHICILLIN RESISTANT STAPHYLOCOCCUS AUREUS**  AEROBIC BOTTLE **METHICILLIN RESISTANT STAPHYLOCOCCUS AUREUS  For Susceptibility Refer to Culture  [id number]    AEROBIC BOTTLE **METHICILLIN RESISTANT STAPHYLOCOCCUS AUREUS  For Susceptibility Refer to Culture  [id number]       Recent Labs      02/13/18   0858  02/13/18   0315  02/13/18   0310  02/12/18   0900  02/11/18   1846   CULT  PENDING  AEROBIC BOTTLE STAPHYLOCOCCUS AUREUS*  AEROBIC BOTTLE STAPHYLOCOCCUS AUREUS*  NO GROWTH AFTER 20 HOURS  AEROBIC AND ANAEROBIC BOTTLES **METHICILLIN RESISTANT STAPHYLOCOCCUS AUREUS **        ABG Recent Labs      02/22/18   0532  02/21/18   0532  02/20/18   0602   PHI  7.473*  7.483*  7.421   PCO2I  31.5*  32.0*  37.5   PO2I  98  71*  64*   HCO3I  23.1  24.1  24.5   FIO2I  40  40  40        Liver Enzymes Protein, total   Date Value Ref Range Status   02/22/2018 6.3 (L) 6.4 - 8.2 g/dL Final     Albumin   Date Value Ref Range Status   02/22/2018 1.7 (L) 3.4 - 5.0 g/dL Final     Globulin   Date Value Ref Range Status   02/22/2018 4.6 (H) 2.0 - 4.0 g/dL Final     A-G Ratio   Date Value Ref Range Status   02/22/2018 0.4 (L) 0.8 - 1.7   Final     AST (SGOT)   Date Value Ref Range Status   02/22/2018 58 (H) 15 - 37 U/L Final     Alk.  phosphatase   Date Value Ref Range Status   02/22/2018 317 (H) 45 - 117 U/L Final     Recent Labs      02/22/18   0400  02/21/18   0530  02/20/18   0400   TP  6.3*  5.9*  6.2*   ALB  1.7*  1.6*  1.7*   GLOB  4.6*  4.3*  4.5*   AGRAT  0.4*  0.4*  0.4*   SGOT  58*  57*  48*   AP  317*  262*  215*        Cardiac Enzymes No results found for: CPK, CK, CKMMB, CKMB, RCK3, CKMBT, CKNDX, CKND1, LESLIE, TROPT, TROIQ, BELLA, TROPT, TNIPOC, BNP, BNPP     BNP No results found for: BNP, BNPP, XBNPT     Coagulation Recent Labs      02/20/18   1410  02/20/18   0620   APTT  127.9*  116.4*         Thyroid  Lab Results   Component Value Date/Time    TSH 0.43 02/12/2018 10:05 AM          Lipid Panel Lab Results   Component Value Date/Time    Cholesterol, total 116 02/11/2018 08:45 PM    HDL Cholesterol 27 (L) 02/11/2018 08:45 PM    LDL, calculated 57.4 02/11/2018 08:45 PM    VLDL, calculated 31.6 02/11/2018 08:45 PM    Triglyceride 278 (H) 02/19/2018 05:15 AM    CHOL/HDL Ratio 4.3 02/11/2018 08:45 PM          Urinalysis Lab Results   Component Value Date/Time    Color DARK YELLOW 02/11/2018 10:45 PM    Appearance CLOUDY 02/11/2018 10:45 PM    Specific gravity 1.019 02/11/2018 10:45 PM    pH (UA) 5.5 02/11/2018 10:45 PM    Protein 300 (A) 02/11/2018 10:45 PM    Glucose >1000 (A) 02/11/2018 10:45 PM    Ketone TRACE (A) 02/11/2018 10:45 PM    Bilirubin NEGATIVE  02/11/2018 10:45 PM    Urobilinogen 1.0 02/11/2018 10:45 PM    Nitrites NEGATIVE  02/11/2018 10:45 PM    Leukocyte Esterase NEGATIVE  02/11/2018 10:45 PM    Epithelial cells 0 01/08/2014 02:46 PM    Bacteria 2+ (A) 02/11/2018 10:45 PM    WBC 0 to 2 02/11/2018 10:45 PM    RBC 0 to 2 02/11/2018 10:45 PM        XR (Most Recent). CXR reviewed by me and compared with previous CXR   Results from Hospital Encounter encounter on 02/11/18   XR CHEST PORT   Narrative Chest, single view    Indication: Endotracheal tube placement. Comparison: Several prior chest radiographs, most recently 2/21/2018 at 5:15 AM.    Findings:  Portable upright AP view of the chest was obtained. The patient is  rotated on the provided projection, with foreshortening of the right hemithorax. There is an endotracheal tube which projects approximately 4.5 cm above the  lorena. There is a nasogastric tube which is present below the diaphragm, the  tip collimated from view. A left subclavian approach vascular catheter likely  projects over the brachiocephalic vein, appearing somewhat retracted in the  interval from prior examination, possibly accentuated by rotation. Similar degree of pulmonary expansion, with dense left retrocardiac opacity  unchanged. Small left pleural effusion noted.  Patchy right lower lung opacities  noted, more similar to prior exam of 2/19/2018. No pneumothorax. Cardiac size  and mediastinal contours remain stable. No acute osseous abnormality. Impression Impression:    1. Rotated projection of the chest with endotracheal tube and visualized  nasogastric tubes in position as above. 2. Left subclavian vascular catheter, tip likely projecting over the  brachiocephalic vein. The catheter appears somewhat retracted in the interval  from prior, likely accentuated by rotation. 3. Bibasilar opacities which may reflect underlying atelectasis or airspace  disease. Small left pleural effusion unchanged. CT (Most Recent)   Results from Hospital Encounter encounter on 02/11/18   CT NECK SOFT TISSUE WO CONT   Narrative CT soft tissue of the neck without contrast.    INDICATION: Sepsis workup. Persistent positive blood cultures with unknown  source. COMPARISON: Neck CT from 8/3/2015. TECHNIQUE:Helically acquired and axial images were obtained from the posterior  fossa to the thoracic inlet without IV contrast. IV contrast not administered  due to recent history of acute renal injury. One or more dose reduction techniques were used on this CT: automated exposure  control, adjustment of the mAs and/or kVp according to patient's size, and  iterative reconstruction techniques. The specific techniques utilized on this CT  exam have been documented in the patient's electronic medical record. FINDINGS:   Endotracheal and enteric tubes are partially visualized. There is a right  internal jugular central venous catheter which is partially visualized. There is  a left subclavian central venous catheter which is partially visualized. Visualized intracranial structures are unremarkable. There is mucosal thickening of the left maxillary sinus and an air-fluid level  within the right maxillary sinus. There is no cervical lymphadenopathy.  No mass identified within the limitations  of this noncontrast exam. The thyroid has unremarkable appearance. There is patchy opacity at the lung apices which may represent  pleural-parenchymal scarring. There are multilevel degenerative changes of the cervical spine without acute  osseous abnormality. Impression IMPRESSION:      1. Tubes and lines as described. 2. Mild inflammatory changes within the maxillary sinuses which may be related  to intubation. There is no large abscess or other findings to suggest an occult  source of infection. EKG No results found for this or any previous visit. ECHO  2/12/18 [facility] SUMMARY:  Left ventricle: Systolic function was normal. Ejection fraction was estimated   in the range of 55 % to 60 %. There was  mild hypokinesis of the basal inferior wall(s). There was moderate concentric   hypertrophy. Right ventricle: The size was normal. Systolic function was normal.    Inferior vena cava, hepatic veins: The inferior vena cava was dilated. The   respirophasic change in diameter was more  than 50%. MRI spine:   2. Prior left L5 laminotomy, partial left facetectomy and possibly previous partial L4-5 discectomy. Minimal left asymmetric disc bulge but no evidence of recurrent disc herniation or retained disc fragment.     3. Minimal L3-4 disc bulge and annular fissure.     4. Multilevel mostly mild facet joint osteoarthritis. Greatest and mild to moderate level right L4-5 facet joint osteoarthritis with moderate-sized facet joint effusion and synovitis. No adjacent periarticular marrow or soft tissue edema to definitively suggest superimposed infective/septic arthritis.     5. Unremarkable upper sacroiliac joints though incompletely included. Mild body wall edema. Imaging:  I have personally reviewed the patients radiographs and have reviewed the reports:                      Alex Cordero M.D.   Pulmonary Critical Care & Sleep Medicine

## 2018-02-22 NOTE — PROGRESS NOTES
ID progress Note    Eden Cedillo is a 57 y.o. male who is being seen on consult for antibiotic management for MRSA septic shock    Current  antibiotics:   ceftaroline 2/19/18       Past antibiotics:   Vancomycin 2/12 to 2/19   azactam 2/11 to 2/14  Levofloxacin  2/12  To 2/15       Impression:   Persistent  MRSA septic shock of Unclear source   ( 2/11 , 2/13 2/14, 2/16, 2/18) blood cultures persistantly postiive with MRSA   TERRY negative for vegetation or mass , repeat TTE on 2/18 show no obvious valvular vegetation  CT neck, chest/abd and pelvis and BL LE did not show occult abscess or infection   R knee arthroplasty intact   MRI Thoracic and lumbar spine negative for discitis or abscess    MRSA vancomycin DIMITRIS of 1 and 0.5       Acute respiratory failure s/p reintubation 2/18 /19    Bibasilar pneumonia vs atelectasis and small L sided pleural effusion   Sputum cx with MRSA    BL LE small pustular lesions resolves     AMANDA with Rhabdomyolysis : resolving   started on HD on 2/14  Now resolved and good UOP  S/p TDC removal     Severe sepsis on admission impriving   Fever , tachycardia resolves  Thrombocytopenia resolves   PCN allergy : rash listed in system  BL knee arthroplasty : not clinically suspected for infection       Plan:   Repeat 1 more set of blood culture today  Continue  iv ceftaroline on 2/19 and will give at least 48 hrs to repeat survillance blood cultures   ceftroline will give lungs coverage for MRSA as well   abscess localization scan whole body to be done later today   If non revealing, recommend to discontinue L IJ line and given line holiday   Discussed with ICU team at bedside   Continue supportive care  We are watching pt closely for toxicities and side effects to abx and drug-drug interactions. We will adjust antibiotics upon the results of the pending tests and the clinical improvement of the patient.    Dicussed with ICU team at bedside   Case discussed with: []Patient []Family [X]Nursing [ ]Case Management  [ ] Akin Prey ]MD/Care team           Interval history       Remains intubated   Off pressors , amiodarone and heparin drip   Afebrile     and  blood cutlures still  Positive with MRSA  surveillance blood culture 1 set repeated this morning        Review of Systems:   Interval notes, available laboratory, microbiology and radiographic data reviewed. Discussed with nursing  where appropriate. I d/w nursing staff  Intubated. On Mech Vent  Afebrile   Hypertensive   Minimal ET secretion  Other direct ROS were unobtainable due to pt's condition      Skin: negative for rash   HENT: negative for ear discharge   Eyes: negative for eye discharge   Respiratory: negative for hemoptysis   Gastointestinal: negative for diarrhea,  vomiting   Genitourinary: negative for hematuria   Hem/Lymph: negative for easy bleeding   Allergy/Imm: negative for hives   Neurological: negative for seizures                Physical Assessment:     VITAL SIGNS  Blood pressure 177/65, pulse 81, temperature 98.2 °F (36.8 °C), resp. rate 26, height 6' (1.829 m), weight 148 kg (326 lb 4.5 oz), SpO2 100 %.   Temp (24hrs), Av.8 °F (37.1 °C), Min:98.2 °F (36.8 °C), Max:100 °F (37.8 °C)      L IJ line in  place   Intubated - On mech ventilation  Wilson in place - urine  With dark brown color        CONSTITUTIONAL:    Intubated sedated   PSYCH:   Judgement/insight - unobtainable due to pt's condition   Affect -  unobtainable due to pt's condition  EYES:    Conjunctivae normal   PERRL  ENMT:    Left exterior ear normal, right external ear normal, nose normal   Inspection lips - normal   Orally intubated   NECK:    Exam neck - no masses, normal symmetry, trachea midline   PULMONARY/CHEST WALL:   Resp Effort - No use of accessory muscles, no intercostal retractions   Breath sounds -  BL coarse BS   CARDIOVASCULAR:    Heart sounds , irregularly irregular   Intact pedal pulses by doppler   ABDOMINAL:    Appearance normal, soft, bowel sounds  Present    No tenderness   Liver/spleen - could not palpate organomegaly  GENITOURINARY: External genitalia - no ulcers, no drainage  , mcdonald cath in place   MUSCULOSKELETAL:   Gait  unobtainable due to pt's condition   Digits, nails - no clubbing, no cyanosis, no infection   RUE, LUE, RLE, LLE ROM -  unobtainable due to pt's condition . No obvious joint swelling or erythema or warmth noted , BL knee incision intact and dry    Muscle strength  unobtainable due to pt's condition   Head and neck ROM -  unobtainable due to pt's condition  SKIN    Inspection - as above - BL LE inner thigh areas pustular lesions resolved    no ulcers, no crepitus, intact   Palpation - as above - otherwise no induration, no nodules, dry, warm  NEUROLOGICAL:  unobtainable due to pt's condition      MICROBIOLOGY DATA  All Micro Results     Procedure Component Value Units Date/Time    CULTURE, BLOOD [[id number]] Collected:  02/22/18 0926    Order Status:  Completed Specimen:  Blood Updated:  02/22/18 0929    CULTURE, BLOOD [[id number]]     Order Status:  Sent Specimen:  Blood     CULTURE, BLOOD [[id number]]  (Abnormal)  (Susceptibility) Collected:  02/18/18 0405    Order Status:  Completed Specimen:  Whole Blood from Blood Updated:  02/20/18 0813     Special Requests: NO SPECIAL REQUESTS        GRAM STAIN         GRAM POSITIVE COCCI IN CLUSTERS AEROBIC BOTTLE              SMEAR CALLED TO AND CORRECTLY REPEATED BY: Vipin Verdugo RN ICU AT 2048 ON 02/18/18 BY AVELINO.      Culture result:         AEROBIC BOTTLE **METHICILLIN RESISTANT STAPHYLOCOCCUS AUREUS** (A)      PATIENT IS A KNOWN MRSA       CULTURE, BLOOD [[id number]]  (Abnormal) Collected:  02/18/18 0400    Order Status:  Completed Specimen:  Whole Blood from Blood Updated:  02/20/18 0739     Special Requests: NO SPECIAL REQUESTS        GRAM STAIN         GRAM POSITIVE COCCI IN CLUSTERS AEROBIC BOTTLE              SMEAR CALLED TO AND CORRECTLY REPEATED BY: Rola Ross RN ICU TO GARY AT 0204 02/19/18              GRAM POSITIVE COCCI IN CLUSTERS ANAEROBIC BOTTLE              SMEAR CALLED TO AND CORRECTLY REPEATED BY: [facility] RN ICU TO [facility] AT 9242 ON 2/19/18.      Culture result:         AEROBIC AND ANAEROBIC BOTTLES **METHICILLIN RESISTANT STAPHYLOCOCCUS AUREUS** (A)            For Susceptibility Refer to Culture  [id number]      CULTURE, BLOOD [[id number]]  (Abnormal) Collected:  02/16/18 0508    Order Status:  Completed Specimen:  Blood from Blood Updated:  02/19/18 0827     Special Requests: NO SPECIAL REQUESTS        GRAM STAIN         GRAM POSITIVE COCCI IN CLUSTERS AEROBIC BOTTLE              SMEAR CALLED TO AND CORRECTLY REPEATED BY: Odette Duverney RN ICU TO JRW AT 2512 02/17/18     Culture result:         AEROBIC BOTTLE **METHICILLIN RESISTANT STAPHYLOCOCCUS AUREUS** (A)            For Susceptibility Refer to Culture  [id number]      CULTURE, BLOOD [[id number]]  (Abnormal)  (Susceptibility) Collected:  02/16/18 0508    Order Status:  Completed Specimen:  Blood from Blood Updated:  02/19/18 0827     Special Requests: NO SPECIAL REQUESTS        GRAM STAIN         GRAM POSITIVE COCCI IN CLUSTERS AEROBIC BOTTLE              SMEAR CALLED TO AND CORRECTLY REPEATED BY: Breanna Nixon RN ICU TO JRW AT 0873 02/17/18     Culture result:         AEROBIC BOTTLE **METHICILLIN RESISTANT STAPHYLOCOCCUS AUREUS** (A)      PATIENT IS A KNOWN MRSA       CULTURE, RESPIRATORY/SPUTUM/BRONCH Bettyjo English STAIN [[id number]]  (Abnormal)  (Susceptibility) Collected:  02/13/18 0858    Order Status:  Completed Specimen:  Sputum from Sputum Updated:  02/19/18 0801     Special Requests: NO SPECIAL REQUESTS        GRAM STAIN 10-25 WBC/lpf         <10 EPI/lpf         MUCUS PRESENT         NO ORGANISMS SEEN        Culture result:         RARE **METHICILLIN RESISTANT STAPHYLOCOCCUS AUREUS** (A)              RARE CANDIDA DUBLINIENSIS (A)              FEW NORMAL RESPIRATORY MAYNOR      PATIENT IS A KNOWN MRSA       CULTURE, BLOOD [[id number]] (Abnormal) Collected:  02/14/18 0915    Order Status:  Completed Specimen:  Whole Blood from Blood Updated:  02/17/18 0708     Special Requests: left femoral,     GRAM STAIN         GRAM POSITIVE COCCI IN CLUSTERS AEROBIC BOTTLE              SMEAR CALLED TO AND CORRECTLY REPEATED BY: Familia Muhammad RN ICU TO  AT 0606 ON [id number]     Culture result:         AEROBIC BOTTLE **METHICILLIN RESISTANT STAPHYLOCOCCUS AUREUS** (A)            For Susceptibility Refer to Culture  [id number]      CULTURE, BLOOD [[id number]]  (Abnormal)  (Susceptibility) Collected:  02/14/18 0920    Order Status:  Completed Specimen:  Whole Blood from Blood Updated:  02/17/18 0630     Special Requests: venipuncture     GRAM STAIN         GRAM POSITIVE COCCI IN CLUSTERS AEROBIC BOTTLE              SMEAR CALLED TO AND CORRECTLY REPEATED BY: CARTER HEMPHILL RN ICU TO  AT 0152 ON [id number]              ANAEROBIC BOTTLE GRAM POSITIVE COCCI IN CLUSTERS              SMEAR CALLED TO AND CORRECTLY REPEATED BY: ramya bowens icu to d on 02/16/18 at 0627     Culture result:         AEROBIC AND ANAEROBIC BOTTLES **METHICILLIN RESISTANT STAPHYLOCOCCUS AUREUS** (A)      PATIENT IS A KNOWN MRSA       CULTURE, BLOOD [[id number]] Collected:  02/16/18 0645    Order Status:  Canceled Specimen:  Blood from Blood     CULTURE, BLOOD [[id number]]  (Abnormal) Collected:  02/13/18 0315    Order Status:  Completed Specimen:  Whole Blood from Blood Updated:  02/15/18 0835     Special Requests: NO SPECIAL REQUESTS        GRAM STAIN         GRAM POSITIVE COCCI IN CLUSTERS AEROBIC BOTTLE              SMEAR CALLED TO AND CORRECTLY REPEATED BY: MICHELLE BOWENS ICU ON [id number] AT 1700 BY JDR     Culture result:         AEROBIC BOTTLE **METHICILLIN RESISTANT STAPHYLOCOCCUS AUREUS** (A)            For Susceptibility Refer to Culture  [id number]      CULTURE, BLOOD [[id number]]  (Abnormal)  (Susceptibility) Collected:  02/13/18 0310    Order Status:  Completed Specimen:  Whole Blood from Blood Updated: 02/15/18 0834     Special Requests: NO SPECIAL REQUESTS        GRAM STAIN         GRAM POSITIVE COCCI IN CLUSTERS AEROBIC BOTTLE              SMEAR CALLED TO AND CORRECTLY REPEATED BY: MICHELLE BOWENS ICU ON [id number] AT [address] [facility]     Culture result:         AEROBIC BOTTLE **METHICILLIN RESISTANT STAPHYLOCOCCUS AUREUS** (A)      PATIENT IS A KNOWN MRSA       CULTURE, URINE [[id number]] Collected:  02/12/18 0900    Order Status:  Completed Specimen:  Urine from Wilson Specimen Updated:  02/14/18 0932     Special Requests: NO SPECIAL REQUESTS        Culture result: NO GROWTH 2 DAYS       CULTURE, BLOOD [[id number]]  (Abnormal)  (Susceptibility) Collected:  02/11/18 1846    Order Status:  Completed Specimen:  Blood from Blood Updated:  02/14/18 0837     Special Requests: NO SPECIAL REQUESTS        GRAM STAIN         GRAM POSITIVE COCCI IN CLUSTERS AEROBIC BOTTLE ANAEROBIC BOTTLE              SMEAR CALLED TO AND CORRECTLY REPEATED BY: Kriss Garcia RN ICU TO [facility] AT 1110 ON [id number]     Culture result:         AEROBIC AND ANAEROBIC BOTTLES **METHICILLIN RESISTANT STAPHYLOCOCCUS AUREUS** (A)            CALLED TO AND CORRECTLY REPEATED BY:  Trent Samuels RN ICU TO [address] [place] [address] AT 0825 ON 2/14/18. INFLUENZA A & B AG (RAPID TEST) [[id number]] Collected:  02/12/18 0211    Order Status:  Completed Specimen:  Nasopharyngeal from Nasal washing Updated:  02/12/18 0237     Influenza A Antigen NEGATIVE          A negative result does not exclude influenza virus infection, seasonal or H1N1 due to suboptimal sensitivity. If influenza is circulating in your community, a diagnosis of influenza should be considered based on a patients clinical presentation and empiric antiviral treatment should be considered, if indicated.         Influenza B Antigen NEGATIVE            LAB DATA      CBC WITH AUTOMATED DIFF    Collection Time: 02/22/18  4:00 AM   Result Value Ref Range    WBC 11.6 4.6 - 13.2 K/uL    RBC 2.72 (L) 4.70 - 5.50 M/uL    HGB 8.0 (L) 13.0 - 16.0 g/dL HCT 25.6 (L) 36.0 - 48.0 %    MCV 94.1 74.0 - 97.0 FL    MCH 29.4 24.0 - 34.0 PG    MCHC 31.3 31.0 - 37.0 g/dL    RDW 19.2 (H) 11.6 - 14.5 %    PLATELET 695 (H) 400 - 420 K/uL    MPV 11.1 9.2 - 11.8 FL    NEUTROPHILS 74 (H) 40 - 73 %    LYMPHOCYTES 16 (L) 21 - 52 %    MONOCYTES 7 3 - 10 %    EOSINOPHILS 2 0 - 5 %    BASOPHILS 1 0 - 2 %    ABS. NEUTROPHILS 8.6 (H) 1.8 - 8.0 K/UL    ABS. LYMPHOCYTES 1.9 0.9 - 3.6 K/UL    ABS. MONOCYTES 0.8 0.05 - 1.2 K/UL    ABS. EOSINOPHILS 0.2 0.0 - 0.4 K/UL    ABS. BASOPHILS 0.1 (H) 0.0 - 0.06 K/UL    DF AUTOMATED         Lab Results   Component Value Date/Time     02/22/2018 04:00 AM    K 3.7 02/22/2018 04:00 AM     (H) 02/22/2018 04:00 AM    CO2 25 02/22/2018 04:00 AM    AGAP 8 02/22/2018 04:00 AM     (H) 02/22/2018 04:00 AM    BUN 30 (H) 02/22/2018 04:00 AM    CREA 1.49 (H) 02/22/2018 04:00 AM    GFRAA 58 (L) 02/22/2018 04:00 AM    GFRNA 47 (L) 02/22/2018 04:00 AM              IMAGING  2/22/18 cxr  Reviewed and official reports   Rotated projection of the chest with endotracheal tube and visualized  nasogastric tubes in position as above.     2. Left subclavian vascular catheter, tip likely projecting over the  brachiocephalic vein. The catheter appears somewhat retracted in the interval  from prior, likely accentuated by rotation.     3. Bibasilar opacities which may reflect underlying atelectasis or airspace  disease.  Small left pleural effusion unchanged.           Current medications reviewed in EPIC      Current Facility-Administered Medications   Medication Dose Route Frequency    fentaNYL citrate (PF) injection  mcg   mcg IntraVENous Q4H PRN    metoprolol tartrate (LOPRESSOR) tablet 50 mg  50 mg Oral Q12H    insulin glargine (LANTUS) injection 22 Units  22 Units SubCUTAneous DAILY WITH LUNCH    LORazepam (ATIVAN) 60 mg in 0.9% sodium chloride 60 mL infusion  0-5 mg/kg/hr IntraVENous TITRATE    enoxaparin (LOVENOX) injection 150 mg 150 mg SubCUTAneous Q12H    amiodarone (CORDARONE) tablet 400 mg  400 mg Oral Q12H    cefTARoline (TEFLARO) 600 mg in 0.9% sodium chloride (MBP/ADV) 50 mL MBP  600 mg IntraVENous Q12H    ipratropium (ATROVENT) 0.02 % nebulizer solution 0.5 mg  0.5 mg Nebulization Q6H RT    insulin lispro (HUMALOG) injection 5 Units  5 Units SubCUTAneous Q6H    ELECTROLYTE REPLACEMENT PROTOCOL -- Potassium  1 Each Other PRN    ELECTROLYTE REPLACEMENT PROTOCOL -- Magnesium  1 Each Other PRN    ELECTROLYTE REPLACEMENT PROTOCOL -- Phosphorus  1 Each Other PRN    ELECTROLYTE REPLACEMENT PROTOCOL -- Calcium  1 Each Other PRN    chlorhexidine (PERIDEX) 0.12 % mouthwash 10 mL  10 mL Oral Q12H    acetaminophen (TYLENOL) suppository 650 mg  650 mg Rectal Q6H PRN    hydrALAZINE (APRESOLINE) 20 mg/mL injection 10 mg  10 mg IntraVENous Q6H PRN    0.9% sodium chloride infusion 250 mL  250 mL IntraVENous PRN    heparin (porcine) 1,000 unit/mL injection 1,500 Units  1,500 Units IntraVENous PRN    acetaminophen (TYLENOL) solution 650 mg  650 mg Oral Q4H PRN    aspirin chewable tablet 81 mg  81 mg Oral DAILY    pantoprazole (PROTONIX) 40 mg in sodium chloride 0.9 % 10 mL injection  40 mg IntraVENous DAILY    naloxone (NARCAN) injection 0.4 mg  0.4 mg IntraVENous PRN    docusate sodium (COLACE) capsule 100 mg  100 mg Oral BID PRN    acetaminophen (TYLENOL) tablet 650 mg  650 mg Oral J8D PRN    folic acid (FOLVITE) tablet 1 mg  1 mg Oral DAILY    thiamine 100 mg in 50 mL NS   IntraVENous Q24H    insulin lispro (HUMALOG) injection   SubCUTAneous Q6H    sodium chloride (NS) flush 5-10 mL  5-10 mL IntraVENous PRN    ondansetron (ZOFRAN ODT) tablet 4 mg  4 mg Oral Q4H PRN    glucose chewable tablet 16 g  4 Tab Oral PRN    glucagon (GLUCAGEN) injection 1 mg  1 mg IntraMUSCular PRN    dextrose (D50W) injection syrg 12.5-25 g  25-50 mL IntraVENous PRN           Medical Decision Making:     I reviewed and summarized data from old medical records and obtained history from other sources than patient. I reviewed laboratory tests, Radiology data, and diagnostic tests in the CPT medicine section. I discussed with the physicians and the nursed involved in this patient's care about this patient's current medical problems. I have  Discussed plan of care with providers and  nursing staff. The total visit duration was of 40 minutes of which more than 50% was spent in coordination of care and counseling (time spent with patient/family face to face, physical exam, reviewing microbiology data, laboratory results, radiographic data, speaking with physicians and nursing staff involved in this pt's care).

## 2018-02-22 NOTE — ROUTINE PROCESS
Bedside, Verbal and Written shift change report given to YUSEF Maravilla (oncoming nurse) by SOLO Tran,RN (offgoing nurse). Report included the following information SBAR, Kardex, Intake/Output and Recent Results. Assumed care of pt at this time, pt vented light sedation, restrained, remains monitored, mcdonald, OG with tube feeding, residual > 130        1038  Pt SBP > 180, PRN blood pressure med's given. See MAR.  1330  RT, and Deni at bedside with Akilah Medel transporting pt to [facility] for test.  Pt tolerated well. 1400  Pt back in ICU, attached to monitor, VSS.   1600  Reassessed, status unchanged, lethargic, remains vented with out sedation, restrained, tube feeding, mcdonald intact. 1800  No changed, scheduled med's given. 2000  Repositioned, scheduled med's given, VSS.  2245  Bedside, Verbal and Written shift change report given to Amanda Ware (oncoming nurse) by Chaparrita Yun. Anthony Maravilla (offgoing nurse). Report included the following information SBAR, Kardex, Intake/Output and Recent Results.

## 2018-02-22 NOTE — PROGRESS NOTES
Palliative Medicine Progress Note  DR. BANKS'[facility]: [phone] ([phone])  [facility]: [address] [place] [address] Way: [phone]    Patient Name: Jacque Issa  YOB: 1955    Date of Initial Consult: 2/16/18  Reason for Consult: care decisions  Requesting Provider: Dr. Marbella Luna   Primary Care Physician: Josh Shah MD          SUMMARY:   Jacque Issa is a 57y.o. year old who presented after two days of malaise with overwhelming MERSA sepsis and multi organ system failure. On pressors initially, has required dialysis and amiodarone drip with ventilator support. Now off pressors, with good UOP, extubated this am. Still minimally responsive. Only negative recent care finding is persistently + blood cultures and fevers. Previously reasonably healthy, last hospitalization for TKR. Recently lost son in January with hepatorenal syndrome. Wife at bedside. 2/19/18: Reintubated < 24 hr post extubation. Persistent MERSA + blood cultures since 2/11 despite MICs demonstrating sensitivity to vanc. No identified sequestered source of bacteremia. No family at bedside. 2/20/18: Stable overnight, no fever. Dialysis catheter removed. Remains on propofol. 2/21/18: Arousable today. Appears uncomfortable. On IV abx, for tagged WBC scan tomorrow looking for source of persistent bacteremia. 2/22/18: Resting comfortably. Blood cultures being drawn. 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 XXXA    Back pain M54.9    Weakness of right lower extremity R29.898    Anemia D64.9    Shock ([facility] R57.9    Acute respiratory failure (HCC) J96.00    Elevated troponin R74.8    Demand ischemia of myocardium (HCC) I24.8    MRSA bacteremia R78.81     1. PLAN:   1. Met with wife and answered questions regarding current clinical status. She understand that while he is improved he remains critically ill. Discussed implications of persistent + blood cultures. Reviewed his AMD and asked if she thinks he would be willing to undergo reintubation and reescalation of care should he decline rapidly again. She is not certain but knows he would not want aggressive measures if he had little or no chance of making a good recovery. Emotional support provided. 2/19/18: No evidence of improvement in underlying source of his multisystem critical illness. With reintubation and persistent bacteremia, prognosis appears to be worsening.    2/20/18: On different abx regimen. No clinical change. 2/21/18: Clinically unchanged. Repeat BC tomorrow. 2/22/18: Hopefully bacteremia clearing. Will need to be considered for trach if not close to extubation by beginning of next week. 2. Initial consult note routed to primary continuity provider  3.  Communicated plan of care with: Palliative IDT    GOALS OF CARE:  Patient/Health Care Proxy Stated Goals: Cure      TREATMENT PREFERENCES:   Code Status: Full Code    Advance Care Planning:  Advance Care Planning 2/14/2018   Patient's Healthcare Decision Maker is: Named in scanned ACP document   Primary Decision Maker Name Pippa Chamberlain   Primary Decision Maker Phone Number [phone]   Primary Decision Maker Relationship to Patient Spouse   Secondary Decision Maker Name America Johnson   Secondary Decision Maker Phone Number [phone]   Secondary Decision Maker Relationship to Patient Sibling   Confirm Advance Directive Yes, on file       Medical Interventions: Full interventions   Other Instructions: Other:  As far as possible, the palliative care team has discussed with patient / health care proxy about goals of care / treatment preferences for patient. HISTORY:     History obtained from: wife    CHIEF COMPLAINT: see summary    HPI/SUBJECTIVE:    The patient is:   [] Verbal and participatory  [x] Non-participatory due to:   sedation     Clinical Pain Assessment (nonverbal scale for nonverbal patients): Activity (Movement): Lying quietly, normal position    Duration: for how long has pt been experiencing pain (e.g., 2 days, 1 month, years)  Frequency: how often pain is an issue (e.g., several times per day, once every few days, constant)     FUNCTIONAL ASSESSMENT:     Palliative Performance Scale (PPS):  PPS: 30    ECOG        PSYCHOSOCIAL/SPIRITUAL SCREENING:      Any spiritual / Taoism concerns:  [] Yes /  [x] No    Caregiver Burnout:  [] Yes /  [x] No /  [] No Caregiver Present      Anticipatory grief assessment:   [x] Normal  / [] Maladaptive        REVIEW OF SYSTEMS:     Positive and pertinent negative findings in ROS are noted above in HPI. The following systems were [] reviewed / [x] unable to be reviewed as noted in HPI  Other findings are noted below. Systems: constitutional, ears/nose/mouth/throat, respiratory, gastrointestinal, genitourinary, musculoskeletal, integumentary, neurologic, psychiatric, endocrine.  Positive findings noted below. Modified ESAS Completed by: provider                                   Stool Occurrence(s): 0        PHYSICAL EXAM:     Wt Readings from Last 3 Encounters:   02/21/18 148 kg (326 lb 4.5 oz)   02/14/18 144.8 kg (319 lb 3.6 oz)   08/02/15 138.8 kg (306 lb)     Blood pressure 177/65, pulse 81, temperature 98.2 °F (36.8 °C), resp. rate 26, height 6' (1.829 m), weight 148 kg (326 lb 4.5 oz), SpO2 100 %.   Pain:  Pain Scale 1: FLACC  Pain Intensity 1: 2  Pain Onset 1: chronic  Pain Location 1: Back  Pain Orientation 1: Lower  Pain Description 1: Aching, Constant  Pain Intervention(s) 1: Medication (see MAR)  Last bowel movement:     Constitutional: obese, sedated, intubated  Eyes: pupils equal, anicteric  ENMT: no nasal discharge, moist mucous membranes  Cardiovascular: regular rhythm, distal pulses intact  Respiratory: breathing not labored, symmetric  Gastrointestinal: soft non-tender, +bowel sounds  Musculoskeletal: no deformity, no tenderness to palpation  Skin: warm, dry  Neurologic: sedated  Not following commands  Psychiatric:   Other:       HISTORY:     Principal Problem:    Sepsis ([address] (2/11/2018)    Active Problems:    Osteoarthritis of right knee (1/18/2014)      Hypokalemia (2/11/2018)      Glucosuria (2/11/2018)      AMANDA (acute kidney injury) ([address] (2/11/2018)      Fall (2/11/2018)      Back pain (2/11/2018)      Weakness of right lower extremity (2/11/2018)      Anemia (2/11/2018)      Shock ([address] (2/13/2018)      Acute respiratory failure (HCC) (2/13/2018)      Elevated troponin (2/12/2018)      Demand ischemia of myocardium ([address] (2/12/2018)      MRSA bacteremia (2/15/2018)      Past Medical History:   Diagnosis Date    Arrhythmia     irregular heart beat    Arthritis     knees- osteo    Chronic pain     knees    Diabetes (HCC) 1990's    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[address] 10/21/2014    GERD (gastroesophageal reflux disease)     well controlled    Gout     High cholesterol     Hypertension 1990's    Irregular heart beat     palpitations    Morbid obesity (HCC)     Osteoarthritis of right knee 1/18/2014    Other ill-defined conditions(799.89)     asbestosis    Other ill-defined conditions(799.89)     irregular heart beat    Other ill-defined conditions(799.89)     h/o migraines    Other ill-defined conditions(799.89)     gout    Other ill-defined conditions(799.89)     cholesterol    PUD (peptic ulcer disease) 1970's    Tinnitus of left ear       Past Surgical History:   Procedure Laterality Date    HX CARPAL TUNNEL RELEASE      HX KNEE ARTHROSCOPY  2010    left    HX KNEE ARTHROSCOPY  1980s    right    HX KNEE REPLACEMENT      HX ORTHOPAEDIC  1970s    right hand tendenitis    HX ORTHOPAEDIC  2012    left hand ring finger released    HX ORTHOPAEDIC  8-2013    left carpal    TOTAL KNEE ARTHROPLASTY  2009/2010    left      No family history on file. History reviewed, no pertinent family history.   Social History   Substance Use Topics    Smoking status: Former Smoker     Quit date: 10/1/1975    Smokeless tobacco: Never Used      Comment: 1970s    Alcohol use 1.5 oz/week     3 Shots of liquor per week     Allergies   Allergen Reactions    Bees [Hymenoptera Allergenic Extract] Anaphylaxis    Cocoa Butter-Zinc Oxide Rash    Dilaudid [Hydromorphone (Bulk)] Rash     capsules    Pcn [Penicillins] Rash      Current Facility-Administered Medications   Medication Dose Route Frequency    fentaNYL citrate (PF) injection  mcg   mcg IntraVENous Q4H PRN    metoprolol tartrate (LOPRESSOR) tablet 50 mg  50 mg Oral Q12H    insulin glargine (LANTUS) injection 22 Units  22 Units SubCUTAneous DAILY WITH LUNCH    LORazepam (ATIVAN) 60 mg in 0.9% sodium chloride 60 mL infusion  0-5 mg/kg/hr IntraVENous TITRATE    enoxaparin (LOVENOX) injection 150 mg  150 mg SubCUTAneous Q12H    amiodarone (CORDARONE) tablet 400 mg  400 mg Oral Q12H    cefTARoline (TEFLARO) 600 mg in 0.9% sodium chloride (MBP/ADV) 50 mL MBP  600 mg IntraVENous Q12H    ipratropium (ATROVENT) 0.02 % nebulizer solution 0.5 mg  0.5 mg Nebulization Q6H RT    insulin lispro (HUMALOG) injection 5 Units  5 Units SubCUTAneous Q6H    ELECTROLYTE REPLACEMENT PROTOCOL -- Potassium  1 Each Other PRN    ELECTROLYTE REPLACEMENT PROTOCOL -- Magnesium  1 Each Other PRN    ELECTROLYTE REPLACEMENT PROTOCOL -- Phosphorus  1 Each Other PRN    ELECTROLYTE REPLACEMENT PROTOCOL -- Calcium  1 Each Other PRN    chlorhexidine (PERIDEX) 0.12 % mouthwash 10 mL  10 mL Oral Q12H    acetaminophen (TYLENOL) suppository 650 mg  650 mg Rectal Q6H PRN    hydrALAZINE (APRESOLINE) 20 mg/mL injection 10 mg  10 mg IntraVENous Q6H PRN    0.9% sodium chloride infusion 250 mL  250 mL IntraVENous PRN    heparin (porcine) 1,000 unit/mL injection 1,500 Units  1,500 Units IntraVENous PRN    acetaminophen (TYLENOL) solution 650 mg  650 mg Oral Q4H PRN    aspirin chewable tablet 81 mg  81 mg Oral DAILY    pantoprazole (PROTONIX) 40 mg in sodium chloride 0.9 % 10 mL injection  40 mg IntraVENous DAILY    naloxone (NARCAN) injection 0.4 mg  0.4 mg IntraVENous PRN    docusate sodium (COLACE) capsule 100 mg  100 mg Oral BID PRN    acetaminophen (TYLENOL) tablet 650 mg  650 mg Oral Y6H PRN    folic acid (FOLVITE) tablet 1 mg  1 mg Oral DAILY    thiamine 100 mg in 50 mL NS   IntraVENous Q24H    insulin lispro (HUMALOG) injection   SubCUTAneous Q6H    sodium chloride (NS) flush 5-10 mL  5-10 mL IntraVENous PRN    ondansetron (ZOFRAN ODT) tablet 4 mg  4 mg Oral Q4H PRN    glucose chewable tablet 16 g  4 Tab Oral PRN    glucagon (GLUCAGEN) injection 1 mg  1 mg IntraMUSCular PRN    dextrose (D50W) injection syrg 12.5-25 g  25-50 mL IntraVENous PRN        LAB AND IMAGING FINDINGS:     Lab Results   Component Value Date/Time    WBC 11.6 02/22/2018 04:00 AM    HGB 8.0 (L) 02/22/2018 04:00 AM    PLATELET 205 (H) 10/08/7847 04:00 AM     Lab Results Component Value Date/Time    Sodium 143 02/22/2018 04:00 AM    Potassium 3.7 02/22/2018 04:00 AM    Chloride 110 (H) 02/22/2018 04:00 AM    CO2 25 02/22/2018 04:00 AM    BUN 30 (H) 02/22/2018 04:00 AM    Creatinine 1.49 (H) 02/22/2018 04:00 AM    Calcium 8.6 02/22/2018 04:00 AM    Magnesium 1.9 02/22/2018 04:00 AM    Phosphorus 3.4 02/22/2018 04:00 AM      Lab Results   Component Value Date/Time    AST (SGOT) 58 (H) 02/22/2018 04:00 AM    Alk.  phosphatase 317 (H) 02/22/2018 04:00 AM    Protein, total 6.3 (L) 02/22/2018 04:00 AM    Albumin 1.7 (L) 02/22/2018 04:00 AM    Globulin 4.6 (H) 02/22/2018 04:00 AM     Lab Results   Component Value Date/Time    INR 1.1 02/19/2018 05:15 AM    Prothrombin time 13.5 02/19/2018 05:15 AM    aPTT 127.9 (H) 02/20/2018 02:10 PM      No results found for: IRON, FE, TIBC, IBCT, PSAT, FERR   No results found for: PH, PCO2, PO2  No components found for: [place]   Lab Results   Component Value Date/Time     02/18/2018 04:05 AM    CK - MB <0.5 (L) 08/02/2015 11:47 PM              Total time: 15  Counseling / coordination time, spent as noted above 12 min  > 50% counseling / coordination        Dony Pozo MD  Palliative Medicine

## 2018-02-22 NOTE — PROGRESS NOTES
Bedside and Verbal shift change report received from LULY Rasmussen RN (offgoing nurse). Report included the following information SBAR, Kardex, Intake/Output, MAR and Recent Results. Alarm parameters reviewed and opportunities for questions provided. 1945 Shift assessment completed, see EMR    2220 Patient had increased RR and breathing over the ventilator, patient was give prn fentanyl 100 mcg. 0030 Reassessment completed, see EMR    0400 Reassessment completed, see EMR    Bedside and Verbal shift change report given to YUSEF Francis RN (oncoming nurse)Report included the following information SBAR, Kardex, Intake/Output, MAR and Recent Results. Alarm parameters reviewed and opportunities for questions provided.

## 2018-02-22 NOTE — PROGRESS NOTES
Nephrology Progress note    Subjective:     Savannah Foss is a 57 y.o. male with history of hypertension, diabetes, hyperlipidemia, gout and arthritis who presented with a fever and multiple falls. On initial evaluation, he had borderline low blood pressure, and normal white cell count, but elevated creatinine at 2.5. Blood culture was positive and started on broad spectrum antibiotics, on IV fluid and IV pressors. A culture finally came back pos for MRSA. His preadmission baseline creatinine back in 2015 was 1.2. On admission, it was 2.5. It continued to increase and it jumped up to 4.06. Of note, the patient also had slightly elevated CPK level on admission, which is trending down now. CAT scan did not show any significant finding as a possible source. Echocardiography was negative for valvular vegetation. Patient is currently intubated in ICU on pressors. He is also sedated. He was initiated on HD 2/14 and repeated 2/15. Now off HD    - Remains on vent. Good Urine output.  S Cr down 1.3-1.4       Admit Date: 2/11/2018    Allergy:  Allergies   Allergen Reactions    Bees [Hymenoptera Allergenic Extract] Anaphylaxis    Cocoa Butter-Zinc Oxide Rash    Dilaudid [Hydromorphone (Bulk)] Rash     capsules    Pcn [Penicillins] Rash        Objective:     Visit Vitals    /51    Pulse 76    Temp 98.6 °F (37 °C)    Resp (!) 32    Ht 6' (1.829 m)    Wt 148 kg (326 lb 4.5 oz)    SpO2 97%    BMI 44.25 kg/m2         Intake/Output Summary (Last 24 hours) at 02/22/18 1545  Last data filed at 02/22/18 1500   Gross per 24 hour   Intake             3050 ml   Output             1300 ml   Net             1750 ml       Physical Exam:       General: On Vent   HENT: Atraumatic and normocephalic   Eyes: Normal conjunctiva   Neck: Supple    Cardiovascular: Normal S1 & S2, no m/r/g   Pulmonary/Chest Wall: Clear to auscultation bilaterally   Abdominal: Soft and non-tender   Musculoskeletal: ++ edema   Neurological: sedated       Data Review:      Lab Results   Component Value Date/Time    WBC 11.6 02/22/2018 04:00 AM    RBC 2.72 (L) 02/22/2018 04:00 AM    HCT 25.6 (L) 02/22/2018 04:00 AM    MCV 94.1 02/22/2018 04:00 AM    MCH 29.4 02/22/2018 04:00 AM    MCHC 31.3 02/22/2018 04:00 AM    RDW 19.2 (H) 02/22/2018 04:00 AM      No results found for: IRONNo components found for: FERRITIN  No components found for: PTHINT  Urinalysis  No results found for: UGLU        Impression:     1. Acute kidney injury, likely acute tubular necrosis from septic shock. Improving with Cr down to 1.3-1.4, from peak of 4.06. Patient was oliguric and had HD 2/14, x2. Now with good Urine output. 2. MRSA septic shock, unclear source, improving. 3. Hypophosphatemia, corrected. 4. Hypokalemia, repleted  5. Acidosis, mixed respiratory and metabolic, improved. 6.  Mild rhabdomyolysis, improved. 7.  Anemia. 8. Thrombocytopenia, resolved  9. Diabetes mellitus. 10.  Morbid obesity.    11.  Respiratory failure, intubated and remains on MV.       Plan:   - OK to start IV Lasix for diuresis  - Minimize IV fluid intake as much as possible   - Continue supportive care, Vent, antibiotics,Nutrition  - Weaning vent per Pulmonologist  - Continue to Monitor I/0's  - Continue current BP meds       MD Blas Posada  [phone]

## 2018-02-22 NOTE — PROGRESS NOTES
Patient suctioned prior to transported to [address] [place] [address] and back to ICU via [facility] portable vent without incident. Vitals SP02 100%, PIP 20-25cm and HR 70-80's during transports and procedure. Patient placed back on 840 vent in ICE, see vent  flowsheet.

## 2018-02-23 NOTE — PROGRESS NOTES
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 XXXA    Back pain M54.9    Weakness of right lower extremity R29.898    Anemia D64.9    Shock ([address] R57.9    Acute respiratory failure (HCC) J96.00    Elevated troponin R74.8    Demand ischemia of myocardium (HCC) I24.8    MRSA bacteremia R78.81 ·   MRSA sepsis with persistent bacteremia- source related to air space disease? as CT showed concern for septic emboli? TERRY negative for vegetation. Spine MRI is negative also. Neck, abdomena and pelvis CT and ext CT without evidence of infection or abscess. Few skin folliculitis lesion bl LE but too small and superficial to be source. Blood culture is positive  and  cultures for MRSA. Now on Teflaro, repeat blood cultures 18 negative so far, ID following  · Septic shock resolved and off pressors  · Acute respiratory failure- intubated on  extubated on , reintubated on  on hospital vent protocol  · HTN - medications adjusted by hospitalist  · Elevated troponin ? Demand ischemia vs NSTMI   · Acute renal failure - improved; S/P HD doing well with improvement in Cr and off of HD, HD cath removed 18.  Now on Lasix for vent weaning planning  · Rhabdo resolved  · Afib, rate controlled S/P cardioversion and on PO amiodarone, PO Metoprolol and Lovenox  · Drop in Hb, no gross bleeding, hemodynamically stable  · Back pain, fall - no absess or open wound   · Severe hypokalemia hypomagnesemia on presentation- improved and now on replacements  · Elevated blood sugars improving control  · Lactic acidosis resolved  · H/O ETOH    PLAN:  -- Mech. Ventilated patients- aim to keep peak plateau pressure less than or equal to 30cm H2O. Titrate FiO2 for goal SPO2> 90%  -- VAP prevention bundle, head of the bed at 30' all times  -- Sedation holiday today and daily and assessment for weaning with SBT as tolerated. Not a candidate given ongoing sepsis concern  -- Ativan per sedation protocol and Fentanyl changed to PRN  -- Repeat blood culture on Teflaro negative so far. Teflaro tolerated. Patient has PCN allergy. If repeat cultures positive then may need line holiday followed by PICC line. Spoke with ID [Dr. Herbert Solorzano, she agrees to get 2 peripheral lines and then remove CVC if not needed over the weekend. -- Amiodarone and Metoprolol PO, lovenox for Afib per cardiology  -- Off of duoneb and continue atrovent due to afib and tachycardia previously  -- NA improved with free water flush. UO better and not on HD  -- Continue Lasix to assist with weaning process. K and MG replacement per protocol  -- Adjust BP meds for control of blood pressure  -- Lantus to 18 unit and humalog 5 units with feeding plus ISSC. Once back on TF then may need to adjust back to 22 units  -- HB follow up in AM, transfuse if < 7  -- MRSA isolation   CVS:Currently on PO Amiodarone drip, Lovenox follow per cardiology recommendations. S/P cardioversion with 200 j on 2/13  Adjust BP medications per hospitalist. On aspirin  RS: as above. Lung protective vent protocol, VAP precatuion  ID:Sepsis source?, MRSA on multiple cultures   Vanco stopped after day 8 [2/19/18]. Persistent bacteremia. Now on Teflaro  Azactam 2/11 stop on 2/14  levaquin 2/12  Stop on 2/15  Monitor skin lesions - no changes compared to previously   ENDO: TSH is ok.  On Lantus, Humalog- at goal  GI: feeds as tolerated, PPI, improving bilirubin, Alk P could be due to stasis/GB sludge from sepsis-US negative for any acute findings   RENAL:Wilson for I/O, Replace k and MG per protocol. Patient is putting out urine, Off HD, dialysis per renal  CNS: Sedated intubated wakes up and follows commands with sedation holiday in AM  HEMATOLOGY:PLT is stable, Lovenox for Afib   MUSCULOSKELETAL:CK was elevated but improved  · PAIN AND SEDATION: On Ativan and PRN Fentanyl, Thiamine and folic acid PO  · Skin/Wound: Monitor back, No skin absess  · Electrolytes: As above   · IVF: Christelle Donate   · Nutrition: Tube feeds   · Prophylaxis: DVT Prophylaxis with Lovenox. GI Prophylaxis. · Restraints: minimal restrain to avoid pulling lines and tubes  · PT/OT eval and treat. OOB when appropriate. · Lines/Tubes: none. Femoral line- 2/12 removed on 2/15. Wilson 2/12/18. Right IJ dialysis catheter 2/14- removed 2/19, Left subclavian line 2/15/18  ADVANCE DIRECTIVE:Full code/ Palliative is consulted spoke with wife at length  FAMILY DISCUSSION: no family at bedside  Quality Care: PPI, DVT prophylaxis, HOB elevated, Infection control all reviewed and addressed. Events and notes from last 24 hours reviewed. Care plan discussed with nursing CC TIME: 36  min excluding procedure/s   · Labs and images personally seen and available reports reviewed. · All current medicines are reviewed and doses and prescription adjusted. Subjective:  2/23/18  Patient remained hemodynamically stable  On sedation holiday, awake, following commands, denies any SOB  Responded to Lasix from yesterday with good UO. Magnesium and potassium replaced. No fever. Some residual but overall tolerating TF, earlier on hold for US abdomen. 2/22/18  Patient remained on ventilator. He remained hemodynamically stable. Tolerating Teflaro. Low grade fever overnight. He remained hemodynamically stable. UO stable. Underwent tagged scan today.    He remained on Ativan. Spoke with RN regarding sedation holiday. Will monitor. 2/21/18  No fever overnight or this AM  He remained hemodynamically stable, on HTN side. He is on ventilator, now on Ativan [1 mg/hr at present] and Fentanyl and off of propofol since yesterday. Tolerating Teflaro and no adverse reaction  Possible tagged scan tomorrow per radiology protocol  Tolerating TF    2/20/18  Patient on ventilator, sedated. On sedation holiday wakes up. Low grade fever overnight. Hemodynamically stable. Repeat bl cx from 2/18 positive for MRSA  Tolerating Teflaro without any rash or adverse effect. ECHO was done on 2/18, spoke with Dr. Ephraim Gottron and no repeat ECHO needed at present. Plan for tagged abscess location scan. Tolerating TF. Good UO. Tolerating Heparin without any bleeding. 2/19/18  Patient remained on ventilator and sedated. No fever. Hemodynamically stable, rather on hypertensive side, hospitalist adjusting meds. Repeat bl cultures from 2/18/18 both set positive for GPC. Spoke with Dr. Ephraim Gottron and he recommended change to Teflaro and follow up ECHO  HD catheter removed by staff per renal recommendations  No plans for HD per renal  Tolerating TF well and adjusted per protocol by staff  The staff denies abnormal bruising or abnormal bleeding from any body orifice such as bleeding from nose or gums, blood in urine or stool, or melena, hemoptysis or hematemesis.      2/18  Intubated again for work of [address] [place] [address] [email or web address] on 2/17  CT ext, ABD pel, Neck all came out ok no source of abscess  No fever overnight  BP is ok  ON /14/50% peep 5 abg ok  Cr is improving with good urine out put  Renal not planning any dialysis at this point    2/17/18  Was extubated on 2/16  Did reasonably well yesterday  Overnight still had fever  Early am was breathing little fast. As per nursing was awake and following command but received morphine now not waking up much  Put out 3300 urine  K 2.9 only replaced by 10 meq  Mg 1.5  PH 7.56/34/30/107  Currently on BIPAP still breathing little fast    2/16  Doing well   Yesterday started noticing some sking lesions all over body looks like septic staph infection  Nothing to suggest allergic rash  Put out about 1400cc yesterday  K 2.9 and replaced  Cr improved to 2.22 s/p hd yesterday  Mild elevation of billirubin to 1.9   HB and PLT is stable   Sputum MRSA    2/15  Doing ok  No overnight event   S/P HD  Also put out 900 cc of urine in last 24 hrs  HB dropped to 7.5 but no active bleed  PLT is 129 INR is 1.6 K 3.0   Alb 1.5     2/14  No significant overnight event  S/P TERRY negative for vegetation or blood clot  S/P cardioversion  ID evaluated suggested MSSA sepsis but initial culture is growing MRSA. Am labs showed worsening cr to 4.06 and PH 7.26  ALB 1.5  Currently on phenylephring 100, versed, amiodarone, fentanyl and bicarb drip    2/13/18  Yesterday became more tachypnic and difficult to control hr  Intubated 2/12, femoral line 2/12 <neck line not placed due to concern for colonization with ongoing sepsis>  Became hypotensive managed with phenylephrine  Afib was poorly controlled started on amiodarone drip. Cardiology is closely following  Blood culture Staph  K 3.2 replaced  Mg 1.1 getting replaced   UO and Cr is improving  Still spiking fever 102   Flu is negative       History:   Charles Wood is a 57 y.o. male who came to the ed with complaints of fall. Patient states he has been feeling generalized weakness at home for the last day or so with low grade subjective fevers but he noted RLE weakness around 5pm. Since then he has fallen about 4 times in his back without any head trauma or LOC. He has been using his tripod cane to assist him with walking. Recently about 1 month a go treated for pna at office. Ex smoker. Drinks about 2 glasses of mix drinks every day. Recently lost his son to multiorgan failure and sepsis.  Due to persistence of his weakness, falls, fevers and low back pain he decided to come to the ED. In the ED, CT of the head was neg and his labs showed significant hypoK with elevated Cr (unknown baseline). He was also febrile and tachycardiac. He was started on broad spectrum Abx. Overnight RRT called out after he was found to be tachycardic, tachypnic and ABG showed alkalosis and was transferred to ICU        Current Facility-Administered Medications   Medication Dose Route Frequency    insulin glargine (LANTUS) injection 18 Units  18 Units SubCUTAneous DAILY WITH LUNCH    [START ON 3/1/2018] amiodarone (CORDARONE) tablet 200 mg  200 mg Oral DAILY    Thiamine Mononitrate (B-1) tablet 100 mg  100 mg Oral DAILY    furosemide (LASIX) injection 40 mg  40 mg IntraVENous DAILY    metoprolol tartrate (LOPRESSOR) tablet 50 mg  50 mg Oral Q12H    enoxaparin (LOVENOX) injection 150 mg  150 mg SubCUTAneous Q12H    amiodarone (CORDARONE) tablet 400 mg  400 mg Oral Q12H    cefTARoline (TEFLARO) 600 mg in 0.9% sodium chloride (MBP/ADV) 50 mL MBP  600 mg IntraVENous Q12H    ipratropium (ATROVENT) 0.02 % nebulizer solution 0.5 mg  0.5 mg Nebulization Q6H RT    insulin lispro (HUMALOG) injection 5 Units  5 Units SubCUTAneous Q6H    chlorhexidine (PERIDEX) 0.12 % mouthwash 10 mL  10 mL Oral Q12H    aspirin chewable tablet 81 mg  81 mg Oral DAILY    pantoprazole (PROTONIX) 40 mg in sodium chloride 0.9 % 10 mL injection  40 mg IntraVENous DAILY    folic acid (FOLVITE) tablet 1 mg  1 mg Oral DAILY    insulin lispro (HUMALOG) injection   SubCUTAneous Q6H         Objective:     Vital Signs:    Blood pressure 162/67, pulse 87, temperature 99.1 °F (37.3 °C), resp. rate 25, height 6' (1.829 m), weight 145.7 kg (321 lb 3.4 oz), SpO2 98 %. Body mass index is 43.56 kg/(m^2).     O2 Device: Ventilator   O2 Flow Rate (L/min): 6 l/min   Temp (24hrs), Av °F (37.2 °C), Min:98.6 °F (37 °C), Max:99.1 °F (37.3 °C)       Intake/Output:   Last shift:      701 - 1900  In: -   Out: 7101 [[facility]:1229]  Last 3 shifts: 02/21 1901 - 02/23 0700  In: 3438.8 [I.V.:2268.8]  Out: 3900 [Urine:3900]    Intake/Output Summary (Last 24 hours) at 02/23/18 1332  Last data filed at 02/23/18 1117   Gross per 24 hour   Intake              200 ml   Output             4870 ml   Net            -4670 ml       Review of Systems:  Intubated sedated   Review of systems not obtained due to patient factors. Physical Exam:  General: in no respiratory distress and acyanotic, appears older than stated age, opens eyes and follows simple commands, on ventilator  HEENT: PERRL, orally intubated  Neck: No abnormally enlarged lymph nodes or thyroid, supple  Chest: normal  Lungs: moderate air entry and rhonchi, normal percussion bilaterally, no tenderness/ rash  Heart: Regular rate and rhythm, S1S2 present or without murmur or extra heart sounds  Abdomen: obese, bowel sounds normoactive, tympanic, abdomen is soft without significant tenderness, masses, organomegaly or guarding  Extremity: Trace, pitting and bl ankle edema, negative for cyanosis, clubbing. Neuro: sedated, moves all extremities well, no involuntary movements, exam limitations  Skin: Skin color, texture, turgor fair.  Leg bl folliculitis rashes or lesions unchanged      CBC w/Diff Recent Labs      02/23/18   0500  02/22/18   0400  02/21/18   0530   WBC  9.4  11.6  10.7   RBC  2.46*  2.72*  2.64*   HGB  7.4*  8.0*  8.0*   HCT  23.8*  25.6*  24.5*   PLT  468*  520*  488*   GRANS  68  74*  73   LYMPH  21  16*  17*   EOS  2  2  2        Chemistry Recent Labs      02/23/18   0500  02/22/18   1807  02/22/18   0400  02/21/18   1940  02/21/18   0530   GLU  107*   --   130*   --   142*   NA  144   --   143   --   141   K  4.4  3.8  3.7  3.8  3.7   CL  109*   --   110*   --   108   CO2  26   --   25   --   26   BUN  28*   --   30*   --   29*   CREA  1.48*   --   1.49*   --   1.44*   CA  8.5   --   8.6   --   8.7   MG  1.6   --   1.9  1.6  1.6   PHOS  4.1   --   3.4   --   3.2 AGAP  9   --   8   --   7   BUCR  19   --   20   --   20   AP  394*   --   317*   --   262*   TP  5.6*   --   6.3*   --   5.9*   ALB  1.8*   --   1.7*   --   1.6*   GLOB  3.8   --   4.6*   --   4.3*   AGRAT  0.5*   --   0.4*   --   0.4*        Lactic Acid Lactic acid   Date Value Ref Range Status   02/13/2018 1.8 0.4 - 2.0 MMOL/L Final     No results for input(s): LAC in the last 72 hours. Micro  Recent Labs      02/18/18   0405  02/18/18   0400  02/16/18   0508   CULT  AEROBIC BOTTLE **METHICILLIN RESISTANT STAPHYLOCOCCUS AUREUS**  AEROBIC BOTTLE **METHICILLIN RESISTANT STAPHYLOCOCCUS AUREUS**  AEROBIC BOTTLE **METHICILLIN RESISTANT STAPHYLOCOCCUS AUREUS  For Susceptibility Refer to Culture  [id number]    AEROBIC BOTTLE **METHICILLIN RESISTANT STAPHYLOCOCCUS AUREUS  For Susceptibility Refer to Culture  [id number]       Recent Labs      02/13/18   0858  02/13/18   0315  02/13/18   0310  02/12/18   0900  02/11/18   1846   CULT  PENDING  AEROBIC BOTTLE STAPHYLOCOCCUS AUREUS*  AEROBIC BOTTLE STAPHYLOCOCCUS AUREUS*  NO GROWTH AFTER 20 HOURS  AEROBIC AND ANAEROBIC BOTTLES **METHICILLIN RESISTANT STAPHYLOCOCCUS AUREUS **        ABG Recent Labs      02/23/18   0515  02/22/18   0532  02/21/18   0532   PHI  7.432  7.473*  7.483*   PCO2I  34.4*  31.5*  32.0*   PO2I  88  98  71*   HCO3I  22.7  23.1  24.1   FIO2I  0.40  40  40        Liver Enzymes Protein, total   Date Value Ref Range Status   02/23/2018 5.6 (L) 6.4 - 8.2 g/dL Final     Albumin   Date Value Ref Range Status   02/23/2018 1.8 (L) 3.4 - 5.0 g/dL Final     Globulin   Date Value Ref Range Status   02/23/2018 3.8 2.0 - 4.0 g/dL Final     A-G Ratio   Date Value Ref Range Status   02/23/2018 0.5 (L) 0.8 - 1.7   Final     AST (SGOT)   Date Value Ref Range Status   02/23/2018 73 (H) 15 - 37 U/L Final     Alk.  phosphatase   Date Value Ref Range Status   02/23/2018 394 (H) 45 - 117 U/L Final     Recent Labs      02/23/18   0500  02/22/18   0400  02/21/18   0530   TP 5. 6*  6.3*  5.9*   ALB  1.8*  1.7*  1.6*   GLOB  3.8  4.6*  4.3*   AGRAT  0.5*  0.4*  0.4*   SGOT  73*  58*  57*   AP  394*  317*  262*        Cardiac Enzymes No results found for: CPK, CK, CKMMB, CKMB, RCK3, CKMBT, CKNDX, CKND1, LESLIE, TROPT, TROIQ, BELLA, TROPT, TNIPOC, BNP, BNPP     BNP No results found for: BNP, BNPP, XBNPT     Coagulation Recent Labs      02/20/18   1410   APTT  127.9*         Thyroid  Lab Results   Component Value Date/Time    TSH 0.43 02/12/2018 10:05 AM          Lipid Panel Lab Results   Component Value Date/Time    Cholesterol, total 116 02/11/2018 08:45 PM    HDL Cholesterol 27 (L) 02/11/2018 08:45 PM    LDL, calculated 57.4 02/11/2018 08:45 PM    VLDL, calculated 31.6 02/11/2018 08:45 PM    Triglyceride 278 (H) 02/19/2018 05:15 AM    CHOL/HDL Ratio 4.3 02/11/2018 08:45 PM          Urinalysis Lab Results   Component Value Date/Time    Color DARK YELLOW 02/11/2018 10:45 PM    Appearance CLOUDY 02/11/2018 10:45 PM    Specific gravity 1.019 02/11/2018 10:45 PM    pH (UA) 5.5 02/11/2018 10:45 PM    Protein 300 (A) 02/11/2018 10:45 PM    Glucose >1000 (A) 02/11/2018 10:45 PM    Ketone TRACE (A) 02/11/2018 10:45 PM    Bilirubin NEGATIVE  02/11/2018 10:45 PM    Urobilinogen 1.0 02/11/2018 10:45 PM    Nitrites NEGATIVE  02/11/2018 10:45 PM    Leukocyte Esterase NEGATIVE  02/11/2018 10:45 PM    Epithelial cells 0 01/08/2014 02:46 PM    Bacteria 2+ (A) 02/11/2018 10:45 PM    WBC 0 to 2 02/11/2018 10:45 PM    RBC 0 to 2 02/11/2018 10:45 PM        XR (Most Recent). CXR reviewed by me and compared with previous CXR   Results from Hospital Encounter encounter on 02/11/18   XR CHEST PORT   Narrative CHEST AP PORTABLE    Indication: Mechanical ventilation, respiratory distress. Comparison: 02/22/2018. Findings: Endotracheal tube tip is at the level of clavicles, approximately 4.3  cm from the lorena. NG tube extends into the stomach, tip out of field-of-view.   Monitoring leads overlie the chest. Stable appearance to left subclavian central  line projecting over the aortic knob without crossing the midline, possible  arterial access. Low lung volume. Cardiomediastinal silhouette is stable. There is prominence of  the central pulmonary vessels and mild diffuse haziness. Persistent retrocardiac  opacity obscuring the diaphragm. Costophrenic angles are sharp but small  effusions difficult to exclude. No pneumothorax. Impression IMPRESSION:    1. No significant interval change. Endotracheal tube and NG tube appear stable. Left subclavian central line projecting over the aortic knob, stable and  possible arterial access and correlate clinically. 2. Diffuse haziness, low lung volume, edema or in part patient's body habitus. Persistent retrocardiac left lower lobe atelectasis or infiltrate and possible  small effusions. CT (Most Recent)   Results from Hospital Encounter encounter on 02/11/18   CT NECK SOFT TISSUE WO CONT   Narrative CT soft tissue of the neck without contrast.    INDICATION: Sepsis workup. Persistent positive blood cultures with unknown  source. COMPARISON: Neck CT from 8/3/2015. TECHNIQUE:Helically acquired and axial images were obtained from the posterior  fossa to the thoracic inlet without IV contrast. IV contrast not administered  due to recent history of acute renal injury. One or more dose reduction techniques were used on this CT: automated exposure  control, adjustment of the mAs and/or kVp according to patient's size, and  iterative reconstruction techniques. The specific techniques utilized on this CT  exam have been documented in the patient's electronic medical record. FINDINGS:   Endotracheal and enteric tubes are partially visualized. There is a right  internal jugular central venous catheter which is partially visualized. There is  a left subclavian central venous catheter which is partially visualized.     Visualized intracranial structures are unremarkable. There is mucosal thickening of the left maxillary sinus and an air-fluid level  within the right maxillary sinus. There is no cervical lymphadenopathy. No mass identified within the limitations  of this noncontrast exam. The thyroid has unremarkable appearance. There is patchy opacity at the lung apices which may represent  pleural-parenchymal scarring. There are multilevel degenerative changes of the cervical spine without acute  osseous abnormality. Impression IMPRESSION:      1. Tubes and lines as described. 2. Mild inflammatory changes within the maxillary sinuses which may be related  to intubation. There is no large abscess or other findings to suggest an occult  source of infection. EKG No results found for this or any previous visit. ECHO  2/12/18 THE [facility] SUMMARY:  Left ventricle: Systolic function was normal. Ejection fraction was estimated   in the range of 55 % to 60 %. There was  mild hypokinesis of the basal inferior wall(s). There was moderate concentric   hypertrophy. Right ventricle: The size was normal. Systolic function was normal.    Inferior vena cava, hepatic veins: The inferior vena cava was dilated. The   respirophasic change in diameter was more  than 50%. 2/23/18  US ABD  1. Hepatomegaly with diffusely increased hepatic parenchymal echogenicity. While nonspecific, these findings can be seen in the context of underlying steatosis and/or hepatocellular disease. 2. No evidence of cholelithiasis. No intrahepatic or extrahepatic biliary ductal dilatation. 3. Limited visualization of the pancreas. 4. No hydronephrosis involving the right kidney. Simple appearing right renal cyst, unchanged. 5. Small right pleural effusion      2/2218  NM INFLAM SCAN  No focal evidence of blood cell accumulation to suggest a site of active infection.  The presence of low level activity over the anterior abdomen, projecting inferior to the liver is likely secondary to the patient's overlying arm/hand. Uptake at this location is minimal, less than that of adjacent liver. Correlation with physical exam directed at this location may be helpful as appropriate clinically. MRI spine:   2. Prior left L5 laminotomy, partial left facetectomy and possibly previous partial L4-5 discectomy. Minimal left asymmetric disc bulge but no evidence of recurrent disc herniation or retained disc fragment.   3. Minimal L3-4 disc bulge and annular fissure. 4. Multilevel mostly mild facet joint osteoarthritis. Greatest and mild to moderate level right L4-5 facet joint osteoarthritis with moderate-sized facet joint effusion and synovitis. No adjacent periarticular marrow or soft tissue edema to definitively suggest superimposed infective/septic arthritis. 5. Unremarkable upper sacroiliac joints though incompletely included. Mild body wall edema. Imaging:  I have personally reviewed the patients radiographs and have reviewed the reports:                      Nabeel Craig M.D.   Pulmonary Critical Care & Sleep Medicine

## 2018-02-23 NOTE — PROGRESS NOTES
Hospitalist Progress Note-critical care note     Patient: Venus Pena MRN: [id number]  CSN: [id number]    YOB: 1955  Age: 57 y.o. Sex: male    DOA: 2/11/2018 LOS:  LOS: 11 days            Chief complaint: acute respiratory failure      Assessment/Plan         Hospital Problems  Date Reviewed: 2/11/2018          Codes Class Noted POA    MRSA bacteremia ICD-10-CM: R78.81  ICD-9-CM: 790.7, 041.12  2/15/2018 Yes        Shock ([facility] ICD-10-CM: R57.9  ICD-9-CM: 785.50  2/13/2018 No        Acute respiratory failure ([facility] ICD-10-CM: J96.00  ICD-9-CM: 518.81  2/13/2018 Unknown        Elevated troponin ICD-10-CM: R74.8  ICD-9-CM: 790.6  2/12/2018 Unknown        Demand ischemia of myocardium ([facility] ICD-10-CM: I24.8  ICD-9-CM: 411.89  2/12/2018 Unknown        * (Principal)Sepsis ([facility] ICD-10-CM: A41.9  ICD-9-CM: 038.9, 995.91  2/11/2018 Clinically Undetermined        Hypokalemia ICD-10-CM: E87.6  ICD-9-CM: 276.8  2/11/2018 Yes        Glucosuria ICD-10-CM: R81  ICD-9-CM: 791.5  2/11/2018 Yes        AMANDA (acute kidney injury) ([facility] ICD-10-CM: N17.9  ICD-9-CM: 584.9  2/11/2018 Unknown        Fall ICD-10-CM: Raymona Duff. Yoly Guerin  ICD-9-CM: E888.9  2/11/2018 Yes        Back pain ICD-10-CM: M54.9  ICD-9-CM: 724.5  2/11/2018 Unknown        Weakness of right lower extremity ICD-10-CM: R29.898  ICD-9-CM: 729.89  2/11/2018 Yes        Anemia ICD-10-CM: D64.9  ICD-9-CM: 285.9  2/11/2018 Unknown        Osteoarthritis of right knee (Chronic) ICD-10-CM: M17.11  ICD-9-CM: 715.96  1/18/2014 Yes            1 bacteremia   ID on board, recommend continue  ceftaroline 600mg Q12H for persistant MRSA bacteremia     2. Sepsis   Still having fever, will repeat cx , might due to lines? Persistent positive cx positive    3. Acute respiratory failure   -intubated on 2/12 extubated on 2/16, reintubated on 2/17 . vent management per intensive   4  amanda   Renal on board   5 rhabdo   Resolved per hydration     6 afib   Rate controlled per amiodarone po , now SR   Echo done: ef wnl   7 anemia   Will transfuse today, one units     8 Demand ischemia of myocardium ([address] on bbb       Nurse: still fever   Poor prognosis   Disposition : tbd   Review of systems:  Unable to obtain due to intubation   Vital signs/Intake and Output:  Visit Vitals    /67    Pulse 87    Temp 99.1 °F (37.3 °C)    Resp 25    Ht 6' (1.829 m)    Wt 145.7 kg (321 lb 3.4 oz)    SpO2 98%    BMI 43.56 kg/m2     Current Shift:  02/23 0701 - 02/23 1900  In: -   Out: 3237 [RJVZ:8779]  Last three shifts:  02/21 1901 - 02/23 0700  In: 3438.8 [I.V.:2268.8]  Out: 3900 [Urine:3900]    Physical Exam:  General: On vent   HEENT: NC, Atraumatic.   anicteric sclerae. Et tube noted   Lungs: Coarse bilateral BS   Heart:  Regular  rhythm,  No murmur, No Rubs, No Gallops  Abdomen: Soft, Non distended, Non tender.  +Bowel sounds,   Extremities: No c/c. 1+ edema   Psych:   Calm   Neurologic:  Unable to obtain due to intubated           Labs: Results:       Chemistry Recent Labs      02/23/18   0500  02/22/18   1807  02/22/18   0400   02/21/18   0530   GLU  107*   --   130*   --   142*   NA  144   --   143   --   141   K  4.4  3.8  3.7   < >  3.7   CL  109*   --   110*   --   108   CO2  26   --   25   --   26   BUN  28*   --   30*   --   29*   CREA  1.48*   --   1.49*   --   1.44*   CA  8.5   --   8.6   --   8.7   AGAP  9   --   8   --   7   BUCR  19   --   20   --   20   AP  394*   --   317*   --   262*   TP  5.6*   --   6.3*   --   5.9*   ALB  1.8*   --   1.7*   --   1.6*   GLOB  3.8   --   4.6*   --   4.3*   AGRAT  0.5*   --   0.4*   --   0.4*    < > = values in this interval not displayed.       CBC w/Diff Recent Labs      02/23/18   0500  02/22/18   0400  02/21/18   0530   WBC  9.4  11.6  10.7   RBC  2.46*  2.72*  2.64*   HGB  7.4*  8.0*  8.0*   HCT  23.8*  25.6*  24.5*   PLT  468*  520*  488*   GRANS  68  74*  73   LYMPH  21  16*  17*   EOS  2  2  2      Cardiac Enzymes No results for input(s): CPK, CKND1, LESLIE in the last 72 hours. No lab exists for component: CKRMB, TROIP   Coagulation Recent Labs      02/20/18   1410   APTT  127.9*       Lipid Panel Lab Results   Component Value Date/Time    Cholesterol, total 116 02/11/2018 08:45 PM    HDL Cholesterol 27 (L) 02/11/2018 08:45 PM    LDL, calculated 57.4 02/11/2018 08:45 PM    VLDL, calculated 31.6 02/11/2018 08:45 PM    Triglyceride 278 (H) 02/19/2018 05:15 AM    CHOL/HDL Ratio 4.3 02/11/2018 08:45 PM      BNP No results for input(s): BNPP in the last 72 hours.    Liver Enzymes Recent Labs      02/23/18   0500   TP  5.6*   ALB  1.8*   AP  394*   SGOT  73*      Thyroid Studies Lab Results   Component Value Date/Time    TSH 0.43 02/12/2018 10:05 AM        Procedures/imaging: see electronic medical records for all procedures/Xrays and details which were not copied into this note but were reviewed prior to creation of Kait Koch MD

## 2018-02-23 NOTE — PROGRESS NOTES
0730- .Bedside and Verbal shift change report given to Floresita Cantu RN (oncoming nurse) by Harley Jacome RN (offgoing nurse). Report included the following information SBAR, Kardex, Intake/Output, MAR, Recent Results and Cardiac Rhythm SR. Initial shift assessment complete see EMR, patient awake, follows commands. NAD. Will continue to monitor. 1100- IDR complete, patient status discussed. orders received. 1200- Reassessment complete, no changes to previous. Will continue to monitor. 1530- Unable to place 2nd PIV as per MD request. Patient has one PIV currently and Central line. 1600-Reassessment complete, see EMR. Will continue to monitor. 1930- Bedside and Verbal shift change report given to Blaise Renner RN (oncoming nurse) by Floresita Cantu RN (offgoing nurse).  Report included the following information SBAR, Kardex, Intake/Output, MAR, Recent Results and Cardiac Rhythm SR.

## 2018-02-23 NOTE — PROGRESS NOTES
ID follow up note     Chart reviewed remotely   Patient is afebrile and remains on MV. Wbc has improved to 9  and creatinine stable at 1.4  Abscess localization scan did not show hidden source of infection  Blood cultures from 2/22 NG so far   Tolerating ceftaroline so far      Recommendation   Recommend to remove the Left IJ line for line holiday  Continue IV  to ceftaroline 600mg Q12H for persistant MRSA bacteremia     Please call with questions or concerns regarding ID issue .      2025 Jb Haddad MD  Infectious diseases specialist   Pager [phone]

## 2018-02-23 NOTE — PROGRESS NOTES
Palliative Medicine Progress Note  [facility]: [phone] ([phone])  [facility]: [address] [place] [address] Way: [phone]    Patient Name: Amrita Mcnamara  YOB: 1955    Date of Initial Consult: 2/16/18  Reason for Consult: care decisions  Requesting Provider: Dr. Keyana Temple   Primary Care Physician: Tianna Treviño MD          SUMMARY:   Amrita Mcnamara is a 57y.o. year old who presented after two days of malaise with overwhelming MERSA sepsis and multi organ system failure. On pressors initially, has required dialysis and amiodarone drip with ventilator support. Now off pressors, with good UOP, extubated this am. Still minimally responsive. Only negative recent care finding is persistently + blood cultures and fevers. Previously reasonably healthy, last hospitalization for TKR. Recently lost son in January with hepatorenal syndrome. Wife at bedside. 2/19/18: Reintubated < 24 hr post extubation. Persistent MERSA + blood cultures since 2/11 despite MICs demonstrating sensitivity to vanc. No identified sequestered source of bacteremia. No family at bedside. 2/20/18: Stable overnight, no fever. Dialysis catheter removed. Remains on propofol. 2/21/18: Arousable today. Appears uncomfortable. On IV abx, for tagged WBC scan tomorrow looking for source of persistent bacteremia. 2/22/18: Resting comfortably. Blood cultures being drawn. 2/23/18: Alert, but not answering questions. Tagged WBC scan negative. PALLIATIVE DIAGNOSES:     Patient Active Problem List   Diagnosis Code    Osteoarthritis of right knee M17.11    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[facility] T84.018A, Z96.659    Failed total knee arthroplasty ([facility] T84.018A, Z96.659    Dysphagia R13.10    Sepsis ([facility] A41.9    Hypokalemia E87.6    Glucosuria R81    AMANDA (acute kidney injury) ([facility] N17.9    Fall W19. XXXA    Back pain M54.9    Weakness of right lower extremity R29.898    Anemia D64.9    Shock ([facility] R57.9    Acute respiratory failure (HCC) J96.00    Elevated troponin R74.8    Demand ischemia of myocardium (HCC) I24.8    MRSA bacteremia R78.81     1. PLAN:   1. Met with wife and answered questions regarding current clinical status. She understand that while he is improved he remains critically ill. Discussed implications of persistent + blood cultures. Reviewed his AMD and asked if she thinks he would be willing to undergo reintubation and reescalation of care should he decline rapidly again. She is not certain but knows he would not want aggressive measures if he had little or no chance of making a good recovery. Emotional support provided. 2/19/18: No evidence of improvement in underlying source of his multisystem critical illness. With reintubation and persistent bacteremia, prognosis appears to be worsening.    2/20/18: On different abx regimen. No clinical change. 2/21/18: Clinically unchanged. Repeat BC tomorrow. 2/22/18: Hopefully bacteremia clearing. Will need to be considered for trach if not close to extubation by beginning of next week. 2/23/18: Discussed w/ Dr. Imer Acosta. If cultures remain negative he is hopeful he can be extubated in a time frame to avoid tracheostomy.  Spent some time orienting him to his current situation and care plan. 2. Initial consult note routed to primary continuity provider  3. Communicated plan of care with: Palliative IDT    GOALS OF CARE:  Patient/Health Care Proxy Stated Goals: Cure      TREATMENT PREFERENCES:   Code Status: Full Code    Advance Care Planning:  Advance Care Planning 2/14/2018   Patient's Healthcare Decision Maker is: Named in scanned ACP document   Primary Decision Maker Name Ayesha Damico   Primary Decision Maker Phone Number [phone]   Primary Decision Maker Relationship to Patient Spouse   Secondary Decision Maker Name Ilsa Chavarria   Secondary Decision Maker Phone Number [phone]   Secondary Decision Maker Relationship to Patient Sibling   Confirm Advance Directive Yes, on file       Medical Interventions: Full interventions   Other Instructions: Other:  As far as possible, the palliative care team has discussed with patient / health care proxy about goals of care / treatment preferences for patient. HISTORY:     History obtained from: wife    CHIEF COMPLAINT: see summary    HPI/SUBJECTIVE:    The patient is:   [] Verbal and participatory  [x] Non-participatory due to:   sedation     Clinical Pain Assessment (nonverbal scale for nonverbal patients): Activity (Movement): Lying quietly, normal position    Duration: for how long has pt been experiencing pain (e.g., 2 days, 1 month, years)  Frequency: how often pain is an issue (e.g., several times per day, once every few days, constant)     FUNCTIONAL ASSESSMENT:     Palliative Performance Scale (PPS):  PPS: 30    ECOG        PSYCHOSOCIAL/SPIRITUAL SCREENING:      Any spiritual / Zoroastrian concerns:  [] Yes /  [x] No    Caregiver Burnout:  [] Yes /  [x] No /  [] No Caregiver Present      Anticipatory grief assessment:   [x] Normal  / [] Maladaptive        REVIEW OF SYSTEMS:     Positive and pertinent negative findings in ROS are noted above in HPI.   The following systems were [] reviewed / [x] unable to be reviewed as noted in HPI  Other findings are noted below. Systems: constitutional, ears/nose/mouth/throat, respiratory, gastrointestinal, genitourinary, musculoskeletal, integumentary, neurologic, psychiatric, endocrine. Positive findings noted below. Modified ESAS Completed by: provider                                   Stool Occurrence(s): 0        PHYSICAL EXAM:     Wt Readings from Last 3 Encounters:   02/23/18 145.7 kg (321 lb 3.4 oz)   02/14/18 144.8 kg (319 lb 3.6 oz)   08/02/15 138.8 kg (306 lb)     Blood pressure 162/67, pulse 87, temperature 99 °F (37.2 °C), resp. rate 19, height 6' (1.829 m), weight 145.7 kg (321 lb 3.4 oz), SpO2 97 %.   Pain:  Pain Scale 1: FACES  Pain Intensity 1: 0  Pain Onset 1: chronic  Pain Location 1: Back  Pain Orientation 1: Lower  Pain Description 1: Aching, Constant  Pain Intervention(s) 1: Medication (see MAR)  Last bowel movement:     Constitutional: obese, sedated, intubated  Eyes: pupils equal, anicteric  ENMT: no nasal discharge, moist mucous membranes  Cardiovascular: regular rhythm, distal pulses intact  Respiratory: breathing not labored, symmetric  Gastrointestinal: soft non-tender, +bowel sounds  Musculoskeletal: no deformity, no tenderness to palpation  Skin: warm, dry  Neurologic: sedated  Not following commands  Psychiatric:   Other:       HISTORY:     Principal Problem:    Sepsis ([address] (2/11/2018)    Active Problems:    Osteoarthritis of right knee (1/18/2014)      Hypokalemia (2/11/2018)      Glucosuria (2/11/2018)      AMANDA (acute kidney injury) ([address] (2/11/2018)      Fall (2/11/2018)      Back pain (2/11/2018)      Weakness of right lower extremity (2/11/2018)      Anemia (2/11/2018)      Shock ([address] (2/13/2018)      Acute respiratory failure (HCC) (2/13/2018)      Elevated troponin (2/12/2018)      Demand ischemia of myocardium ([address] (2/12/2018)      MRSA bacteremia (2/15/2018)      Past Medical History:   Diagnosis Date    Arrhythmia     irregular heart beat    Arthritis     knees- osteo    Chronic pain     knees    Diabetes (HCC) 1990's    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[facility] 10/21/2014    GERD (gastroesophageal reflux disease)     well controlled    Gout     High cholesterol     Hypertension 1990's    Irregular heart beat     palpitations    Morbid obesity ([facility]     Osteoarthritis of right knee 1/18/2014    Other ill-defined conditions(799.89)     asbestosis    Other ill-defined conditions(799.89)     irregular heart beat    Other ill-defined conditions(799.89)     h/o migraines    Other ill-defined conditions(799.89)     gout    Other ill-defined conditions(799.89)     cholesterol    PUD (peptic ulcer disease) 1970's    Tinnitus of left ear       Past Surgical History:   Procedure Laterality Date    HX CARPAL TUNNEL RELEASE      HX KNEE ARTHROSCOPY  2010    left    HX KNEE ARTHROSCOPY  1980s    right    HX KNEE REPLACEMENT      HX ORTHOPAEDIC  1970s    right hand tendenitis    HX ORTHOPAEDIC  2012    left hand ring finger released    HX ORTHOPAEDIC  8-2013    left carpal    TOTAL KNEE ARTHROPLASTY  2009/2010    left      No family history on file. History reviewed, no pertinent family history.   Social History   Substance Use Topics    Smoking status: Former Smoker     Quit date: 10/1/1975    Smokeless tobacco: Never Used      Comment: 1970s    Alcohol use 1.5 oz/week     3 Shots of liquor per week     Allergies   Allergen Reactions    Bees [Hymenoptera Allergenic Extract] Anaphylaxis    Cocoa Butter-Zinc Oxide Rash    Dilaudid [Hydromorphone (Bulk)] Rash     capsules    Pcn [Penicillins] Rash      Current Facility-Administered Medications   Medication Dose Route Frequency    [START ON 3/1/2018] amiodarone (CORDARONE) tablet 200 mg  200 mg Oral DAILY    Thiamine Mononitrate (B-1) tablet 100 mg  100 mg Oral DAILY    furosemide (LASIX) injection 40 mg  40 mg IntraVENous DAILY    fentaNYL citrate (PF) injection  mcg   mcg IntraVENous Q4H PRN    metoprolol tartrate (LOPRESSOR) tablet 50 mg  50 mg Oral Q12H    insulin glargine (LANTUS) injection 22 Units  22 Units SubCUTAneous DAILY WITH LUNCH    LORazepam (ATIVAN) 60 mg in 0.9% sodium chloride 60 mL infusion  0-5 mg/kg/hr IntraVENous TITRATE    enoxaparin (LOVENOX) injection 150 mg  150 mg SubCUTAneous Q12H    amiodarone (CORDARONE) tablet 400 mg  400 mg Oral Q12H    cefTARoline (TEFLARO) 600 mg in 0.9% sodium chloride (MBP/ADV) 50 mL MBP  600 mg IntraVENous Q12H    ipratropium (ATROVENT) 0.02 % nebulizer solution 0.5 mg  0.5 mg Nebulization Q6H RT    insulin lispro (HUMALOG) injection 5 Units  5 Units SubCUTAneous Q6H    ELECTROLYTE REPLACEMENT PROTOCOL -- Potassium  1 Each Other PRN    ELECTROLYTE REPLACEMENT PROTOCOL -- Magnesium  1 Each Other PRN    ELECTROLYTE REPLACEMENT PROTOCOL -- Phosphorus  1 Each Other PRN    ELECTROLYTE REPLACEMENT PROTOCOL -- Calcium  1 Each Other PRN    chlorhexidine (PERIDEX) 0.12 % mouthwash 10 mL  10 mL Oral Q12H    acetaminophen (TYLENOL) suppository 650 mg  650 mg Rectal Q6H PRN    hydrALAZINE (APRESOLINE) 20 mg/mL injection 10 mg  10 mg IntraVENous Q6H PRN    0.9% sodium chloride infusion 250 mL  250 mL IntraVENous PRN    heparin (porcine) 1,000 unit/mL injection 1,500 Units  1,500 Units IntraVENous PRN    acetaminophen (TYLENOL) solution 650 mg  650 mg Oral Q4H PRN    aspirin chewable tablet 81 mg  81 mg Oral DAILY    pantoprazole (PROTONIX) 40 mg in sodium chloride 0.9 % 10 mL injection  40 mg IntraVENous DAILY    naloxone (NARCAN) injection 0.4 mg  0.4 mg IntraVENous PRN    docusate sodium (COLACE) capsule 100 mg  100 mg Oral BID PRN    acetaminophen (TYLENOL) tablet 650 mg  650 mg Oral D9X PRN    folic acid (FOLVITE) tablet 1 mg  1 mg Oral DAILY    insulin lispro (HUMALOG) injection   SubCUTAneous Q6H    sodium chloride (NS) flush 5-10 mL  5-10 mL IntraVENous PRN  ondansetron (ZOFRAN ODT) tablet 4 mg  4 mg Oral Q4H PRN    glucose chewable tablet 16 g  4 Tab Oral PRN    glucagon (GLUCAGEN) injection 1 mg  1 mg IntraMUSCular PRN    dextrose (D50W) injection syrg 12.5-25 g  25-50 mL IntraVENous PRN        LAB AND IMAGING FINDINGS:     Lab Results   Component Value Date/Time    WBC 9.4 02/23/2018 05:00 AM    HGB 7.4 (L) 02/23/2018 05:00 AM    PLATELET 619 (H) 17/70/3387 05:00 AM     Lab Results   Component Value Date/Time    Sodium 144 02/23/2018 05:00 AM    Potassium 4.4 02/23/2018 05:00 AM    Chloride 109 (H) 02/23/2018 05:00 AM    CO2 26 02/23/2018 05:00 AM    BUN 28 (H) 02/23/2018 05:00 AM    Creatinine 1.48 (H) 02/23/2018 05:00 AM    Calcium 8.5 02/23/2018 05:00 AM    Magnesium 1.6 02/23/2018 05:00 AM    Phosphorus 4.1 02/23/2018 05:00 AM      Lab Results   Component Value Date/Time    AST (SGOT) 73 (H) 02/23/2018 05:00 AM    Alk.  phosphatase 394 (H) 02/23/2018 05:00 AM    Protein, total 5.6 (L) 02/23/2018 05:00 AM    Albumin 1.8 (L) 02/23/2018 05:00 AM    Globulin 3.8 02/23/2018 05:00 AM     Lab Results   Component Value Date/Time    INR 1.1 02/19/2018 05:15 AM    Prothrombin time 13.5 02/19/2018 05:15 AM    aPTT 127.9 (H) 02/20/2018 02:10 PM      No results found for: IRON, FE, TIBC, IBCT, PSAT, FERR   No results found for: PH, PCO2, PO2  No components found for: [place]   Lab Results   Component Value Date/Time     02/18/2018 04:05 AM    CK - MB <0.5 (L) 08/02/2015 11:47 PM              Total time: 15  Counseling / coordination time, spent as noted above 12 min  > 50% counseling / coordination        Dony Pozo MD  Palliative Medicine

## 2018-02-23 NOTE — PROGRESS NOTES
2300-Report received from Isaias Hess RN. Sbar, mar, labs, kardex, cardiac rhythm and pt status reviewed. Vent settings verified. 0000-Assessment complete. Patient continues to be lethargic as per previous nurse stated. Opens eyes to stimulus, and has decreased command following. CHG bath given. 0130-Central line dressing changed under sterile technique.

## 2018-02-23 NOTE — PROGRESS NOTES
NUTRITION FOLLOW-UP    RECOMMENDATIONS/PLAN:   - Adv tube feeds to meet goal rate  Monitor labs/lytes, tube feed tolerance, skin integrity, wt, fluid status, BM    NUTRITION ASSESSMENT:   Client Update: 57 yrs old Male with septic shock, AMANDA, MRSA bacteremia, and cardiac stress due to sepsis w/ resp failure, hypokalemia, and low magnesium. Pt was extubated on 2/16/18, however was reintubated on 2/18/18. Currently intubated in ICU. Pt had tagged scan yesterday. FOOD/NUTRITION INTAKE   Diet Order:  Tube Feeds   Supplements: prosource    Food Allergies: NKFA  Average PO Intake:      No data found. Pertinent Medications:  [x] Reviewed; folic acid, heparin, protonix, thiamine  Electrolyte Replacement Protocol: [x]K [x]Mg [x]PO4  Insulin:  [x]SSI  []Pre-meal   []Basal    []Drip  []None  Cultural/Restorationist Food Preferences: None Identified    BIOCHEMICAL DATA & MEDICAL TESTS  Pertinent Labs:  [x] Reviewed; ANTHROPOMETRICS  Height: 6' (182.9 cm)       Weight: 145.7 kg (321 lb 3.4 oz)         BMI: 43.6 kg/m^2 morbidly obese (Greater than or = to 40% BMI)   Adm Weight: 306 lbs                Weight change: +15lbs noted +2 non-pitting edema   Adjusted Body Weight: 95.5kg     NUTRITION-FOCUSED PHYSICAL ASSESSMENT  Skin: No pressure injury noted      GI: No new BM since last review     NUTRITION PRESCRIPTION  Calories: 2400-5236 kcal/day based on 11-14kcal/kg  Protein: 97 g/day based on 1.2 g/kg of IBW  CHO: 191-243 g/day based on 50% of total energy  Fluid: 2119-7086 ml/day based on 1 kcal/ml       NUTRITION DIAGNOSES:   1. Inadequate oral intake related to intubation as evidence by NPO diet order and need for nutrition support.          NUTRITION INTERVENTIONS:   INTERVENTIONS:                                                                                                                                                                         GOALS:  1.  EN:Scottyro @ 40ml/hr 1584kcal, 71g PRO, 638ml H20, 147g CHO, 84g Fat 1 Meet tube feed goal rate by next review 3 days                  LEARNING NEEDS (Diet, Supplementation, Food/Nutrient-Drug Interaction):   [x] None Identified   [] Education provided/documented      Identified and patient: [] Declined   [] Was not appropriate/indicated        NUTRITION MONITORING /EVALUATION:   Adjust EN/PN as appropriate  Monitor wt  Monitor renal labs, electrolytes, fluid status  Monitor for additional supplement needs     Previous Recommendations Implemented: no        Previous Goals Met:  no -      [x] Participated in Interdisciplinary Rounds    [x] Interdisciplinary Care Plan Reviewed  DISCHARGE NUTRITION RECOMMENDATIONS ADDRESSED:        [x] To be determined closer to discharge    NUTRITION RISK:           [x] At risk                        [] Not currently at risk        Will follow-up per policy.   Chidi , [address] [facility]

## 2018-02-23 NOTE — INTERDISCIPLINARY ROUNDS
[]Hide copied text           CRITICAL CARE INTERDISCIPLINARY ROUNDS      Patient Information:      Name: Merary Rico  Age:   61 y.o.  Daisy Mckeon  Admission Date:   2/11/2018      Critical Care Day: 11      Surgery Date:  n/a      Attending Provider: Moisés Manley MD      Surgeon:  n/a      Consultant(s):  Dr. Jessi Koenig, Dr. Marcela Chu, Dr. Sumanth Canchola Physician:  Dr. 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 Pinkey Forth    Back pain M54.9    Weakness of right lower extremity R29.898    Anemia D64.9    Shock ([address] R57.9    Acute respiratory failure (HCC) J96.00    Elevated troponin R74.8    Demand ischemia of myocardium (HCC) I24.8    MRSA bacteremia R78.81           Principal Problem:  Sepsis ([address]      During rounds the following quality care indicators and evidence based practices were addressed :  DVT Prophylaxis, PUD Prophylaxis, Pressure Injury Prevention, Sepsis resuscitation and management, Nutritional Status, Critical Care Interventions Airways, Dialysis, Lines and Pressors and Flu Vaccine Wilson Day 8 (M-Care yes) ; Central Line Day: 5 Isolation: contact-MRSA;  Antibiotic Stewardship: Vancomycin      Ventilator Bundle:  Ventilator Bundle Order Set: yes Sedation Vacation n/a; Spontaneous Breathing Trial n/a; Restraints yes (Order, Necessity, Documentation)  Vent Day: 4      Sepsis Order Set: yes or Elements of Sepsis Bundle Reviewed (Fluids, Antibiotics, Cultures, Glycemic control      Glycemic Control:               Recent Labs       02/19/18   5588  02/18/18   1015  02/18/18   0405  02/17/18   2100   GLU  200*  952*  386*  203*   ;            Recent Labs       02/19/18   0520  02/18/18   0523  02/17/18   1448   PHI  7.468*  7.498*  7.511*   PCO2I  33.4*  35.8  36.1   PO2I  97  100  96    Adjustments       Acute MI/PCI:   Not applicable      Door to Balloon: Admission Time: 1810        Heart Failure:    Not applicable       SCIP Measures for other Surgeries:   Not applicable CHG Bath Daily yes      Pneumonia:    Not applicable      Interdisciplinary team rounds were held with the following team membersCare Management, Diabetes Treatment Specialist, Nursing, Nutrition, Pharmacy, Physical Therapy, Physician and Respiratory Therapy. Plan of care discussed.       Goals of Care/ Recommendations: continue to monitor pt VS, labs, electrolyte replacement protocol. H/H discussed, continue to monitor.    See clinical pathway and/or care plan for interventions and desired outcomes.      Critical Care Discharge Status:  Red

## 2018-02-23 NOTE — DIABETES MGMT
GLYCEMIC CONTROL & NUTRITION:    - Discussed in rounds, known h/o DM, on dual oral therapy at home, current A1C 5.8%  - pt was re-intubated over the weekend  - TF on pause r/t ultrasound this AM (goal 40 ml/hr now that sedation is off)  - BG has trended down to low-mid 100's, recommend decrease basal insulin to Lantus 18 units qd  - make sure scheduled mealtime dose is held if TF paused  - pt received 42 units TDD of insulin yesterday (22 Lantus, 20 Humalog - all scheduled)  - see Clinical RD notes for full nutrition assessment    Recent Glucose Results:   Lab Results   Component Value Date/Time     (H) 02/23/2018 05:00 AM    GLUCPOC 112 (H) 02/23/2018 12:03 PM    GLUCPOC 114 (H) 02/23/2018 05:10 AM    GLUCPOC 106 02/22/2018 11:39 PM     Lab Results   Component Value Date/Time    Hemoglobin A1c 5.8 (H) 02/11/2018 06:45 PM               Louis Ayoub, MPH, RD, CDE

## 2018-02-23 NOTE — PROGRESS NOTES
Nephrology Progress note    Subjective:     Darien Yost is a 57 y.o. male with history of hypertension, diabetes, hyperlipidemia, gout and arthritis who presented with a fever and multiple falls. On initial evaluation, he had borderline low blood pressure, and normal white cell count, but elevated creatinine at 2.5. Blood culture was positive and started on broad spectrum antibiotics, on IV fluid and IV pressors. A culture finally came back pos for MRSA. His preadmission baseline creatinine back in 2015 was 1.2. On admission, it was 2.5. It continued to increase and it jumped up to 4.06. Of note, the patient also had slightly elevated CPK level on admission, which is trending down now. CAT scan did not show any significant finding as a possible source. Echocardiography was negative for valvular vegetation. Patient is currently intubated in ICU on pressors. He is also sedated. He was initiated on HD 2/14 and repeated 2/15. Now off HD    - Remains on vent. Responding well to Lasix.  S Cr down and stable at 1.3-1.4       Admit Date: 2/11/2018    Allergy:  Allergies   Allergen Reactions    Bees [Hymenoptera Allergenic Extract] Anaphylaxis    Cocoa Butter-Zinc Oxide Rash    Dilaudid [Hydromorphone (Bulk)] Rash     capsules    Pcn [Penicillins] Rash        Objective:     Visit Vitals    /71    Pulse 85    Temp 98.7 °F (37.1 °C)    Resp 26    Ht 6' (1.829 m)    Wt 145.7 kg (321 lb 3.4 oz)    SpO2 98%    BMI 43.56 kg/m2         Intake/Output Summary (Last 24 hours) at 02/23/18 1705  Last data filed at 02/23/18 1405   Gross per 24 hour   Intake              130 ml   Output             4720 ml   Net            -4590 ml       Physical Exam:       General: On Vent   HENT: Atraumatic and normocephalic   Eyes: Normal conjunctiva   Neck: Supple    Cardiovascular: Normal S1 & S2, no m/r/g   Pulmonary/Chest Wall: Clear to auscultation bilaterally   Abdominal: Soft and non-tender   Musculoskeletal: ++ edema   Neurological: sedated       Data Review:      Lab Results   Component Value Date/Time    WBC 9.4 02/23/2018 05:00 AM    RBC 2.46 (L) 02/23/2018 05:00 AM    HCT 23.8 (L) 02/23/2018 05:00 AM    MCV 96.7 02/23/2018 05:00 AM    MCH 30.1 02/23/2018 05:00 AM    MCHC 31.1 02/23/2018 05:00 AM    RDW 19.7 (H) 02/23/2018 05:00 AM      No results found for: IRONNo components found for: FERRITIN  No components found for: PTHINT  Urinalysis  No results found for: UGLU        Impression:     - Acute kidney injury, likely acute tubular necrosis from septic shock. Improving with Cr down to 1.3-1.4, from peak of 4.06. Patient was oliguric and had HD 2/14, x2. Now with good Urine output.   - MRSA septic shock, unclear source, improving.    - Hypophosphatemia, corrected. - Hypokalemia, repleted  - Acidosis, mixed respiratory and metabolic, improved. - Mild rhabdomyolysis, improved. - Anemia. - Thrombocytopenia, resolved  - Diabetes mellitus.   - Morbid obesity.   - Respiratory failure, intubated and remains on MV.       Plan:   - Continue current dose of IV Lasix  - Continue supportive care, Vent, antibiotics,Nutrition  - Weaning vent per Pulmonologist  - Continue to Monitor I/0's  - Continue current BP meds       MD Blas Mcelroy  [phone]

## 2018-02-24 NOTE — PROGRESS NOTES
Nephrology Progress note    Subjective:     Eden Cedillo is a 57 y.o. male with history of hypertension, diabetes, hyperlipidemia, gout and arthritis who presented with a fever and multiple falls. On initial evaluation, he had borderline low blood pressure, and normal white cell count, but elevated creatinine at 2.5. Blood culture was positive and started on broad spectrum antibiotics, on IV fluid and IV pressors. A culture finally came back pos for MRSA. His preadmission baseline creatinine back in 2015 was 1.2. On admission, it was 2.5. It continued to increase and it jumped up to 4.06. Of note, the patient also had slightly elevated CPK level on admission, which is trending down now. CAT scan did not show any significant finding as a possible source. Echocardiography was negative for valvular vegetation. Patient is currently intubated in ICU on pressors. He is also sedated. He was initiated on HD 2/14 and repeated 2/15. Now off HD    - Remains on vent. Responding well to Lasix.  S Cr down and stable at 1.3-1.4       Admit Date: 2/11/2018    Allergy:  Allergies   Allergen Reactions    Bees [Hymenoptera Allergenic Extract] Anaphylaxis    Cocoa Butter-Zinc Oxide Rash    Dilaudid [Hydromorphone (Bulk)] Rash     capsules    Pcn [Penicillins] Rash        Objective:     Visit Vitals    BP (!) 163/102    Pulse 89    Temp 99 °F (37.2 °C)    Resp 21    Ht 6' (1.829 m)    Wt 149 kg (328 lb 6.4 oz)    SpO2 96%    BMI 44.54 kg/m2         Intake/Output Summary (Last 24 hours) at 02/24/18 1141  Last data filed at 02/24/18 0900   Gross per 24 hour   Intake             1257 ml   Output             1925 ml   Net             -668 ml       Physical Exam:       General: On Vent   HENT: Atraumatic and normocephalic   Eyes: Normal conjunctiva   Neck: Supple    Cardiovascular: Normal S1 & S2, no m/r/g   Pulmonary/Chest Wall: Clear to auscultation bilaterally   Abdominal: Soft and non-tender   Musculoskeletal: ++ edema   Neurological: sedated       Data Review:      Lab Results   Component Value Date/Time    WBC 10.0 02/24/2018 04:00 AM    RBC 2.90 (L) 02/24/2018 04:00 AM    HCT 28.1 (L) 02/24/2018 04:00 AM    MCV 96.9 02/24/2018 04:00 AM    MCH 30.3 02/24/2018 04:00 AM    MCHC 31.3 02/24/2018 04:00 AM    RDW 19.6 (H) 02/24/2018 04:00 AM      No results found for: IRONNo components found for: FERRITIN  No components found for: PTHINT  Urinalysis  No results found for: UGLU        Impression:     - Acute kidney injury, likely acute tubular necrosis from septic shock. Improving with Cr down to 1.3-1.4, from peak of 4.06. Patient was oliguric and had HD 2/14, x2. Now with good Urine output on [place]x.   - MRSA septic shock, unclear source, improving.    - Anasarca, on Lasix   - Hypophosphatemia, corrected. - Hypokalemia, repleted  - Acidosis, mixed respiratory and metabolic, improved. - Mild rhabdomyolysis, improved. - Anemia. - Thrombocytopenia, resolved  - Diabetes mellitus.   - Morbid obesity.   - Respiratory failure, intubated and remains on MV.       Plan:   - Continue current dose of IV Lasix  - Continue supportive care, Vent, antibiotics,Nutrition  - Weaning vent per Pulmonologist  - Continue to Monitor I/0's  - Continue current BP meds         Denver November, MD [employer]  [phone]

## 2018-02-24 NOTE — PROGRESS NOTES
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 Verle Olamide    Back pain M54.9    Weakness of right lower extremity R29.898    Anemia D64.9    Shock ([address] R57.9    Acute respiratory failure (HCC) J96.00    Elevated troponin R74.8    Demand ischemia of myocardium (HCC) I24.8    MRSA bacteremia R78.81 ·   MRSA sepsis with persistent bacteremia- last blood cultures negative x 2 days, likely Vancomycin was not as sensitive as appeared in Vitro. TERRY negative for vegetation. Spine MRI is negative also. Neck, abdomen and pelvis CT and ext CT without evidence of infection or abscess. Abscess scan and US abdomen without any source. Few skin folliculitis lesion bl LE but too small and superficial to be source. Now on Teflaro, repeat blood cultures 18 negative so far, ID following  · Septic shock resolved and off pressors  · Acute respiratory failure- intubated on  extubated on , reintubated on  on hospital vent protocol  · HTN - medications adjusted by hospitalist  · Elevated troponin ? Demand ischemia vs NSTMI   · Acute renal failure - improved; S/P HD doing well with improvement in Cr and off of HD, HD cath removed 18.  Now on Lasix for vent weaning planning  · Rhabdo resolved  · Afib, rate controlled S/P cardioversion and on PO amiodarone, PO Metoprolol and Lovenox  · Stable Hb, no gross bleeding, hemodynamically stable  · Back pain, fall - no absess or open wound   · Severe hypokalemia hypomagnesemia on presentation- improved and now on replacements  · Elevated blood sugars improving control  · Lactic acidosis resolved  · H/O ETOH    PLAN:  -- Mech. Ventilated patients- aim to keep peak plateau pressure less than or equal to 30cm H2O. Titrate FiO2 for goal SPO2> 90%  -- VAP prevention bundle, head of the bed at 30' all times  -- Sedation holiday daily and assessment for weaning with SBT as tolerated. Not a candidate given some tachypnea when awake  -- Ativan per sedation protocol and Fentanyl changed to PRN  -- Repeat blood culture on Teflaro negative so far. Teflaro tolerated. Patient has PCN allergy. Since not on vasopressor would try line holiday followed by PICC line if needed. -- Amiodarone and Metoprolol PO, lovenox for Afib per cardiology  -- Off of duoneb and continue atrovent due to afib and tachycardia previously  -- NA improved with free water flush. UO better and not on HD  -- Continue Lasix to assist with weaning process. K and MG replacement per protocol  -- Adjust BP meds for control of blood pressure  -- Lantus to 12 unit and hold humalog 5 units since off of feeding; ISSC. Once back on TF then may need to adjust back Lantus  -- HB follow up in AM, transfuse if < 7  -- MRSA isolation   CVS:Currently on PO Amiodarone drip, Lovenox follow per cardiology recommendations. S/P cardioversion with 200 j on 2/13  Adjust BP medications per hospitalist. On aspirin  RS: as above. Lung protective vent protocol, VAP precatuion  ID:Sepsis source?, MRSA on multiple cultures   Vanco stopped after day 8 [2/19/18]. Now on Teflaro since 2/19/18  Azactam 2/11 stop on 2/14  levaquin 2/12  Stop on 2/15  Monitor skin lesions - no changes compared to previously   ENDO: TSH is ok. On Lantus, Humalog  GI: feeds as tolerated, PPI.  Monitor Alk P- likely stasis related  RENAL:Wilson for I/O, Replace k and MG per protocol. Patient is putting out urine, Off HD, dialysis per renal  CNS: Sedated intubated wakes up and follows commands with sedation holiday in AM  HEMATOLOGY:PLT stable, Lovenox for Afib   MUSCULOSKELETAL:CK was elevated but improved  · PAIN AND SEDATION: On Ativan and PRN Fentanyl, Thiamine and folic acid PO  · Skin/Wound: Monitor back, No skin absess  · Electrolytes: As above   · IVF: Julianne Sergo   · Nutrition: Tube feeds   · Prophylaxis: DVT Prophylaxis with Lovenox. GI Prophylaxis. · Restraints: minimal restrain to avoid pulling lines and tubes  · PT/OT eval and treat. OOB when appropriate. · Lines/Tubes: none. Femoral line- 2/12 removed on 2/15. Wilson 2/12/18. Right IJ dialysis catheter 2/14- removed 2/19, Left subclavian line 2/15/18  ADVANCE DIRECTIVE:Full code/ Palliative is consulted spoke with wife  FAMILY DISCUSSION: no family at bedside  Quality Care: PPI, DVT prophylaxis, HOB elevated, Infection control all reviewed and addressed. Events and notes from last 24 hours reviewed. Care plan discussed with nursing CC TIME: 37 min excluding procedure/s   · Labs and images personally seen and available reports reviewed. · All current medicines are reviewed and doses and prescription adjusted. Subjective:  2/24/18  Patient is easily arousable during sedation holiday  Remained hemodynamically stable and afebrile. Good response to Lasix continues  Had residuals and required to hold off TF, adjustment with Lantus to lower dose  Discussed with RN to get 2 peripheral and remove CVC since not on vasopressor    2/23/18  Patient remained hemodynamically stable  On sedation holiday, awake, following commands, denies any SOB  Responded to Lasix from yesterday with good UO. Magnesium and potassium replaced. No fever. Some residual but overall tolerating TF, earlier on hold for US abdomen. 2/22/18  Patient remained on ventilator. He remained hemodynamically stable. Tolerating Teflaro.  Low grade fever overnight. He remained hemodynamically stable. UO stable. Underwent tagged scan today. He remained on Ativan. Spoke with RN regarding sedation holiday. Will monitor. 2/21/18  No fever overnight or this AM  He remained hemodynamically stable, on HTN side. He is on ventilator, now on Ativan [1 mg/hr at present] and Fentanyl and off of propofol since yesterday. Tolerating Teflaro and no adverse reaction  Possible tagged scan tomorrow per radiology protocol  Tolerating TF    2/20/18  Patient on ventilator, sedated. On sedation holiday wakes up. Low grade fever overnight. Hemodynamically stable. Repeat bl cx from 2/18 positive for MRSA  Tolerating Teflaro without any rash or adverse effect. ECHO was done on 2/18, spoke with Dr. Won Lloyd and no repeat ECHO needed at present. Plan for tagged abscess location scan. Tolerating TF. Good UO. Tolerating Heparin without any bleeding. 2/19/18  Patient remained on ventilator and sedated. No fever. Hemodynamically stable, rather on hypertensive side, hospitalist adjusting meds. Repeat bl cultures from 2/18/18 both set positive for GPC. Spoke with Dr. Won Lloyd and he recommended change to Teflaro and follow up ECHO  HD catheter removed by staff per renal recommendations  No plans for HD per renal  Tolerating TF well and adjusted per protocol by staff  The staff denies abnormal bruising or abnormal bleeding from any body orifice such as bleeding from nose or gums, blood in urine or stool, or melena, hemoptysis or hematemesis.      2/18  Intubated again for work of [address] [place] [address] [id number] on 2/17  CT ext, ABD pel, Neck all came out ok no source of abscess  No fever overnight  BP is ok  ON /14/50% peep 5 abg ok  Cr is improving with good urine out put  Renal not planning any dialysis at this point    2/17/18  Was extubated on 2/16  Did reasonably well yesterday  Overnight still had fever  Early am was breathing little fast. As per nursing was awake and following command but received morphine now not waking up much  Put out 3300 urine  K 2.9 only replaced by 10 meq  Mg 1.5  PH 7.56/34/30/107  Currently on BIPAP still breathing little fast    2/16  Doing well   Yesterday started noticing some sking lesions all over body looks like septic staph infection  Nothing to suggest allergic rash  Put out about 1400cc yesterday  K 2.9 and replaced  Cr improved to 2.22 s/p hd yesterday  Mild elevation of billirubin to 1.9   HB and PLT is stable   Sputum MRSA    2/15  Doing ok  No overnight event   S/P HD  Also put out 900 cc of urine in last 24 hrs  HB dropped to 7.5 but no active bleed  PLT is 129 INR is 1.6 K 3.0   Alb 1.5     2/14  No significant overnight event  S/P TERRY negative for vegetation or blood clot  S/P cardioversion  ID evaluated suggested MSSA sepsis but initial culture is growing MRSA. Am labs showed worsening cr to 4.06 and PH 7.26  ALB 1.5  Currently on phenylephring 100, versed, amiodarone, fentanyl and bicarb drip    2/13/18  Yesterday became more tachypnic and difficult to control hr  Intubated 2/12, femoral line 2/12 <neck line not placed due to concern for colonization with ongoing sepsis>  Became hypotensive managed with phenylephrine  Afib was poorly controlled started on amiodarone drip. Cardiology is closely following  Blood culture Staph  K 3.2 replaced  Mg 1.1 getting replaced   UO and Cr is improving  Still spiking fever 102   Flu is negative       History:   Jacque Issa is a 57 y.o. male who came to the ed with complaints of fall. Patient states he has been feeling generalized weakness at home for the last day or so with low grade subjective fevers but he noted RLE weakness around 5pm. Since then he has fallen about 4 times in his back without any head trauma or LOC. He has been using his tripod cane to assist him with walking. Recently about 1 month a go treated for pna at office. Ex smoker. Drinks about 2 glasses of mix drinks every day.   Recently lost his son to multiorgan failure and sepsis. Due to persistence of his weakness, falls, fevers and low back pain he decided to come to the ED. In the ED, CT of the head was neg and his labs showed significant hypoK with elevated Cr (unknown baseline). He was also febrile and tachycardiac. He was started on broad spectrum Abx. Overnight RRT called out after he was found to be tachycardic, tachypnic and ABG showed alkalosis and was transferred to ICU        Current Facility-Administered Medications   Medication Dose Route Frequency    insulin glargine (LANTUS) injection 12 Units  12 Units SubCUTAneous DAILY WITH LUNCH    LORazepam (ATIVAN) 1 mg/mL in 0.9% sodium chloride 60 mL infusion  0-0.03 mg/kg/hr IntraVENous TITRATE    [START ON 3/1/2018] amiodarone (CORDARONE) tablet 200 mg  200 mg Oral DAILY    Thiamine Mononitrate (B-1) tablet 100 mg  100 mg Oral DAILY    furosemide (LASIX) injection 40 mg  40 mg IntraVENous DAILY    metoprolol tartrate (LOPRESSOR) tablet 50 mg  50 mg Oral Q12H    enoxaparin (LOVENOX) injection 150 mg  150 mg SubCUTAneous Q12H    amiodarone (CORDARONE) tablet 400 mg  400 mg Oral Q12H    cefTARoline (TEFLARO) 600 mg in 0.9% sodium chloride (MBP/ADV) 50 mL MBP  600 mg IntraVENous Q12H    ipratropium (ATROVENT) 0.02 % nebulizer solution 0.5 mg  0.5 mg Nebulization Q6H RT    insulin lispro (HUMALOG) injection 5 Units  5 Units SubCUTAneous Q6H    chlorhexidine (PERIDEX) 0.12 % mouthwash 10 mL  10 mL Oral Q12H    aspirin chewable tablet 81 mg  81 mg Oral DAILY    pantoprazole (PROTONIX) 40 mg in sodium chloride 0.9 % 10 mL injection  40 mg IntraVENous DAILY    folic acid (FOLVITE) tablet 1 mg  1 mg Oral DAILY    insulin lispro (HUMALOG) injection   SubCUTAneous Q6H         Objective:     Vital Signs:    Blood pressure (!) 163/102, pulse 89, temperature 99 °F (37.2 °C), resp. rate 27, height 6' (1.829 m), weight 149 kg (328 lb 6.4 oz), SpO2 97 %. Body mass index is 44.54 kg/(m^2).     O2 Device: Ventilator, Endotracheal tube   O2 Flow Rate (L/min): 6 l/min   Temp (24hrs), Av.4 °F (33.6 °C), Min:48.2 °F (9 °C), Max:99.9 °F (37.7 °C)       Intake/Output:   Last shift:       07 - 1900  In: 210 [I.V.:150]  Out: 800 [Urine:800]  Last 3 shifts: 1901 -  07  In: 3353 [I.V.:100]  Out: 6245 [Urine:6245]    Intake/Output Summary (Last 24 hours) at 18 1251  Last data filed at 18 1237   Gross per 24 hour   Intake             1277 ml   Output             2725 ml   Net            -1448 ml       Review of Systems:  Intubated sedated   Review of systems not obtained due to patient factors. Physical Exam:  General: in no respiratory distress and acyanotic, appears older than stated age, opens eyes and follows simple commands, on ventilator  HEENT: PERRL, orally intubated  Neck: No abnormally enlarged lymph nodes or thyroid, supple  Chest: normal  Lungs: moderate air entry and rhonchi, normal percussion bilaterally, no tenderness/ rash  Heart: Regular rate and rhythm, S1S2 present or without murmur or extra heart sounds  Abdomen: obese, bowel sounds normoactive, tympanic, abdomen is soft without significant tenderness, masses, organomegaly or guarding  Extremity: Trace, pitting and bl ankle edema, negative for cyanosis, clubbing. Neuro: sedated, moves all extremities well, no involuntary movements, exam limitations  Skin: Skin color, texture, turgor fair.  Leg bl folliculitis rashes or lesions unchanged      CBC w/Diff Recent Labs      18   0400  18   0500  18   0400   WBC  10.0  9.4  11.6   RBC  2.90*  2.46*  2.72*   HGB  8.8*  7.4*  8.0*   HCT  28.1*  23.8*  25.6*   PLT  442*  468*  520*   GRANS  73  68  74*   LYMPH  17*  21  16*   EOS  2  2  2        Chemistry Recent Labs      18   0400  18   1845  18   0500  18   1807  18   0400   GLU  103*   --   107*   --   130*   NA  142   --   144   --   143   K  3.4*   --   4.4  3.8  3.7 CL  106   --   109*   --   110*   CO2  25   --   26   --   25   BUN  27*   --   28*   --   30*   CREA  1.43*   --   1.48*   --   1.49*   CA  9.2   --   8.5   --   8.6   MG  1.7  1.7  1.6   --   1.9   PHOS  4.0   --   4.1   --   3.4   AGAP  11   --   9   --   8   BUCR  19   --   19   --   20   AP  414*   --   394*   --   317*   TP  7.0   --   5.6*   --   6.3*   ALB  1.9*   --   1.8*   --   1.7*   GLOB  5.1*   --   3.8   --   4.6*   AGRAT  0.4*   --   0.5*   --   0.4*        Lactic Acid Lactic acid   Date Value Ref Range Status   02/13/2018 1.8 0.4 - 2.0 MMOL/L Final     No results for input(s): LAC in the last 72 hours. Micro       2/22/18  Special Requests: left a/c peripheal  Preliminary     Culture result: NO GROWTH 2 DAYS Preliminary         ABG Recent Labs      02/24/18   0539  02/23/18   0515  02/22/18   0532   PHI  7.462*  7.432  7.473*   PCO2I  30.7*  34.4*  31.5*   PO2I  62*  88  98   HCO3I  21.9*  22.7  23.1   FIO2I  0.4  0.40  40        Liver Enzymes Protein, total   Date Value Ref Range Status   02/24/2018 7.0 6.4 - 8.2 g/dL Final     Albumin   Date Value Ref Range Status   02/24/2018 1.9 (L) 3.4 - 5.0 g/dL Final     Globulin   Date Value Ref Range Status   02/24/2018 5.1 (H) 2.0 - 4.0 g/dL Final     A-G Ratio   Date Value Ref Range Status   02/24/2018 0.4 (L) 0.8 - 1.7   Final     AST (SGOT)   Date Value Ref Range Status   02/24/2018 59 (H) 15 - 37 U/L Final     Alk.  phosphatase   Date Value Ref Range Status   02/24/2018 414 (H) 45 - 117 U/L Final     Recent Labs      02/24/18   0400  02/23/18   0500  02/22/18   0400   TP  7.0  5.6*  6.3*   ALB  1.9*  1.8*  1.7*   GLOB  5.1*  3.8  4.6*   AGRAT  0.4*  0.5*  0.4*   SGOT  59*  73*  58*   AP  414*  394*  317*        Cardiac Enzymes No results found for: CPK, CK, CKMMB, CKMB, RCK3, CKMBT, CKNDX, CKND1, LESLIE, TROPT, TROIQ, BELLA, TROPT, TNIPOC, BNP, BNPP     BNP No results found for: BNP, BNPP, XBNPT     Coagulation No results for input(s): PTP, INR, APTT in the last 72 hours. No lab exists for component: INREXT, INREXT      Thyroid  Lab Results   Component Value Date/Time    TSH 0.43 02/12/2018 10:05 AM          Lipid Panel Lab Results   Component Value Date/Time    Cholesterol, total 116 02/11/2018 08:45 PM    HDL Cholesterol 27 (L) 02/11/2018 08:45 PM    LDL, calculated 57.4 02/11/2018 08:45 PM    VLDL, calculated 31.6 02/11/2018 08:45 PM    Triglyceride 278 (H) 02/19/2018 05:15 AM    CHOL/HDL Ratio 4.3 02/11/2018 08:45 PM          Urinalysis Lab Results   Component Value Date/Time    Color DARK YELLOW 02/11/2018 10:45 PM    Appearance CLOUDY 02/11/2018 10:45 PM    Specific gravity 1.019 02/11/2018 10:45 PM    pH (UA) 5.5 02/11/2018 10:45 PM    Protein 300 (A) 02/11/2018 10:45 PM    Glucose >1000 (A) 02/11/2018 10:45 PM    Ketone TRACE (A) 02/11/2018 10:45 PM    Bilirubin NEGATIVE  02/11/2018 10:45 PM    Urobilinogen 1.0 02/11/2018 10:45 PM    Nitrites NEGATIVE  02/11/2018 10:45 PM    Leukocyte Esterase NEGATIVE  02/11/2018 10:45 PM    Epithelial cells 0 01/08/2014 02:46 PM    Bacteria 2+ (A) 02/11/2018 10:45 PM    WBC 0 to 2 02/11/2018 10:45 PM    RBC 0 to 2 02/11/2018 10:45 PM        XR (Most Recent). CXR reviewed by me and compared with previous CXR 2/24/18  CXR port [preliminary]  ET, OG and subclavian CVC in place  Unchanged from day prior     CT (Most Recent)   Results from [place] encounter on 02/11/18   CT NECK SOFT TISSUE WO CONT   Narrative CT soft tissue of the neck without contrast.    INDICATION: Sepsis workup. Persistent positive blood cultures with unknown  source. COMPARISON: Neck CT from 8/3/2015. TECHNIQUE:Helically acquired and axial images were obtained from the posterior  fossa to the thoracic inlet without IV contrast. IV contrast not administered  due to recent history of acute renal injury.     One or more dose reduction techniques were used on this CT: automated exposure  control, adjustment of the mAs and/or kVp according to patient's size, and  iterative reconstruction techniques. The specific techniques utilized on this CT  exam have been documented in the patient's electronic medical record. FINDINGS:   Endotracheal and enteric tubes are partially visualized. There is a right  internal jugular central venous catheter which is partially visualized. There is  a left subclavian central venous catheter which is partially visualized. Visualized intracranial structures are unremarkable. There is mucosal thickening of the left maxillary sinus and an air-fluid level  within the right maxillary sinus. There is no cervical lymphadenopathy. No mass identified within the limitations  of this noncontrast exam. The thyroid has unremarkable appearance. There is patchy opacity at the lung apices which may represent  pleural-parenchymal scarring. There are multilevel degenerative changes of the cervical spine without acute  osseous abnormality. Impression IMPRESSION:      1. Tubes and lines as described. 2. Mild inflammatory changes within the maxillary sinuses which may be related  to intubation. There is no large abscess or other findings to suggest an occult  source of infection. EKG No results found for this or any previous visit. ECHO  2/12/18 [facility] SUMMARY:  Left ventricle: Systolic function was normal. Ejection fraction was estimated   in the range of 55 % to 60 %. There was  mild hypokinesis of the basal inferior wall(s). There was moderate concentric   hypertrophy. Right ventricle: The size was normal. Systolic function was normal.    Inferior vena cava, hepatic veins: The inferior vena cava was dilated. The   respirophasic change in diameter was more  than 50%. 2/23/18  US ABD  1. Hepatomegaly with diffusely increased hepatic parenchymal echogenicity. While nonspecific, these findings can be seen in the context of underlying steatosis and/or hepatocellular disease.   2. No evidence of cholelithiasis. No intrahepatic or extrahepatic biliary ductal dilatation. 3. Limited visualization of the pancreas. 4. No hydronephrosis involving the right kidney. Simple appearing right renal cyst, unchanged. 5. Small right pleural effusion      2/2218  NM INFLAM SCAN  No focal evidence of blood cell accumulation to suggest a site of active infection. The presence of low level activity over the anterior abdomen, projecting inferior to the liver is likely secondary to the patient's overlying arm/hand. Uptake at this location is minimal, less than that of adjacent liver. Correlation with physical exam directed at this location may be helpful as appropriate clinically. MRI spine:   2. Prior left L5 laminotomy, partial left facetectomy and possibly previous partial L4-5 discectomy. Minimal left asymmetric disc bulge but no evidence of recurrent disc herniation or retained disc fragment.   3. Minimal L3-4 disc bulge and annular fissure. 4. Multilevel mostly mild facet joint osteoarthritis. Greatest and mild to moderate level right L4-5 facet joint osteoarthritis with moderate-sized facet joint effusion and synovitis. No adjacent periarticular marrow or soft tissue edema to definitively suggest superimposed infective/septic arthritis. 5. Unremarkable upper sacroiliac joints though incompletely included. Mild body wall edema. Imaging:  I have personally reviewed the patients radiographs and have reviewed the reports:     2/24/18                 Rudy Kyle M.D.   Pulmonary Critical Care & Sleep Medicine

## 2018-02-24 NOTE — PROGRESS NOTES
0900--Patient TF residuals 850+. 1100--Dr. Imer Acosta notified of high residuals. Received orders to keep TF off for 6 hours and then retry. Also will decreased Lantus to 12units while NPO.     1157--Patient medicated with Ativan 1mg IVP for sedation. 1202--Ativan gtt started at 0.01mg/kg/hr or 1.5mg/hr. 1255--SSI and q6hr 5 units of Lispro held d/t TF being turned off.     1310--Two new IVL placed 22g to L Upper Arm and to LFA. 1335--Central Line removed. 1454--Mg+ 1.8. Mag Sulfate 1gm order per replacement protocol. 1500--TF residuals 360ml. TF restarted at 20ml/hr. 1614--K+ 3.3. KCL 10mEq 3 doses ordered per replacement protocol. 1900--TF residuals 280ml. TF increased to 25ml/hr. 1930--Bedside and Verbal shift change report given to Aleksandar Smith RN/Sylvia Stanford RN (oncoming nurse) by Severo Pile, RN (offgoing nurse).  Report included the following information SBAR, Kardex, Intake/Output, MAR, Recent Results, Med Rec Status and Cardiac Rhythm SR.

## 2018-02-24 NOTE — PROGRESS NOTES
Problem: Falls - Risk of  Goal: *Absence of Falls  Document Jessica Fall Risk and appropriate interventions in the flowsheet.    Outcome: Progressing Towards Goal  Fall Risk Interventions:       Mentation Interventions: Adequate sleep, hydration, pain control, Door open when patient unattended, Evaluate medications/consider consulting pharmacy, Increase mobility, More frequent rounding, Toileting rounds, Update white board    Medication Interventions: Evaluate medications/consider consulting pharmacy, Teach patient to arise slowly    Elimination Interventions: Call light in reach, Patient to call for help with toileting needs, Toileting schedule/hourly rounds    History of Falls Interventions: Evaluate medications/consider consulting pharmacy

## 2018-02-24 NOTE — PROGRESS NOTES
Hospitalist Progress Note    Patient: Kamila Jiménze MRN: [id number]  CSN: [id number]    YOB: 1955  Age: 57 y.o. 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 XXXA    Back pain M54.9    Weakness of right lower extremity R29.898    Anemia D64.9    Shock ([facility]) R57.9    Acute respiratory failure ([facility]) J96.00    Elevated troponin R74.8    Demand ischemia of myocardium ([facility]) I24.8    MRSA bacteremia R65.80        58 yo male admitted for pneumonia, respiratory distress. RRT called on this patient for respiratory distress in morning of 02/12/2018, high mews score of 8, fever of 103, tachycardia, resp rate of 44. He was transferred to ICU. Patient continued to decline in condition with elevated resp rate. He was intubated and central line placed. CRITICAL CARE PLAN         Resp -   Acute respiratory failure - extubated 02/16/2018. Patient reintubated 02/17/2018     ID -   Sepsis unclear source of infection. Recent pneumonia. Persistent bacteremia   Blood cultures 02/11/2018 positive for MRSA. blood cx 2/2 02/13/2018 MRSA. blood cultures 2/2 02/14/2018 MRSA. blood cultures 2/2 02/16/2018 MRSA  blood cultures 2/2 02/18/2018 MRSA  blood cultures 02/22/2018 no growth to date.   resp cultures 02/13/2018 MRSA  Urine culture 02/12/2018 staph aureus  CT chest Multifocal peripheral bilateral subpleural nodular densities the largest 15  mm right apex, which may represent peripheral areas of pneumonia or other  inflammatory etiology, although differential diagnosis includes less likely  metastatic disease or infarcts from pulmonary emboli. CT of ext and neck with no source of infection. No MRI evidence of osteomyelitis and discitis. S/p TERRY with no evidence of vegetations. ANTIBIOTICS not responding to vancomycin, changed to teflaro. He has pcn allergy as rash. Closely monitor. Wbc scan 02/22     [facility] - Monitor HD. Septic shock - off pressors  A-fib - Off amiodarone drip started on oral amiodarone. S/p cardioversion. Converted to sinus rhythm. Elevated troponin - demand ischemia vs NSTEMI. Will need ischemia work up when stable per cardiology.       Heme/onc - Follow H&H, plts.      Renal -   AMANDA - improving, nephrology following. Metabolic acidosis - improved with bicarb drip  Trend BUN, Cr, follow I/O. Check and replace Mg, K, phos.     Endocrine -  Follow FSG  TSH in normal range. DM - on lantus and SSI     Neuro/ Pain/ Sedation - sedation bundle  Head CT negative     GI - NPO, tube feeding.     Prophylaxis - DVT: heparin drip, GI: protonix.     35 minutes of critical care time spent in the direct evaluation and treatment of this high risk patient. The reason for providing this level of medical care for this critically ill patient was due a critical illness that impaired one or more vital organ systems such that there was a high probability of imminent or life threatening deterioration in the patients condition. This care involved high complexity decision making to assess, manipulate, and support vital system functions, to treat this degreee vital organ system failure and to prevent further life threatening deterioration of the patients condition.             Disposition : TBD    Physical Exam:  General: As above  HEENT: NC, Atraumatic. PERRLA, anicteric sclerae. Lungs: CTA Bilaterally. No Wheezing/Rhonchi/Rales.   Heart:  Regular  rhythm,  No murmur, No Rubs, No Gallops  Abdomen: Soft, Non distended, Non tender.  +Bowel sounds,   Extremities: Edema upper and LE bilaterally          Vital signs/Intake and Output:  Visit Vitals    /76    Pulse 94    Temp 99 °F (37.2 °C)    Resp (!) 34    Ht 6' (1.829 m)    Wt 149 kg (328 lb 6.4 oz)    SpO2 94%    BMI 44.54 kg/m2     Current Shift:     Last three shifts:  02/22 1901 - 02/24 0700  In: 1197 [I.V.:100]  Out: 6245 [Urine:6245]            Labs: Results:       Chemistry Recent Labs      02/24/18   0400  02/23/18   0500  02/22/18   1807  02/22/18   0400   GLU  103*  107*   --   130*   NA  142  144   --   143   K  3.4*  4.4  3.8  3.7   CL  106  109*   --   110*   CO2  25  26   --   25   BUN  27*  28*   --   30*   CREA  1.43*  1.48*   --   1.49*   CA  9.2  8.5   --   8.6   AGAP  11  9   --   8   BUCR  19  19   --   20   AP  414*  394*   --   317*   TP  7.0  5.6*   --   6.3*   ALB  1.9*  1.8*   --   1.7*   GLOB  5.1*  3.8   --   4.6*   AGRAT  0.4*  0.5*   --   0.4*      CBC w/Diff Recent Labs      02/24/18   0400  02/23/18   0500  02/22/18   0400   WBC  10.0  9.4  11.6   RBC  2.90*  2.46*  2.72*   HGB  8.8*  7.4*  8.0*   HCT  28.1*  23.8*  25.6*   PLT  442*  468*  520*   GRANS  73  68  74*   LYMPH  17*  21  16*   EOS  2  2  2      Cardiac Enzymes No results for input(s): CPK, CKND1, LESLIE in the last 72 hours. No lab exists for component: CKRMB, TROIP   Coagulation No results for input(s): PTP, INR, APTT in the last 72 hours. No lab exists for component: INREXT, INREXT    Lipid Panel Lab Results   Component Value Date/Time    Cholesterol, total 116 02/11/2018 08:45 PM    HDL Cholesterol 27 (L) 02/11/2018 08:45 PM    LDL, calculated 57.4 02/11/2018 08:45 PM    VLDL, calculated 31.6 02/11/2018 08:45 PM    Triglyceride 278 (H) 02/19/2018 05:15 AM    CHOL/HDL Ratio 4.3 02/11/2018 08:45 PM      BNP No results for input(s): BNPP in the last 72 hours.    Liver Enzymes Recent Labs      02/24/18   0400   TP  7.0   ALB  1.9*   AP  414*   SGOT  59*      Thyroid Studies Lab Results   Component Value Date/Time    TSH 0.43 02/12/2018 10:05 AM        Procedures/imaging: see electronic medical records for all procedures/Xrays and details which were not copied into this note but were reviewed prior to creation of Plan

## 2018-02-24 NOTE — PROGRESS NOTES
1900: Bedside and Verbal shift change report received from CHRYSTAL Irving RN. Report included the following information SBAR, Intake/Output, MAR, Recent Results, Cardiac Rhythm NSR and Alarm Parameters . 2000: assessment complete, see flow sheet. Verified ET tube 25 lips, 7.5 tube, vent settings. 1 unit of PRBC to be infused tonight. Called wife for consent. Verified with César Harman RN. See paper chart. 2009: PRBC unit started. Nurse will monitor. 2015: No s/s of transfusion reaction. 2030: Transfusion increased, see flow sheet. Pt tolerating. 2345: transfusion complete, pt tolerated well. 0000: Reassessment, no changes. Pt bathed, chg wipes gown and linen changed. New tube feed hung. 0400: Reassessment, no change. 0600: Orders placed per electrolyte protocol. 0700: Bedside and Verbal shift change report given to YUSEF Kyle RN (oncoming nurse) by Candida Wagner RN (offgoing nurse). Report included the following information SBAR, Intake/Output, MAR, Recent Results, Cardiac Rhythm NSR and Alarm Parameters .

## 2018-02-24 NOTE — ROUTINE PROCESS
Bedside and Verbal shift change report given to Pk Underwood RN (oncoming nurse) by Glenn Esquivel RN (offgoing nurse). Report included the following information SBAR, Kardex, Intake/Output, MAR, Recent Results, Med Rec Status and Cardiac Rhythm SR/AFib.

## 2018-02-25 NOTE — PROGRESS NOTES
Nephrology Progress note    Subjective:     Mick Rangel is a 57 y.o. male with history of hypertension, diabetes, hyperlipidemia, gout and arthritis who presented with a fever and multiple falls. On initial evaluation, he had borderline low blood pressure, and normal white cell count, but elevated creatinine at 2.5. Blood culture was positive and started on broad spectrum antibiotics, on IV fluid and IV pressors. A culture finally came back pos for MRSA. His preadmission baseline creatinine back in 2015 was 1.2. On admission, it was 2.5. It continued to increase and it jumped up to 4.06. Of note, the patient also had slightly elevated CPK level on admission, which is trending down now. CAT scan did not show any significant finding as a possible source. Echocardiography was negative for valvular vegetation. Patient is currently intubated in ICU on pressors. He is also sedated. He was initiated on HD 2/14 and repeated 2/15. Now off HD    - Remains on vent. Responding well to Lasix.  S Cr down and stable at 1.3-1.4       Admit Date: 2/11/2018    Allergy:  Allergies   Allergen Reactions    Bees [Hymenoptera Allergenic Extract] Anaphylaxis    Cocoa Butter-Zinc Oxide Rash    Dilaudid [Hydromorphone (Bulk)] Rash     capsules    Pcn [Penicillins] Rash        Objective:     Visit Vitals    /52    Pulse 69    Temp 98.6 °F (37 °C)    Resp 23    Ht 6' (1.829 m)    Wt 148.2 kg (326 lb 11.6 oz)    SpO2 98%    BMI 44.31 kg/m2         Intake/Output Summary (Last 24 hours) at 02/25/18 1506  Last data filed at 02/25/18 1257   Gross per 24 hour   Intake           1517.6 ml   Output             1500 ml   Net             17.6 ml       Physical Exam:       General: On Vent   HENT: Atraumatic and normocephalic   Eyes: Normal conjunctiva   Neck: Supple    Cardiovascular: Normal S1 & S2, no m/r/g   Pulmonary/Chest Wall: Clear to auscultation bilaterally   Abdominal: Soft and non-tender   Musculoskeletal: ++ edema   Neurological: sedated       Data Review:      Lab Results   Component Value Date/Time    WBC 7.9 02/25/2018 04:39 AM    RBC 2.70 (L) 02/25/2018 04:39 AM    HCT 26.4 (L) 02/25/2018 04:39 AM    MCV 97.8 (H) 02/25/2018 04:39 AM    MCH 30.0 02/25/2018 04:39 AM    MCHC 30.7 (L) 02/25/2018 04:39 AM    RDW 20.1 (H) 02/25/2018 04:39 AM      No results found for: IRONNo components found for: FERRITIN  No components found for: PTHINT  Urinalysis  No results found for: UGLU        Impression:     - Acute kidney injury, likely acute tubular necrosis from septic shock. Improving with Cr down to 1.3-1.4, from peak of 4.06. Patient was oliguric and had HD 2/14, x2. Now with good Urine output on Lasx.   - MRSA septic shock, unclear source, improving.    - Anasarca, on Lasix   - Hypophosphatemia, corrected. - Hypokalemia, repleted  - Acidosis, mixed respiratory and metabolic, improved. - Mild rhabdomyolysis, improved. - Anemia. - Thrombocytopenia, resolved  - Diabetes mellitus.   - Morbid obesity.   - Respiratory failure, intubated and remains on MV.       Plan:   - Continue current dose of IV Lasix  - Replace electrolytes per protocol   - Continue supportive care, Vent, antibiotics,Nutrition  - Weaning vent per Pulmonologist  - Continue to Monitor I/0's  - Continue current BP meds         MD Blas Malin  [phone]

## 2018-02-25 NOTE — PROGRESS NOTES
[address] [place] [address]  Phone [phone] Fax [phone]  Pulmonary Critical Care & Sleep Medicine       Name: Karen Rothman MRN: [id number]   : 1955 Hospital: 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 XXXA    Back pain M54.9    Weakness of right lower extremity R29.898    Anemia D64.9    Shock ([address] R57.9    Acute respiratory failure (HCC) J96.00    Elevated troponin R74.8    Demand ischemia of myocardium (HCC) I24.8    MRSA bacteremia R78.81 ·   MRSA sepsis with persistent bacteremia- improved; last blood cultures negative x 3 days, likely Vancomycin was not sensitive as appeared in Vitro. TERRY negative for vegetation. Spine MRI is negative also. Neck, abdomen and pelvis CT and ext CT without evidence of infection or abscess. Abscess scan and US abdomen without any source. Few skin folliculitis lesion bl LE but too small and superficial to be source. Now on Teflaro, repeat blood cultures 18 negative so far, ID following, all central catheters removed for now about 1 day  · Septic shock resolved and off pressors  · Acute respiratory failure- intubated on  extubated on , reintubated on  on hospital vent protocol  · HTN - medications adjusted by hospitalist  · Elevated troponin ? Demand ischemia vs NSTMI   · Acute renal failure - improved; S/P HD doing well with improvement in Cr and off of HD, HD cath removed 18.  Now on Lasix for vent weaning planning  · Rhabdo resolved  · Afib, rate controlled S/P cardioversion and on PO amiodarone, PO Metoprolol and Lovenox  · Stable Hb, no gross bleeding, hemodynamically stable  · Back pain, fall - no absess or open wound   · Severe hypokalemia hypomagnesemia on presentation- improved and now on replacements  · Elevated blood sugars improving control  · Lactic acidosis resolved  · H/O ETOH    PLAN:  -- Mech. Ventilated patients- aim to keep peak plateau pressure less than or equal to 30cm H2O. Titrate FiO2 for goal SPO2> 90%  -- VAP prevention bundle, head of the bed at 30' all times  -- Sedation holiday daily and assessment for weaning with SBT as tolerated. Not a candidate given some tachypnea when awake  -- Ativan per sedation protocol and Fentanyl changed to PRN  -- Repeat blood culture on Teflaro negative so far. Teflaro tolerated. Patient has PCN allergy. On line holiday, PICC line if needed. -- Amiodarone and Metoprolol PO, lovenox for Afib per cardiology  -- Off of duoneb and continue atrovent due to afib and tachycardia previously  -- NA improved with free water flush. UO better and not on HD  -- Continue Lasix to assist with weaning process. Improving CXR  -- K and MG replacement per protocol  -- Adjust BP meds for control of blood pressure  -- Lantus to 12 unit and humalog 5 units since back on feeding; ISSC. may adjust back Lantus  -- HB follow up in AM, transfuse if < 7  -- MRSA isolation   CVS: Currently on PO Amiodarone drip, Lovenox follow per cardiology recommendations. S/P cardioversion with 200 j on 2/13  Adjust BP medications per hospitalist. On aspirin  RS: as above. Lung protective vent protocol, VAP precatuion  ID: Sepsis source?, MRSA on multiple cultures   Vanco stopped after day 8 [2/19/18]. Now on Teflaro since 2/19/18  Azactam 2/11 stop on 2/14  levaquin 2/12  Stop on 2/15  Monitor skin lesions - no changes compared to previously   ENDO: TSH is ok. On Lantus, Humalog  GI: feeds as tolerated, PPI.  Monitor Alk P- likely stasis related  RENAL: Wilson for I/O, Replace k and MG per protocol. Patient is putting out urine, Off HD, on Lasix  CNS: Sedated intubated wakes up and follows commands with sedation holiday in AM  HEMATOLOGY:PLT stable, Lovenox for Afib   MUSCULOSKELETAL:CK was elevated but improved  · PAIN AND SEDATION: On Ativan and PRN Fentanyl, Thiamine and folic acid PO  · Skin/Wound: Monitor back, No skin absess  · Electrolytes: As above   · IVF: Padilla Friar   · Nutrition: Tube feeds   · Prophylaxis: DVT Prophylaxis with Lovenox. GI Prophylaxis. · Restraints: minimal restrain to avoid pulling lines and tubes  · PT/OT eval and treat. OOB when appropriate. · Lines/Tubes: none. Femoral line- 2/12 removed on 2/15. Wilson 2/12/18. Right IJ dialysis catheter 2/14- removed 2/19, Left subclavian line removed 2/24/18  ADVANCE DIRECTIVE:Full code/ Palliative is consulted and spoke with wife  FAMILY DISCUSSION: no family at bedside  Quality Care: PPI, DVT prophylaxis, HOB elevated, Infection control all reviewed and addressed. Events and notes from last 24 hours reviewed. Care plan discussed with nursing CC TIME: 36 min excluding procedure/s   · Labs and images personally seen and available reports reviewed. · All current medicines are reviewed and doses and prescription adjusted. Subjective:  2/25/18  Patient placed on sedation holiday  Patient remained on ventilator and didn't tolerate bedside SBT  He remained hemodynamically stable. UO stable. CVC was removed yesterday  No bleeding    2/24/18  Patient is easily arousable during sedation holiday  Remained hemodynamically stable and afebrile. Good response to Lasix continues  Had residuals and required to hold off TF, adjustment with Lantus to lower dose  Discussed with RN to get 2 peripheral and remove CVC since not on vasopressor    2/23/18  Patient remained hemodynamically stable  On sedation holiday, awake, following commands, denies any SOB  Responded to Lasix from yesterday with good UO. Magnesium and potassium replaced.   No fever. Some residual but overall tolerating TF, earlier on hold for US abdomen. 2/22/18  Patient remained on ventilator. He remained hemodynamically stable. Tolerating Teflaro. Low grade fever overnight. He remained hemodynamically stable. UO stable. Underwent tagged scan today. He remained on Ativan. Spoke with RN regarding sedation holiday. Will monitor. 2/21/18  No fever overnight or this AM  He remained hemodynamically stable, on HTN side. He is on ventilator, now on Ativan [1 mg/hr at present] and Fentanyl and off of propofol since yesterday. Tolerating Teflaro and no adverse reaction  Possible tagged scan tomorrow per radiology protocol  Tolerating TF    2/20/18  Patient on ventilator, sedated. On sedation holiday wakes up. Low grade fever overnight. Hemodynamically stable. Repeat bl cx from 2/18 positive for MRSA  Tolerating Teflaro without any rash or adverse effect. ECHO was done on 2/18, spoke with Dr. Jewels Meadows and no repeat ECHO needed at present. Plan for tagged abscess location scan. Tolerating TF. Good UO. Tolerating Heparin without any bleeding. 2/19/18  Patient remained on ventilator and sedated. No fever. Hemodynamically stable, rather on hypertensive side, hospitalist adjusting meds. Repeat bl cultures from 2/18/18 both set positive for GPC. Spoke with Dr. Jewels Meadows and he recommended change to Teflaro and follow up ECHO  HD catheter removed by staff per renal recommendations  No plans for HD per renal  Tolerating TF well and adjusted per protocol by staff  The staff denies abnormal bruising or abnormal bleeding from any body orifice such as bleeding from nose or gums, blood in urine or stool, or melena, hemoptysis or hematemesis.      2/18  Intubated again for work of [address] [place] [address] on 2/17  CT ext, ABD pel, Neck all came out ok no source of abscess  No fever overnight  BP is ok  ON /14/50% peep 5 abg ok  Cr is improving with good urine out put  Renal not planning any dialysis at this point    2/17/18  Was extubated on 2/16  Did reasonably well yesterday  Overnight still had fever  Early am was breathing little fast. As per nursing was awake and following command but received morphine now not waking up much  Put out 3300 urine  K 2.9 only replaced by 10 meq  Mg 1.5  PH 7.56/34/30/107  Currently on BIPAP still breathing little fast    2/16  Doing well   Yesterday started noticing some sking lesions all over body looks like septic staph infection  Nothing to suggest allergic rash  Put out about 1400cc yesterday  K 2.9 and replaced  Cr improved to 2.22 s/p hd yesterday  Mild elevation of billirubin to 1.9   HB and PLT is stable   Sputum MRSA    2/15  Doing ok  No overnight event   S/P HD  Also put out 900 cc of urine in last 24 hrs  HB dropped to 7.5 but no active bleed  PLT is 129 INR is 1.6 K 3.0   Alb 1.5     2/14  No significant overnight event  S/P TERRY negative for vegetation or blood clot  S/P cardioversion  ID evaluated suggested MSSA sepsis but initial culture is growing MRSA. Am labs showed worsening cr to 4.06 and PH 7.26  ALB 1.5  Currently on phenylephring 100, versed, amiodarone, fentanyl and bicarb drip    2/13/18  Yesterday became more tachypnic and difficult to control hr  Intubated 2/12, femoral line 2/12 <neck line not placed due to concern for colonization with ongoing sepsis>  Became hypotensive managed with phenylephrine  Afib was poorly controlled started on amiodarone drip. Cardiology is closely following  Blood culture Staph  K 3.2 replaced  Mg 1.1 getting replaced   UO and Cr is improving  Still spiking fever 102   Flu is negative       History:   Faith Rodriguez is a 57 y.o. male who came to the ed with complaints of fall. Patient states he has been feeling generalized weakness at home for the last day or so with low grade subjective fevers but he noted RLE weakness around 5pm. Since then he has fallen about 4 times in his back without any head trauma or LOC.  He has been using his tripod cane to assist him with walking. Recently about 1 month a go treated for pna at office. Ex smoker. Drinks about 2 glasses of mix drinks every day. Recently lost his son to multiorgan failure and sepsis. Due to persistence of his weakness, falls, fevers and low back pain he decided to come to the ED. In the ED, CT of the head was neg and his labs showed significant hypoK with elevated Cr (unknown baseline). He was also febrile and tachycardiac. He was started on broad spectrum Abx.  Overnight RRT called out after he was found to be tachycardic, tachypnic and ABG showed alkalosis and was transferred to ICU        Current Facility-Administered Medications   Medication Dose Route Frequency    insulin glargine (LANTUS) injection 12 Units  12 Units SubCUTAneous DAILY WITH LUNCH    LORazepam (ATIVAN) 1 mg/mL in 0.9% sodium chloride 60 mL infusion  0-0.03 mg/kg/hr IntraVENous TITRATE    [START ON 3/1/2018] amiodarone (CORDARONE) tablet 200 mg  200 mg Oral DAILY    Thiamine Mononitrate (B-1) tablet 100 mg  100 mg Oral DAILY    furosemide (LASIX) injection 40 mg  40 mg IntraVENous DAILY    metoprolol tartrate (LOPRESSOR) tablet 50 mg  50 mg Oral Q12H    enoxaparin (LOVENOX) injection 150 mg  150 mg SubCUTAneous Q12H    amiodarone (CORDARONE) tablet 400 mg  400 mg Oral Q12H    cefTARoline (TEFLARO) 600 mg in 0.9% sodium chloride (MBP/ADV) 50 mL MBP  600 mg IntraVENous Q12H    ipratropium (ATROVENT) 0.02 % nebulizer solution 0.5 mg  0.5 mg Nebulization Q6H RT    insulin lispro (HUMALOG) injection 5 Units  5 Units SubCUTAneous Q6H    chlorhexidine (PERIDEX) 0.12 % mouthwash 10 mL  10 mL Oral Q12H    aspirin chewable tablet 81 mg  81 mg Oral DAILY    pantoprazole (PROTONIX) 40 mg in sodium chloride 0.9 % 10 mL injection  40 mg IntraVENous DAILY    folic acid (FOLVITE) tablet 1 mg  1 mg Oral DAILY    insulin lispro (HUMALOG) injection   SubCUTAneous Q6H         Objective:     Vital Signs:    Blood pressure 129/52, pulse 73, temperature 98.8 °F (37.1 °C), resp. rate 23, height 6' (1.829 m), weight 148.2 kg (326 lb 11.6 oz), SpO2 97 %. Body mass index is 44.31 kg/(m^2). O2 Device: Ventilator, Endotracheal tube   O2 Flow Rate (L/min): 6 l/min   Temp (24hrs), Av.2 °F (37.3 °C), Min:98.8 °F (37.1 °C), Max:99.4 °F (37.4 °C)       Intake/Output:   Last shift:       07 - 1900  In: 340   Out: -   Last 3 shifts: 1901 -  0700  In: 2104.6 [I.V.:719.6]  Out: 2700 [Urine:2700]    Intake/Output Summary (Last 24 hours) at 18 1033  Last data filed at 18 2443   Gross per 24 hour   Intake           1317.6 ml   Output             1500 ml   Net           -182.4 ml       Review of Systems:  Intubated sedated   Review of systems not obtained due to patient factors. Physical Exam:  General: in no respiratory distress and acyanotic, appears older than stated age, opens eyes and follows simple commands, on ventilator  HEENT: PERRL, orally intubated  Neck: No abnormally enlarged lymph nodes or thyroid, supple  Chest: normal  Lungs: moderate air entry and rhonchi, normal percussion bilaterally, no tenderness/ rash  Heart: Regular rate and rhythm, S1S2 present or without murmur or extra heart sounds  Abdomen: obese, bowel sounds normoactive, tympanic, abdomen is soft without significant tenderness, masses, organomegaly or guarding  Extremity: Trace, pitting and bl ankle edema, negative for cyanosis, clubbing. Neuro: sedated, moves all extremities well, no involuntary movements, exam limitations  Skin: Skin color, texture, turgor fair.  Leg bl folliculitis rashes or lesions unchanged      CBC w/Diff Recent Labs      18   0439  18   0400  18   0500   WBC  7.9  10.0  9.4   RBC  2.70*  2.90*  2.46*   HGB  8.1*  8.8*  7.4*   HCT  26.4*  28.1*  23.8*   PLT  419  442*  468*   GRANS  61  73  68   LYMPH  25  17*  21   EOS  6*  2  2        Chemistry Recent Labs      18   121 02/24/18   1326  02/24/18   0400   02/23/18   0500   GLU   --    --   103*   --   107*   NA   --    --   142   --   144   K  3.4*  3.3*  3.4*   --   4.4   CL   --    --   106   --   109*   CO2   --    --   25   --   26   BUN   --    --   27*   --   28*   CREA   --    --   1.43*   --   1.48*   CA   --    --   9.2   --   8.5   MG  1.8  1.8  1.7   < >  1.6   PHOS  3.8   --   4.0   --   4.1   AGAP   --    --   11   --   9   BUCR   --    --   19   --   19   AP   --    --   414*   --   394*   TP   --    --   7.0   --   5.6*   ALB   --    --   1.9*   --   1.8*   GLOB   --    --   5.1*   --   3.8   AGRAT   --    --   0.4*   --   0.5*    < > = values in this interval not displayed. Lactic Acid Lactic acid   Date Value Ref Range Status   02/13/2018 1.8 0.4 - 2.0 MMOL/L Final     No results for input(s): LAC in the last 72 hours. Micro       2/22/18  Special Requests: left a/c peripheal  Preliminary     Culture result: NO GROWTH 2 DAYS Preliminary         ABG Recent Labs      02/25/18   0517  02/24/18   0539  02/23/18   0515   PHI  7.464*  7.462*  7.432   PCO2I  32.8*  30.7*  34.4*   PO2I  72*  62*  88   HCO3I  23.5  21.9*  22.7   FIO2I  0.4  0.4  0.40        Liver Enzymes Protein, total   Date Value Ref Range Status   02/24/2018 7.0 6.4 - 8.2 g/dL Final     Albumin   Date Value Ref Range Status   02/24/2018 1.9 (L) 3.4 - 5.0 g/dL Final     Globulin   Date Value Ref Range Status   02/24/2018 5.1 (H) 2.0 - 4.0 g/dL Final     A-G Ratio   Date Value Ref Range Status   02/24/2018 0.4 (L) 0.8 - 1.7   Final     AST (SGOT)   Date Value Ref Range Status   02/24/2018 59 (H) 15 - 37 U/L Final     Alk.  phosphatase   Date Value Ref Range Status   02/24/2018 414 (H) 45 - 117 U/L Final     Recent Labs      02/24/18   0400  02/23/18   0500   TP  7.0  5.6*   ALB  1.9*  1.8*   GLOB  5.1*  3.8   AGRAT  0.4*  0.5*   SGOT  59*  73*   AP  414*  394*        Cardiac Enzymes No results found for: CPK, CK, CKMMB, CKMB, RCK3, CKMBT, CKNDX, CKND1, LESLIE, TROPT, TROIQ, BELLA, TROPT, TNIPOC, BNP, BNPP     BNP No results found for: BNP, BNPP, XBNPT     Coagulation No results for input(s): PTP, INR, APTT in the last 72 hours. No lab exists for component: INREXT, INREXT      Thyroid  Lab Results   Component Value Date/Time    TSH 0.43 02/12/2018 10:05 AM          Lipid Panel Lab Results   Component Value Date/Time    Cholesterol, total 116 02/11/2018 08:45 PM    HDL Cholesterol 27 (L) 02/11/2018 08:45 PM    LDL, calculated 57.4 02/11/2018 08:45 PM    VLDL, calculated 31.6 02/11/2018 08:45 PM    Triglyceride 278 (H) 02/19/2018 05:15 AM    CHOL/HDL Ratio 4.3 02/11/2018 08:45 PM          Urinalysis Lab Results   Component Value Date/Time    Color DARK YELLOW 02/11/2018 10:45 PM    Appearance CLOUDY 02/11/2018 10:45 PM    Specific gravity 1.019 02/11/2018 10:45 PM    pH (UA) 5.5 02/11/2018 10:45 PM    Protein 300 (A) 02/11/2018 10:45 PM    Glucose >1000 (A) 02/11/2018 10:45 PM    Ketone TRACE (A) 02/11/2018 10:45 PM    Bilirubin NEGATIVE  02/11/2018 10:45 PM    Urobilinogen 1.0 02/11/2018 10:45 PM    Nitrites NEGATIVE  02/11/2018 10:45 PM    Leukocyte Esterase NEGATIVE  02/11/2018 10:45 PM    Epithelial cells 0 01/08/2014 02:46 PM    Bacteria 2+ (A) 02/11/2018 10:45 PM    WBC 0 to 2 02/11/2018 10:45 PM    RBC 0 to 2 02/11/2018 10:45 PM        XR (Most Recent). CXR reviewed by me and compared with previous CXR 2/25/18  CXR port [preliminary]  ET, OG in place  Significant improvement in right basilar changes and effusion from day prior     CT (Most Recent)   Results from [place] encounter on 02/11/18   CT NECK SOFT TISSUE WO CONT   Narrative CT soft tissue of the neck without contrast.    INDICATION: Sepsis workup. Persistent positive blood cultures with unknown  source. COMPARISON: Neck CT from 8/3/2015.     TECHNIQUE:Helically acquired and axial images were obtained from the posterior  fossa to the thoracic inlet without IV contrast. IV contrast not administered  due to recent history of acute renal injury. One or more dose reduction techniques were used on this CT: automated exposure  control, adjustment of the mAs and/or kVp according to patient's size, and  iterative reconstruction techniques. The specific techniques utilized on this CT  exam have been documented in the patient's electronic medical record. FINDINGS:   Endotracheal and enteric tubes are partially visualized. There is a right  internal jugular central venous catheter which is partially visualized. There is  a left subclavian central venous catheter which is partially visualized. Visualized intracranial structures are unremarkable. There is mucosal thickening of the left maxillary sinus and an air-fluid level  within the right maxillary sinus. There is no cervical lymphadenopathy. No mass identified within the limitations  of this noncontrast exam. The thyroid has unremarkable appearance. There is patchy opacity at the lung apices which may represent  pleural-parenchymal scarring. There are multilevel degenerative changes of the cervical spine without acute  osseous abnormality. Impression IMPRESSION:      1. Tubes and lines as described. 2. Mild inflammatory changes within the maxillary sinuses which may be related  to intubation. There is no large abscess or other findings to suggest an occult  source of infection. EKG No results found for this or any previous visit. ECHO  2/12/18 [facility] SUMMARY:  Left ventricle: Systolic function was normal. Ejection fraction was estimated   in the range of 55 % to 60 %. There was  mild hypokinesis of the basal inferior wall(s). There was moderate concentric   hypertrophy. Right ventricle: The size was normal. Systolic function was normal.    Inferior vena cava, hepatic veins: The inferior vena cava was dilated. The   respirophasic change in diameter was more  than 50%. 2/23/18  US ABD  1.  Hepatomegaly with diffusely increased hepatic parenchymal echogenicity. While nonspecific, these findings can be seen in the context of underlying steatosis and/or hepatocellular disease. 2. No evidence of cholelithiasis. No intrahepatic or extrahepatic biliary ductal dilatation. 3. Limited visualization of the pancreas. 4. No hydronephrosis involving the right kidney. Simple appearing right renal cyst, unchanged. 5. Small right pleural effusion      2/2218  NM INFLAM SCAN  No focal evidence of blood cell accumulation to suggest a site of active infection. The presence of low level activity over the anterior abdomen, projecting inferior to the liver is likely secondary to the patient's overlying arm/hand. Uptake at this location is minimal, less than that of adjacent liver. Correlation with physical exam directed at this location may be helpful as appropriate clinically. MRI spine:   2. Prior left L5 laminotomy, partial left facetectomy and possibly previous partial L4-5 discectomy. Minimal left asymmetric disc bulge but no evidence of recurrent disc herniation or retained disc fragment.   3. Minimal L3-4 disc bulge and annular fissure. 4. Multilevel mostly mild facet joint osteoarthritis. Greatest and mild to moderate level right L4-5 facet joint osteoarthritis with moderate-sized facet joint effusion and synovitis. No adjacent periarticular marrow or soft tissue edema to definitively suggest superimposed infective/septic arthritis. 5. Unremarkable upper sacroiliac joints though incompletely included. Mild body wall edema. Imaging:  I have personally reviewed the patients radiographs and have reviewed the reports:     2/25/18                 Columba Dillard M.D.   Pulmonary Critical Care & Sleep Medicine

## 2018-02-25 NOTE — PROGRESS NOTES
1930-Bedside shift change report given to GUILLERMINA/Rico by Andres Wade. Report included the following information SBAR, Kardex, OR Summary, Intake/Output, MAR, Accordion, Recent Results and Med Rec Status. 2000- Assessment complete. Please refer to flow sheet. ET tube and vent intact. Patient is safe, bed is locked and call bell is within reach. Will continue to monitor and assess. [address] Bird[address] Kilgore care is completed. 0000- Increased TF up to 30 ml/hr. Residual 185 ml. Patient is tolerating TF well. Will continue to monitor and assess. 56- Patient has low grade fever of 99.4. Administered tylenol 650. Will reassess. Patient was also given PRN colace to help with bowel irregularity. 0200- Patient is resting. Will continue to monitor and assess. 0230- Called Lab and requested lab draw for potassium. Spoke to Nelson. 0400- Assessment complete. Please refer to [facility]     0405- Called lab due to absent K result. Spoke to Burak Carrillo and Nelson. They said they would send someone immediately. 0600- Patient comfortable and able to be aroused. Will continue to monitor and assess. 7970- Bedside shift change report given to Fabio Valencia RN (oncoming nurse) by GUILLERMINA/[employer], RN (offgoing nurse). Report included the following information SBAR, Kardex, Procedure Summary, Intake/Output, MAR, Accordion and Recent Results.

## 2018-02-25 NOTE — PROGRESS NOTES
1945: Bedside and Verbal shift change report received from [facility] RN. Report included the following information SBAR, Intake/Output, MAR, Recent Results, Cardiac Rhythm SR and Alarm Parameters . This nurse assisting GARRICK La Lies with care of patient.

## 2018-02-25 NOTE — PROGRESS NOTES
Hospitalist Progress Note    Patient: Mateo Carpio MRN: [id number]  CSN: [id number]    YOB: 1955  Age: 57 y.o. Sex: male    DOA: 2/11/2018 LOS:  LOS: 13 days                Assessment and Plan:    Principal Problem:    Sepsis ([address] (2/11/2018)    Active Problems:    Osteoarthritis of right knee (1/18/2014)      Hypokalemia (2/11/2018)      Glucosuria (2/11/2018)      AMANDA (acute kidney injury) ([address] (2/11/2018)      Fall (2/11/2018)      Back pain (2/11/2018)      Weakness of right lower extremity (2/11/2018)      Anemia (2/11/2018)      Shock ([address] (2/13/2018)      Acute respiratory failure (HCC) (2/13/2018)      Elevated troponin (2/12/2018)      Demand ischemia of myocardium ([address] (2/12/2018)      MRSA bacteremia (2/15/2018)          Resp -   Acute respiratory failure - management of vent per pulmonary critical care      ID -   Sepsis unclear source of infection. Recent pneumonia. Persistent bacteremia although recent cultures have been negative       changed to teflaro with ID follow up    He has pcn allergy as rash. Closely monitor.        [facility] - Monitor HD. Septic shock - off pressors  A-fib - Off amiodarone drip started on oral amiodarone. S/p cardioversion. Converted to sinus rhythm. Elevated troponin - demand ischemia vs NSTEMI. Will need ischemia work up when stable per cardiology.         Heme/onc - Follow H&H, plts.       Renal -  Following creatinine . Will recheck tomorrow . Replacing electrolytes    AMANDA - improving, nephrology following.        Endocrine -  Follow FSG  TSH in normal range. DM - on lantus and SSI      Neuro/ Pain/ Sedation - sedation bundle  Head CT negative      GI - NPO, tube feeding.      Prophylaxis - DVT: heparin drip, GI: protonix        Chief complaint:  Pneumonia and respiratory distress       Subjective:    Sedated and intubated. Per nurse when awake will follow commands       Review of systems:    General: No fevers or chills.   Cardiovascular: No chest pain or pressure. No palpitations. Pulmonary: No shortness of breath. Gastrointestinal: No nausea, vomiting. Objective:    Vital signs/Intake and Output:  Visit Vitals    /52    Pulse 69    Temp 98.6 °F (37 °C)    Resp 23    Ht 6' (1.829 m)    Wt 148.2 kg (326 lb 11.6 oz)    SpO2 98%    BMI 44.31 kg/m2     Current Shift:  02/25 0701 - 02/25 1900  In: 690 [I.V.:350]  Out: 800 [Urine:800]  Last three shifts:  02/23 1901 - 02/25 0700  In: 2104.6 [I.V.:719.6]  Out: 2700 [Urine:2700]    Physical Exam:  General: NAD, AAOx3. Non-toxic. HEENT: NC/AT. PERRLA, EOMI.  MMM. Lungs: Nml inspection. CTA B/L. No wheezing, rales or rhonchi. Heart:  S1S2 RRR,  PMI mid 5th IC space. No M/RG. Abdomen: Soft, NT/ND.  BS+. No peritoneal signs. Extremities: No C/C/E. Psych:   Nml affect. Neurologic:  2-12 intact. Strength 5/5 throughout. Sensation symmetrical.          Labs: Results:       Chemistry Recent Labs      02/25/18 0439 02/24/18   1326  02/24/18   0400  02/23/18   0500   GLU   --    --   103*  107*   NA   --    --   142  144   K  3.4*  3.3*  3.4*  4.4   CL   --    --   106  109*   CO2   --    --   25  26   BUN   --    --   27*  28*   CREA   --    --   1.43*  1.48*   CA   --    --   9.2  8.5   AGAP   --    --   11  9   BUCR   --    --   19  19   AP   --    --   414*  394*   TP   --    --   7.0  5.6*   ALB   --    --   1.9*  1.8*   GLOB   --    --   5.1*  3.8   AGRAT   --    --   0.4*  0.5*      CBC w/Diff Recent Labs      02/25/18   0439 02/24/18   0400  02/23/18   0500   WBC  7.9  10.0  9.4   RBC  2.70*  2.90*  2.46*   HGB  8.1*  8.8*  7.4*   HCT  26.4*  28.1*  23.8*   PLT  419  442*  468*   GRANS  61  73  68   LYMPH  25  17*  21   EOS  6*  2  2      Cardiac Enzymes No results for input(s): CPK, CKND1, LESLIE in the last 72 hours. No lab exists for component: CKRMB, TROIP   Coagulation No results for input(s): PTP, INR, APTT in the last 72 hours.     No lab exists for component: INREXT    Lipid Panel Lab Results   Component Value Date/Time    Cholesterol, total 116 02/11/2018 08:45 PM    HDL Cholesterol 27 (L) 02/11/2018 08:45 PM    LDL, calculated 57.4 02/11/2018 08:45 PM    VLDL, calculated 31.6 02/11/2018 08:45 PM    Triglyceride 278 (H) 02/19/2018 05:15 AM    CHOL/HDL Ratio 4.3 02/11/2018 08:45 PM      BNP No results for input(s): BNPP in the last 72 hours.    Liver Enzymes Recent Labs      02/24/18   0400   TP  7.0   ALB  1.9*   AP  414*   SGOT  59*      Thyroid Studies Lab Results   Component Value Date/Time    TSH 0.43 02/12/2018 10:05 AM        Procedures/imaging: see electronic medical records for all procedures/Xrays and details which were not copied into this note but were reviewed prior to creation of Plan

## 2018-02-26 NOTE — PROGRESS NOTES
ID follow up note     Chart reviewed remotely   Patient is afebrile and remains on MV. Wbc has improved to 6.7   and creatinine stable at 1.4  CVL removed on 2/24 and now with only PIV onley   Blood cultures from 2/22 NG NG for 4 days    Tolerating ceftaroline so far      Recommendation   Continue line holiday and if picc line is needed , it can be placed if 2/22 blood cultures NG at final    Continue IV  to ceftaroline 600mg Q12H for persistant MRSA bacteremia   Plan to treat for total 4 weeks from negative surveillance blood culture date    Please call with questions or concerns regarding ID issue .      2025 Jb Haddad MD  Infectious diseases specialist   Pager [phone]

## 2018-02-26 NOTE — PROGRESS NOTES
[facility]  Pulmonary Critical Care & Sleep Medicine       Name: Renae Felix MRN: [id number]   : 1955 Hospital: [facility] [place]   Date: 2018        [facility] note    History:   Renae Felix is a 57 y.o. male who came to the ed with complaints of fall. Patient states he has been feeling generalized weakness at home for the last day or so with low grade subjective fevers but he noted RLE weakness around 5pm. Since then he has fallen about 4 times in his back without any head trauma or LOC. He has been using his tripod cane to assist him with walking. Recently about 1 month a go treated for pna at office. Ex smoker. Drinks about 2 glasses of mix drinks every day. Recently lost his son to multiorgan failure and sepsis. Due to persistence of his weakness, falls, fevers and low back pain he decided to come to the ED. In the ED, CT of the head was neg and his labs showed significant hypoK with elevated Cr (unknown baseline). He was also febrile and tachycardiac. He was started on broad spectrum Abx.  Overnight RRT called out after he was found to be tachycardic, tachypneic and ABG showed alkalosis and was transferred to ICU      Subjective:  18  Patient on vent, sedated  Afebrile; BP stable - not on pressors  Tele SR    UOP 1.9 lits yesterday    Current Facility-Administered Medications   Medication Dose Route Frequency    potassium chloride 10 mEq in 100 ml IVPB  10 mEq IntraVENous Q1H    insulin lispro (HUMALOG) injection 3 Units  3 Units SubCUTAneous Q6H    insulin glargine (LANTUS) injection 10 Units  10 Units SubCUTAneous DAILY WITH LUNCH    LORazepam (ATIVAN) 1 mg/mL in 0.9% sodium chloride 60 mL infusion  0-0.03 mg/kg/hr IntraVENous TITRATE    [START ON 3/1/2018] amiodarone (CORDARONE) tablet 200 mg  200 mg Oral DAILY    Thiamine Mononitrate (B-1) tablet 100 mg  100 mg Oral DAILY    furosemide (LASIX) injection 40 mg  40 mg IntraVENous DAILY    metoprolol tartrate (LOPRESSOR) tablet 50 mg  50 mg Oral Q12H    enoxaparin (LOVENOX) injection 150 mg  150 mg SubCUTAneous Q12H    amiodarone (CORDARONE) tablet 400 mg  400 mg Oral Q12H    cefTARoline (TEFLARO) 600 mg in 0.9% sodium chloride (MBP/ADV) 50 mL MBP  600 mg IntraVENous Q12H    ipratropium (ATROVENT) 0.02 % nebulizer solution 0.5 mg  0.5 mg Nebulization Q6H RT    chlorhexidine (PERIDEX) 0.12 % mouthwash 10 mL  10 mL Oral Q12H    aspirin chewable tablet 81 mg  81 mg Oral DAILY    pantoprazole (PROTONIX) 40 mg in sodium chloride 0.9 % 10 mL injection  40 mg IntraVENous DAILY    folic acid (FOLVITE) tablet 1 mg  1 mg Oral DAILY    insulin lispro (HUMALOG) injection   SubCUTAneous Q6H     Review of Systems:  Intubated sedated   Review of systems not obtained due to patient factors. Objective:     Vital Signs:    Blood pressure 119/57, pulse 71, temperature 99 °F (37.2 °C), resp. rate 21, height 6' (1.829 m), weight 141.7 kg (312 lb 6.3 oz), SpO2 95 %. Body mass index is 42.37 kg/(m^2).     O2 Device: Endotracheal tube, Ventilator   O2 Flow Rate (L/min): 6 l/min   Temp (24hrs), Av.8 °F (37.1 °C), Min:98.1 °F (36.7 °C), Max:99.2 °F (37.3 °C)       Intake/Output:   Last shift:      701 - 1900  In: 882 [I.V.:300]  Out: 1000 [Urine:1000]  Last 3 shifts: 1901 -  0700  In: 1708.8 [I.V.:578.8]  Out: 6877 [Urine:2325]    Intake/Output Summary (Last 24 hours) at 18 1231  Last data filed at 18 1209   Gross per 24 hour   Intake          1430.68 ml   Output             2125 ml   Net          -694.32 ml     ABG:    Lab Results   Component Value Date/Time    PHI 7.490 (H) 2018 05:45 AM    PCO2I 31.2 (L) 2018 05:45 AM    PO2I 79 (L) 2018 05:45 AM    HCO3I 23.8 2018 05:45 AM    FIO2I 0.40 2018 05:45 AM     Ventilator Mode: Assist control, VC+  Respiratory Rate  Resp Rate Observed: 26  Back-Up Rate: 14  Insp Time (sec): 0.9 sec  Insp Flow (l/min): 60 l/min  I:E Ratio: 1:3.77  Ventilator Volumes  Vt Set (ml): 420 ml  Vt Exhaled (Machine Breath) (ml): 417 ml  Vt Spont (ml): 382 ml  Ve Observed (l/min): 10.4 l/min  Ventilator Pressures  PIP Observed (cm H2O): 24 cm H2O  Plateau Pressure (cm H2O): 18 cm H2O  MAP (cm H2O): 14  PEEP/VENT (cm H2O): 5 cm H20      Physical Exam:  General: in no respiratory distress and acyanotic, appears older than stated age, opens eyes and follows simple commands, on ventilator  HEENT: BECKY, orally intubated  Neck: No abnormally enlarged lymph nodes or thyroid, supple  Chest: normal  Lungs: moderate air entry bilateral; decreased BS bases; no wheezing; normal respirations and chest movements  Heart: Regular rate and rhythm, S1S2 present or without murmur or extra heart sounds  Abdomen: obese, bowel sounds normoactive, tympanic, abdomen is soft without significant tenderness, distension, masses, organomegaly or guarding  Extremity: Trace, pitting and bl ankle edema, negative for cyanosis, clubbing. Neuro: sedated, moves all extremities well, no involuntary movements, exam limitations  Skin: Skin color, texture, turgor fair.  Leg bl folliculitis rashes or lesions unchanged    LABS  Recent Results (from the past 12 hour(s))   MAGNESIUM    Collection Time: 02/26/18  4:31 AM   Result Value Ref Range    Magnesium 1.8 1.6 - 2.6 mg/dL   PHOSPHORUS    Collection Time: 02/26/18  4:31 AM   Result Value Ref Range    Phosphorus 3.6 2.5 - 4.9 MG/DL   CBC WITH AUTOMATED DIFF    Collection Time: 02/26/18  4:31 AM   Result Value Ref Range    WBC 6.7 4.6 - 13.2 K/uL    RBC 2.64 (L) 4.70 - 5.50 M/uL    HGB 8.0 (L) 13.0 - 16.0 g/dL    HCT 26.0 (L) 36.0 - 48.0 %    MCV 98.5 (H) 74.0 - 97.0 FL    MCH 30.3 24.0 - 34.0 PG    MCHC 30.8 (L) 31.0 - 37.0 g/dL    RDW 19.6 (H) 11.6 - 14.5 %    PLATELET 400 783 - 181 K/uL    MPV 11.3 9.2 - 11.8 FL    NEUTROPHILS 65 40 - 73 %    LYMPHOCYTES 21 21 - 52 %    MONOCYTES 8 3 - 10 %    EOSINOPHILS 6 (H) 0 - 5 %    BASOPHILS 0 0 - 2 % ABS. NEUTROPHILS 4.3 1.8 - 8.0 K/UL    ABS. LYMPHOCYTES 1.4 0.9 - 3.6 K/UL    ABS. MONOCYTES 0.5 0.05 - 1.2 K/UL    ABS. EOSINOPHILS 0.4 0.0 - 0.4 K/UL    ABS. BASOPHILS 0.0 0.0 - 0.06 K/UL    DF AUTOMATED     CALCIUM, IONIZED    Collection Time: 02/26/18  4:31 AM   Result Value Ref Range    Ionized Calcium 1.259 1.12 - 0.97 MMOL/L   METABOLIC PANEL, COMPREHENSIVE    Collection Time: 02/26/18  4:31 AM   Result Value Ref Range    Sodium 146 (H) 136 - 145 mmol/L    Potassium 3.0 (L) 3.5 - 5.5 mmol/L    Chloride 112 (H) 100 - 108 mmol/L    CO2 22 21 - 32 mmol/L    Anion gap 12 3.0 - 18 mmol/L    Glucose 108 (H) 74 - 99 mg/dL    BUN 31 (H) 7.0 - 18 MG/DL    Creatinine 1.45 (H) 0.6 - 1.3 MG/DL    BUN/Creatinine ratio 21 (H) 12 - 20      GFR est AA 59 (L) >60 ml/min/1.73m2    GFR est non-AA 49 (L) >60 ml/min/1.73m2    Calcium 9.5 8.5 - 10.1 MG/DL    Bilirubin, total 0.8 0.2 - 1.0 MG/DL    ALT (SGPT) 21 16 - 61 U/L    AST (SGOT) 23 15 - 37 U/L    Alk. phosphatase 224 (H) 45 - 117 U/L    Protein, total 6.7 6.4 - 8.2 g/dL    Albumin 1.8 (L) 3.4 - 5.0 g/dL    Globulin 4.9 (H) 2.0 - 4.0 g/dL    A-G Ratio 0.4 (L) 0.8 - 1.7     GLUCOSE, POC    Collection Time: 02/26/18  5:42 AM   Result Value Ref Range    Glucose (POC) 105 70 - 110 mg/dL   POC G3    Collection Time: 02/26/18  5:45 AM   Result Value Ref Range    Device: VENT      FIO2 (POC) 0.40 %    pH (POC) 7.490 (H) 7.35 - 7.45      pCO2 (POC) 31.2 (L) 35.0 - 45.0 MMHG    pO2 (POC) 79 (L) 80 - 100 MMHG    HCO3 (POC) 23.8 22 - 26 MMOL/L    sO2 (POC) 97 92 - 97 %    Base excess (POC) 0 mmol/L    Mode ASSIST CONTROL      Tidal volume 420 ml    Set Rate 14 bpm    PEEP/CPAP (POC) 5 cmH2O    Allens test (POC) YES      Inspiratory Time 0.9 sec    Total resp. rate 24      Site LEFT RADIAL      Patient temp.  98.6      Specimen type (POC) ARTERIAL      Performed by [facility]     Volume control plus YES     GLUCOSE, POC    Collection Time: 02/26/18 12:06 PM   Result Value Ref Range Glucose (POC) 106 70 - 110 mg/dL       CBC w/Diff Recent Labs      02/26/18 0431 02/25/18   0439  02/24/18   0400   WBC  6.7  7.9  10.0   RBC  2.64*  2.70*  2.90*   HGB  8.0*  8.1*  8.8*   HCT  26.0*  26.4*  28.1*   PLT  419  419  442*   GRANS  65  61  73   LYMPH  21  25  17*   EOS  6*  6*  2        Chemistry Recent Labs      02/26/18 0431 02/25/18 2124  02/25/18 0439 02/24/18   0400   GLU  108*   --    --    --   103*   NA  146*   --    --    --   142   K  3.0*  4.7  3.4*   < >  3.4*   CL  112*   --    --    --   106   CO2  22   --    --    --   25   BUN  31*   --    --    --   27*   CREA  1.45*   --    --    --   1.43*   CA  9.5   --    --    --   9.2   MG  1.8  1.7  1.8   < >  1.7   PHOS  3.6   --   3.8   --   4.0   AGAP  12   --    --    --   11   BUCR  21*   --    --    --   19   AP  224*   --    --    --   414*   TP  6.7   --    --    --   7.0   ALB  1.8*   --    --    --   1.9*   GLOB  4.9*   --    --    --   5.1*   AGRAT  0.4*   --    --    --   0.4*    < > = values in this interval not displayed. Lactic Acid Lactic acid   Date Value Ref Range Status   02/13/2018 1.8 0.4 - 2.0 MMOL/L Final     No results for input(s): LAC in the last 72 hours.      Micro       2/22/18  Special Requests: left a/c peripheal  Preliminary     Culture result: NO GROWTH 2 DAYS Preliminary         ABG Recent Labs      02/26/18   0545  02/25/18   0517  02/24/18   0539   PHI  7.490*  7.464*  7.462*   PCO2I  31.2*  32.8*  30.7*   PO2I  79*  72*  62*   HCO3I  23.8  23.5  21.9*   FIO2I  0.40  0.4  0.4        Liver Enzymes Protein, total   Date Value Ref Range Status   02/26/2018 6.7 6.4 - 8.2 g/dL Final     Albumin   Date Value Ref Range Status   02/26/2018 1.8 (L) 3.4 - 5.0 g/dL Final     Globulin   Date Value Ref Range Status   02/26/2018 4.9 (H) 2.0 - 4.0 g/dL Final     A-G Ratio   Date Value Ref Range Status   02/26/2018 0.4 (L) 0.8 - 1.7   Final     AST (SGOT)   Date Value Ref Range Status   02/26/2018 23 15 - 37 U/L Final     Alk. phosphatase   Date Value Ref Range Status   02/26/2018 224 (H) 45 - 117 U/L Final     Recent Labs      02/26/18   0431  02/24/18   0400   TP  6.7  7.0   ALB  1.8*  1.9*   GLOB  4.9*  5.1*   AGRAT  0.4*  0.4*   SGOT  23  59*   AP  224*  414*        Cardiac Enzymes No results found for: CPK, CK, CKMMB, CKMB, RCK3, CKMBT, CKNDX, CKND1, LESLIE, TROPT, TROIQ, BELLA, TROPT, TNIPOC, BNP, BNPP     BNP No results found for: BNP, BNPP, XBNPT     Coagulation No results for input(s): PTP, INR, APTT in the last 72 hours. No lab exists for component: INREXT, INREXT      Thyroid  Lab Results   Component Value Date/Time    TSH 0.43 02/12/2018 10:05 AM          Lipid Panel Lab Results   Component Value Date/Time    Cholesterol, total 116 02/11/2018 08:45 PM    HDL Cholesterol 27 (L) 02/11/2018 08:45 PM    LDL, calculated 57.4 02/11/2018 08:45 PM    VLDL, calculated 31.6 02/11/2018 08:45 PM    Triglyceride 278 (H) 02/19/2018 05:15 AM    CHOL/HDL Ratio 4.3 02/11/2018 08:45 PM          Urinalysis Lab Results   Component Value Date/Time    Color DARK YELLOW 02/11/2018 10:45 PM    Appearance CLOUDY 02/11/2018 10:45 PM    Specific gravity 1.019 02/11/2018 10:45 PM    pH (UA) 5.5 02/11/2018 10:45 PM    Protein 300 (A) 02/11/2018 10:45 PM    Glucose >1000 (A) 02/11/2018 10:45 PM    Ketone TRACE (A) 02/11/2018 10:45 PM    Bilirubin NEGATIVE  02/11/2018 10:45 PM    Urobilinogen 1.0 02/11/2018 10:45 PM    Nitrites NEGATIVE  02/11/2018 10:45 PM    Leukocyte Esterase NEGATIVE  02/11/2018 10:45 PM    Epithelial cells 0 01/08/2014 02:46 PM    Bacteria 2+ (A) 02/11/2018 10:45 PM    WBC 0 to 2 02/11/2018 10:45 PM    RBC 0 to 2 02/11/2018 10:45 PM        CT (Most Recent)   Results from [place] encounter on 02/11/18   CT NECK SOFT TISSUE WO CONT   Narrative CT soft tissue of the neck without contrast.    INDICATION: Sepsis workup. Persistent positive blood cultures with unknown  source.     COMPARISON: Neck CT from 8/3/2015. TECHNIQUE:Helically acquired and axial images were obtained from the posterior  fossa to the thoracic inlet without IV contrast. IV contrast not administered  due to recent history of acute renal injury. One or more dose reduction techniques were used on this CT: automated exposure  control, adjustment of the mAs and/or kVp according to patient's size, and  iterative reconstruction techniques. The specific techniques utilized on this CT  exam have been documented in the patient's electronic medical record. FINDINGS:   Endotracheal and enteric tubes are partially visualized. There is a right  internal jugular central venous catheter which is partially visualized. There is  a left subclavian central venous catheter which is partially visualized. Visualized intracranial structures are unremarkable. There is mucosal thickening of the left maxillary sinus and an air-fluid level  within the right maxillary sinus. There is no cervical lymphadenopathy. No mass identified within the limitations  of this noncontrast exam. The thyroid has unremarkable appearance. There is patchy opacity at the lung apices which may represent  pleural-parenchymal scarring. There are multilevel degenerative changes of the cervical spine without acute  osseous abnormality. Impression IMPRESSION:      1. Tubes and lines as described. 2. Mild inflammatory changes within the maxillary sinuses which may be related  to intubation. There is no large abscess or other findings to suggest an occult  source of infection. EKG No results found for this or any previous visit. [facility]  [facility]  Left ventricle: Systolic function was normal. Ejection fraction was estimated   in the range of 55 % to 60 %. There was  mild hypokinesis of the basal inferior wall(s). There was moderate concentric   hypertrophy. Right ventricle:  The size was normal. Systolic function was normal.    Inferior vena cava, hepatic veins: The inferior vena cava was dilated. The   respirophasic change in diameter was more  than 50%. 2/23/18  US ABD  1. Hepatomegaly with diffusely increased hepatic parenchymal echogenicity. While nonspecific, these findings can be seen in the context of underlying steatosis and/or hepatocellular disease. 2. No evidence of cholelithiasis. No intrahepatic or extrahepatic biliary ductal dilatation. 3. Limited visualization of the pancreas. 4. No hydronephrosis involving the right kidney. Simple appearing right renal cyst, unchanged. 5. Small right pleural effusion      2/2218  NM INFLAM SCAN  No focal evidence of blood cell accumulation to suggest a site of active infection. The presence of low level activity over the anterior abdomen, projecting inferior to the liver is likely secondary to the patient's overlying arm/hand. Uptake at this location is minimal, less than that of adjacent liver. Correlation with physical exam directed at this location may be helpful as appropriate clinically. MRI spine:   2. Prior left L5 laminotomy, partial left facetectomy and possibly previous partial L4-5 discectomy. Minimal left asymmetric disc bulge but no evidence of recurrent disc herniation or retained disc fragment.   3. Minimal L3-4 disc bulge and annular fissure. 4. Multilevel mostly mild facet joint osteoarthritis. Greatest and mild to moderate level right L4-5 facet joint osteoarthritis with moderate-sized facet joint effusion and synovitis. No adjacent periarticular marrow or soft tissue edema to definitively suggest superimposed infective/septic arthritis. 5. Unremarkable upper sacroiliac joints though incompletely included. Mild body wall edema. Imaging:  I have personally reviewed the patients radiographs and have reviewed the reports:    CXR 2/26/18  IMPRESSION:  1. Satisfactory position of ET tube with stable enteric line.   2. Moderate pulmonary hypoinflation with increased left hilar parenchymal  opacity and persistent bibasilar opacities. Differential considerations would  include developing atelectasis superimposed upon unchanged bibasilar airspace  Disease. 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 XXXA    Back pain M54.9    Weakness of right lower extremity R29.898    Anemia D64.9    Shock ([address] R57.9    Acute respiratory failure (HCC) J96.00    Elevated troponin R74.8    Demand ischemia of myocardium ([facility]) I24.8    MRSA bacteremia R78.81 ·   MRSA sepsis with persistent bacteremia- improved; last blood cultures negative x 3 days, likely Vancomycin was not sensitive as appeared in Vitro. TERRY negative for vegetation. Spine MRI is negative also. Neck, abdomen and pelvis CT and ext CT without evidence of infection or abscess. Abscess scan and US abdomen without any source. Few skin folliculitis lesion bl LE but too small and superficial to be source. Now on Teflaro, repeat blood cultures 2/22/18 negative so far, ID following, all central catheters removed for now about 1 day  · Septic shock resolved and off pressors  · Acute respiratory failure- intubated on 2/12 extubated on 2/16, reintubated on 2/17 on hospital vent protocol  · HTN - medications adjusted by hospitalist  · Elevated troponin ? Demand ischemia vs NSTMI   · Acute renal failure - improved; S/P HD doing well with improvement in Cr and off of HD, HD cath removed 2/19/18.  Now on Lasix for vent weaning planning  · Rhabdo resolved  · Afib, rate controlled S/P cardioversion and on PO amiodarone, PO Metoprolol and Lovenox  · Stable Hb, no gross bleeding, hemodynamically stable  · Back pain, fall - no absess or open wound   · Severe hypokalemia hypomagnesemia on presentation- improved and now on replacements  · Elevated blood sugars improving control  · Lactic acidosis resolved  · H/O ETOH   · Anemia of chronic illness   PLAN:  -- Chas Das. Ventilated patients- aim to keep peak plateau pressure less than or equal to 30cm H2O. Titrate FiO2 for goal SPO2> 90%  -- weaning trials as tolerated; ABG stable; CXR stable - continue diuresis  -- VAP prevention bundle, head of the bed at 30' all times  -- Sedation holiday daily and assessment for weaning with SBT as tolerated. Not a candidate given some tachypnea when awake  -- Ativan per sedation protocol and Fentanyl changed to PRN  -- Repeat blood culture on Teflaro negative so far. Teflaro tolerated. Patient has PCN allergy. On line holiday, PICC line - ordered, poor iv access. -- Amiodarone and Metoprolol PO, lovenox for parox Afib per cardiology - currently in SR  -- Off of duoneb and continue atrovent due to afib and tachycardia previously  -- NA improved with free water flush. UO better and not on HD  -- Continue Lasix to assist with weaning process. Improving CXR  -- K and MG replacement per protocol  -- Adjust BP meds for control of blood pressure  -- Lantus 10 unit and humalog 3 units since back on feeding; SSI; watch for hypoglycemia  -- HB follow up in AM, transfuse if < 7  -- MRSA isolation   ID: Sepsis source?, MRSA on multiple cultures   Vanco stopped after day 8 [2/19/18]. Now on Teflaro since 2/19/18  Azactam 2/11 stop on 2/14  levaquin 2/12  Stop on 2/15  Monitor skin lesions - no changes compared to previously     PAIN AND SEDATION: On Ativan and PRN Fentanyl, Thiamine and folic acid PO  · Skin/Wound: Monitor back, No skin absess  · Electrolytes: As above   · IVF: Dieudonne Sierra   · Nutrition: Tube feeds   · Prophylaxis: DVT Prophylaxis with Lovenox. GI Prophylaxis. · Restraints: minimal restrain to avoid pulling lines and tubes  · PT/OT eval and treat.  OOB when appropriate. · Lines/Tubes: none. Femoral line- 2/12 removed on 2/15. Wilson 2/12/18. Right IJ dialysis catheter 2/14- removed 2/19, Left subclavian line removed 2/24/18  ADVANCE DIRECTIVE:Full code/ Palliative is consulted and spoke with wife  FAMILY DISCUSSION: no family at bedside; update family when available  Quality Care: PPI, DVT prophylaxis, HOB elevated, Infection control all reviewed and addressed. Events and notes from last 24 hours reviewed.  Care plan discussed with nursing CC TIME: 38 min excluding procedure or discussions       Gabby Argueta MD  Pulmonary Critical Care & Sleep Medicine

## 2018-02-26 NOTE — DIABETES MGMT
GLYCEMIC CONTROL & NUTRITION:    - recommend decrease insulin regimen to the following   * Lantus 10 units every day   * Humalog 3 units q 6 hours   - continue corrective coverage at normal scale  - BG has been trending down, TF has been on hold for various amounts of time r/t residuals and procedures over the weekend  - will continue to monitor  - K being replaced     Recent Glucose Results:   Lab Results   Component Value Date/Time     (H) 02/26/2018 04:31 AM    GLUCPOC 106 02/26/2018 12:06 PM    GLUCPOC 105 02/26/2018 05:42 AM    GLUCPOC 92 02/26/2018 12:24 AM             Kaylyn Paul, MPH, RD, CDE

## 2018-02-26 NOTE — PROGRESS NOTES
1930-Bedside report received from Ariella Richardson RN. Sbar, mar, labs, cardiac rhythm, and patient status reviewed. Drips verified. Assumed care of patient. Assisted off-going nurse with incontent care. 2000-Assessment completed. Vital signs remain stable. 0000-Assessment completed. Gastric residual checked 150ml at this time. Tube feed held due to frequents suctioning of tan creamy/feed colored secretions. Feed tube remains secured in place from previous shift. Holding feed this time until placement verified via Xray. Held Mealtime insulin. 0400-Assessment completed. No changes from previous assessment. 0730- Ativan drip held at this time for SBT. Orders placed for Electrolyte replacement per protocol. Due to frequent loose stools, orders received from Dr. Claudene Kerns for FMS to prevent skin breakdown with frequent changes. Report given to Ariella Richardson RN. morales Smith, labs, kardex and patient status reviewed.

## 2018-02-26 NOTE — PROGRESS NOTES
NUTRITION FOLLOW-UP    RECOMMENDATIONS/PLAN:   - Continue w/ current tube feeds  - Will continue to follow to adj reccs  Monitor labs/lytes, tube feeds, skin integrity, wt, fluid status, BM    NUTRITION ASSESSMENT:   Client Update: 57 yrs old Male with septic shock, AMANDA, MRSA bacteremia, and cardiac stress due to sepsis w/ resp failure, hypokalemia, and low magnesium. Pt was extubated on 2/16/18, however was reintubated on 2/18/18. Currently intubated in ICU. FOOD/NUTRITION INTAKE   Diet Order:  Tube feeds   Supplements: prosource    Food Allergies: NKFA  Average PO Intake:       No data found. Pertinent Medications:  [x] Reviewed; folic acid, lopressor, protonix, KCl, thiamine  Electrolyte Replacement Protocol: [x]K [x]Mg [x]PO4  Insulin:  [x]SSI  []Pre-meal   []Basal    []Drip  []None  Cultural/Amish Food Preferences: None Identified    BIOCHEMICAL DATA & MEDICAL TESTS  Pertinent Labs:  [x] Reviewed; Na-146, K-3.0   ANTHROPOMETRICS  Height: 6' (182.9 cm)       Weight: 141.7 kg (312 lb 6.3 oz)         BMI: 42.4 kg/m^2 morbidly obese (Greater than or = to 40% BMI)   Adm Weight: 306 lbs                Weight change: +6lbs +3 & +4 non-pitting  Adjusted Body Weight: 95.5kg     NUTRITION-FOCUSED PHYSICAL ASSESSMENT  Skin: No pressure injury noted      GI: +BM 2/26/18    NUTRITION PRESCRIPTION  Calories: 9778-5360 kcal/day based on 11-14kcal/kg  Protein: 97 g/day based on 1.2 g/kg of IBW  CHO: 191-243 g/day based on 50% of total energy  Fluid: 4250-9482 ml/day based on 1 kcal/ml       NUTRITION DIAGNOSES:   1.  Inadequate oral intake related to intubation as evidence by NPO diet order and need for nutrition support.         NUTRITION INTERVENTIONS:   INTERVENTIONS:        GOALS:  1. EN: continue w/ current tube feeds 1.  Meet tube feed needs by next review 2 days             LEARNING NEEDS (Diet, Supplementation, Food/Nutrient-Drug Interaction):   [x] None Identified   [] Education provided/documented Identified and patient: [] Declined   [] Was not appropriate/indicated        NUTRITION MONITORING /EVALUATION:   Adjust EN/PN as appropriate  Monitor wt  Monitor renal labs, electrolytes, fluid status  Monitor for additional supplement needs     Previous Recommendations Implemented: yes        Previous Goals Met:  no -Tube feed turned off multiple times this weekend       [] Participated in Interdisciplinary Rounds    [x] Interdisciplinary Care Plan Reviewed  DISCHARGE NUTRITION RECOMMENDATIONS ADDRESSED:      [x] To be determined closer to discharge    NUTRITION RISK:           [x] At risk                        [] Not currently at risk        Will follow-up per policy.   Ashley Sarmiento, [address] [facility]

## 2018-02-26 NOTE — PROGRESS NOTES
Problem: Falls - Risk of  Goal: *Absence of Falls  Document Jessica Fall Risk and appropriate interventions in the flowsheet.    Outcome: Progressing Towards Goal  Fall Risk Interventions:       Mentation Interventions: Adequate sleep, hydration, pain control, Bed/chair exit alarm, Door open when patient unattended, More frequent rounding, Reorient patient    Medication Interventions: Evaluate medications/consider consulting pharmacy, Patient to call before getting OOB, Bed/chair exit alarm    Elimination Interventions: Bed/chair exit alarm, Toileting schedule/hourly rounds    History of Falls Interventions: Evaluate medications/consider consulting pharmacy, Bed/chair exit alarm, Door open when patient unattended, Room close to nurse's station

## 2018-02-26 NOTE — ROUTINE PROCESS
Bedside and Verbal shift change report given to Charmaine Clinton RN (oncoming nurse) by Payton Cruz RN/Lissette [facility] RN (offgoing nurse).  Report included the following information SBAR, Kardex, Intake/Output, MAR, Recent Results, Med Rec Status and Cardiac Rhythm SR.

## 2018-02-26 NOTE — PROGRESS NOTES
Hospitalist Progress Note    Patient: Venus Pena MRN: [id number]  CSN: [id number]    YOB: 1955  Age: 57 y.o. Sex: male    DOA: 2/11/2018 LOS:  LOS: 14 days                Assessment/Plan     Patient Active Problem List   Diagnosis Code    Osteoarthritis of right knee M17.11    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[facility] T84.018A, Z96.659    Failed total knee arthroplasty ([facility] T84.018A, Z96.659    Dysphagia R13.10    Sepsis ([facility] A41.9    Hypokalemia E87.6    Glucosuria R81    AMANDA (acute kidney injury) ([facility] [facility] N17.9    Fall W19. XXXA    Back pain M54.9    Weakness of right lower extremity R29.898    Anemia D64.9    Shock (HCC) R57.9    Acute respiratory failure ([facility]) J96.00    Elevated troponin R74.8    Demand ischemia of myocardium ([facility]) I24.8    MRSA bacteremia R65.80        56 yo male admitted for pneumonia, respiratory distress. RRT called on this patient for respiratory distress in morning of 02/12/2018, high mews score of 8, fever of 103, tachycardia, resp rate of 44. He was transferred to ICU. Patient continued to decline in condition with elevated resp rate. He was intubated and central line placed. CRITICAL CARE PLAN         Resp -   Acute respiratory failure - extubated 02/16/2018. Patient reintubated 02/17/2018. Management per pulmonary.     ID -   Sepsis unclear source of infection. Recent pneumonia. Persistent bacteremia   Blood cultures 02/11/2018 positive for MRSA. blood cx 2/2 02/13/2018 MRSA. blood cultures 2/2 02/14/2018 MRSA. blood cultures 2/2 02/16/2018 MRSA  blood cultures 2/2 02/18/2018 MRSA  blood cultures 02/22/2018 no growth to date.   resp cultures 02/13/2018 MRSA  Urine culture 02/12/2018 staph aureus  CT chest Multifocal peripheral bilateral subpleural nodular densities the largest 15  mm right apex, which may represent peripheral areas of pneumonia or other  inflammatory etiology, although differential diagnosis includes less likely  metastatic disease or infarcts from pulmonary emboli. CT of ext and neck with no source of infection. No MRI evidence of osteomyelitis and discitis. S/p TERRY with no evidence of vegetations. ANTIBIOTICS not responding to vancomycin, changed to teflaro. He has pcn allergy as rash. Closely monitor. Wbc scan 02/22 No focal evidence of blood cell accumulation to suggest a site of active  Infection  ID following.     [facility] - Monitor HD. Septic shock - off pressors  A-fib - Off amiodarone drip started on oral amiodarone. S/p cardioversion. Converted to sinus rhythm. Elevated troponin - demand ischemia vs NSTEMI. Will need ischemia work up when stable per cardiology.       Heme/onc - Follow H&H, plts.      Renal -   AMANDA - improving, nephrology following. Metabolic acidosis - improved with bicarb drip  follow I/O. Check and replace Mg, K, phos.     Endocrine -  Follow FSG  TSH in normal range. DM - on lantus and SSI     Neuro/ Pain/ Sedation - sedation bundle  Head CT negative     GI - NPO, tube feeding.     Prophylaxis - DVT: heparin drip, GI: protonix.                Disposition : TBD    Physical Exam:  General: As above  HEENT: NC, Atraumatic. PERRLA, anicteric sclerae. Lungs: CTA Bilaterally. No Wheezing/Rhonchi/Rales.   Heart:  Regular  rhythm,  No murmur, No Rubs, No Gallops  Abdomen: Soft, Non distended, Non tender.  +Bowel sounds,   Extremities: Edema upper and LE bilaterally          Vital signs/Intake and Output:  Visit Vitals    /57    Pulse 76    Temp 98.9 °F (37.2 °C)    Resp 14    Ht 6' (1.829 m)    Wt 141.7 kg (312 lb 6.3 oz)    SpO2 100%    BMI 42.37 kg/m2     Current Shift:  02/26 0701 - 02/26 1900  In: 301 [I.V.:200]  Out: 625 [Urine:625]  Last three shifts:  02/24 1901 - 02/26 0700  In: 1708.8 [I.V.:578.8]  Out: 2325 [Urine:2325]            Labs: Results:       Chemistry Recent Labs      02/26/18   0431  02/25/18   2124  02/25/18   0439   02/24/18   0400   GLU  108* --    --    --   103*   NA  146*   --    --    --   142   K  3.0*  4.7  3.4*   < >  3.4*   CL  112*   --    --    --   106   CO2  22   --    --    --   25   BUN  31*   --    --    --   27*   CREA  1.45*   --    --    --   1.43*   CA  9.5   --    --    --   9.2   AGAP  12   --    --    --   11   BUCR  21*   --    --    --   19   AP  224*   --    --    --   414*   TP  6.7   --    --    --   7.0   ALB  1.8*   --    --    --   1.9*   GLOB  4.9*   --    --    --   5.1*   AGRAT  0.4*   --    --    --   0.4*    < > = values in this interval not displayed. CBC w/Diff Recent Labs      02/26/18   0431 02/25/18   0439  02/24/18   0400   WBC  6.7  7.9  10.0   RBC  2.64*  2.70*  2.90*   HGB  8.0*  8.1*  8.8*   HCT  26.0*  26.4*  28.1*   PLT  419  419  442*   GRANS  65  61  73   LYMPH  21  25  17*   EOS  6*  6*  2      Cardiac Enzymes No results for input(s): CPK, CKND1, LESLIE in the last 72 hours. No lab exists for component: CKRMB, TROIP   Coagulation No results for input(s): PTP, INR, APTT in the last 72 hours. No lab exists for component: INREXT, INREXT    Lipid Panel Lab Results   Component Value Date/Time    Cholesterol, total 116 02/11/2018 08:45 PM    HDL Cholesterol 27 (L) 02/11/2018 08:45 PM    LDL, calculated 57.4 02/11/2018 08:45 PM    VLDL, calculated 31.6 02/11/2018 08:45 PM    Triglyceride 278 (H) 02/19/2018 05:15 AM    CHOL/HDL Ratio 4.3 02/11/2018 08:45 PM      BNP No results for input(s): BNPP in the last 72 hours.    Liver Enzymes Recent Labs      02/26/18   0431   TP  6.7   ALB  1.8*   AP  224*   SGOT  23      Thyroid Studies Lab Results   Component Value Date/Time    TSH 0.43 02/12/2018 10:05 AM        Procedures/imaging: see electronic medical records for all procedures/Xrays and details which were not copied into this note but were reviewed prior to creation of Plan

## 2018-02-26 NOTE — PROGRESS NOTES
Occupational Therapy Screening:  Services are indicated. Evaluate and Treat Order is requested. An InBasket screening referral was triggered for occupational therapy based on results obtained during the nursing admission assessment. The patients chart was reviewed and the patient is appropriate for a skilled therapy evaluation. Although pt is intubated, pt arousable and following commands. Pt has had long hospital course with deconditioning and would benefit from OT/PT assessment. Thank you.   Olivia Acosta, OTR/L

## 2018-02-26 NOTE — PROGRESS NOTES
Palliative Medicine Progress Note  DR. [facility]: [phone])  [facility]: [address] [place] [address] Way: [phone]    Patient Name: Erick Sainz  YOB: 1955    Date of Initial Consult: 2/16/18  Reason for Consult: care decisions  Requesting Provider: Dr. Yulia Faulkner   Primary Care Physician: Jessica Dumont MD          SUMMARY:   Erick Sainz is a 57y.o. year old who presented after two days of malaise with overwhelming MERSA sepsis and multi organ system failure. On pressors initially, has required dialysis and amiodarone drip with ventilator support. Now off pressors, with good UOP, extubated this am. Still minimally responsive. Only negative recent care finding is persistently + blood cultures and fevers. Previously reasonably healthy, last hospitalization for TKR. Recently lost son in January with hepatorenal syndrome. Wife at bedside. 2/19/18: Reintubated < 24 hr post extubation. Persistent MERSA + blood cultures since 2/11 despite MICs demonstrating sensitivity to vanc. No identified sequestered source of bacteremia. No family at bedside. 2/20/18: Stable overnight, no fever. Dialysis catheter removed. Remains on propofol. 2/21/18: Arousable today. Appears uncomfortable. On IV abx, for tagged WBC scan tomorrow looking for source of persistent bacteremia. 2/22/18: Resting comfortably. Blood cultures being drawn. 2/23/18: Alert, but not answering questions. Tagged WBC scan negative. 2/26/18: Alert, answering yes/no questions. Pain in back and chest.  SBT unsuccessful yesterday. PALLIATIVE DIAGNOSES:     Patient Active Problem List   Diagnosis Code    Osteoarthritis of right knee M17.11    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[facility] [address] T84.018A, Z96.659    Failed total knee arthroplasty ([facility] [address] T84.018A, Z96.659    Dysphagia R13.10    Sepsis ([facility] [address] A41.9    Hypokalemia E87.6    Glucosuria R81    AMANDA (acute kidney injury) ([facility] [address] N17.9    Fall W19. XXXA    Back pain M54.9    Weakness of right lower extremity R29.898    Anemia D64.9    Shock ([facility] [address] R57.9    Acute respiratory failure (HCC) J96.00    Elevated troponin R74.8    Demand ischemia of myocardium (HCC) I24.8    MRSA bacteremia R78.81     1. PLAN:   1. Met with wife and answered questions regarding current clinical status. She understand that while he is improved he remains critically ill. Discussed implications of persistent + blood cultures. Reviewed his AMD and asked if she thinks he would be willing to undergo reintubation and reescalation of care should he decline rapidly again. She is not certain but knows he would not want aggressive measures if he had little or no chance of making a good recovery. Emotional support provided. 2/19/18: No evidence of improvement in underlying source of his multisystem critical illness. With reintubation and persistent bacteremia, prognosis appears to be worsening.    2/20/18: On different abx regimen. No clinical change. 2/21/18: Clinically unchanged. Repeat BC tomorrow. 2/22/18: Hopefully bacteremia clearing. Will need to be considered for trach if not close to extubation by beginning of next week. 2/23/18: Discussed w/ Dr. Oseas Kingston.  If cultures remain negative he is hopeful he can be extubated in a time frame to avoid tracheostomy. Spent some time orienting him to his current situation and care plan. 2/26/18: Improving clinically and on paper if he can be extubated soon. If not may require PEG/trach. 2. Initial consult note routed to primary continuity provider  3. Communicated plan of care with: Palliative IDT    GOALS OF CARE:  Patient/Health Care Proxy Stated Goals: Cure      TREATMENT PREFERENCES:   Code Status: Full Code    Advance Care Planning:  Advance Care Planning 2/14/2018   Patient's Healthcare Decision Maker is: Named in scanned ACP document   Primary Decision Maker Name Nupur Kahn   Primary Decision Maker Phone Number [phone]   Primary Decision Maker Relationship to Patient Spouse   Secondary Decision Maker Name Jimy Acosta   Secondary Decision Maker Phone Number [phone]   Secondary Decision Maker Relationship to Patient Sibling   Confirm Advance Directive Yes, on file       Medical Interventions: Full interventions   Other Instructions: Other:  As far as possible, the palliative care team has discussed with patient / health care proxy about goals of care / treatment preferences for patient. HISTORY:     History obtained from: wife    CHIEF COMPLAINT: see summary    HPI/SUBJECTIVE:    The patient is:   [] Verbal and participatory  [x] Non-participatory due to:   sedation     Clinical Pain Assessment (nonverbal scale for nonverbal patients):        Activity (Movement): Lying quietly, normal position    Duration: for how long has pt been experiencing pain (e.g., 2 days, 1 month, years)  Frequency: how often pain is an issue (e.g., several times per day, once every few days, constant)     FUNCTIONAL ASSESSMENT:     Palliative Performance Scale (PPS):  PPS: 30    ECOG        PSYCHOSOCIAL/SPIRITUAL SCREENING:      Any spiritual / Jewish concerns:  [] Yes /  [x] No    Caregiver Burnout:  [] Yes /  [x] No /  [] No Caregiver Present      Anticipatory grief assessment:   [x] Normal  / [] Maladaptive        REVIEW OF SYSTEMS:     Positive and pertinent negative findings in ROS are noted above in HPI. The following systems were [] reviewed / [x] unable to be reviewed as noted in HPI  Other findings are noted below. Systems: constitutional, ears/nose/mouth/throat, respiratory, gastrointestinal, genitourinary, musculoskeletal, integumentary, neurologic, psychiatric, endocrine. Positive findings noted below. Modified ESAS Completed by: provider                                   Stool Occurrence(s): 1        PHYSICAL EXAM:     Wt Readings from Last 3 Encounters:   02/26/18 141.7 kg (312 lb 6.3 oz)   02/14/18 144.8 kg (319 lb 3.6 oz)   08/02/15 138.8 kg (306 lb)     Blood pressure 119/57, pulse 76, temperature 98.9 °F (37.2 °C), resp. rate 14, height 6' (1.829 m), weight 141.7 kg (312 lb 6.3 oz), SpO2 100 %.   Pain:  Pain Scale 1: Adult Nonverbal Pain Scale  Pain Intensity 1: 0  Pain Onset 1: chronic  Pain Location 1: Generalized  Pain Orientation 1: Lower  Pain Description 1: Aching, Constant  Pain Intervention(s) 1: Rest, Repositioned, Emotional support, Environmental changes  Last bowel movement:     Constitutional: obese, alert, intubated  Eyes: pupils equal, anicteric  ENMT: no nasal discharge, moist mucous membranes  Cardiovascular: regular rhythm, distal pulses intact  Respiratory: breathing not labored, symmetric  Gastrointestinal: soft non-tender, +bowel sounds  Musculoskeletal: no deformity, no tenderness to palpation  Skin: warm, dry  Neurologic:  Following some commands  Psychiatric:   Other:       HISTORY:     Principal Problem:    Sepsis ([address] (2/11/2018)    Active Problems:    Osteoarthritis of right knee (1/18/2014)      Hypokalemia (2/11/2018)      Glucosuria (2/11/2018)      AMANDA (acute kidney injury) ([address] (2/11/2018)      Fall (2/11/2018)      Back pain (2/11/2018)      Weakness of right lower extremity (2/11/2018)      Anemia (2/11/2018)      Shock ([address] (2/13/2018)      Acute respiratory failure ([address] (2/13/2018)      Elevated troponin (2/12/2018)      Demand ischemia of myocardium ([address] (2/12/2018)      MRSA bacteremia (2/15/2018)      Past Medical History:   Diagnosis Date    Arrhythmia     irregular heart beat    Arthritis     knees- osteo    Chronic pain     knees    Diabetes (HCC) 1990's    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[address] 10/21/2014    GERD (gastroesophageal reflux disease)     well controlled    Gout     High cholesterol     Hypertension 1990's    Irregular heart beat     palpitations    Morbid obesity ([address]     Osteoarthritis of right knee 1/18/2014    Other ill-defined conditions(799.89)     asbestosis    Other ill-defined conditions(799.89)     irregular heart beat    Other ill-defined conditions(799.89)     h/o migraines    Other ill-defined conditions(799.89)     gout    Other ill-defined conditions(799.89)     cholesterol    PUD (peptic ulcer disease) 1970's    Tinnitus of left ear       Past Surgical History:   Procedure Laterality Date    HX CARPAL TUNNEL RELEASE      HX KNEE ARTHROSCOPY  2010    left    HX KNEE ARTHROSCOPY  1980s    right    HX KNEE REPLACEMENT      HX ORTHOPAEDIC  1970s    right hand tendenitis    HX ORTHOPAEDIC  2012    left hand ring finger released    HX ORTHOPAEDIC  8-2013    left carpal    TOTAL KNEE ARTHROPLASTY  2009/2010    left      No family history on file. History reviewed, no pertinent family history.   Social History   Substance Use Topics    Smoking status: Former Smoker     Quit date: 10/1/1975    Smokeless tobacco: Never Used      Comment: 1970s    Alcohol use 1.5 oz/week     3 Shots of liquor per week     Allergies   Allergen Reactions    Bees [Hymenoptera Allergenic Extract] Anaphylaxis    Cocoa Butter-Zinc Oxide Rash    Dilaudid [Hydromorphone (Bulk)] Rash     capsules    Pcn [Penicillins] Rash      Current Facility-Administered Medications   Medication Dose Route Frequency    potassium chloride 10 mEq in 100 ml IVPB  10 mEq IntraVENous Q1H    insulin glargine (LANTUS) injection 12 Units  12 Units SubCUTAneous DAILY WITH LUNCH    LORazepam (ATIVAN) 1 mg/mL in 0.9% sodium chloride 60 mL infusion  0-0.03 mg/kg/hr IntraVENous TITRATE    0.9% sodium chloride infusion 250 mL  250 mL IntraVENous PRN    [START ON 3/1/2018] amiodarone (CORDARONE) tablet 200 mg  200 mg Oral DAILY    Thiamine Mononitrate (B-1) tablet 100 mg  100 mg Oral DAILY    furosemide (LASIX) injection 40 mg  40 mg IntraVENous DAILY    fentaNYL citrate (PF) injection  mcg   mcg IntraVENous Q4H PRN    metoprolol tartrate (LOPRESSOR) tablet 50 mg  50 mg Oral Q12H    enoxaparin (LOVENOX) injection 150 mg  150 mg SubCUTAneous Q12H    amiodarone (CORDARONE) tablet 400 mg  400 mg Oral Q12H    cefTARoline (TEFLARO) 600 mg in 0.9% sodium chloride (MBP/ADV) 50 mL MBP  600 mg IntraVENous Q12H    ipratropium (ATROVENT) 0.02 % nebulizer solution 0.5 mg  0.5 mg Nebulization Q6H RT    insulin lispro (HUMALOG) injection 5 Units  5 Units SubCUTAneous Q6H    ELECTROLYTE REPLACEMENT PROTOCOL -- Potassium  1 Each Other PRN    ELECTROLYTE REPLACEMENT PROTOCOL -- Magnesium  1 Each Other PRN    ELECTROLYTE REPLACEMENT PROTOCOL -- Phosphorus  1 Each Other PRN    ELECTROLYTE REPLACEMENT PROTOCOL -- Calcium  1 Each Other PRN    chlorhexidine (PERIDEX) 0.12 % mouthwash 10 mL  10 mL Oral Q12H    acetaminophen (TYLENOL) suppository 650 mg  650 mg Rectal Q6H PRN    hydrALAZINE (APRESOLINE) 20 mg/mL injection 10 mg  10 mg IntraVENous Q6H PRN    0.9% sodium chloride infusion 250 mL  250 mL IntraVENous PRN    heparin (porcine) 1,000 unit/mL injection 1,500 Units  1,500 Units IntraVENous PRN    acetaminophen (TYLENOL) solution 650 mg  650 mg Oral Q4H PRN    aspirin chewable tablet 81 mg  81 mg Oral DAILY    pantoprazole (PROTONIX) 40 mg in sodium chloride 0.9 % 10 mL injection  40 mg IntraVENous DAILY    naloxone (NARCAN) injection 0.4 mg  0.4 mg IntraVENous PRN    docusate sodium (COLACE) capsule 100 mg  100 mg Oral BID PRN    acetaminophen (TYLENOL) tablet 650 mg  650 mg Oral E6Y PRN    folic acid (FOLVITE) tablet 1 mg  1 mg Oral DAILY    insulin lispro (HUMALOG) injection   SubCUTAneous Q6H    sodium chloride (NS) flush 5-10 mL  5-10 mL IntraVENous PRN    ondansetron (ZOFRAN ODT) tablet 4 mg  4 mg Oral Q4H PRN    glucose chewable tablet 16 g  4 Tab Oral PRN    glucagon (GLUCAGEN) injection 1 mg  1 mg IntraMUSCular PRN    dextrose (D50W) injection syrg 12.5-25 g  25-50 mL IntraVENous PRN        LAB AND IMAGING FINDINGS:     Lab Results   Component Value Date/Time    WBC 6.7 02/26/2018 04:31 AM    HGB 8.0 (L) 02/26/2018 04:31 AM    PLATELET 271 19/40/9055 04:31 AM     Lab Results   Component Value Date/Time    Sodium 146 (H) 02/26/2018 04:31 AM    Potassium 3.0 (L) 02/26/2018 04:31 AM    Chloride 112 (H) 02/26/2018 04:31 AM    CO2 22 02/26/2018 04:31 AM    BUN 31 (H) 02/26/2018 04:31 AM    Creatinine 1.45 (H) 02/26/2018 04:31 AM    Calcium 9.5 02/26/2018 04:31 AM    Magnesium 1.8 02/26/2018 04:31 AM    Phosphorus 3.6 02/26/2018 04:31 AM      Lab Results   Component Value Date/Time    AST (SGOT) 23 02/26/2018 04:31 AM    Alk.  phosphatase 224 (H) 02/26/2018 04:31 AM    Protein, total 6.7 02/26/2018 04:31 AM    Albumin 1.8 (L) 02/26/2018 04:31 AM    Globulin 4.9 (H) 02/26/2018 04:31 AM     Lab Results   Component Value Date/Time    INR 1.1 02/19/2018 05:15 AM    Prothrombin time 13.5 02/19/2018 05:15 AM    aPTT 127.9 (H) 02/20/2018 02:10 PM      No results found for: IRON, FE, TIBC, IBCT, PSAT, FERR   No results found for: PH, PCO2, PO2  No components found for: [place]   Lab Results   Component Value Date/Time     02/18/2018 04:05 AM    CK - MB <0.5 (L) 08/02/2015 11:47 PM              Total time: 15  Counseling / coordination time, spent as noted above 12 min  > 50% counseling / coordination        Neha Johnson MD  Palliative Medicine

## 2018-02-26 NOTE — PROGRESS NOTES
1310--Patient placed on SBT by RT.     1404--SBT completed at this time. Will resume Ativan when gtt is available. Will give some Fentanyl IVP. Plan on placing PICC in next 30 minutes. 1507--Patient medicated with Fentanyl 100mcg for transport to Angio department for PICC line insertion. 1514--Patient transported to Angio. 1555--Patient transported back to ICU 7.     1915--Bedside and Verbal shift change report given to Amberly Choe RN (oncoming nurse) by Robert Carrasco RN (offgoing nurse).  Report included the following information SBAR, Kardex, Intake/Output, MAR, Recent Results, Med Rec Status and Cardiac Rhythm SR.

## 2018-02-26 NOTE — PROGRESS NOTES
0752--Ativan stopped for sedation vacation. 0815--New IVLs 22g placed to R Chest and R Upper Arm. 0930--Patient had loose BM.     1201--Patient medicated with Ativan 1mg IVP for sedation. Will resume gtt once available. Patient has now received KCL 10mEq x3 doses IV and recheck due at 1600 along with a Mg+ recheck. 1219--Patient had loose BM. 1230--After turning and preforming incontinence care on this patient, it was noted by myself that he had what appeared to be TF in his RORO tubing. I preformed deep oral suction and did not get much out. I also preformed ET suction and did not get much out. I speak with Dr. Minnie Knight and he gave orders to hold TF for 4 hours and recheck residuals before resuming. CXR in AM per usual protocol. I did check placement via auscultation. Held noon dose of Lispro 5 units. 1449--Patient had another loose BM. 1452--Restarted Ativan gtt for sedation. 1556--Patient medicated with Ativan 1mg IVP for sedation and agitation. Gtt just restarted but patient was able to answer appropriately he was not comfortable. 1945--Bedside and Verbal shift change report given to Harley Jacome RN (oncoming nurse) by Guera Murcia RN (offgoing nurse).  Report included the following information SBAR, Kardex, Intake/Output, MAR, Recent Results, Med Rec Status and Cardiac Rhythm SR.

## 2018-02-27 NOTE — PROGRESS NOTES
ID progress Note    Radha Brady is a 57 y.o. male who is being seen on consult for antibiotic management for MRSA septic shock    Current  antibiotics:   ceftaroline 2/19/18       Past antibiotics:   Vancomycin 2/12 to 2/19   azactam 2/11 to 2/14  Levofloxacin  2/12  To 2/15       Impression:   Persistent  MRSA septic shock of Unclear source :resolves   2/22 blood cultures NG for 5 days   ( 2/11 , 2/13 2/14, 2/16, 2/18) blood cultures persistantly postiive with MRSA   TERRY negative for vegetation or mass , repeat TTE on 2/18 show no obvious valvular vegetation  CT neck, chest/abd and pelvis and BL LE did not show occult abscess or infection   R knee arthroplasty intact   MRI Thoracic and lumbar spine negative for discitis or abscess   Abscess localization scan 2/21 shows no hidden source of infection     MRSA vancomycin DIMITRIS of 1 and 0.5       Acute respiratory failure s/p reintubation 2/18 /19  Awaiting for trach and peg placement     Bibasilar pneumonia vs atelectasis and small L sided pleural effusion   Sputum cx with MRSA    BL LE small pustular lesions resolves     AMANDA with Rhabdomyolysis : resolving   started on HD on 2/14  Now resolved and good UOP  S/p TDC removal     Severe sepsis on admission resolves   Fever , tachycardia resolves  Thrombocytopenia resolves   PCN allergy : rash listed in system, tolerating ceftaroline   BL knee arthroplasty : not clinically suspected for infection       Plan:     Continue  iv ceftaroline  For 4 weeks from negative survillance blood culture date ( 2/22/18)   Last day of antibiotics will be 3/22/18   ceftroline will give lungs coverage for MRSA as well     Patient to be followed by out patient ID physician  Dr Reena Lentz ( [phone] ( office )   as out patient  On discharge  PICC line routine care will be done by home health/ out patient infusion center or rehab facility. D/C PICC at end of treatment with antibiotics, no exceptions.    CBC diff, BUN, Cr every Monday  while on antibiotics   Lab results to be faxed to dr Donnie Hernandez office ( fax  )  Pharmacy to adjust antibiotics if needed     Continue supportive care  We are watching pt closely for toxicities and side effects to abx and drug-drug interactions. We will adjust antibiotics upon the results of the pending tests and the clinical improvement of the patient. Dicussed with ICU team at bedside   Case discussed with: []Patient []Family [X]Nursing [ ]Case Management  [ ] Verlin Safe ]MD/Care team           Interval history       Remains intubated   Awake on vent   Afebrile   picc line placed on   Trach and peg to be placed         Review of Systems:   Interval notes, available laboratory, microbiology and radiographic data reviewed. Discussed with nursing  where appropriate. I d/w nursing staff and ICU team   Intubated. On [facility] Vent  Afebrile   Awake on vent   Other direct ROS were unobtainable due to pt's condition      Skin: negative for rash   HENT: negative for ear discharge   Eyes: negative for eye discharge   Respiratory: negative for hemoptysis   Gastointestinal: negative for diarrhea,  vomiting   Genitourinary: negative for hematuria   Hem/Lymph: negative for easy bleeding   Allergy/Imm: negative for hives   Neurological: negative for seizures                Physical Assessment:     VITAL SIGNS  Blood pressure 137/59, pulse 83, temperature 99 °F (37.2 °C), resp. rate 16, height 6' (1.829 m), weight 141.7 kg (312 lb 6.3 oz), SpO2 100 %.   Temp (24hrs), Av.1 °F (37.3 °C), Min:98.9 °F (37.2 °C), Max:99.5 °F (37.5 °C)      R UE picc line in place   Wilson in place - urine  With  Lissette color urine  FMS in place          CONSTITUTIONAL:    Intubated , awake on vent , nod to answer simple questions   PSYCH:   Judgement/insight - unobtainable due to pt's condition   Affect -  unobtainable due to pt's condition  EYES:    Conjunctivae normal   PERRL  ENMT:    Left exterior ear normal, right external ear normal, nose normal   Inspection lips - normal   Orally intubated   NECK:    Exam neck - no masses, normal symmetry, trachea midline   PULMONARY/CHEST WALL:   Resp Effort - No use of accessory muscles, no intercostal retractions   Breath sounds -  BL coarse BS   CARDIOVASCULAR:    Heart sounds  S1, S2 present    Intact pedal pulses by doppler   ABDOMINAL:    Appearance normal, soft, bowel sounds  Present    No tenderness   Liver/spleen - could not palpate organomegaly  GENITOURINARY: External genitalia - no ulcers, no drainage  , mcdonald cath in place   MUSCULOSKELETAL:   Gait  unobtainable due to pt's condition   Digits, nails - no clubbing, no cyanosis, no infection   RUE, LUE, RLE, LLE ROM -  unobtainable due to pt's condition .  No obvious joint swelling or erythema or warmth noted , BL knee incision intact and dry    Muscle strength  unobtainable due to pt's condition   Head and neck ROM -  unobtainable due to pt's condition  SKIN    Inspection - as above - BL LE inner thigh areas pustular lesions resolved    no ulcers, no crepitus, intact   Palpation - as above - otherwise no induration, no nodules, dry, warm  NEUROLOGICAL:  unobtainable due to pt's condition      MICROBIOLOGY DATA  All Micro Results     Procedure Component Value Units Date/Time    CULTURE, BLOOD [[id number]] Collected:  02/22/18 1105    Order Status:  Completed Specimen:  Blood from Blood Updated:  02/27/18 0630     Special Requests: left a/c peripheal     Culture result: NO GROWTH 5 DAYS       CULTURE, BLOOD [[id number]] Collected:  02/22/18 0926    Order Status:  Completed Specimen:  Blood from Blood Updated:  02/27/18 0630     Special Requests: left hand peripheal     Culture result: NO GROWTH 5 DAYS       CULTURE, BLOOD [[id number]]  (Abnormal)  (Susceptibility) Collected:  02/18/18 0405    Order Status:  Completed Specimen:  Whole Blood from Blood Updated:  02/20/18 0813     Special Requests: NO SPECIAL REQUESTS        Maggie Weathers STAIN         GRAM POSITIVE COCCI IN CLUSTERS AEROBIC BOTTLE              SMEAR CALLED TO AND CORRECTLY REPEATED BY: Chapincito Godinez RN ICU AT 2048 ON 02/18/18 BY AVELINO. Culture result:         AEROBIC BOTTLE **METHICILLIN RESISTANT STAPHYLOCOCCUS AUREUS** (A)      PATIENT IS A KNOWN MRSA       CULTURE, BLOOD [[id number]]  (Abnormal) Collected:  02/18/18 0400    Order Status:  Completed Specimen:  Whole Blood from Blood Updated:  02/20/18 0739     Special Requests: NO SPECIAL REQUESTS        GRAM STAIN         GRAM POSITIVE COCCI IN CLUSTERS AEROBIC BOTTLE              SMEAR CALLED TO AND CORRECTLY REPEATED BY: Javier Abad RN ICU TO JRW AT 7812 02/19/18              GRAM POSITIVE COCCI IN CLUSTERS ANAEROBIC BOTTLE              SMEAR CALLED TO AND CORRECTLY REPEATED BY: [facility] RN ICU TO 2001 \[facility] [place] [facility]\"" AT 5771 ON 2/19/18.      Culture result:         AEROBIC AND ANAEROBIC BOTTLES **METHICILLIN RESISTANT STAPHYLOCOCCUS AUREUS** (A)            For Susceptibility Refer to Culture  [id number]      CULTURE, BLOOD [[id number]]  (Abnormal) Collected:  02/16/18 0508    Order Status:  Completed Specimen:  Blood from Blood Updated:  02/19/18 0827     Special Requests: NO SPECIAL REQUESTS        GRAM STAIN         GRAM POSITIVE COCCI IN CLUSTERS AEROBIC BOTTLE              SMEAR CALLED TO AND CORRECTLY REPEATED BY: Shasta Feliciano RN ICU TO JRW AT 9441 02/17/18     Culture result:         AEROBIC BOTTLE **METHICILLIN RESISTANT STAPHYLOCOCCUS AUREUS** (A)            For Susceptibility Refer to Culture  [id number]      CULTURE, BLOOD [[id number]]  (Abnormal)  (Susceptibility) Collected:  02/16/18 0508    Order Status:  Completed Specimen:  Blood from Blood Updated:  02/19/18 0827     Special Requests: NO SPECIAL REQUESTS        GRAM STAIN         GRAM POSITIVE COCCI IN CLUSTERS AEROBIC BOTTLE              SMEAR CALLED TO AND CORRECTLY REPEATED BYMary [facility] AT 5529 02/17/18     Culture result:         AEROBIC BOTTLE **METHICILLIN RESISTANT STAPHYLOCOCCUS AUREUS** (A)      PATIENT IS A KNOWN MRSA       CULTURE, RESPIRATORY/SPUTUM/BRONCH Laverta Meuse STAIN [[id number]]  (Abnormal)  (Susceptibility) Collected:  02/13/18 0858    Order Status:  Completed Specimen:  Sputum from Sputum Updated:  02/19/18 0801     Special Requests: NO SPECIAL REQUESTS        GRAM STAIN 10-25 WBC/lpf         <10 EPI/lpf         MUCUS PRESENT         NO ORGANISMS SEEN        Culture result:         RARE **METHICILLIN RESISTANT STAPHYLOCOCCUS AUREUS** (A)              RARE CANDIDA DUBLINIENSIS (A)              FEW NORMAL RESPIRATORY MAYNOR      PATIENT IS A KNOWN MRSA       CULTURE, BLOOD [[id number]]  (Abnormal) Collected:  02/14/18 0915    Order Status:  Completed Specimen:  Whole Blood from Blood Updated:  02/17/18 0708     Special Requests: left femoral,     GRAM STAIN         GRAM POSITIVE COCCI IN CLUSTERS AEROBIC BOTTLE              SMEAR CALLED TO AND CORRECTLY REPEATED BY: Mike Moe RN ICU TO  AT 0606 ON [id number]     Culture result:         AEROBIC BOTTLE **METHICILLIN RESISTANT STAPHYLOCOCCUS AUREUS** (A)            For Susceptibility Refer to Culture  [id number]      CULTURE, BLOOD [[id number]]  (Abnormal)  (Susceptibility) Collected:  02/14/18 0920    Order Status:  Completed Specimen:  Whole Blood from Blood Updated:  02/17/18 0630     Special Requests: venipuncture     GRAM STAIN         GRAM POSITIVE COCCI IN CLUSTERS AEROBIC BOTTLE              SMEAR CALLED TO AND CORRECTLY REPEATED BY: Mike Moe RN ICU TO  AT 0152 ON [id number]              ANAEROBIC BOTTLE GRAM POSITIVE COCCI IN CLUSTERS              SMEAR CALLED TO AND CORRECTLY REPEATED BY: ramya plata icu to d on 02/16/18 at 0627     Culture result:         AEROBIC AND ANAEROBIC BOTTLES **METHICILLIN RESISTANT STAPHYLOCOCCUS AUREUS** (A)      PATIENT IS A KNOWN MRSA       CULTURE, BLOOD [[id number]] Collected:  02/16/18 0645    Order Status:  Canceled Specimen:  Blood from Blood     CULTURE, BLOOD [[id number]]  (Abnormal) Collected:  02/13/18 0315    Order Status:  Completed Specimen:  Whole Blood from Blood Updated:  02/15/18 0835     Special Requests: NO SPECIAL REQUESTS        GRAM STAIN         GRAM POSITIVE COCCI IN CLUSTERS AEROBIC BOTTLE              SMEAR CALLED TO AND CORRECTLY REPEATED BY: MICHELLE RN ICU ON [id number] AT 1700 BY DR     Culture result:         AEROBIC BOTTLE **METHICILLIN RESISTANT STAPHYLOCOCCUS AUREUS** (A)            For Susceptibility Refer to Culture  [id number]      CULTURE, BLOOD [[id number]]  (Abnormal)  (Susceptibility) Collected:  02/13/18 0310    Order Status:  Completed Specimen:  Whole Blood from Blood Updated:  02/15/18 0834     Special Requests: NO SPECIAL REQUESTS        GRAM STAIN         GRAM POSITIVE COCCI IN CLUSTERS AEROBIC BOTTLE              SMEAR CALLED TO AND CORRECTLY REPEATED BY: Toy Chun RN ICU ON [id number] AT [address] [facility]     Culture result:         AEROBIC BOTTLE **METHICILLIN RESISTANT STAPHYLOCOCCUS AUREUS** (A)      PATIENT IS A KNOWN MRSA       CULTURE, URINE [[id number]] Collected:  02/12/18 0900    Order Status:  Completed Specimen:  Urine from Wilson Specimen Updated:  02/14/18 0932     Special Requests: NO SPECIAL REQUESTS        Culture result: NO GROWTH 2 DAYS       CULTURE, BLOOD [[id number]]  (Abnormal)  (Susceptibility) Collected:  02/11/18 1846    Order Status:  Completed Specimen:  Blood from Blood Updated:  02/14/18 0837     Special Requests: NO SPECIAL REQUESTS        GRAM STAIN         GRAM POSITIVE COCCI IN CLUSTERS AEROBIC BOTTLE ANAEROBIC BOTTLE              SMEAR CALLED TO AND CORRECTLY REPEATED BY: Benny King RN ICU TO [facility] AT 1110 ON [id number]     Culture result:         AEROBIC AND ANAEROBIC BOTTLES **METHICILLIN RESISTANT STAPHYLOCOCCUS AUREUS** (A)            CALLED TO AND CORRECTLY REPEATED BY:  Andrey Burnett RN ICU TO 2001 [address] AT 0825 ON 2/14/18.       INFLUENZA A & B AG (RAPID TEST) [[id number]] Collected:  02/12/18 0211    Order Status:  Completed Specimen:  Nasopharyngeal from Nasal washing Updated:  02/12/18 0237     Influenza A Antigen NEGATIVE          A negative result does not exclude influenza virus infection, seasonal or H1N1 due to suboptimal sensitivity. If influenza is circulating in your community, a diagnosis of influenza should be considered based on a patients clinical presentation and empiric antiviral treatment should be considered, if indicated. Influenza B Antigen NEGATIVE            LAB DATA      CBC WITH AUTOMATED DIFF    Collection Time: 02/27/18  4:30 AM   Result Value Ref Range    WBC 7.5 4.6 - 13.2 K/uL    RBC 2.65 (L) 4.70 - 5.50 M/uL    HGB 8.0 (L) 13.0 - 16.0 g/dL    HCT 26.5 (L) 36.0 - 48.0 %    .0 (H) 74.0 - 97.0 FL    MCH 30.2 24.0 - 34.0 PG    MCHC 30.2 (L) 31.0 - 37.0 g/dL    RDW 19.3 (H) 11.6 - 14.5 %    PLATELET 555 (H) 801 - 420 K/uL    MPV 11.2 9.2 - 11.8 FL    NEUTROPHILS 60 40 - 73 %    LYMPHOCYTES 24 21 - 52 %    MONOCYTES 7 3 - 10 %    EOSINOPHILS 8 (H) 0 - 5 %    BASOPHILS 1 0 - 2 %    ABS. NEUTROPHILS 4.4 1.8 - 8.0 K/UL    ABS. LYMPHOCYTES 1.8 0.9 - 3.6 K/UL    ABS. MONOCYTES 0.6 0.05 - 1.2 K/UL    ABS. EOSINOPHILS 0.6 (H) 0.0 - 0.4 K/UL    ABS. BASOPHILS 0.1 (H) 0.0 - 0.06 K/UL    DF AUTOMATED         Lab Results   Component Value Date/Time     (H) 02/27/2018 04:30 AM    K 3.8 02/27/2018 04:30 AM     (H) 02/27/2018 04:30 AM    CO2 23 02/27/2018 04:30 AM    AGAP 11 02/27/2018 04:30 AM     (H) 02/27/2018 04:30 AM    BUN 30 (H) 02/27/2018 04:30 AM    CREA 1.38 (H) 02/27/2018 04:30 AM    GFRAA >60 02/27/2018 04:30 AM    GFRNA 52 (L) 02/27/2018 04:30 AM              IMAGING  2/27/18 CXR  Reviewed and official reports   Satisfactory position of ET tube with stable enteric line.     2. Moderate pulmonary hypoinflation with increased left hilar parenchymal  opacity and persistent bibasilar opacities.  Differential considerations would  include developing atelectasis superimposed upon unchanged bibasilar airspace  disease.     Current medications reviewed in EPIC      Current Facility-Administered Medications   Medication Dose Route Frequency    midazolam (VERSED) injection 2 mg  2 mg IntraVENous Q3H PRN    insulin lispro (HUMALOG) injection 3 Units  3 Units SubCUTAneous Q6H    insulin glargine (LANTUS) injection 10 Units  10 Units SubCUTAneous DAILY WITH LUNCH    0.9% sodium chloride infusion 250 mL  250 mL IntraVENous PRN    [START ON 3/1/2018] amiodarone (CORDARONE) tablet 200 mg  200 mg Oral DAILY    Thiamine Mononitrate (B-1) tablet 100 mg  100 mg Oral DAILY    furosemide (LASIX) injection 40 mg  40 mg IntraVENous DAILY    fentaNYL citrate (PF) injection  mcg   mcg IntraVENous Q4H PRN    metoprolol tartrate (LOPRESSOR) tablet 50 mg  50 mg Oral Q12H    enoxaparin (LOVENOX) injection 150 mg  150 mg SubCUTAneous Q12H    amiodarone (CORDARONE) tablet 400 mg  400 mg Oral Q12H    cefTARoline (TEFLARO) 600 mg in 0.9% sodium chloride (MBP/ADV) 50 mL MBP  600 mg IntraVENous Q12H    ipratropium (ATROVENT) 0.02 % nebulizer solution 0.5 mg  0.5 mg Nebulization Q6H RT    ELECTROLYTE REPLACEMENT PROTOCOL -- Potassium  1 Each Other PRN    ELECTROLYTE REPLACEMENT PROTOCOL -- Magnesium  1 Each Other PRN    ELECTROLYTE REPLACEMENT PROTOCOL -- Phosphorus  1 Each Other PRN    ELECTROLYTE REPLACEMENT PROTOCOL -- Calcium  1 Each Other PRN    chlorhexidine (PERIDEX) 0.12 % mouthwash 10 mL  10 mL Oral Q12H    acetaminophen (TYLENOL) suppository 650 mg  650 mg Rectal Q6H PRN    hydrALAZINE (APRESOLINE) 20 mg/mL injection 10 mg  10 mg IntraVENous Q6H PRN    0.9% sodium chloride infusion 250 mL  250 mL IntraVENous PRN    heparin (porcine) 1,000 unit/mL injection 1,500 Units  1,500 Units IntraVENous PRN    acetaminophen (TYLENOL) solution 650 mg  650 mg Oral Q4H PRN    aspirin chewable tablet 81 mg  81 mg Oral DAILY    pantoprazole (PROTONIX) 40 mg in sodium chloride 0.9 % 10 mL injection  40 mg IntraVENous DAILY    naloxone (NARCAN) injection 0.4 mg  0.4 mg IntraVENous PRN    docusate sodium (COLACE) capsule 100 mg  100 mg Oral BID PRN    acetaminophen (TYLENOL) tablet 650 mg  650 mg Oral Q3D PRN    folic acid (FOLVITE) tablet 1 mg  1 mg Oral DAILY    insulin lispro (HUMALOG) injection   SubCUTAneous Q6H    sodium chloride (NS) flush 5-10 mL  5-10 mL IntraVENous PRN    ondansetron (ZOFRAN ODT) tablet 4 mg  4 mg Oral Q4H PRN    glucose chewable tablet 16 g  4 Tab Oral PRN    glucagon (GLUCAGEN) injection 1 mg  1 mg IntraMUSCular PRN    dextrose (D50W) injection syrg 12.5-25 g  25-50 mL IntraVENous PRN           Medical Decision Making:     I reviewed and summarized data from old medical records and obtained history from other sources than patient. I reviewed laboratory tests, Radiology data, and diagnostic tests in the CPT medicine section. I discussed with the physicians and the nursed involved in this patient's care about this patient's current medical problems. I have  Discussed plan of care with providers and  nursing staff. The total visit duration was of 35  minutes of which more than 50% was spent in coordination of care and counseling (time spent with patient/family face to face, physical exam, reviewing microbiology data, laboratory results, radiographic data, speaking with physicians and nursing staff involved in this pt's care).

## 2018-02-27 NOTE — PROGRESS NOTES
Nephrology Progress note    Subjective:     Indu Jay is a 57 y.o. male with history of hypertension, diabetes, hyperlipidemia, gout and arthritis who presented with a fever and multiple falls. On initial evaluation, he had borderline low blood pressure, and normal white cell count, but elevated creatinine at 2.5. Blood culture was positive and started on broad spectrum antibiotics, on IV fluid and IV pressors. A culture finally came back pos for MRSA. His preadmission baseline creatinine back in 2015 was 1.2. On admission, it was 2.5. It continued to increase and it jumped up to 4.06. Of note, the patient also had slightly elevated CPK level on admission, which is trending down now. CAT scan did not show any significant finding as a possible source. Echocardiography was negative for valvular vegetation. Patient is currently intubated in ICU on pressors. He is also sedated. He was initiated on HD 2/14 and repeated 2/15. Now off HD    - Remains on vent. Responding well to Lasix.  S Cr down and stable at 1.3-1.4       Admit Date: 2/11/2018    Allergy:  Allergies   Allergen Reactions    Bees [Hymenoptera Allergenic Extract] Anaphylaxis    Cocoa Butter-Zinc Oxide Rash    Dilaudid [Hydromorphone (Bulk)] Rash     capsules    Pcn [Penicillins] Rash        Objective:     Visit Vitals    /60    Pulse 78    Temp 99.5 °F (37.5 °C)    Resp 28    Ht 6' (1.829 m)    Wt 141.7 kg (312 lb 6.3 oz)    SpO2 96%    BMI 42.37 kg/m2         Intake/Output Summary (Last 24 hours) at 02/26/18 2110  Last data filed at 02/26/18 1859   Gross per 24 hour   Intake          1838.23 ml   Output             2725 ml   Net          -886.77 ml       Physical Exam:       General: On Vent   HENT: Atraumatic and normocephalic   Eyes: Normal conjunctiva   Neck: Supple    Cardiovascular: Normal S1 & S2, no m/r/g   Pulmonary/Chest Wall: Clear to auscultation bilaterally   Abdominal: Soft and non-tender   Musculoskeletal: ++ edema both upper extremities   Neurological: Awake       Data Review:      Lab Results   Component Value Date/Time    WBC 6.7 02/26/2018 04:31 AM    RBC 2.64 (L) 02/26/2018 04:31 AM    HCT 26.0 (L) 02/26/2018 04:31 AM    MCV 98.5 (H) 02/26/2018 04:31 AM    MCH 30.3 02/26/2018 04:31 AM    MCHC 30.8 (L) 02/26/2018 04:31 AM    RDW 19.6 (H) 02/26/2018 04:31 AM      No results found for: IRONNo components found for: FERRITIN  No components found for: PTHINT  Urinalysis  No results found for: UGLU        Impression:     - Acute kidney injury, likely acute tubular necrosis from septic shock. Improving with Cr down to 1.3-1.4, from peak of 4.06. Patient was oliguric and had HD 2/14, x2. Now with good Urine output on Lasx.   - MRSA septic shock, unclear source, improving.    - Anasarca, on Lasix   - Hypophosphatemia, corrected. - Hypokalemia, repleted  - Acidosis, mixed respiratory and metabolic, improved. - Mild rhabdomyolysis, improved. - Anemia. - Thrombocytopenia, resolved  - Diabetes mellitus.   - Morbid obesity.   - Respiratory failure, intubated and remains on MV.       Plan:   - Continue current dose of IV Lasix  - Replace electrolytes per protocol   - Continue supportive care, Vent, antibiotics,Nutrition  - Weaning vent per Pulmonologist  - Continue to Monitor I/0's  - Continue current BP meds         MD Blas Desir  [phone]

## 2018-02-27 NOTE — ROUTINE PROCESS
Bedside, Verbal and Written shift change report given to YUSEF Mobley (oncoming nurse) by Sarthak Corral (offgoing nurse). Report included the following information SBAR, Kardex, Intake/Output and Recent Results. Assumed care of pt at this time, pt opens eyes to verbal commands, intubated with out sedation at this time, remains on telemetry monitor, mcdonald in place, FMS in place, PICC line KATERINA, OG with tube feeding. 0800  Wean trial at this time, per Dr. Humaira Bach  0930  Scheduled med's given, Pt tolerated well, VSS,  91 21 06  Radiology, OG tube needs to be advanced 4-5cm. 1300    Bedside, Verbal and Written shift change report given to CALEB Butterfield RN (oncoming nurse) by Monika Mobley (offgoing nurse). Report included the following information SBAR, Kardex, Intake/Output and Recent Results.

## 2018-02-27 NOTE — PROGRESS NOTES
Updated wife at bedside  Discussed medical condition and high risk for re-intubation due to multiple factors; she is agreeable for trach and peg tube placement for prolonged weaning support. Meryle Nutley Dr Dagoberto Rathke and ENT Dr Shahzad Montana.     Shahana Velasquez MD

## 2018-02-27 NOTE — PROGRESS NOTES
Nephrology Progress note    Subjective:     Amrita Mcnamara is a 57 y.o. male with history of hypertension, diabetes, hyperlipidemia, gout and arthritis who presented with a fever and multiple falls. On initial evaluation, he had borderline low blood pressure, and normal white cell count, but elevated creatinine at 2.5. Blood culture was positive and started on broad spectrum antibiotics, on IV fluid and IV pressors. A culture finally came back pos for MRSA. His preadmission baseline creatinine back in 2015 was 1.2. On admission, it was 2.5. It continued to increase and it jumped up to 4.06. Of note, the patient also had slightly elevated CPK level on admission, which is trending down now. CAT scan did not show any significant finding as a possible source. Echocardiography was negative for valvular vegetation. Patient is currently intubated in ICU on pressors. He is also sedated. He was initiated on HD 2/14 and repeated 2/15. Now off HD    - Remains on vent. Responding well to Lasix.  S Cr down and stable at 1.3-1.4       Admit Date: 2/11/2018    Allergy:  Allergies   Allergen Reactions    Bees [Hymenoptera Allergenic Extract] Anaphylaxis    Cocoa Butter-Zinc Oxide Rash    Dilaudid [Hydromorphone (Bulk)] Rash     capsules    Pcn [Penicillins] Rash        Objective:     Visit Vitals    /72    Pulse 71    Temp 99.1 °F (37.3 °C)    Resp 30    Ht 6' (1.829 m)    Wt 141.7 kg (312 lb 6.3 oz)    SpO2 98%    BMI 42.37 kg/m2         Intake/Output Summary (Last 24 hours) at 02/27/18 1708  Last data filed at 02/27/18 0800   Gross per 24 hour   Intake           914.77 ml   Output             1275 ml   Net          -360.23 ml       Physical Exam:       General: On Vent   HENT: Atraumatic and normocephalic   Eyes: Normal conjunctiva   Neck: Supple    Cardiovascular: Normal S1 & S2, no m/r/g   Pulmonary/Chest Wall: Clear to auscultation bilaterally   Abdominal: Soft and non-tender   Musculoskeletal: ++ edema both upper extremities   Neurological: Awake       Data Review:      Lab Results   Component Value Date/Time    WBC 7.5 02/27/2018 04:30 AM    RBC 2.65 (L) 02/27/2018 04:30 AM    HCT 26.5 (L) 02/27/2018 04:30 AM    .0 (H) 02/27/2018 04:30 AM    MCH 30.2 02/27/2018 04:30 AM    MCHC 30.2 (L) 02/27/2018 04:30 AM    RDW 19.3 (H) 02/27/2018 04:30 AM      No results found for: IRONNo components found for: FERRITIN  No components found for: PTHINT  Urinalysis  No results found for: UGLU        Impression:     - Acute kidney injury, likely acute tubular necrosis from septic shock. Improving with Cr down to 1.3-1.4, from peak of 4.06. Patient was oliguric and had HD 2/14, x2. Now with good Urine output on Lasx.   - MRSA septic shock, unclear source, improving.    - Anasarca, on Lasix   - Hypernatremia, diuresis induced   - Hypophosphatemia, corrected. - Hypokalemia, repleted  - Acidosis, mixed respiratory and metabolic, improved. - Mild rhabdomyolysis, improved. - Anemia. - Thrombocytopenia, resolved  - Diabetes mellitus. - Morbid obesity.   - Respiratory failure, intubated and remains on MV.       Plan:   - Continue IV Lasix.  If hypernatremia worse, might need to decrease the dose   - Increase Free water thru NG to 150cc q4  - Replace electrolytes per protocol   - Continue supportive care, Vent, antibiotics,Nutrition  - Weaning vent per Pulmonologist  - Continue to Monitor I/0's  - Plan for peg and trach in am        MD Blas Moore  [phone]

## 2018-02-27 NOTE — ROUTINE PROCESS
Bedside, Verbal and Written shift change report given to YUSEF Scott (oncoming nurse) by Reyna Scott  (offgoing nurse). Report included the following information SBAR, Kardex, Intake/Output and Recent Results. Assumed care of pt at this time, pt opens eyes to verbal commands, follows commands, remain vented with sedation, remains on telemtry monitor, OG tube with tube feeding, FMS, mcdonald. PICC line KATERINA. 2100  Scheduled med's given, pt tolerated well.      2200  Scheduled med's given, VSS, repositioned. 2315  Bedside, Verbal and Written shift change report given to Emelyn Montana (oncoming nurse) by YUSEF Scott (offgoing nurse). Report included the following information SBAR, Kardex, Intake/Output and Recent Results.

## 2018-02-27 NOTE — PROGRESS NOTES
2300-Report received from Inessa Lou RN. Sbar, mar, labs, kardex and patient status received. Assumed care of patient. 0600-Ativan drip held at this time for SBT. Appears comfortable at this time. Held Mealtime insulin due to possibility of extubation later this morning and will no longer have tube feeding and will be NPO. Replaced Mg and K per protocol. Orders for recheck placed. 0700-Report given to Inessa Lou RN. Sbar, mar, labs, kardex and patient status reviewed.

## 2018-02-27 NOTE — PROGRESS NOTES
Hospitalist Progress Note    Patient: Concepción Love MRN: [id number]  CSN: [id number]    YOB: 1955  Age: 57 y.o. Sex: male    DOA: 2/11/2018 LOS:  LOS: 15 days                Assessment/Plan     Patient Active Problem List   Diagnosis Code    Osteoarthritis of right knee M17.11    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 [address] A41.9    Hypokalemia E87.6    Glucosuria R81    AMANDA (acute kidney injury) ([facility] [address] N17.9    Fall W19. XXXA    Back pain M54.9    Weakness of right lower extremity R29.898    Anemia D64.9    Shock (HCC) R57.9    Acute respiratory failure ([facility]) J96.00    Elevated troponin R74.8    Demand ischemia of myocardium ([facility]) I24.8    MRSA bacteremia R65.80        58 yo male admitted for pneumonia, respiratory distress. RRT called on this patient for respiratory distress in morning of 02/12/2018, high mews score of 8, fever of 103, tachycardia, resp rate of 44. He was transferred to ICU. Patient continued to decline in condition with elevated resp rate. He was intubated and central line placed. CRITICAL CARE PLAN         Resp -   Acute respiratory failure - extubated 02/16/2018. Patient reintubated 02/17/2018. Discussed with Dr. Ulises Russo, plan for trach.     ID -   Sepsis unclear source of infection. Recent pneumonia. Persistent bacteremia   Blood cultures 02/11/2018 positive for MRSA. blood cx 2/2 02/13/2018 MRSA. blood cultures 2/2 02/14/2018 MRSA. blood cultures 2/2 02/16/2018 MRSA  blood cultures 2/2 02/18/2018 MRSA  blood cultures 02/22/2018 no growth to date.   resp cultures 02/13/2018 MRSA  Urine culture 02/12/2018 no growth  CT chest Multifocal peripheral bilateral subpleural nodular densities the largest 15  mm right apex, which may represent peripheral areas of pneumonia or other  inflammatory etiology, although differential diagnosis includes less likely  metastatic disease or infarcts from pulmonary emboli. CT of ext and neck with no source of infection. No MRI evidence of osteomyelitis and discitis. S/p TERRY with no evidence of vegetations. ANTIBIOTICS not responding to vancomycin, changed to teflaro. Wbc scan 02/22 No focal evidence of blood cell accumulation to suggest a site of active  Infection  ID following. Recommend teflaro till 03/22/2018     [facility] - Monitor HD. Septic shock - off pressors  A-fib - Off amiodarone drip started on oral amiodarone. S/p cardioversion. Converted to sinus rhythm. On therapeutic lovenox. Elevated troponin - demand ischemia vs NSTEMI. Will need ischemia work up when stable per cardiology.       Heme/onc - Follow H&H, plts.      Renal -   AMANDA - improving, nephrology following. Metabolic acidosis - improved with bicarb drip  follow I/O. Check and replace Mg, K, phos.     Endocrine -  Follow FSG  TSH in normal range. DM - on lantus and SSI     Neuro/ Pain/ Sedation - sedation bundle  Head CT negative     GI - NPO, tube feeding. Plan for PEG.    Prophylaxis - DVT: lovenox, GI: protonix.                Disposition : TBD    Physical Exam:  General: As above  HEENT: NC, Atraumatic. PERRLA, anicteric sclerae. Lungs: CTA Bilaterally. No Wheezing/Rhonchi/Rales.   Heart:  Regular  rhythm,  No murmur, No Rubs, No Gallops  Abdomen: Soft, Non distended, Non tender.  +Bowel sounds,   Extremities: Edema upper and LE bilaterally          Vital signs/Intake and Output:  Visit Vitals    /72    Pulse 71    Temp 99.1 °F (37.3 °C)    Resp 30    Ht 6' (1.829 m)    Wt 141.7 kg (312 lb 6.3 oz)    SpO2 98%    BMI 42.37 kg/m2     Current Shift:  02/27 0701 - 02/27 1900  In: 100   Out: -   Last three shifts:  02/25 1901 - 02/27 0700  In: 2221.3 [I.V.:717.3]  Out: 3572 [Urine:2300; Drains:725]            Labs: Results:       Chemistry Recent Labs      02/27/18   1204  02/27/18   0430  02/26/18   1828  02/26/18   0431 GLU   --   118*   --   108*   NA   --   149*   --   146*   K  3.2*  3.8  3.0*  3.0*   CL   --   115*   --   112*   CO2   --   23   --   22   BUN   --   30*   --   31*   CREA   --   1.38*   --   1.45*   CA   --   8.9   --   9.5   AGAP   --   11   --   12   BUCR   --   22*   --   21*   AP   --   226*   --   224*   TP   --   6.3*   --   6.7   ALB   --   2.0*   --   1.8*   GLOB   --   4.3*   --   4.9*   AGRAT   --   0.5*   --   0.4*      CBC w/Diff Recent Labs      02/27/18   0430 02/26/18   0431 02/25/18   0439   WBC  7.5  6.7  7.9   RBC  2.65*  2.64*  2.70*   HGB  8.0*  8.0*  8.1*   HCT  26.5*  26.0*  26.4*   PLT  430*  419  419   GRANS  60  65  61   LYMPH  24  21  25   EOS  8*  6*  6*      Cardiac Enzymes No results for input(s): CPK, CKND1, LESLIE in the last 72 hours. No lab exists for component: CKRMB, TROIP   Coagulation No results for input(s): PTP, INR, APTT in the last 72 hours. No lab exists for component: INREXT, INREXT    Lipid Panel Lab Results   Component Value Date/Time    Cholesterol, total 116 02/11/2018 08:45 PM    HDL Cholesterol 27 (L) 02/11/2018 08:45 PM    LDL, calculated 57.4 02/11/2018 08:45 PM    VLDL, calculated 31.6 02/11/2018 08:45 PM    Triglyceride 278 (H) 02/19/2018 05:15 AM    CHOL/HDL Ratio 4.3 02/11/2018 08:45 PM      BNP No results for input(s): BNPP in the last 72 hours.    Liver Enzymes Recent Labs      02/27/18   0430   TP  6.3*   ALB  2.0*   AP  226*   SGOT  20      Thyroid Studies Lab Results   Component Value Date/Time    TSH 0.43 02/12/2018 10:05 AM        Procedures/imaging: see electronic medical records for all procedures/Xrays and details which were not copied into this note but were reviewed prior to creation of Plan

## 2018-02-27 NOTE — PROGRESS NOTES
[facility]  Pulmonary Critical Care & Sleep Medicine       Name: Percy Patel MRN: [id number]   : 1955 Hospital: [facility] [place]   Date: 2018        [facility] note    History:   Percy Patel is a 57 y.o. male who came to the ed with complaints of fall. Patient states he has been feeling generalized weakness at home for the last day or so with low grade subjective fevers but he noted RLE weakness around 5pm. Since then he has fallen about 4 times in his back without any head trauma or LOC. He has been using his tripod cane to assist him with walking. Recently about 1 month a go treated for pna at office. Ex smoker. Drinks about 2 glasses of mix drinks every day. Recently lost his son to multiorgan failure and sepsis. Due to persistence of his weakness, falls, fevers and low back pain he decided to come to the ED. In the ED, CT of the head was neg and his labs showed significant hypoK with elevated Cr (unknown baseline). He was also febrile and tachycardiac. He was started on broad spectrum Abx.  Overnight RRT called out after he was found to be tachycardic, tachypneic and ABG showed alkalosis and was transferred to ICU      Subjective:  18  Patient on vent, sedated  Afebrile; BP stable - not on pressors  Tele SR    UOP 1.9 lits yesterday    Current Facility-Administered Medications   Medication Dose Route Frequency    insulin lispro (HUMALOG) injection 3 Units  3 Units SubCUTAneous Q6H    insulin glargine (LANTUS) injection 10 Units  10 Units SubCUTAneous DAILY WITH LUNCH    LORazepam (ATIVAN) 1 mg/mL in 0.9% sodium chloride 60 mL infusion  0-0.03 mg/kg/hr IntraVENous TITRATE    [START ON 3/1/2018] amiodarone (CORDARONE) tablet 200 mg  200 mg Oral DAILY    Thiamine Mononitrate (B-1) tablet 100 mg  100 mg Oral DAILY    furosemide (LASIX) injection 40 mg  40 mg IntraVENous DAILY    metoprolol tartrate (LOPRESSOR) tablet 50 mg  50 mg Oral Q12H    enoxaparin (LOVENOX) injection 150 mg  150 mg SubCUTAneous Q12H    amiodarone (CORDARONE) tablet 400 mg  400 mg Oral Q12H    cefTARoline (TEFLARO) 600 mg in 0.9% sodium chloride (MBP/ADV) 50 mL MBP  600 mg IntraVENous Q12H    ipratropium (ATROVENT) 0.02 % nebulizer solution 0.5 mg  0.5 mg Nebulization Q6H RT    chlorhexidine (PERIDEX) 0.12 % mouthwash 10 mL  10 mL Oral Q12H    aspirin chewable tablet 81 mg  81 mg Oral DAILY    pantoprazole (PROTONIX) 40 mg in sodium chloride 0.9 % 10 mL injection  40 mg IntraVENous DAILY    folic acid (FOLVITE) tablet 1 mg  1 mg Oral DAILY    insulin lispro (HUMALOG) injection   SubCUTAneous Q6H     Review of Systems:  Intubated sedated   Review of systems not obtained due to patient factors. Objective:     Vital Signs:    Blood pressure 137/59, pulse 83, temperature 99 °F (37.2 °C), resp. rate 16, height 6' (1.829 m), weight 141.7 kg (312 lb 6.3 oz), SpO2 100 %. Body mass index is 42.37 kg/(m^2).     O2 Device: Ventilator   O2 Flow Rate (L/min): 6 l/min   Temp (24hrs), Av.1 °F (37.3 °C), Min:98.9 °F (37.2 °C), Max:99.5 °F (37.5 °C)       Intake/Output:   Last shift:         Last 3 shifts:  1901 -  0700  In: 2221.3 [I.V.:717.3]  Out: 5381 [Urine:2300; Drains:725]    Intake/Output Summary (Last 24 hours) at 18 0921  Last data filed at 18 0009   Gross per 24 hour   Intake          1324.77 ml   Output             2275 ml   Net          -950.23 ml     ABG:    Lab Results   Component Value Date/Time    PHI 7.480 (H) 2018 05:14 AM    PCO2I 29.9 (L) 2018 05:14 AM    PO2I 72 (L) 2018 05:14 AM    HCO3I 22.3 2018 05:14 AM    FIO2I 0.35 2018 05:14 AM     Ventilator Mode: Assist control, VC+  Respiratory Rate  Resp Rate Observed: 26  Back-Up Rate: 14  Insp Time (sec): 0.9 sec  Insp Flow (l/min): 60 l/min  I:E Ratio: 1:3.77  Ventilator Volumes  Vt Set (ml): 420 ml  Vt Exhaled (Machine Breath) (ml): 407 ml  Vt Spont (ml): 440 ml  Ve Observed (l/min): 13.3 l/min  Ventilator Pressures  Pressure Support (cm H2O): 7 cm H2O  PIP Observed (cm H2O): 19 cm H2O  Plateau Pressure (cm H2O): 16 cm H2O  MAP (cm H2O): 11  PEEP/VENT (cm H2O): 5 cm H20      Physical Exam:  General: in no respiratory distress and acyanotic, appears older than stated age, opens eyes and follows simple commands, on ventilator  HEENT: BECKY, orally intubated  Neck: No abnormally enlarged lymph nodes or thyroid, supple  Chest: normal  Lungs: moderate air entry bilateral; decreased BS bases; no wheezing; normal respirations and chest movements  Heart: Regular rate and rhythm, S1S2 present or without murmur or extra heart sounds  Abdomen: obese, bowel sounds normoactive, tympanic, abdomen is soft without significant tenderness, distension, masses, organomegaly or guarding  Extremity: Trace, pitting and bl ankle edema, negative for cyanosis, clubbing. Neuro: sedated, moves all extremities well, no involuntary movements, exam limitations  Skin: Skin color, texture, turgor fair.  Leg bl folliculitis rashes or lesions unchanged    LABS  Recent Results (from the past 12 hour(s))   GLUCOSE, POC    Collection Time: 02/26/18 11:17 PM   Result Value Ref Range    Glucose (POC) 106 70 - 110 mg/dL   MAGNESIUM    Collection Time: 02/27/18  4:30 AM   Result Value Ref Range    Magnesium 1.6 1.6 - 2.6 mg/dL   PHOSPHORUS    Collection Time: 02/27/18  4:30 AM   Result Value Ref Range    Phosphorus 3.5 2.5 - 4.9 MG/DL   CBC WITH AUTOMATED DIFF    Collection Time: 02/27/18  4:30 AM   Result Value Ref Range    WBC 7.5 4.6 - 13.2 K/uL    RBC 2.65 (L) 4.70 - 5.50 M/uL    HGB 8.0 (L) 13.0 - 16.0 g/dL    HCT 26.5 (L) 36.0 - 48.0 %    .0 (H) 74.0 - 97.0 FL    MCH 30.2 24.0 - 34.0 PG    MCHC 30.2 (L) 31.0 - 37.0 g/dL    RDW 19.3 (H) 11.6 - 14.5 %    PLATELET 985 (H) 255 - 420 K/uL    MPV 11.2 9.2 - 11.8 FL    NEUTROPHILS 60 40 - 73 %    LYMPHOCYTES 24 21 - 52 %    MONOCYTES 7 3 - 10 %    EOSINOPHILS 8 (H) 0 - 5 % BASOPHILS 1 0 - 2 %    ABS. NEUTROPHILS 4.4 1.8 - 8.0 K/UL    ABS. LYMPHOCYTES 1.8 0.9 - 3.6 K/UL    ABS. MONOCYTES 0.6 0.05 - 1.2 K/UL    ABS. EOSINOPHILS 0.6 (H) 0.0 - 0.4 K/UL    ABS. BASOPHILS 0.1 (H) 0.0 - 0.06 K/UL    DF AUTOMATED     CALCIUM, IONIZED    Collection Time: 02/27/18  4:30 AM   Result Value Ref Range    Ionized Calcium 1.314 1.12 - 0.22 MMOL/L   METABOLIC PANEL, COMPREHENSIVE    Collection Time: 02/27/18  4:30 AM   Result Value Ref Range    Sodium 149 (H) 136 - 145 mmol/L    Potassium 3.8 3.5 - 5.5 mmol/L    Chloride 115 (H) 100 - 108 mmol/L    CO2 23 21 - 32 mmol/L    Anion gap 11 3.0 - 18 mmol/L    Glucose 118 (H) 74 - 99 mg/dL    BUN 30 (H) 7.0 - 18 MG/DL    Creatinine 1.38 (H) 0.6 - 1.3 MG/DL    BUN/Creatinine ratio 22 (H) 12 - 20      GFR est AA >60 >60 ml/min/1.73m2    GFR est non-AA 52 (L) >60 ml/min/1.73m2    Calcium 8.9 8.5 - 10.1 MG/DL    Bilirubin, total 0.7 0.2 - 1.0 MG/DL    ALT (SGPT) 20 16 - 61 U/L    AST (SGOT) 20 15 - 37 U/L    Alk. phosphatase 226 (H) 45 - 117 U/L    Protein, total 6.3 (L) 6.4 - 8.2 g/dL    Albumin 2.0 (L) 3.4 - 5.0 g/dL    Globulin 4.3 (H) 2.0 - 4.0 g/dL    A-G Ratio 0.5 (L) 0.8 - 1.7     GLUCOSE, POC    Collection Time: 02/27/18  5:11 AM   Result Value Ref Range    Glucose (POC) 116 (H) 70 - 110 mg/dL   POC G3    Collection Time: 02/27/18  5:14 AM   Result Value Ref Range    Device: VENT      FIO2 (POC) 0.35 %    pH (POC) 7.480 (H) 7.35 - 7.45      pCO2 (POC) 29.9 (L) 35.0 - 45.0 MMHG    pO2 (POC) 72 (L) 80 - 100 MMHG    HCO3 (POC) 22.3 22 - 26 MMOL/L    sO2 (POC) 96 92 - 97 %    Base deficit (POC) 1 mmol/L    Mode ASSIST CONTROL      Tidal volume 420 ml    Set Rate 14 bpm    PEEP/CPAP (POC) 5 cmH2O    Allens test (POC) YES      Inspiratory Time 0.9 sec    Total resp. rate 28      Site RIGHT RADIAL      Patient temp.  98.6      Specimen type (POC) ARTERIAL      Performed by Macario William     Volume control plus YES         CBC w/Diff Recent Labs      02/27/18 0430  02/26/18   0431  02/25/18   0439   WBC  7.5  6.7  7.9   RBC  2.65*  2.64*  2.70*   HGB  8.0*  8.0*  8.1*   HCT  26.5*  26.0*  26.4*   PLT  430*  419  419   GRANS  60  65  61   LYMPH  24  21  25   EOS  8*  6*  6*        Chemistry Recent Labs      02/27/18   0430  02/26/18   1828  02/26/18   0431   02/25/18 0439   GLU  118*   --   108*   --    --    NA  149*   --   146*   --    --    K  3.8  3.0*  3.0*   < >  3.4*   CL  115*   --   112*   --    --    CO2  23   --   22   --    --    BUN  30*   --   31*   --    --    CREA  1.38*   --   1.45*   --    --    CA  8.9   --   9.5   --    --    MG  1.6  1.7  1.8   < >  1.8   PHOS  3.5   --   3.6   --   3.8   AGAP  11   --   12   --    --    BUCR  22*   --   21*   --    --    AP  226*   --   224*   --    --    TP  6.3*   --   6.7   --    --    ALB  2.0*   --   1.8*   --    --    GLOB  4.3*   --   4.9*   --    --    AGRAT  0.5*   --   0.4*   --    --     < > = values in this interval not displayed. Lactic Acid Lactic acid   Date Value Ref Range Status   02/13/2018 1.8 0.4 - 2.0 MMOL/L Final     No results for input(s): LAC in the last 72 hours. Micro       2/22/18  Special Requests: left a/c peripheal  Preliminary     Culture result: NO GROWTH 2 DAYS Preliminary         ABG Recent Labs      02/27/18   0514  02/26/18   1403  02/26/18   0545   PHI  7.480*  7.509*  7.490*   PCO2I  29.9*  31.7*  31.2*   PO2I  72*  74*  79*   HCO3I  22.3  25.3  23.8   FIO2I  0.35  35  0.40        Liver Enzymes Protein, total   Date Value Ref Range Status   02/27/2018 6.3 (L) 6.4 - 8.2 g/dL Final     Albumin   Date Value Ref Range Status   02/27/2018 2.0 (L) 3.4 - 5.0 g/dL Final     Globulin   Date Value Ref Range Status   02/27/2018 4.3 (H) 2.0 - 4.0 g/dL Final     A-G Ratio   Date Value Ref Range Status   02/27/2018 0.5 (L) 0.8 - 1.7   Final     AST (SGOT)   Date Value Ref Range Status   02/27/2018 20 15 - 37 U/L Final     Alk.  phosphatase   Date Value Ref Range Status 02/27/2018 226 (H) 45 - 117 U/L Final     Recent Labs      02/27/18   0430  02/26/18   0431   TP  6.3*  6.7   ALB  2.0*  1.8*   GLOB  4.3*  4.9*   AGRAT  0.5*  0.4*   SGOT  20  23   AP  226*  224*        Cardiac Enzymes No results found for: CPK, CK, CKMMB, CKMB, RCK3, CKMBT, CKNDX, CKND1, LESLIE, TROPT, TROIQ, BELLA, TROPT, TNIPOC, BNP, BNPP     BNP No results found for: BNP, BNPP, XBNPT     Coagulation No results for input(s): PTP, INR, APTT in the last 72 hours. No lab exists for component: INREXT, INREXT      Thyroid  Lab Results   Component Value Date/Time    TSH 0.43 02/12/2018 10:05 AM          ECHO  2/12/18 [facility] SUMMARY:  Left ventricle: Systolic function was normal. Ejection fraction was estimated   in the range of 55 % to 60 %. There was  mild hypokinesis of the basal inferior wall(s). There was moderate concentric   hypertrophy. Right ventricle: The size was normal. Systolic function was normal.    Inferior vena cava, hepatic veins: The inferior vena cava was dilated. The   respirophasic change in diameter was more  than 50%. CTs LEs 2/17 - no source of infection  CT neck 2/17- Mild inflammatory changes within the maxillary sinuses which may be related to intubation. There is no large abscess or other findings to suggest an occult source of infection. CT abd/pelvis 2/17 - Small bilateral pleural effusions with underlying bibasilar dependent consolidation. The consolidation could represent atelectasis, aspiration, or pneumonia. Trace ascites. Additional chronic and incidental findings as above without other sites of potential infection identified. Ct chest/abd 2/12  IMPRESSION:   1. Mild areas of atelectasis and/or scarring lower lobes and lingula with very  small pleural effusions.   2. Multifocal peripheral bilateral subpleural nodular densities the largest 15  mm right apex, which may represent peripheral areas of pneumonia or other  inflammatory etiology, although differential diagnosis includes less likely  metastatic disease or infarcts from pulmonary emboli. Follow-up chest CT  recommended in one month following treatment. 3. Diffuse esophageal wall thickening most likely related to esophagitis. 4. Cholelithiasis without CT evidence of acute cholecystitis. 5. Dilated stomach and duodenum with air-fluid levels without evidence of  mechanical obstruction, most likely related to ileus. 6. Stable additional ancillary findings as above. CT head 2/11  IMPRESSION:  No acute intracranial abnormalities. 2/23/18  US ABD  1. Hepatomegaly with diffusely increased hepatic parenchymal echogenicity. While nonspecific, these findings can be seen in the context of underlying steatosis and/or hepatocellular disease. 2. No evidence of cholelithiasis. No intrahepatic or extrahepatic biliary ductal dilatation. 3. Limited visualization of the pancreas. 4. No hydronephrosis involving the right kidney. Simple appearing right renal cyst, unchanged. 5. Small right pleural effusion      2/2218  NM INFLAM SCAN  No focal evidence of blood cell accumulation to suggest a site of active infection. The presence of low level activity over the anterior abdomen, projecting inferior to the liver is likely secondary to the patient's overlying arm/hand. Uptake at this location is minimal, less than that of adjacent liver. Correlation with physical exam directed at this location may be helpful as appropriate clinically. MRI T spine 2/15  IMPRESSION:  1. No MR evidence of thoracic discitis or osteomyelitis. 2. Minimal T6-7 and T7-8 disc bulging and spondylosis. 3. Small bilateral pleural effusions and dependent atelectasis. Paraspinal  muscle atrophy. MRI brain 2/15  IMPRESSION:  Motion degraded study. 1.  No acute infarct, mass effect, or midline shift. 2.  Minimal amount of localized extra-axial near fluid signal along the right  convexity without restricted diffusion or mass effect.  Uncertain if this area is  artifactual, representing prominent CSF pulsation and/or motion. Difficult to  exclude a small subdural fluid collection such as hygroma or chronic hematoma  given history of falls. Subdural empyema would be expected to demonstrate  restricted diffusion which is not seen. No prior comparison study. Interval  follow-up may be helpful in patient is able to be more cooperative/still. 3. Scattered fluid within the bilateral paranasal sinuses and mastoid air cells,  nonspecific but suspect inflammatory related to intubation and NG tube. MRI L spine 2/15   IMPRESSION:  1. No CT evidence of lumbar discitis or osteomyelitis. 2. Prior left L5 laminotomy, partial left facetectomy and possibly previous  partial L4-5 discectomy. Minimal left asymmetric disc bulge but no evidence of  recurrent disc herniation or retained disc fragment. 3. Minimal L3-4 disc bulge and annular fissure. 4. Multilevel mostly mild facet joint osteoarthritis. Greatest and mild to  moderate level right L4-5 facet joint osteoarthritis with moderate-sized facet  joint effusion and synovitis. No adjacent periarticular marrow or soft tissue  edema to definitively suggest superimposed infective/septic arthritis. 5. Unremarkable upper sacroiliac joints though incompletely included. Mild body  wall edema.       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 XXXA    Back pain M54.9    Weakness of right lower extremity R29.898    Anemia D64.9    [facility] R57.9    Acute respiratory failure ([facility]) J96.00    Elevated troponin R74.8    Demand ischemia of myocardium ([facility]) I24.8    MRSA bacteremia R78.81 ·   MRSA sepsis with persistent bacteremia- improved; last blood cultures negative x 3 days, likely Vancomycin was not sensitive as appeared in Vitro. TERRY negative for vegetation. Spine MRI is negative also. Neck, abdomen and pelvis CT and ext CT without evidence of infection or abscess. Abscess scan and US abdomen without any source. Few skin folliculitis lesion bl LE but too small and superficial to be source. Now on Teflaro, repeat blood cultures 2/22/18 negative so far, ID following, all central catheters removed for now about 1 day  · Septic shock resolved and off pressors  · Acute respiratory failure- intubated on 2/12 extubated on 2/16, reintubated on 2/17 on hospital vent protocol  · HTN - medications adjusted by hospitalist  · Elevated troponin ? Demand ischemia vs NSTMI   · Acute renal failure - improved; S/P HD doing well with improvement in Cr and off of HD, HD cath removed 2/19/18. Now on Lasix for vent weaning planning  · Rhabdo resolved  · Afib, rate controlled S/P cardioversion and on PO amiodarone, PO Metoprolol and Lovenox  · Stable Hb, no gross bleeding, hemodynamically stable  · Back pain, fall - no absess or open wound   · Severe hypokalemia hypomagnesemia on presentation- improved and now on replacements  · Elevated blood sugars improving control  · Lactic acidosis resolved  · H/O ETOH   · Anemia of chronic illness   [facility]  [facility]- aim to keep peak plateau pressure less than or equal to 30cm H2O.  Titrate FiO2 for goal SPO2> 90%  -- weaning trials as tolerated-patient resp rate tends to be high; yesterday ABG showed resp alkalosis during weaning trials; today he has lots of thick creamy resp secretions, lethargic; he has been on vent since 2/12 extubated on 2/16, reintubated on 2/17 - will recommend Tracheostomy for patient since he is high risk for resp failure post extubation. -- VAP prevention bundle, head of the bed at 30' all times  -- Sedation holiday daily and assessment for weaning with SBT as tolerated. Not a candidate given some tachypnea when awake  -Sedation - prn versed and fentanyl   -- Repeat blood culture on Teflaro negative so far. Teflaro tolerated. Patient has PCN allergy.   -  He was on line holiday, PICC line done 2/26 -  poor iv access and long term antibiotics. -- Amiodarone and Metoprolol PO, lovenox for parox Afib per cardiology - currently in SR  -- Off of duoneb and continue atrovent due to afib and tachycardia previously  -- NA improved with free water flush. UO better and not on HD  -- Continue Lasix to assist with weaning process. Stable CXR. -- K and MG replacement per protocol  -- Adjust BP meds for control of blood pressure  -- Lantus 10 unit and humalog 3 units since back on feeding; SSI; watch for hypoglycemia  -- HB follow up in AM, transfuse if < 7  -- MRSA isolation; extensive work up done with no source for infection    ID: Sepsis source?, MRSA on multiple cultures   Vanco stopped after day 8 [2/19/18]. Now on Teflaro since 2/19/18  Azactam 2/11 stop on 2/14  levaquin 2/12  Stop on 2/15  Monitor skin lesions - no changes compared to previously     PAIN AND SEDATION: On Ativan and PRN Fentanyl, Thiamine and folic acid PO  · Skin/Wound: Monitor back, No skin absess  · Electrolytes: As above   · IVF: Wilbern Sow   · Nutrition: Tube feeds   · Prophylaxis: DVT Prophylaxis with Lovenox. GI Prophylaxis. · Restraints: minimal restrain to avoid pulling lines and tubes  · PT/OT eval and treat. OOB when appropriate. · Lines/Tubes: none. Femoral line- 2/12 removed on 2/15. Wilson 2/12/18. Right IJ dialysis catheter 2/14- removed 2/19, Left subclavian line removed 2/24/18; Picc line 2/26; ET tube 2/12 to 2/16, reintubated on 2/17.    ADVANCE DIRECTIVE:Full code/ Palliative is consulted and spoke with wife  FAMILY DISCUSSION: no family at bedside; update family when available  Quality Care: PPI, DVT prophylaxis, HOB elevated, Infection control all reviewed and addressed. Events and notes from last 24 hours reviewed.  Care plan discussed with nursing CC TIME: 39 min excluding procedure or discussions       Miya Woodall MD  Pulmonary Critical Care & Sleep Medicine

## 2018-02-27 NOTE — INTERDISCIPLINARY ROUNDS
CRITICAL CARE INTERDISCIPLINARY ROUNDS     Patient Information:     Name:   Kay Ruiz     Age:   57 y.o.     Admission Date:   2/11/2018     Critical Care Day: 9     Surgery Date:  n/a     Attending Provider:   Fernanda Troy MD     Surgeon:  n/a     Consultant(s):  Dr. Sierra Hilton, Dr. Evan Vallejo, Dr. Ismael Jasso Physician:  Dr. 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 Randall Sep    Back pain M54.9    Weakness of right lower extremity R29.898    Anemia D64.9    Shock ([address] R57.9    Acute respiratory failure ([facility]) J96.00    Elevated troponin R74.8    Demand ischemia of myocardium ([facility]) I24.8    MRSA bacteremia R78.81         Principal Problem:  Sepsis ([address]     During rounds the following quality care indicators and evidence based practices were addressed :  DVT Prophylaxis, PUD Prophylaxis, Pressure Injury Prevention, Sepsis resuscitation and management, Nutritional Status, Critical Care Interventions Airways, Dialysis, Lines and Pressors and Flu Vaccine Wilson Day 8 (M-Care yes) ; Central Line Day: 5 Isolation: contact-MRSA;  Antibiotic Stewardship: Vancomycin     Ventilator Bundle:  Ventilator Bundle Order Set: yes Sedation Vacation n/a; Spontaneous Breathing Trial n/a; Restraints yes (Order, Necessity, Documentation)  Vent Day: 3     Sepsis Order Set: yes or Elements of Sepsis Bundle Reviewed (Fluids, Antibiotics, Cultures, Glycemic control     Glycemic Control:          Recent Labs       02/19/18   0515  02/18/18   1015  02/18/18   0405  02/17/18   2100   GLU  200*  198*  179*  203*   ;        Recent Labs       02/19/18   7564  02/18/18   0648 02/17/18   1448   PHI  7.468*  7.498*  7.511*   PCO2I  33.4*  35.8  36.1   PO2I  97  100  96    Adjustments      Acute MI/PCI:   Not applicable     Door to Balloon: Admission Time: 1810       Heart Failure:    Not applicable      SCIP Measures for other Surgeries:   Not applicable CHG Bath Daily yes     Pneumonia:    Not applicable     Interdisciplinary team rounds were held with the following team membersCare Management, Diabetes Treatment Specialist, Nursing, Nutrition, Pharmacy, Physical Therapy, Physician and Respiratory Therapy. Plan of care discussed.      Goals of Care/ Recommendations: continue to monitor pt VS, labs, electrolyte replacement protocol  See clinical pathway and/or care plan for interventions and desired outcomes.   DC sedation, PT/OT, Possible Trach, Xray abdomen.     Critical Care Discharge Status:  Red

## 2018-02-27 NOTE — PROGRESS NOTES
Palliative Medicine Progress Note  DR. BANKS'[facility]: [phone] ([phone])  [facility]: [address] [place] [address] Way: [phone]    Patient Name: Josh King  YOB: 1955    Date of Initial Consult: 2/16/18  Reason for Consult: care decisions  Requesting Provider: Dr. Maria Guadalupe Barbour   Primary Care Physician: Kelsey Pulliam MD          SUMMARY:   Josh King is a 57y.o. year old who presented after two days of malaise with overwhelming MERSA sepsis and multi organ system failure. On pressors initially, has required dialysis and amiodarone drip with ventilator support. Now off pressors, with good UOP, extubated this am. Still minimally responsive. Only negative recent care finding is persistently + blood cultures and fevers. Previously reasonably healthy, last hospitalization for TKR. Recently lost son in January with hepatorenal syndrome. Wife at bedside. 2/19/18: Reintubated < 24 hr post extubation. Persistent MERSA + blood cultures since 2/11 despite MICs demonstrating sensitivity to vanc. No identified sequestered source of bacteremia. No family at bedside. 2/20/18: Stable overnight, no fever. Dialysis catheter removed. Remains on propofol. 2/21/18: Arousable today. Appears uncomfortable. On IV abx, for tagged WBC scan tomorrow looking for source of persistent bacteremia. 2/22/18: Resting comfortably. Blood cultures being drawn. 2/23/18: Alert, but not answering questions. Tagged WBC scan negative. 2/26/18: Alert, answering yes/no questions. Pain in back and chest.  SBT unsuccessful yesterday. 2/27/18: More sedated today.  Increased secretions PALLIATIVE DIAGNOSES:     Patient Active Problem List   Diagnosis Code    Osteoarthritis of right knee M17.11    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[facility] T84.018A, Z96.659    Failed total knee arthroplasty ([facility] T84.018A, Z96.659    Dysphagia R13.10    Sepsis ([facility] A41.9    Hypokalemia E87.6    Glucosuria R81    AMANDA (acute kidney injury) ([facility] N17.9    Fall W19. XXXA    Back pain M54.9    Weakness of right lower extremity R29.898    Anemia D64.9    Shock ([facility] R57.9    Acute respiratory failure (HCC) J96.00    Elevated troponin R74.8    Demand ischemia of myocardium (HCC) I24.8    MRSA bacteremia R78.81     1. PLAN:   1. Met with wife and answered questions regarding current clinical status. She understand that while he is improved he remains critically ill. Discussed implications of persistent + blood cultures. Reviewed his AMD and asked if she thinks he would be willing to undergo reintubation and reescalation of care should he decline rapidly again. She is not certain but knows he would not want aggressive measures if he had little or no chance of making a good recovery. Emotional support provided. 2/19/18: No evidence of improvement in underlying source of his multisystem critical illness. With reintubation and persistent bacteremia, prognosis appears to be worsening.    2/20/18: On different abx regimen. No clinical change. 2/21/18: Clinically unchanged. Repeat BC tomorrow. 2/22/18: Hopefully bacteremia clearing.  Will need to be considered for trach if not close to extubation by beginning of next week.    2/23/18: Discussed w/ Dr. Levy Sanches. If cultures remain negative he is hopeful he can be extubated in a time frame to avoid tracheostomy. Spent some time orienting him to his current situation and care plan. 2/26/18: Improving clinically and on paper if he can be extubated soon. If not may require PEG/trach. 2/27/18: Unfortunately respiratory progress behind other improving clinical parameters. For PEG and trach tomorrow. 2. Initial consult note routed to primary continuity provider  3. Communicated plan of care with: Palliative IDT    GOALS OF CARE:  Patient/Health Care Proxy Stated Goals: Cure      TREATMENT PREFERENCES:   Code Status: Full Code    Advance Care Planning:  Advance Care Planning 2/14/2018   Patient's Healthcare Decision Maker is: Named in scanned ACP document   Primary Decision Maker Name Nupur Kahn   Primary Decision Maker Phone Number [phone]   Primary Decision Maker Relationship to Patient Spouse   Secondary Decision Maker Name Jimy Acosta   Secondary Decision Maker Phone Number [phone]   Secondary Decision Maker Relationship to Patient Sibling   Confirm Advance Directive Yes, on file       Medical Interventions: Full interventions   Other Instructions: Other:  As far as possible, the palliative care team has discussed with patient / health care proxy about goals of care / treatment preferences for patient. HISTORY:     History obtained from: wife    CHIEF COMPLAINT: see summary    HPI/SUBJECTIVE:    The patient is:   [] Verbal and participatory  [x] Non-participatory due to:   sedation     Clinical Pain Assessment (nonverbal scale for nonverbal patients):        Activity (Movement): Lying quietly, normal position    Duration: for how long has pt been experiencing pain (e.g., 2 days, 1 month, years)  Frequency: how often pain is an issue (e.g., several times per day, once every few days, constant)     FUNCTIONAL ASSESSMENT:     Palliative Performance Scale (PPS):  PPS: 30    ECOG        PSYCHOSOCIAL/SPIRITUAL SCREENING:      Any spiritual / Holiness concerns:  [] Yes /  [x] No    Caregiver Burnout:  [] Yes /  [x] No /  [] No Caregiver Present      Anticipatory grief assessment:   [x] Normal  / [] Maladaptive        REVIEW OF SYSTEMS:     Positive and pertinent negative findings in ROS are noted above in HPI. The following systems were [] reviewed / [x] unable to be reviewed as noted in HPI  Other findings are noted below. Systems: constitutional, ears/nose/mouth/throat, respiratory, gastrointestinal, genitourinary, musculoskeletal, integumentary, neurologic, psychiatric, endocrine. Positive findings noted below. Modified ESAS Completed by: provider                                   Stool Occurrence(s): 1        PHYSICAL EXAM:     Wt Readings from Last 3 Encounters:   02/26/18 141.7 kg (312 lb 6.3 oz)   02/14/18 144.8 kg (319 lb 3.6 oz)   08/02/15 138.8 kg (306 lb)     Blood pressure 151/72, pulse 71, temperature 99.6 °F (37.6 °C), resp. rate 30, height 6' (1.829 m), weight 141.7 kg (312 lb 6.3 oz), SpO2 98 %.   Pain:  Pain Scale 1: Adult Nonverbal Pain Scale  Pain Intensity 1: 0  Pain Onset 1: chronic  Pain Location 1: Generalized  Pain Orientation 1: Lower  Pain Description 1: Aching, Constant  Pain Intervention(s) 1: Rest, Repositioned, Emotional support, Environmental changes  Last bowel movement:     Constitutional: obese, alert, intubated  Eyes: pupils equal, anicteric  ENMT: no nasal discharge, moist mucous membranes  Cardiovascular: regular rhythm, distal pulses intact  Respiratory: breathing not labored, symmetric  Gastrointestinal: soft non-tender, +bowel sounds  Musculoskeletal: no deformity, no tenderness to palpation  Skin: warm, dry  Neurologic:  Following some commands  Psychiatric:   Other:       HISTORY:     Principal Problem:    Sepsis ([address] (2/11/2018)    Active Problems:    Osteoarthritis of right knee (1/18/2014)      Hypokalemia (2/11/2018) Glucosuria (2/11/2018)      AMANDA (acute kidney injury) ([address] (2/11/2018)      Fall (2/11/2018)      Back pain (2/11/2018)      Weakness of right lower extremity (2/11/2018)      Anemia (2/11/2018)      Shock ([address] (2/13/2018)      Acute respiratory failure ([address] (2/13/2018)      Elevated troponin (2/12/2018)      Demand ischemia of myocardium ([address] (2/12/2018)      MRSA bacteremia (2/15/2018)      Past Medical History:   Diagnosis Date    Arrhythmia     irregular heart beat    Arthritis     knees- osteo    Chronic pain     knees    Diabetes ([address] 1990's    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[address] 10/21/2014    GERD (gastroesophageal reflux disease)     well controlled    Gout     High cholesterol     Hypertension 1990's    Irregular heart beat     palpitations    Morbid obesity ([address]     Osteoarthritis of right knee 1/18/2014    Other ill-defined conditions(799.89)     asbestosis    Other ill-defined conditions(799.89)     irregular heart beat    Other ill-defined conditions(799.89)     h/o migraines    Other ill-defined conditions(799.89)     gout    Other ill-defined conditions(799.89)     cholesterol    PUD (peptic ulcer disease) 1970's    Tinnitus of left ear       Past Surgical History:   Procedure Laterality Date    HX CARPAL TUNNEL RELEASE      HX KNEE ARTHROSCOPY  2010    left    HX KNEE ARTHROSCOPY  1980s    right    HX KNEE REPLACEMENT      HX ORTHOPAEDIC  1970s    right hand tendenitis    HX ORTHOPAEDIC  2012    left hand ring finger released    HX ORTHOPAEDIC  8-2013    left carpal    TOTAL KNEE ARTHROPLASTY  2009/2010    left      No family history on file. History reviewed, no pertinent family history.   Social History   Substance Use Topics    Smoking status: Former Smoker     Quit date: 10/1/1975    Smokeless tobacco: Never Used      Comment: 1970s    Alcohol use 1.5 oz/week     3 Shots of liquor per week     Allergies   Allergen Reactions    [employer] Allergenic Extract] Anaphylaxis    Cocoa Butter-Zinc Oxide Rash    Dilaudid [Hydromorphone (Bulk)] Rash     capsules    Pcn [Penicillins] Rash      Current Facility-Administered Medications   Medication Dose Route Frequency    midazolam (VERSED) injection 2 mg  2 mg IntraVENous Q3H PRN    insulin lispro (HUMALOG) injection 3 Units  3 Units SubCUTAneous Q6H    0.9% sodium chloride infusion 250 mL  250 mL IntraVENous PRN    [START ON 3/1/2018] amiodarone (CORDARONE) tablet 200 mg  200 mg Oral DAILY    Thiamine Mononitrate (B-1) tablet 100 mg  100 mg Oral DAILY    furosemide (LASIX) injection 40 mg  40 mg IntraVENous DAILY    fentaNYL citrate (PF) injection  mcg   mcg IntraVENous Q4H PRN    metoprolol tartrate (LOPRESSOR) tablet 50 mg  50 mg Oral Q12H    enoxaparin (LOVENOX) injection 150 mg  150 mg SubCUTAneous Q12H    amiodarone (CORDARONE) tablet 400 mg  400 mg Oral Q12H    cefTARoline (TEFLARO) 600 mg in 0.9% sodium chloride (MBP/ADV) 50 mL MBP  600 mg IntraVENous Q12H    ipratropium (ATROVENT) 0.02 % nebulizer solution 0.5 mg  0.5 mg Nebulization Q6H RT    ELECTROLYTE REPLACEMENT PROTOCOL -- Potassium  1 Each Other PRN    ELECTROLYTE REPLACEMENT PROTOCOL -- Magnesium  1 Each Other PRN    ELECTROLYTE REPLACEMENT PROTOCOL -- Phosphorus  1 Each Other PRN    ELECTROLYTE REPLACEMENT PROTOCOL -- Calcium  1 Each Other PRN    chlorhexidine (PERIDEX) 0.12 % mouthwash 10 mL  10 mL Oral Q12H    acetaminophen (TYLENOL) suppository 650 mg  650 mg Rectal Q6H PRN    hydrALAZINE (APRESOLINE) 20 mg/mL injection 10 mg  10 mg IntraVENous Q6H PRN    0.9% sodium chloride infusion 250 mL  250 mL IntraVENous PRN    heparin (porcine) 1,000 unit/mL injection 1,500 Units  1,500 Units IntraVENous PRN    acetaminophen (TYLENOL) solution 650 mg  650 mg Oral Q4H PRN    aspirin chewable tablet 81 mg  81 mg Oral DAILY    pantoprazole (PROTONIX) 40 mg in sodium chloride 0.9 % 10 mL injection  40 mg IntraVENous DAILY    naloxone (NARCAN) injection 0.4 mg  0.4 mg IntraVENous PRN    docusate sodium (COLACE) capsule 100 mg  100 mg Oral BID PRN    acetaminophen (TYLENOL) tablet 650 mg  650 mg Oral S8A PRN    folic acid (FOLVITE) tablet 1 mg  1 mg Oral DAILY    insulin lispro (HUMALOG) injection   SubCUTAneous Q6H    sodium chloride (NS) flush 5-10 mL  5-10 mL IntraVENous PRN    ondansetron (ZOFRAN ODT) tablet 4 mg  4 mg Oral Q4H PRN    glucose chewable tablet 16 g  4 Tab Oral PRN    glucagon (GLUCAGEN) injection 1 mg  1 mg IntraMUSCular PRN    dextrose (D50W) injection syrg 12.5-25 g  25-50 mL IntraVENous PRN        LAB AND IMAGING FINDINGS:     Lab Results   Component Value Date/Time    WBC 7.5 02/27/2018 04:30 AM    HGB 8.0 (L) 02/27/2018 04:30 AM    PLATELET 700 (H) 16/92/0143 04:30 AM     Lab Results   Component Value Date/Time    Sodium 149 (H) 02/27/2018 04:30 AM    Potassium 3.2 (L) 02/27/2018 12:04 PM    Chloride 115 (H) 02/27/2018 04:30 AM    CO2 23 02/27/2018 04:30 AM    BUN 30 (H) 02/27/2018 04:30 AM    Creatinine 1.38 (H) 02/27/2018 04:30 AM    Calcium 8.9 02/27/2018 04:30 AM    Magnesium 1.7 02/27/2018 12:04 PM    Phosphorus 3.5 02/27/2018 04:30 AM      Lab Results   Component Value Date/Time    AST (SGOT) 20 02/27/2018 04:30 AM    Alk.  phosphatase 226 (H) 02/27/2018 04:30 AM    Protein, total 6.3 (L) 02/27/2018 04:30 AM    Albumin 2.0 (L) 02/27/2018 04:30 AM    Globulin 4.3 (H) 02/27/2018 04:30 AM     Lab Results   Component Value Date/Time    INR 1.1 02/19/2018 05:15 AM    Prothrombin time 13.5 02/19/2018 05:15 AM    aPTT 127.9 (H) 02/20/2018 02:10 PM      No results found for: IRON, FE, TIBC, IBCT, PSAT, FERR   No results found for: PH, PCO2, PO2  No components found for: [place]   Lab Results   Component Value Date/Time     02/18/2018 04:05 AM    CK - MB <0.5 (L) 08/02/2015 11:47 PM              Total time: 15  Counseling / coordination time, spent as noted above 12 min  > 50% counseling / coordination        Kaitlin Breaux MD  Palliative Medicine

## 2018-02-27 NOTE — DIABETES MGMT
GLYCEMIC CONTROL & NUTRITION:    - Discussed in rounds  - recommend d/c Lantus and continue Humalog (scheduled + correction) at this time, BG trending lower than ICU targets  - TF at goal, 40 ml/hr, minor residuals     Recent Glucose Results:   Lab Results   Component Value Date/Time     (H) 02/27/2018 04:30 AM    GLUCPOC 117 (H) 02/27/2018 11:38 AM    GLUCPOC 116 (H) 02/27/2018 05:11 AM    GLUCPOC 106 02/26/2018 11:17 PM         LULY Castañeda, MPH, RD, CDE

## 2018-02-27 NOTE — PROGRESS NOTES
conducted a Follow up consultation and Spiritual Assessment for Marco Antonio Ravi, who is a 57 y.o.,male. Patient continues on vent. Wife was at the bedside this afternoon. Patient is expected to go for a trac and peg tomorrow. The  provided the following Interventions:  Continued the relationship of care and support. Listened empathically. Offered assurance of continued prayer on patients behalf. Chart reviewed. The following outcomes were achieved: Wife expressed gratitude for Spiritual Care visit. Patient was reviewed in ICU Interdisciplinary Rounds. Assessment:  There are no further spiritual or Yarsanism issues which require Spiritual Care Services intervention at this time. Plan:  Chaplains will continue to follow and will provide pastoral care as needed or requested.  recommends bedside caregivers page the  on duty if patient shows signs of acute spiritual or emotional distress. [address] [address].    Board Certified   [phone] - Office

## 2018-02-28 NOTE — PROGRESS NOTES
Report given to oncoming corazon, Iggy Singh RN, at pt bedside. Labs and orders reviewed. Pt VSS. Drips and lines verified. Pt awake and alert and nods appropriately.

## 2018-02-28 NOTE — PROGRESS NOTES
[id number] Dr. Judy Phalen spoke to patient's wife, she decided to make the patient a DNR and stop coding patient at present.

## 2018-02-28 NOTE — PROGRESS NOTES
Problem: Falls - Risk of  Goal: *Absence of Falls  Document Jessica Fall Risk and appropriate interventions in the flowsheet. Outcome: Progressing Towards Goal  Fall Risk Interventions:       Mentation Interventions: Evaluate medications/consider consulting pharmacy    Medication Interventions: Bed/chair exit alarm    Elimination Interventions: Call light in reach    History of Falls Interventions: Bed/chair exit alarm        Comments: Pt is following commands off sedation. Teaching completed with pt and also wife at bedside. Continue to suction patient to clear airways and promote optimal gas exchange. Pt is calm and nods head appropriately to questions, even smiling at times. Plan of care discussed with intensivist.  Pt is progressing towards all goals.

## 2018-02-28 NOTE — ROUTINE PROCESS
EMR reviewed. Patient noted to be in restraints within 24 hours of the time of death.  Information entered into internal log on: 2/28/18 1927

## 2018-02-28 NOTE — DIABETES MGMT
GLYCEMIC CONTROL & NUTRITION:    - entered chart to review glycemic control   - noted pt      LULY Hernandez, MPH, RD, CDE

## 2018-02-28 NOTE — PROGRESS NOTES
Bereavement Note:     responded to the death of Mret Samano, who is a 57 y.o., male, offering Spiritual Care to patient and family, see flow sheets for   interventions. On call  paged for death. [place] Sam was in room when  arrived grieving appropriately. She is still grieving over her son who  in the bed across the jasso recently.  home TBD. Body going to [facility]. She couldn't remember the name of her Rastafarian (\"big Christian Rastafarian on [address]\")  provided emotional and spiritual support as well as bereavement support and literature. Offered prayer. Walked her to her car. She is alone with no family to provide support - very sad. Date of Death: 18  Time of Death: 250    Extended Emergency Contact Information  Primary Emergency Contact: Sarina Almaraz  Address: [address] [place] [address] Phone: [phone]  Mobile Phone: [phone]  Relation: Spouse                 YES      NO  UNKNOWN  Life Net   []        [x]    []   [employer]   [] [x] []   Medical Examiner  []        [x]  []   Going to [employer]  [x]        [] []      Autopsy   []        [x]         []   Sympathy Card  [x]        []  Bereavement Materials  [x]        []           Business Card Provided  [x]        []              Home: TBD    Chaplains will continue to follow family and will provide spiritual care as needed. [employer]  [address] [facility]  [phone]

## 2018-02-28 NOTE — DISCHARGE SUMMARY
Admit Date: 2/11/2018   Date of Death: 2/28/2018     Patient ID:   Eden Cedillo   57 y.o.   1955   Time of Death: 0250   Cause of Death: sepsis, shock, AMANDA, a-fib, bacteremia, DM. Diagnosis   Patient Active Problem List    Diagnosis Date Noted    MRSA bacteremia 02/15/2018    Shock ([address] 02/13/2018    Acute respiratory failure ([address] 02/13/2018    Elevated troponin 02/12/2018    Demand ischemia of myocardium ([address] 02/12/2018    Sepsis ([address] 02/11/2018    Hypokalemia 02/11/2018    Glucosuria 02/11/2018    AMANDA (acute kidney injury) ([address] 02/11/2018    Fall 02/11/2018    Back pain 02/11/2018    Weakness of right lower extremity 02/11/2018    Anemia 02/11/2018    Dysphagia 08/03/2015    Failed total knee arthroplasty ([address] 10/22/2014    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[address] 10/21/2014    Osteoarthritis of right knee 01/18/2014        Hospital Course:   Eden Cedillo is a 57 y.o. male who came to the ed with complaints of fall. Patient states he has been feeling generalized weakness at home for the last day or so with low grade subjective fevers but yesterday he noted RLE weakness around 5pm. Since then he has fallen about 4 times in his back without any head trauma or LOC. He has been using his tripod cane to assist him with walking. Due to persistence of his weakness, falls, fevers and low back pain he decided to come to the ED. In the ED, CT of the head was neg and his labs showed significant hypoK with elevated Cr (unknown baseline). He was also febrile and tachycardic. He was started on broad spectrum Abx. RRT called on this patient for respiratory distress in morning of 02/12/2018, high mews score of 8, fever of 103, tachycardia, resp rate of 44. He was transferred to ICU. Patient continued to decline in condition with elevated resp rate. He was intubated and central line placed.   He was seen and followed by pulmonary critical care, after several SBT he was extubated on 02/16/2018 however post extubation his respiratory status declined and he was reintubated 02/17/2018. He was septic and in shock initially requiring pressors. His blood cultures with growth of MRSA, his blood cultures persistently positive even with vancomycin. Work up including imaging did not show obvious source of infection. ID consulted and antibiotic changes to teflaro. His blood cultures post antibiotic change no growth. He also was in A-fib and was seen and followed by cardiology. After TERRY he underwent cardioversion and converted to sinus rhythm. He was initially placed on amiodarone drip and later changes to oral amiodarone. His troponin elevated which is possible demand ischemia vs NSTEMI. His AMANDA, metabolic acidosis, rhabdomyolysis all improved. Patient became hypotensive again early this morning and was started on levophed. However his HR slowed down and then went into Asystole.      PCP: Giles Wayne MD

## 2018-02-28 NOTE — PROGRESS NOTES
Report received from Virginie Gaffney, [facility], at pt bedside. Pt on contact precautions for MRSA in blood. Pts wife at bedside. All sedation off since 0900, and pt is awake and alert, pain free, and follows commands. Pt calm and cooperative. Pt has soft wrist restraints and understands need for restraints. Education completed with wife and patient on plan of care. PICC line infusing fluids/meds without complications, dressing CDI. SCDs on bilat LE. Pt repositioned. FMS balloon deflated and FMS flushed per protocol. Pt tolerated well. VSS on monitor with alarms and settings verified. Bed in low position with alarm on. Wilson without dependent loop. TF stopped due to need to advance 4.5 cm per KUB with Dr. Sanjeev Castañeda notified. Verbal order received to advance. No residual and placement verified and flushes easily.

## 2018-02-28 NOTE — PROGRESS NOTES
KUB shows OGT in lower stomach. TF restarted at 40cc/hr. Water flushes increased from 100cc q 4 hrs to 150 cc q 4 hrs per MD orders. Pt diuresing. Potassium replacement protocol orders followed and 40 meq IV KCL ordered from pharmacy (4 10meq bags) and await delivery.

## 2018-02-28 NOTE — PROGRESS NOTES
Admit Date: 2/11/2018 Date/ time: 2/28/2018, 3:03 AM   Start Time 2/28/2018, 0222    End Time: 2/28/2018, 0250   Assessment:   Type of Arrest: Asystole   Other medical problems:   Principal Problem:    Sepsis ([address] (2/11/2018)    Active Problems:    Osteoarthritis of right knee (1/18/2014)      Hypokalemia (2/11/2018)      Glucosuria (2/11/2018)      AMANDA (acute kidney injury) ([address] (2/11/2018)      Fall (2/11/2018)      Back pain (2/11/2018)      Weakness of right lower extremity (2/11/2018)      Anemia (2/11/2018)      Shock ([address] (2/13/2018)      Acute respiratory failure (HCC) (2/13/2018)      Elevated troponin (2/12/2018)      Demand ischemia of myocardium ([address] (2/12/2018)      MRSA bacteremia (2/15/2018)       Patient Active Problem List   Diagnosis Code    Osteoarthritis of right knee M17.11    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[address] T84.018A, Z96.659    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 XXXA    Back pain M54.9    Weakness of right lower extremity R29.898    Anemia D64.9    Shock ([address] R57.9    Acute respiratory failure (HCC) J96.00    Elevated troponin R74.8    Demand ischemia of myocardium (HCC) I24.8    MRSA bacteremia R78.81      Past Medical History:   Diagnosis Date    Arrhythmia     irregular heart beat    Arthritis     knees- osteo    Chronic pain     knees    Diabetes (HCC) 1990's    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[address] 10/21/2014    GERD (gastroesophageal reflux disease)     well controlled    Gout     High cholesterol     Hypertension 1990's    Irregular heart beat     palpitations    Morbid obesity ([address]     Osteoarthritis of right knee 1/18/2014    Other ill-defined conditions(799.89)     asbestosis    Other ill-defined conditions(799.89)     irregular heart beat    Other ill-defined conditions(799.89) h/o migraines    Other ill-defined conditions(799.89)     gout    Other ill-defined conditions(799.89)     cholesterol    PUD (peptic ulcer disease) 1970's    Tinnitus of left ear          Interventions:   CPR From: 0222 to 0238     Epi 3 amps     Code blue called on this patient. His BP initially dropped and he was started on levophed. However patient's heart rate slowed down and went into asystole. ACLS started. CPR initiated. He received 3 rounds of epi. His pulse returned. I called and discussed with patient's wife and explained about the present code. I informed her that we are actively trying to resuscitate patient. I discussed that he may get his pulse back but high chances that he will code again. Discussed code status and she agreed with DNR if patient's heart stops again. Patient had pulse transiently and he again went into PEA and then asystole. No response to noxious stimuli. No spontaneous breath sounds nor cardiac sounds. Pupils fixed and dilated. TOD: 0250. Wife notified on phone.

## 2018-02-28 NOTE — CONSULTS
Oscar Martinez, Annalise Sapp  MR#: [id number]  : 1955  ACCOUNT #: [de-identified]   DATE OF SERVICE: 2018    HISTORY OF PRESENT ILLNESS:  This is a 33-year-old male who was admitted on  because of respiratory complication of influenza infection, requiring endotracheal intubation. He also has tested positive for MRSA in his blood. He has had some acute renal injury. He required a protracted endotracheal intubation. Presently, he is on 35% of oxygen with 5 of PEEP, but the plan is to put a tracheostomy and also a PEG tube to feed him. Presently, the patient has been on orogastric tube feeding. He appears to be tolerating it with some abdominal distention. He has some diarrhea going through a rectal tube collection system, but does not have C. difficile. So I am being asked to see him for PEG tube insertion in view of sending him for tracheostomy. This patient is already known to have significant obesity, irregular heartbeat, chronic osteoarthritis of the knee, diabetes, history of total knee arthroplasty, chronic GERD, gout, hypercholesterolemia, hypertension, migraine, history of peptic ulcer disease, tinnitus, had a previous bilateral knee arthroplasty, carpal tunnel release. ALLERGIES:  PENICILLIN, DILAUDID, AND BEE. HOME MEDICATIONS:  Zofran, Percocet 10/325, magnesium 250 tablets, vitamin B complex, multivitamins, HCTZ, Flonase, Zyloprim 100, Lopressor 100, TriCor 160, Crestor 10, Actos 45, Vasotec 20 b.i.d., Fioricet, Glucophage 1000 mg b.i.d., Claritin 10 mg b.i.d., Zantac 150 mg b.i.d., colchicine 0.6 mg. PHYSICAL EXAMINATION:  GENERAL:  The patient is completely nonverbal because he is on the ventilator, cannot communicate, but he seems to understand and he can nod with yes and no. He is sedated. VITAL SIGNS:  He weighs 141.7 kg.   Temperature is 99.1 and 99.6, pulse 71, blood pressure 151/72, breathing at 18-30 on 98% saturation on the vent parameters. HEENT:  He has also an orogastric tube, receiving tube feeding. Eyes are unremarkable. Pupils are equal and reactive to light. Sclerae are anicteric. The conjunctivae are pink. Oropharyngeal cavity is dry and pink mucous membrane. He is hard to evaluate because of endotracheal intubation. NECK:  Stiff. He has a right jugular vein catheter. LUNGS:  Clear to auscultation anteriorly. CARDIAC:  Rhythm is regular. S1, S2 normal.  ABDOMEN:  Huge, distended, tympanic, difficult to palpate, but not tender. No mass or organomegaly. Bowel sounds are normal.  There is no surgical scar. EXTREMITIES:  Remarkable for moderate left tibial pitting edema and mild in the right side. GENITOURINARY AND RECTAL:  He has a Wilson catheter and draining clear urine and he has also a rectal tube. NEUROLOGIC:  He appears to be alert, understands some. Moves barely his 4 extremities, but appears to be weak and stiff. LABORATORY DATA:  We have a hemoglobin of 8, MCV 98.5-100, WBC 7.5, platelet 960, normal differential except he has 8% eosinophils. Urinalysis on 02/11 showed that he has a large amount of glucose, 300 protein, large amount of blood, but negative for infection. Complete metabolic panel this morning showed that he has a BUN of 30, creatinine 1.38, albumin 2. Normal LFT except for alkaline phosphatase of 226. Blood culture on 02/18 were positive for MRSA, only susceptible to gentamicin and vancomycin. CT scan of the abdomen and pelvis on 02/17/2018 showed small bilateral pleural effusion with underlying bibasilar dependent consolidation, trace ascites, scattered colonic diverticula. In conclusion, this is a 78-year-old male who came with complication of influenza mostly with respiratory problem. He has been hard to wean off of the ventilator and will require soon to have tracheostomy. I was asked to put a PEG tube, but I think in his case it is going to be very short lived.   I do not think it is warranted to insert a PEG tube if it is going to be removed within less than a month. I recommend in this case to try to put an oral jejunal tube under fluoroscopic guidance. That probably will be less traumatic to the patient and can be removed easily at any time. This patient has significant anemia, which is at 7.4-8, which may be related to some GI tract blood losses. We should check his iron level, but also vitamin C11, folic acid. This patient has hypertension, chronic kidney disease. He is morbidly obese. He has mild elevation of the alkaline phosphatase, most likely related to fatty liver. He is also diabetic. From my side, I will see him as needed. I recommend that we check for occult blood in the stools. This patient already had an EGD by Dr. Fernanda Braun on 08/03/2015, which showed evidence of eosinophilic esophagitis with some antral erosions. In my opinion, this patient should stay on long-term PPI instead of Zantac and may require at one time a repeat EGD. I am not sure if he already had a colonoscopy. Thank you for this consultation. MD Aureliano Sandoval / Kvng Goode  D: 02/27/2018 19:01     T: 02/27/2018 19:41  JOB #: [id number]  CC: Janene Bryson MD  CC: aJk Mendiola MD

## 2018-02-28 NOTE — PROGRESS NOTES
@2015 Pt taken over awake in bed on ventilator via ET tube . Tube feeds continues via OG tube O residual ,positon verified. Wilson remains in situ draining clear yellow urine. Flexi seal ( fecal management system ) in situ draining brown liquid stool . Electrolytes being replaced per protocol. . Assessment done and documented in appropriate flow sheets. @2200 no changes, incontinent care administered brown liquid stool, care continues. @0000 pt incontinent of stool leaking around flexi seal. Incontinent care administered observation continues. @0137 pt BP low after being recycled. Dr. Dayanna Sin was called and updated 0.9%NACL bolus 500 mls administered care continues. @0200 no improvement  In BP Levophed started and being titrated up to desired BP observation continues. @0225 pt HR dropped witnessed by  nurses present in room , pulsed checked , no pulse code blue was called and CPR started immediately. @9284 pt wife spoke with Dr Elias Jade and Dr. Elias Jade communicated that pt is now DNR. Pt has no pulse at this time Dr Celena Jade pronounced pt dead, and he informed the patients wife who is on her way in.  was called and informed, he verbalized that he will be here shortly. @0300 after postpartum care blood observed in mouth and small amount of black liquid stool . @ 1235 [facility] was called [facility] responded  and pt was released by them and [facility] due to MRSA in blood. Nursing supervisor was already informed by [facility].   @ 0400 Pt to be taken to the [facility] upon wives request. Wife in with patient.

## 2020-12-18 NOTE — CDMP QUERY
[facility]  Cardiology Office Note  [id number]  Phone: [phone]  Fax: [phone]                            Date:  12/18/2020  Patient: Jacy Ayon  Age:  60 y.o., 1965    Referral: LULY Power *      PROBLEM LIST:    Patient Active Problem List    Diagnosis Date Noted    Coronary artery disease involving native coronary artery      Priority: High    Chest pain 09/21/2019     Priority: Low    Traumatic tear of medial meniscus of right knee 03/08/2017     Priority: Low    Cervical radiculopathy      Priority: Low    Hypoesthesia of skin      Priority: Low     1.  Non-STEMI 9/21/2019 with catheterization showing severe spasm of distal LAD, unresponsive to IC nitroglycerin, normal LV ejection fraction with apical wall motion abnormality, significant improvement in coronary anatomy by catheterization 11/7/2019. 2.  GERD. 3.  Family history of premature CAD involving mother who was a smoker    PRESENTATION: Jacy Ayon is a 60y.o. year old male here for follow-up evaluation. He has been doing fairly well but does report episodic left-sided upper and right sided upper discomfort which occurs at any time unrelated to exertion but has been ongoing. He is able to play racquetball by his account but when he climbs any inclines while playing golf he gets short of breath. Stairs would make him short of breath as well. No leg swelling. REVIEW OF SYSTEMS:  Review of Systems   Constitutional: Negative for activity change, diaphoresis and fatigue. HENT: Negative for hearing loss, nosebleeds and tinnitus. Eyes: Negative for visual disturbance. Respiratory: Negative for cough, shortness of breath and wheezing. Cardiovascular: Negative for chest pain, palpitations and leg swelling. Gastrointestinal: Negative for abdominal distention, abdominal pain, blood in stool, diarrhea and vomiting. Please clarify if there is any organ dysfunction directly related to sepsis.     => Sepsis associated with AMANDA and Acute Respiratory Failure  => Other Explanation of clinical findings  => Unable to Determine (no explanation of clinical findings)    The medical record reflects the following:  Risk:  -- admitted with sepsis of unknown source, Acute renal Failure  2/12 Developed Acute respiratory insufficiency requiring intubation    Clinical Indicators:  -- H&P states:  Sepsis, of unknown origin, AMANDA  Lactic acid 2.1/2.3  .1, -130, RR 23-41  2/11 blood culture growing staph aureus    Treatment:   NS fluid bolus, Vancomycin, Levaquin, blood cultures    Thank you,  Cindy Sams RN, CCDS Endocrine: Negative for cold intolerance, heat intolerance, polydipsia, polyphagia and polyuria. Genitourinary: Negative for difficulty urinating, flank pain and hematuria. Musculoskeletal: Negative for arthralgias, back pain, joint swelling and myalgias. Skin: Negative for pallor and rash. Neurological: Negative for dizziness, seizures, syncope and headaches. Psychiatric/Behavioral: Negative for behavioral problems and dysphoric mood. The patient is not nervous/anxious. Past Medical History:      Diagnosis Date    Asthma     bronchitis    CAD (coronary artery disease)     GERD (gastroesophageal reflux disease)     Hyperlipidemia        Past Surgical History:      Procedure Laterality Date    BICEPS TENDON REPAIR  2006    labrium repair and bicep, [place]    KNEE CARTILAGE SURGERY Right 3/9/2017    KNEE ARTHROSCOPIC PARTIAL MEDIAL MENISCECTOMY performed by Demetrice Gallardo MD at [place] [address] Left        Medications:  Current Outpatient Medications   Medication Sig Dispense Refill    omeprazole (PRILOSEC) 20 MG delayed release capsule TAKE 1 CAPSULE BY MOUTH EVERY DAY 90 capsule 3    aspirin EC 81 MG EC tablet Take 1 tablet by mouth daily 30 tablet 0    amLODIPine (NORVASC) 2.5 MG tablet TAKE 1 TABLET BY MOUTH EVERY DAY (Patient not taking: Reported on 12/18/2020) 90 tablet 3    nitroGLYCERIN (NITROSTAT) 0.4 MG SL tablet PLACE 1 TABLET UNDER TONGUE EVERY 5 MINS, UP TO 3 DOSES AS NEEDED FOR CHEST PAIN (Patient not taking: Reported on 12/18/2020) 75 tablet 1    predniSONE (DELTASONE) 10 MG tablet Days 1-3 take 1 PO TID, days 4-6 take 1 PO BID, days 7-9 take 1 PO daily.  (Patient not taking: Reported on 12/18/2020) 18 tablet 0    ibuprofen (ADVIL;MOTRIN) 200 MG tablet Take 200 mg by mouth every 6 hours as needed for Pain (takes 3-6 tablets daily)      rosuvastatin (CRESTOR) 10 MG tablet Take 1 tablet by mouth nightly (Patient not taking: Reported on 12/18/2020) 90 tablet 3  cetirizine (ZYRTEC) 10 MG tablet Take 10 mg by mouth daily as needed for Allergies      celecoxib (CELEBREX) 200 MG capsule Take 200 mg by mouth as needed        No current facility-administered medications for this visit. Allergies:  Patient has no known allergies. Past Social History:  Social History     Socioeconomic History    Marital status:      Spouse name: Not on file    Number of children: Not on file    Years of education: Not on file    Highest education level: Not on file   Occupational History    Not on file   Social Needs    Financial resource strain: Not on file    Food insecurity     Worry: Not on file     Inability: Not on file    Transportation needs     Medical: Not on file     Non-medical: Not on file   Tobacco Use    Smoking status: Never Smoker    Smokeless tobacco: Never Used   Substance and Sexual Activity    Alcohol use:  Yes    Drug use: No    Sexual activity: Yes     Partners: Female   Lifestyle    Physical activity     Days per week: Not on file     Minutes per session: Not on file    Stress: Not on file   Relationships    Social connections     Talks on phone: Not on file     Gets together: Not on file     Attends [employer] service: Not on file     Active member of club or organization: Not on file     Attends meetings of clubs or organizations: Not on file     Relationship status: Not on file    Intimate partner violence     Fear of current or ex partner: Not on file     Emotionally abused: Not on file     Physically abused: Not on file     Forced sexual activity: Not on file   Other Topics Concern    Not on file   Social History Narrative    Not on file       Family History:       Problem Relation Age of Onset    Cancer Mother         lung cancer    No Known Problems Father          Physical Examination:  BP 98/70   Pulse 58   Ht 5' 10\" (1.778 m)   Wt 205 lb (93 kg)   BMI 29.41 kg/m²   Physical Exam  Constitutional: Appearance: He is well-developed. Comments: Blood pressure right arm sitting 126/85 mmHg, pulse 68 bpm.   HENT:      Mouth/Throat:      Pharynx: No oropharyngeal exudate. Eyes:      General: No scleral icterus. Right eye: No discharge. Left eye: No discharge. Neck:      Thyroid: No thyromegaly. Vascular: No JVD. Cardiovascular:      Rate and Rhythm: Normal rate and regular rhythm. Heart sounds: No murmur. No friction rub. No gallop. Comments: No JVD  No edema  No significant systolic or diastolic murmurs appreciated    Pulmonary:      Effort: No respiratory distress. Breath sounds: No stridor. No wheezing or rales. Abdominal:      General: Bowel sounds are normal. There is no distension. Palpations: Abdomen is soft. There is no mass. Tenderness: There is no abdominal tenderness. There is no guarding or rebound. Comments: No palpable organomegaly   Musculoskeletal:         General: No deformity. Skin:     General: Skin is warm. Coloration: Skin is not pale. Findings: No erythema or rash. Neurological:      Mental Status: He is alert and oriented to person, place, and time. Motor: No abnormal muscle tone. Coordination: Coordination normal.      Deep Tendon Reflexes: Reflexes normal.           Labs:   CBC: No results for input(s): WBC, HGB, HCT, PLT in the last 72 hours. BMP:No results for input(s): NA, K, CO2, BUN, CREATININE, LABGLOM, GLUCOSE in the last 72 hours. BNP: No results for input(s): BNP in the last 72 hours. PT/INR: No results for input(s): PROTIME, INR in the last 72 hours. APTT:No results for input(s): APTT in the last 72 hours. CARDIAC ENZYMES:No results for input(s): CKTOTAL, CKMB, CKMBINDEX, TROPONINI in the last 72 hours.   FASTING LIPID PANEL:  Lab Results   Component Value Date    HDL 45 09/27/2019    LDLCALC 137 09/27/2019    TRIG 222 09/27/2019 LIVER PROFILE:No results for input(s): AST, ALT, LABALBU in the last 72 hours. Imaging:          ASSESSMENT and PLAN:    57-year-old gentleman with past medical history of GERD presenting with new chest pain ruling in for non-STEMI 9/21/2019 with cardiac catheterization showing severe diffuse spasm in the distal LAD with occlusion of the vessel and apical wall motion abnormality treated for spasm with nitrates and calcium channel blockers with improvement in symptoms, significant improvement in coronary anatomy by catheterization 11/7/2019 here for follow-up evaluation. 1.  His symptoms of chest discomfort have been ongoing and are atypical not exertional in nature. He does get some shortness of breath with inclines but not with playing racque[place]yeview. He does not exercise regularly. I have asked him to make an effort in this direction. I would do an exercise stress echo and rule out hypertensive etiology of symptoms or any change in EF. He has stopped all his medications except for aspirin. 2.  He will follow-up with me in 1 year. Orders:  Orders Placed This Encounter   Procedures    ECHO Stress Test     No orders of the defined types were placed in this encounter. Return in about 1 year (around 12/18/2021). Electronically signed by Stephanie Moreno MD on 12/18/2020 at [address] [place] [address] [facility]      Thisdictation was generated by voice recognition computer software. Although all attempts are made to edit the dictation for accuracy, there may be errors in the transcription that are not intended.